# Patient Record
Sex: FEMALE | Race: WHITE | Employment: UNEMPLOYED | URBAN - METROPOLITAN AREA
[De-identification: names, ages, dates, MRNs, and addresses within clinical notes are randomized per-mention and may not be internally consistent; named-entity substitution may affect disease eponyms.]

---

## 2017-05-30 ENCOUNTER — HOSPITAL ENCOUNTER (OUTPATIENT)
Dept: LAB | Age: 42
Discharge: HOME | End: 2017-05-30

## 2017-05-30 PROCEDURE — 88312 SPECIAL STAINS GROUP 1: CPT | Performed by: INTERNAL MEDICINE

## 2017-05-30 PROCEDURE — 88305 TISSUE EXAM BY PATHOLOGIST: CPT | Performed by: INTERNAL MEDICINE

## 2018-03-02 PROBLEM — E55.9 VITAMIN D DEFICIENCY: Status: ACTIVE | Noted: 2018-03-02

## 2018-04-03 ENCOUNTER — HOSPITAL ENCOUNTER (OUTPATIENT)
Dept: MAMMOGRAPHY | Age: 43
Discharge: HOME | End: 2018-04-03
Payer: MEDICARE

## 2018-04-03 DIAGNOSIS — Z12.39 SCREENING FOR BREAST CANCER: ICD-10-CM

## 2018-04-03 PROCEDURE — 77067 SCR MAMMO BI INCL CAD: CPT

## 2018-04-17 PROBLEM — Z00.00 ANNUAL PHYSICAL EXAM: Status: ACTIVE | Noted: 2018-04-17

## 2018-04-17 PROBLEM — R51.9 HA (HEADACHE): Status: ACTIVE | Noted: 2018-04-17

## 2018-05-24 PROBLEM — D64.9 ANEMIA: Status: ACTIVE | Noted: 2018-05-16

## 2018-10-10 ENCOUNTER — HOSPITAL ENCOUNTER (OUTPATIENT)
Dept: PHYSICAL THERAPY | Age: 43
Discharge: HOME | End: 2018-10-10
Payer: MEDICARE

## 2018-10-10 PROCEDURE — 97162 PT EVAL MOD COMPLEX 30 MIN: CPT

## 2018-10-10 PROCEDURE — G8979 MOBILITY GOAL STATUS: HCPCS

## 2018-10-10 PROCEDURE — G8978 MOBILITY CURRENT STATUS: HCPCS

## 2018-10-10 NOTE — THERAPY EVALUATION
Tammy Zimmerman  : 1975  Primary: [place]  [place] [facility] at [address] [facility] [address] [facility], [place] [address] [address] [facility]  [phone]   [phone]       OUTPATIENT PHYSICAL THERAPY:Initial Assessment 10/10/2018    ICD-10: Treatment Diagnosis: Pain in right ankle and joints of right foot (M25.571)      Stiffness of right ankle, not elsewhere classified (M25.671)    Difficulty in walking, not elsewhere classified (R26.2)   Precautions/Allergies:   Abilify [aripiprazole]; Geodon [ziprasidone hcl]; Suzzanna Mirna; Ambien [zolpidem]; and Topamax [topiramate]   Fall Risk Score: 3 (? 5 = High Risk)  MD Orders: Evaluate and treat  MEDICAL/REFERRING DIAGNOSIS:  foot, right   DATE OF ONSET: 2018  REFERRING PHYSICIAN: Barry Sandoval MD  RETURN PHYSICIAN APPOINTMENT: 2018 (pt unsure of date)   This section established at most recent assessment  ASSESSMENT:  Tammy Zimmerman is a 41 y.o. female who presents to physical therapy for R ankle/foot pain that started in 2018 and has recently gotten worse. This session, she demonstrated decreased B LE strength/endurance, pain with R ankle mobility, multiple postural/gait deficits, decreased standing/walking tolerance, and decreased functional mobility as evident by a score of 36/84 on the Foot and Ankle Ability Measure and a score of 27/80 on the Lower Extremity Functional Scale (with lower scores indicating increased disability). Pt may benefit from skilled PT to address the above listed deficits to improve ability to perform pain-free ADLs/IADLs and to improve overall quality of life prior to discharge. PROBLEM LIST (Impacting functional limitations):  1. Decreased Strength  2. Decreased ADL/Functional Activities  3. Decreased Transfer Abilities  4. Decreased Ambulation Ability/Technique  5. Decreased Balance  6. Increased Pain  7.  Decreased Activity Tolerance  8. Increased Fatigue  9. Decreased Flexibility/Joint Mobility  10. Edema/Girth INTERVENTIONS PLANNED:  1. Balance Exercise  2. Bed Mobility  3. Cold  4. Electrical Stimulation  5. Family Education  6. Gait Training  7. Heat  8. Home Exercise Program (HEP)  9. Manual Therapy  10. Neuromuscular Re-education/Strengthening  11. Range of Motion (ROM)  12. Therapeutic Activites  13. Therapeutic Exercise/Strengthening  14. Transcutaneous Electrical Nerve Stimulation (TENS)  15. Transfer Training  16. Ultrasound (US)   TREATMENT PLAN:  Effective Dates: 10/10/2018 TO 12/9/2018 (60 days). Frequency/Duration: 2 times a week for 60 Days  GOALS: (Goals have been discussed and agreed upon with patient.)  Short-Term Functional Goals: Time Frame: 30 days  1. Pt will be compliant with HEP in order to increase LE strength/endurance/mobility to improve functional mobility and overall quality of life. 2. Pt will improve score on the Lower Extremity Functional Scale to 38/80 in order to demonstrate improved functional mobility and quality of life. 3. Pt will improve score on the Foot and Ankle Ability Measure to 41/84 in order to demonstrate improved functional mobility and quality of life. 4. Pt will report standing for > 5 minutes with minimal to no increase in pain in order to be able to stand for prolonged periods as needed to perform chores. 5. Pt will be able to ascend/descend 6 steps with S with or without rail with safe gait pattern and minimal to no increase in pain in order to improve community mobility. Discharge Goals: Time Frame: 60 days  1. Pt will be independent with HEP in order to increase LE strength/endurance/mobility to improve functional mobility and overall quality of life. 2. Pt will improve score on the Lower Extremity Functional Scale to 50/80 in order to demonstrate improved functional mobility and quality of life.    3. Pt will improve score on the Foot and Ankle Ability Measure to 45/84 in order to demonstrate improved functional mobility and quality of life. 4. Pt will report standing for > 10 minutes with minimal to no increase in pain in order to be able to stand for prolonged periods as needed to perform chores. 5. Pt will be able to ascend/descend 12 steps Magdalena with or without rail with reciprocal gait pattern and minimal to no increase in pain in order to improve community mobility. Rehabilitation Potential For Stated Goals: Good  Regarding Sanjay Pelletier's therapy, I certify that the treatment plan above will be carried out by a therapist or under their direction. Thank you for this referral,  KRISTINE QuachT     Referring Physician Signature: Ofe Giron MD              Date                    HISTORY:   History of Present Injury/Illness (Reason for Referral): *History per pt or pt's family except where otherwise noted  Pt states pain in her R ankle started in July 2018 (states she was walking more than usual), and she states that she was being treated for it (with prednisone) and it had gotten better with taping, but it has recently gotten worse again (early September when she stopped doing taping and started everyday walking again). States the pain has gotten better temporarily with Prednisone, but states that when she goes off the Prednisone it starts hurting again. Pain at worst is 6-7/10 (aggravating activities include walking > 5 minutes, prolonged standing > 1-2 minutes - even in shower). Pain at best is 0/10 (eases pain with prednisone, rest, taping, mobic, ice). Pt states she was given boot to wear to help with the pain but she cannot wear this while driving (she is supposed to wear this 4 weeks starting last Friday). Past Medical History/Comorbidities:   Ms. Michelle Betancourt  has a past medical history of Acid reflux; ADHD (attention deficit hyperactivity disorder); Allergic rhinitis (1/20/2014); Anxiety disorder (1/20/2014); Arthritis;  Asthma; B12 deficiency (1/20/2014); Cerumen impaction; Chronic sinusitis; Depression (1/20/2014); Deviated nasal septum; Endocrine disease; Fibromyalgia; GERD (gastroesophageal reflux disease) (1/20/2014); Headache; Hypothyroidism; Low blood sugar; Meniere disease (1/20/2014); Migraine; Nasal obstruction; OCD (obsessive compulsive disorder); Psychiatric disorder; Special examinations; Special examinations; Tinnitus; and Unspecified hypothyroidism (1/20/2014). Ms. Heber Grijalva  has a past surgical history that includes hx orthopaedic; hx heent; hx heent (07/03/2013); and hx heent (06/2017). Social History/Living Environment:    lives alone in one story apartment with elevator to get up (15 flights of stairs with B railing - occasionally has to take the stairs down if she has an appointment and the elevator is busy)  Prior Level of Function/Work/Activity:  On disability for bipolar disorder and anxiety; likes to walk downtown with her friends, does housework (vacuuming, tidying up, light cooking); has to shop for her groceries  Dominant Side:         RIGHT  Other Clinical Tests:          X-rays of R foot (showed heel spur according to pt)  Previous Treatment Approaches:          Prednisone has helped  Personal Factors:          Sex:  female        Age:  41 y.o. Current Medications:    Current Outpatient Prescriptions:     diclofenac (VOLTAREN) 1 % gel, Apply 2 g to affected area four (4) times daily. Apply to right foot as needed 2-4 times a day, Disp: 100 g, Rfl: 2    tiZANidine (ZANAFLEX) 4 mg tablet, Take 1 Tab by mouth three (3) times daily as needed. , Disp: 90 Tab, Rfl: 1    levothyroxine (SYNTHROID) 125 mcg tablet, Take 1 Tab by mouth daily. , Disp: 90 Tab, Rfl: 0    Dexlansoprazole (DEXILANT) 60 mg CpDB, Take 1 Cap by mouth every morning., Disp: 90 Cap, Rfl: 0    candesartan (ATACAND) 4 mg tablet, Take 1 Tab by mouth daily. , Disp: 90 Tab, Rfl: 0    IRON, FERROUS SULFATE, PO, Take 45 mg by mouth daily. , Disp: , Rfl:    cyanocobalamin 1,000 mcg tablet, Take 1 Tab by mouth daily. , Disp: 60 Tab, Rfl: 0    meloxicam (MOBIC) 15 mg tablet, Take 1 Tab by mouth daily. , Disp: 92 Tab, Rfl: 1    butalbital-acetaminophen (BUPAP)  mg tab, Take 1 Tab by mouth daily as needed. , Disp: 30 Tab, Rfl: 5    montelukast (SINGULAIR) 10 mg tablet, Take 1 Tab by mouth daily. , Disp: 92 Tab, Rfl: 1    cetirizine (ZYRTEC) 10 mg tablet, Take 1 Tab by mouth daily. , Disp: 92 Tab, Rfl: 1    venlafaxine-SR (EFFEXOR XR) 150 mg capsule, Take  by mouth daily. , Disp: , Rfl:     escitalopram oxalate (LEXAPRO) 20 mg tablet, Take 20 mg by mouth daily. , Disp: , Rfl:     Magnesium Oxide 500 mg cap, Take 500 mg by mouth daily. , Disp: , Rfl:     traMADol (ULTRAM) 50 mg tablet, Take 1 Tab by mouth every eight (8) hours as needed for Pain. Max Daily Amount: 150 mg., Disp: 30 Tab, Rfl: 1    amphetamine-dextroamphetamine XR (ADDERALL XR) 20 mg XR capsule, TK 1 C PO QD, Disp: , Rfl: 0    AMITIZA 24 mcg capsule, TK 1 C PO BID WF AND WATER, Disp: , Rfl: 6    albuterol (PROVENTIL HFA, VENTOLIN HFA, PROAIR HFA) 90 mcg/actuation inhaler, Take 2 Puffs by inhalation as needed for Wheezing., Disp: 1 Inhaler, Rfl: 2    ALPRAZolam (XANAX) 1 mg tablet, 0.5 mg three (3) times daily as needed. , Disp: , Rfl: 4    SUMAtriptan (IMITREX) 50 mg tablet, Take 50 mg by mouth., Disp: , Rfl:     risperiDONE (RISPERDAL) 2 mg tablet, Take 3 mg by mouth nightly., Disp: , Rfl: 3    LAMICTAL 200 mg tablet, TK 1 T PO BID, Disp: , Rfl: 4    pregabalin (LYRICA) 75 mg capsule, 2 PO q hs, Disp: , Rfl:     melatonin tab tablet, Take 5 mg by mouth., Disp: , Rfl:     cholecalciferol (VITAMIN D3) 5,000 unit capsule, Take 2,000 Units by mouth., Disp: , Rfl:     mirabegron ER (MYRBETRIQ) 25 mg ER tablet, Take 1 Tab by mouth daily.  (Patient taking differently: Take 25 mg by mouth nightly.), Disp: 30 Tab, Rfl: 12   Date Last Reviewed:  10/10/2018   # of Personal Factors/Comorbidities that affect the Plan of Care: 3+: HIGH COMPLEXITY   EXAMINATION:   Initial assessment on 10/10/2018  Observation/Orthostatic Postural Assessment:    The following postural deficits were noted in sitting:  loss of cervical lordosis, increased thoracic kyphosis, forward head, rounded shoulders, posterior pelvic tilt   The following postural deficits were noted in standing: forward trunk flexion, pronated foot L > R, slight genu valgus noted B  Palpation:          Pt with tenderness noted at posterior R heel and at dorsum of R foot  ROM:    Initial measurement: (on 10/10/2018) Initial measurement: (on 10/10/2018) Reassessment measurement:  Reassessment measurement:    L LE: WFL throughout, but excessive hip IR passively R LE: WFL throughout, but excessive hip IR passively; ankle motion [facility] but significant tightness noted in dorsiflexors with pain in ankle       Strength:    Initial measurement: (on 10/10/2018) Initial measurement: (on 10/10/2018) Reassessment measurement:  Reassessment measurement:    L LE:  Hip flexion: 5/5   Hip extension: 4-/5  Hip abduction: 4-/5  Hip adduction: 5/5  Hip IR: 4+/5  Hip ER: 4/5   Knee flexion:  4/5  Knee extension: 5/5  Ankle DF: 4/5 R LE:  Hip flexion: 5/5  Hip extension: 4-/5  Hip abduction: 4/5  Hip adduction: 4/5  Hip IR: 4+/5  Hip ER: 4/5  Knee flexion: 4+/5  Knee extension: 5/5  Ankle DF: 4-/5       Special Tests:          No leg length discrepancy noted  Neurological Screen:        Myotomes:  WFL        Dermatomes:  No deficits noted  Functional Mobility:         Gait/Ambulation:  Pt ambulates with no AD with following gait deficits: decreased B step length/clearance, increased pronation at each foot in stance phase, decreased B heel strike, decreased B arm swing, slight forward trunk flexion, increased lateral sway        Transfers:  Pt able to stand/sit with minimal to no UE use        Bed Mobility:  Pt able to perform Magdalena with increased time  Balance:          Slight lateral sway with ambulation    Body Structures Involved:  1. Nerves  2. Bones  3. Joints  4. Muscles  5. Ligaments Body Functions Affected:  1. Sensory/Pain  2. Neuromusculoskeletal  3. Movement Related Activities and Participation Affected:  1. General Tasks and Demands  2. Mobility  3. Self Care  4. Domestic Life  5. Interpersonal Interactions and Relationships  6. Community, Social and [place]   # of elements that affect the Plan of Care: 4+: HIGH COMPLEXITY   CLINICAL PRESENTATION:   Presentation: Evolving clinical presentation with changing clinical characteristics: MODERATE COMPLEXITY   CLINICAL DECISION MAKING:   Outcome Measure: Tool Used: Lower Extremity Functional Scale (LEFS)  Score:  Initial: 27/80 Most Recent: X/80 (Date: -- )   Interpretation of Score: 20 questions each scored on a 5 point scale with 0 representing \"extreme difficulty or unable to perform\" and 4 representing \"no difficulty\". The lower the score, the greater the functional disability. 80/80 represents no disability. Minimal detectable change is 9 points. Score 80 79-63 62-48 47-32 31-16 15-1 0   Modifier CH CI CJ CK CL CM CN     ? Mobility - Walking and Moving Around:     - CURRENT STATUS: CL - 60%-79% impaired, limited or restricted    - GOAL STATUS: CJ - 20%-39% impaired, limited or restricted    - D/C STATUS:  ---------------To be determined---------------    Tool Used: PT/OT FOOT AND ANKLE ABILITY MEASURE  Score:  Initial: 36/84 Most Recent: X (Date: -- )   Interpretation of Score: For the \"Activities of Daily Living\", there are 21 questions each scored on a 5 point scale with 0 representing \"Unable to do\" and 4 representing \"No difficulty\". The lower the score, the greater the functional disability. 84/84 represents no disability. Minimal detectable change is 5.7 points.   With the addition of the 8 questions in the \"Sports Subscale,\" there are 29 questions, each scored on a 5 point scale with 0 representing \"Unable to do\" and 4 representing \"No difficulty\". The lower the score, the greater the functional disability. 116/116 represents no disability. Minimal detectable change is 12.3 points. Activities of Daily Living:  Score 84 83-68 67-51 50-34 33-18 17-1 0   Modifier CH CI CJ CK CL CM CN     Activities of Daily Living + Sports Subscale:  Score 116 115-94 93-71 70-47 46-24 23-1 0   Modifier CH CI CJ CK CL CM CN     Payor: [employer] MEDICARE / Plan: [address] [place] [address] HMO / Product Type: Managed Care Medicare /   Medical Necessity:   · Patient is expected to demonstrate progress in strength, range of motion, balance and functional technique to improve ability to perform pain-free standing/ambulation. · Patient demonstrates good rehab potential due to higher previous functional level. Reason for Services/Other Comments:  · Patient continues to require modification of therapeutic interventions to increase complexity of exercises. Use of outcome tool(s) and clinical judgement create a POC that gives a: Questionable prediction of patient's progress: MODERATE COMPLEXITY   TREATMENT:   (In addition to Assessment/Re-Assessment sessions the following treatments were rendered)  Subjective comments at beginning of session: See history above. Assessment only today, no treatment provided. Treatment/Session Assessment:  See assessment above. Showed pt how to do eccentric calf raises at home  · Pain/ Symptoms: Initial:   1-2/10 in posterior R ankle Post Session:  No pain reported at end of session ·   Compliance with Program/Exercises: Will assess as treatment progresses. · Recommendations/Intent for next treatment session: \"Next visit will focus on advancements to more challenging activities and reduction in assistance provided\".  Manual therapy/modalities as needed to reduce tightness and pain; work on eccentric plantarflexor strengthening and balance  Total Treatment Duration:  PT Patient Time In/Time Out  Time In: 9142  Time Out: 1 Cristiana Montoya, DPT    Future Appointments  Date Time Provider Jo An   10/15/2018 10:15 AM Peerless [place], DPT [facility]   10/17/2018 10:00 AM Peerless Manolo, DPT [facility]   10/18/2018 10:00 AM MTV PM LAB/RAD [facility]   10/22/2018 10:15 AM Peerless [place], DPT [facility]   10/24/2018 10:15 AM Peerless Manolo, DPT [facility]   10/25/2018 10:00 AM Angeal Higgins MD [facility]   10/29/2018 10:15 AM Peerless [place], DPT [facility]   10/31/2018 10:15 AM Peerless [place], DPT [facility]   11/5/2018 10:30 AM Peerless Manolo, DPT [facility]   11/7/2018 10:15 AM [facility] [place]

## 2018-10-10 NOTE — PROGRESS NOTES
Ambulatory/Rehab Services H2 Model Falls Risk Assessment    Risk Factor Pts. ·   Confusion/Disorientation/Impulsivity  []    4 ·   Symptomatic Depression  [x]   2 ·   Altered Elimination  []   1 ·   Dizziness/Vertigo  []   1 ·   Gender (Male)  []   1 ·   Any administered antiepileptics (anticonvulsants):  []   2 ·   Any administered benzodiazepines:  [x]   1 ·   Visual Impairment (specify):  []   1 ·   Portable Oxygen Use  []   1 ·   Orthostatic ? BP  []   1 ·   History of Recent Falls (within 3 mos.)  []   5     Ability to Rise from Chair (choose one) Pts. ·   Ability to rise in a single movement  [x]   0 ·   Pushes up, successful in one attempt  []   1 ·   Multiple attempts, but successful  []   3 ·   Unable to rise without assistance  []   4   Total: (5 or greater = High Risk) 3     Falls Prevention Plan:   []                Physical Limitations to Exercise (specify):   []                Mobility Assistance Device (type):   []                Exercise/Equipment Adaptation (specify):    ©2010 [facility] of [address] Patent #[id number].  Federal Law prohibits the replication, distribution or use without written permission from [facility] [employer]

## 2018-10-15 ENCOUNTER — HOSPITAL ENCOUNTER (OUTPATIENT)
Dept: PHYSICAL THERAPY | Age: 43
Discharge: HOME | End: 2018-10-15
Payer: MEDICARE

## 2018-10-15 PROCEDURE — 97110 THERAPEUTIC EXERCISES: CPT

## 2018-10-15 PROCEDURE — 97140 MANUAL THERAPY 1/> REGIONS: CPT

## 2018-10-15 NOTE — PROGRESS NOTES
Geena Reilly  : 1975  Primary: Lilia Nunez Medicare [place]  Secondary: [place] [facility], [address], [place] [address] [address] [facility]  [phone]   [phone]       OUTPATIENT PHYSICAL THERAPY:Daily Note 10/15/2018    ICD-10: Treatment Diagnosis: Pain in right ankle and joints of right foot (M25.571)      Stiffness of right ankle, not elsewhere classified (M25.671)    Difficulty in walking, not elsewhere classified (R26.2)   Precautions/Allergies:   Abilify [aripiprazole]; Geodon [ziprasidone hcl]; Wendelyn Tacho; Ambien [zolpidem]; and Topamax [topiramate]   Fall Risk Score: 3 (? 5 = High Risk)  MD Orders: Evaluate and treat  MEDICAL/REFERRING DIAGNOSIS:  foot, right   DATE OF ONSET: 2018  REFERRING PHYSICIAN: Viki Chappell MD  RETURN PHYSICIAN APPOINTMENT: 2018 (pt unsure of date)   This section established at most recent assessment  ASSESSMENT:  Geena Reilly is a 41 y.o. female who presents to physical therapy for R ankle/foot pain that started in 2018 and has recently gotten worse. This session, she demonstrated decreased B LE strength/endurance, pain with R ankle mobility, multiple postural/gait deficits, decreased standing/walking tolerance, and decreased functional mobility as evident by a score of 36/84 on the Foot and Ankle Ability Measure and a score of 27/80 on the Lower Extremity Functional Scale (with lower scores indicating increased disability). Pt may benefit from skilled PT to address the above listed deficits to improve ability to perform pain-free ADLs/IADLs and to improve overall quality of life prior to discharge. PROBLEM LIST (Impacting functional limitations):  1. Decreased Strength  2. Decreased ADL/Functional Activities  3. Decreased Transfer Abilities  4. Decreased Ambulation Ability/Technique  5. Decreased Balance  6. Increased Pain  7.  Decreased Activity Tolerance  8. Increased Fatigue  9. Decreased Flexibility/Joint Mobility  10. Edema/Girth INTERVENTIONS PLANNED:  1. Balance Exercise  2. Bed Mobility  3. Cold  4. Electrical Stimulation  5. Family Education  6. Gait Training  7. Heat  8. Home Exercise Program (HEP)  9. Manual Therapy  10. Neuromuscular Re-education/Strengthening  11. Range of Motion (ROM)  12. Therapeutic Activites  13. Therapeutic Exercise/Strengthening  14. Transcutaneous Electrical Nerve Stimulation (TENS)  15. Transfer Training  16. Ultrasound (US)   TREATMENT PLAN:  Effective Dates: 10/10/2018 TO 12/9/2018 (60 days). Frequency/Duration: 2 times a week for 60 Days  GOALS: (Goals have been discussed and agreed upon with patient.)  Short-Term Functional Goals: Time Frame: 30 days  1. Pt will be compliant with HEP in order to increase LE strength/endurance/mobility to improve functional mobility and overall quality of life. 2. Pt will improve score on the Lower Extremity Functional Scale to 38/80 in order to demonstrate improved functional mobility and quality of life. 3. Pt will improve score on the Foot and Ankle Ability Measure to 41/84 in order to demonstrate improved functional mobility and quality of life. 4. Pt will report standing for > 5 minutes with minimal to no increase in pain in order to be able to stand for prolonged periods as needed to perform chores. 5. Pt will be able to ascend/descend 6 steps with S with or without rail with safe gait pattern and minimal to no increase in pain in order to improve community mobility. Discharge Goals: Time Frame: 60 days  1. Pt will be independent with HEP in order to increase LE strength/endurance/mobility to improve functional mobility and overall quality of life. 2. Pt will improve score on the Lower Extremity Functional Scale to 50/80 in order to demonstrate improved functional mobility and quality of life.    3. Pt will improve score on the Foot and Ankle Ability Measure to 45/84 in order to demonstrate improved functional mobility and quality of life. 4. Pt will report standing for > 10 minutes with minimal to no increase in pain in order to be able to stand for prolonged periods as needed to perform chores. 5. Pt will be able to ascend/descend 12 steps Magdalena with or without rail with reciprocal gait pattern and minimal to no increase in pain in order to improve community mobility. Rehabilitation Potential For Stated Goals: Good  Regarding Massimo Pelletier's therapy, I certify that the treatment plan above will be carried out by a therapist or under their direction. Thank you for this referral,  Wily Ames DPT     Referring Physician Signature: Robin Elkins MD              Date                    HISTORY:   History of Present Injury/Illness (Reason for Referral): *History per pt or pt's family except where otherwise noted  Pt states pain in her R ankle started in July 2018 (states she was walking more than usual), and she states that she was being treated for it (with prednisone) and it had gotten better with taping, but it has recently gotten worse again (early September when she stopped doing taping and started everyday walking again). States the pain has gotten better temporarily with Prednisone, but states that when she goes off the Prednisone it starts hurting again. Pain at worst is 6-7/10 (aggravating activities include walking > 5 minutes, prolonged standing > 1-2 minutes - even in shower). Pain at best is 0/10 (eases pain with prednisone, rest, taping, mobic, ice). Pt states she was given boot to wear to help with the pain but she cannot wear this while driving (she is supposed to wear this 4 weeks starting last Friday). Past Medical History/Comorbidities:   Ms. Merlin Frisk  has a past medical history of Acid reflux; ADHD (attention deficit hyperactivity disorder); Allergic rhinitis (1/20/2014); Anxiety disorder (1/20/2014); Arthritis;  Asthma; B12 deficiency (1/20/2014); Cerumen impaction; Chronic sinusitis; Depression (1/20/2014); Deviated nasal septum; Endocrine disease; Fibromyalgia; GERD (gastroesophageal reflux disease) (1/20/2014); Headache; Hypothyroidism; Low blood sugar; Meniere disease (1/20/2014); Migraine; Nasal obstruction; OCD (obsessive compulsive disorder); Psychiatric disorder; Special examinations; Special examinations; Tinnitus; and Unspecified hypothyroidism (1/20/2014). Ms. Juventino Aguilar  has a past surgical history that includes hx orthopaedic; hx heent; hx heent (07/03/2013); and hx heent (06/2017). Social History/Living Environment:    lives alone in one story apartment with elevator to get up (15 flights of stairs with B railing - occasionally has to take the stairs down if she has an appointment and the elevator is busy)  Prior Level of Function/Work/Activity:  On disability for bipolar disorder and anxiety; likes to walk downtown with her friends, does housework (vacuuming, tidying up, light cooking); has to shop for her groceries  Dominant Side:         RIGHT  Other Clinical Tests:          X-rays of R foot (showed heel spur according to pt)  Previous Treatment Approaches:          Prednisone has helped  Personal Factors:          Sex:  female        Age:  41 y.o. Current Medications:    Current Outpatient Prescriptions:     diclofenac (VOLTAREN) 1 % gel, Apply 2 g to affected area four (4) times daily. Apply to right foot as needed 2-4 times a day, Disp: 100 g, Rfl: 2    tiZANidine (ZANAFLEX) 4 mg tablet, Take 1 Tab by mouth three (3) times daily as needed. , Disp: 90 Tab, Rfl: 1    levothyroxine (SYNTHROID) 125 mcg tablet, Take 1 Tab by mouth daily. , Disp: 90 Tab, Rfl: 0    Dexlansoprazole (DEXILANT) 60 mg CpDB, Take 1 Cap by mouth every morning., Disp: 90 Cap, Rfl: 0    candesartan (ATACAND) 4 mg tablet, Take 1 Tab by mouth daily. , Disp: 90 Tab, Rfl: 0    IRON, FERROUS SULFATE, PO, Take 45 mg by mouth daily. , Disp: , Rfl:    cyanocobalamin 1,000 mcg tablet, Take 1 Tab by mouth daily. , Disp: 60 Tab, Rfl: 0    meloxicam (MOBIC) 15 mg tablet, Take 1 Tab by mouth daily. , Disp: 92 Tab, Rfl: 1    butalbital-acetaminophen (BUPAP)  mg tab, Take 1 Tab by mouth daily as needed. , Disp: 30 Tab, Rfl: 5    montelukast (SINGULAIR) 10 mg tablet, Take 1 Tab by mouth daily. , Disp: 92 Tab, Rfl: 1    cetirizine (ZYRTEC) 10 mg tablet, Take 1 Tab by mouth daily. , Disp: 92 Tab, Rfl: 1    venlafaxine-SR (EFFEXOR XR) 150 mg capsule, Take  by mouth daily. , Disp: , Rfl:     escitalopram oxalate (LEXAPRO) 20 mg tablet, Take 20 mg by mouth daily. , Disp: , Rfl:     Magnesium Oxide 500 mg cap, Take 500 mg by mouth daily. , Disp: , Rfl:     traMADol (ULTRAM) 50 mg tablet, Take 1 Tab by mouth every eight (8) hours as needed for Pain. Max Daily Amount: 150 mg., Disp: 30 Tab, Rfl: 1    amphetamine-dextroamphetamine XR (ADDERALL XR) 20 mg XR capsule, TK 1 C PO QD, Disp: , Rfl: 0    AMITIZA 24 mcg capsule, TK 1 C PO BID WF AND WATER, Disp: , Rfl: 6    albuterol (PROVENTIL HFA, VENTOLIN HFA, PROAIR HFA) 90 mcg/actuation inhaler, Take 2 Puffs by inhalation as needed for Wheezing., Disp: 1 Inhaler, Rfl: 2    ALPRAZolam (XANAX) 1 mg tablet, 0.5 mg three (3) times daily as needed. , Disp: , Rfl: 4    SUMAtriptan (IMITREX) 50 mg tablet, Take 50 mg by mouth., Disp: , Rfl:     risperiDONE (RISPERDAL) 2 mg tablet, Take 3 mg by mouth nightly., Disp: , Rfl: 3    LAMICTAL 200 mg tablet, TK 1 T PO BID, Disp: , Rfl: 4    pregabalin (LYRICA) 75 mg capsule, 2 PO q hs, Disp: , Rfl:     melatonin tab tablet, Take 5 mg by mouth., Disp: , Rfl:     cholecalciferol (VITAMIN D3) 5,000 unit capsule, Take 2,000 Units by mouth., Disp: , Rfl:     mirabegron ER (MYRBETRIQ) 25 mg ER tablet, Take 1 Tab by mouth daily.  (Patient taking differently: Take 25 mg by mouth nightly.), Disp: 30 Tab, Rfl: 12   Date Last Reviewed:  10/15/2018   # of Personal Factors/Comorbidities that affect the Plan of Care: 3+: HIGH COMPLEXITY   EXAMINATION:   Initial assessment on 10/10/2018  Observation/Orthostatic Postural Assessment:    The following postural deficits were noted in sitting:  loss of cervical lordosis, increased thoracic kyphosis, forward head, rounded shoulders, posterior pelvic tilt   The following postural deficits were noted in standing: forward trunk flexion, pronated foot L > R, slight genu valgus noted B  Palpation:          Pt with tenderness noted at posterior R heel and at dorsum of R foot  ROM:    Initial measurement: (on 10/10/2018) Initial measurement: (on 10/10/2018) Reassessment measurement:  Reassessment measurement:    L LE: WFL throughout, but excessive hip IR passively R LE: WFL throughout, but excessive hip IR passively; ankle motion [facility] but significant tightness noted in dorsiflexors with pain in ankle       Strength:    Initial measurement: (on 10/10/2018) Initial measurement: (on 10/10/2018) Reassessment measurement:  Reassessment measurement:    L LE:  Hip flexion: 5/5   Hip extension: 4-/5  Hip abduction: 4-/5  Hip adduction: 5/5  Hip IR: 4+/5  Hip ER: 4/5   Knee flexion:  4/5  Knee extension: 5/5  Ankle DF: 4/5 R LE:  Hip flexion: 5/5  Hip extension: 4-/5  Hip abduction: 4/5  Hip adduction: 4/5  Hip IR: 4+/5  Hip ER: 4/5  Knee flexion: 4+/5  Knee extension: 5/5  Ankle DF: 4-/5       Special Tests:          No leg length discrepancy noted  Neurological Screen:        Myotomes:  WFL        Dermatomes:  No deficits noted  Functional Mobility:         Gait/Ambulation:  Pt ambulates with no AD with following gait deficits: decreased B step length/clearance, increased pronation at each foot in stance phase, decreased B heel strike, decreased B arm swing, slight forward trunk flexion, increased lateral sway        Transfers:  Pt able to stand/sit with minimal to no UE use        Bed Mobility:  Pt able to perform Magdalena with increased time  Balance:          Slight lateral sway with ambulation    Body Structures Involved:  1. Nerves  2. Bones  3. Joints  4. Muscles  5. Ligaments Body Functions Affected:  1. Sensory/Pain  2. Neuromusculoskeletal  3. Movement Related Activities and Participation Affected:  1. General Tasks and Demands  2. Mobility  3. Self Care  4. Domestic Life  5. Interpersonal Interactions and Relationships  6. Community, Social and [place]   # of elements that affect the Plan of Care: 4+: HIGH COMPLEXITY   CLINICAL PRESENTATION:   Presentation: Evolving clinical presentation with changing clinical characteristics: MODERATE COMPLEXITY   CLINICAL DECISION MAKING:   Outcome Measure: Tool Used: Lower Extremity Functional Scale (LEFS)  Score:  Initial: 27/80 Most Recent: X/80 (Date: -- )   Interpretation of Score: 20 questions each scored on a 5 point scale with 0 representing \"extreme difficulty or unable to perform\" and 4 representing \"no difficulty\". The lower the score, the greater the functional disability. 80/80 represents no disability. Minimal detectable change is 9 points. Score 80 79-63 62-48 47-32 31-16 15-1 0   Modifier CH CI CJ CK CL CM CN     ? Mobility - Walking and Moving Around:     - CURRENT STATUS: CL - 60%-79% impaired, limited or restricted    - GOAL STATUS: CJ - 20%-39% impaired, limited or restricted    - D/C STATUS:  ---------------To be determined---------------    Tool Used: PT/OT FOOT AND ANKLE ABILITY MEASURE  Score:  Initial: 36/84 Most Recent: X (Date: -- )   Interpretation of Score: For the \"Activities of Daily Living\", there are 21 questions each scored on a 5 point scale with 0 representing \"Unable to do\" and 4 representing \"No difficulty\". The lower the score, the greater the functional disability. 84/84 represents no disability. Minimal detectable change is 5.7 points.   With the addition of the 8 questions in the \"Sports Subscale,\" there are 29 questions, each scored on a 5 point scale with 0 representing \"Unable to do\" and 4 representing \"No difficulty\". The lower the score, the greater the functional disability. 116/116 represents no disability. Minimal detectable change is 12.3 points. Activities of Daily Living:  Score 84 83-68 67-51 50-34 33-18 17-1 0   Modifier CH CI CJ CK CL CM CN     Activities of Daily Living + Sports Subscale:  Score 116 115-94 93-71 70-47 46-24 23-1 0   Modifier CH CI CJ CK CL CM CN     Payor: [employer] MEDICARE / Plan: [address] [place] [address] HMO / Product Type: Managed Care Medicare /   Medical Necessity:   · Patient is expected to demonstrate progress in strength, range of motion, balance and functional technique to improve ability to perform pain-free standing/ambulation. · Patient demonstrates good rehab potential due to higher previous functional level. Reason for Services/Other Comments:  · Patient continues to require modification of therapeutic interventions to increase complexity of exercises. Use of outcome tool(s) and clinical judgement create a POC that gives a: Questionable prediction of patient's progress: MODERATE COMPLEXITY   TREATMENT:   (In addition to Assessment/Re-Assessment sessions the following treatments were rendered)  Subjective comments at beginning of session: Pt states she felt okay after last session, and she did try to do the exercises, although she backed off of the exercises if she felt too sore; she also did walking (she iced her ankle after walking); she brings in ankle boot this session  THERAPEUTIC EXERCISE: (12 minutes):  Exercises per grid below to improve mobility, strength, balance and dynamic movement of ankle - right to improve functional mobility. Required minimal visual, verbal and manual cues to promote proper body alignment, promote proper body posture, promote proper body mechanics and promote proper body breathing techniques. Progressed resistance, range, repetitions and complexity of movement as indicated.     Date:  10/15/2018 Date:   Date:   Activity/Exercise Parameters Parameters    Physiostep L1 resistance for 8 minutes to increase strength/endurance     Ankle plantarflexor stretch on incline board 30 second hold x 3 reps with knees extended then flexed                                   *given in HEP  [facility]      Manual therapy (42 minutes): With pt in prone position, extensive STM to R gastrocnemius and soleus as well as cross friction massage to R achilles tendon to reduce tightness and pain and improve ankle mobility    Modalities (10 minutes): With pt in supine position with B LEs supported on wedge, cold pack (pillowcase layer) applied to R ankle to reduce pain and inflammation at end of session. Treatment/Session Assessment:  Pt initially stated pain in her R posterior ankle at beginning of manual therapy session, but stated this reduced after therapist told her to relax her foot (pt had it plantarflexed). She reported reduced pain overall at end of session. Therapist instructed pt to continue working on eccentric plantarflexor strengthening as tolerated and to ice after every strengthening session. · Pain/ Symptoms: Initial:   3/10 in posterior R ankle Post Session:  2/10 ·   Compliance with Program/Exercises: Will assess as treatment progresses. · Recommendations/Intent for next treatment session: \"Next visit will focus on advancements to more challenging activities and reduction in assistance provided\".  Manual therapy/modalities as needed to reduce tightness and pain; work on eccentric plantarflexor strengthening and balance  Total Treatment Duration:  PT Patient Time In/Time Out  Time In: 1015  Time Out: [address] -10, DPT    Future Appointments  Date Time Provider Jo Nolan   10/17/2018 10:00 AM Ricky Cisneros DPT [facility]   10/18/2018 10:00 AM MTV PM LAB/RAD [facility]   10/22/2018 10:15 AM Ricky Cisneros DPT [facility]   10/24/2018 10:15 AM Ricky Cisneros DPT [facility] 10/25/2018 10:00 AM Dat Dixon MD [facility]   10/29/2018 10:15 AM [facility] Mar   10/31/2018 10:15 AM VALENTINO Mar   11/5/2018 10:30 AM VALENTINO Mar   11/7/2018 10:15 AM [facility]

## 2018-10-17 ENCOUNTER — HOSPITAL ENCOUNTER (OUTPATIENT)
Dept: PHYSICAL THERAPY | Age: 43
Discharge: HOME | End: 2018-10-17
Payer: MEDICARE

## 2018-10-17 PROCEDURE — 97140 MANUAL THERAPY 1/> REGIONS: CPT

## 2018-10-17 PROCEDURE — 97110 THERAPEUTIC EXERCISES: CPT

## 2018-10-17 NOTE — PROGRESS NOTES
Mi Alcazar  : 1975  [facility] [employer] [facility]  [facility]: [place] [facility] [address] [facility], [place] [address] [address] [facility]  [phone]   [phone]       OUTPATIENT PHYSICAL THERAPY:Daily Note 10/17/2018    ICD-10: Treatment Diagnosis: Pain in right ankle and joints of right foot (M25.571)      Stiffness of right ankle, not elsewhere classified (M25.671)    Difficulty in walking, not elsewhere classified (R26.2)   Precautions/Allergies:   Abilify [aripiprazole]; Geodon [ziprasidone hcl]; Bud Husbands; Ambien [zolpidem]; and Topamax [topiramate]   Fall Risk Score: 3 (? 5 = High Risk)  MD Orders: Evaluate and treat  MEDICAL/REFERRING DIAGNOSIS:  foot, right   DATE OF ONSET: 2018  REFERRING PHYSICIAN: Phoebe Bills MD  RETURN PHYSICIAN APPOINTMENT: 2018 (pt unsure of date)   This section established at most recent assessment  ASSESSMENT:  Mi Alcazar is a 41 y.o. female who presents to physical therapy for R ankle/foot pain that started in 2018 and has recently gotten worse. This session, she demonstrated decreased B LE strength/endurance, pain with R ankle mobility, multiple postural/gait deficits, decreased standing/walking tolerance, and decreased functional mobility as evident by a score of 36/84 on the Foot and Ankle Ability Measure and a score of 27/80 on the Lower Extremity Functional Scale (with lower scores indicating increased disability). Pt may benefit from skilled PT to address the above listed deficits to improve ability to perform pain-free ADLs/IADLs and to improve overall quality of life prior to discharge. PROBLEM LIST (Impacting functional limitations):  1. Decreased Strength  2. Decreased ADL/Functional Activities  3. Decreased Transfer Abilities  4. Decreased Ambulation Ability/Technique  5. Decreased Balance  6. Increased Pain  7.  Decreased Activity Tolerance  8. Increased Fatigue  9. Decreased Flexibility/Joint Mobility  10. Edema/Girth INTERVENTIONS PLANNED:  1. Balance Exercise  2. Bed Mobility  3. Cold  4. Electrical Stimulation  5. Family Education  6. Gait Training  7. Heat  8. Home Exercise Program (HEP)  9. Manual Therapy  10. Neuromuscular Re-education/Strengthening  11. Range of Motion (ROM)  12. Therapeutic Activites  13. Therapeutic Exercise/Strengthening  14. Transcutaneous Electrical Nerve Stimulation (TENS)  15. Transfer Training  16. Ultrasound (US)   TREATMENT PLAN:  Effective Dates: 10/10/2018 TO 12/9/2018 (60 days). Frequency/Duration: 2 times a week for 60 Days  GOALS: (Goals have been discussed and agreed upon with patient.)  Short-Term Functional Goals: Time Frame: 30 days  1. Pt will be compliant with HEP in order to increase LE strength/endurance/mobility to improve functional mobility and overall quality of life. 2. Pt will improve score on the Lower Extremity Functional Scale to 38/80 in order to demonstrate improved functional mobility and quality of life. 3. Pt will improve score on the Foot and Ankle Ability Measure to 41/84 in order to demonstrate improved functional mobility and quality of life. 4. Pt will report standing for > 5 minutes with minimal to no increase in pain in order to be able to stand for prolonged periods as needed to perform chores. 5. Pt will be able to ascend/descend 6 steps with S with or without rail with safe gait pattern and minimal to no increase in pain in order to improve community mobility. Discharge Goals: Time Frame: 60 days  1. Pt will be independent with HEP in order to increase LE strength/endurance/mobility to improve functional mobility and overall quality of life. 2. Pt will improve score on the Lower Extremity Functional Scale to 50/80 in order to demonstrate improved functional mobility and quality of life.    3. Pt will improve score on the Foot and Ankle Ability Measure to 45/84 in order to demonstrate improved functional mobility and quality of life. 4. Pt will report standing for > 10 minutes with minimal to no increase in pain in order to be able to stand for prolonged periods as needed to perform chores. 5. Pt will be able to ascend/descend 12 steps Magdalena with or without rail with reciprocal gait pattern and minimal to no increase in pain in order to improve community mobility. Rehabilitation Potential For Stated Goals: Good  Regarding Osiel Landa Liyah's therapy, I certify that the treatment plan above will be carried out by a therapist or under their direction. Thank you for this referral,  Yojana Lang DPT     Referring Physician Signature: Francisco Javier Hein MD              Date                    HISTORY:   History of Present Injury/Illness (Reason for Referral): *History per pt or pt's family except where otherwise noted  Pt states pain in her R ankle started in July 2018 (states she was walking more than usual), and she states that she was being treated for it (with prednisone) and it had gotten better with taping, but it has recently gotten worse again (early September when she stopped doing taping and started everyday walking again). States the pain has gotten better temporarily with Prednisone, but states that when she goes off the Prednisone it starts hurting again. Pain at worst is 6-7/10 (aggravating activities include walking > 5 minutes, prolonged standing > 1-2 minutes - even in shower). Pain at best is 0/10 (eases pain with prednisone, rest, taping, mobic, ice). Pt states she was given boot to wear to help with the pain but she cannot wear this while driving (she is supposed to wear this 4 weeks starting last Friday).    Past Medical History/Comorbidities:   Ms. Khoa Benjamin  has a past medical history of Acid reflux, ADHD (attention deficit hyperactivity disorder), Allergic rhinitis, Anxiety disorder, Arthritis, Asthma, B12 deficiency, Cerumen impaction, Chronic sinusitis, Depression, Deviated nasal septum, Endocrine disease, Fibromyalgia, GERD (gastroesophageal reflux disease), Headache, Hypothyroidism, Low blood sugar, Meniere disease, Migraine, Nasal obstruction, OCD (obsessive compulsive disorder), Psychiatric disorder, Special examinations, Special examinations, Tinnitus, and Unspecified hypothyroidism. Ms. John Adhikari  has a past surgical history that includes hx orthopaedic; hx heent; hx heent (07/03/2013); and hx heent (06/2017). Social History/Living Environment:    lives alone in one story apartment with elevator to get up (15 flights of stairs with B railing - occasionally has to take the stairs down if she has an appointment and the elevator is busy)  Prior Level of Function/Work/Activity:  On disability for bipolar disorder and anxiety; likes to walk downtown with her friends, does housework (vacuuming, tidying up, light cooking); has to shop for her groceries  Dominant Side:         RIGHT  Other Clinical Tests:          X-rays of R foot (showed heel spur according to pt)  Previous Treatment Approaches:          Prednisone has helped  Personal Factors:          Sex:  female        Age:  41 y.o. Current Medications:    Current Outpatient Medications:     diclofenac (VOLTAREN) 1 % gel, Apply 2 g to affected area four (4) times daily. Apply to right foot as needed 2-4 times a day, Disp: 100 g, Rfl: 2    tiZANidine (ZANAFLEX) 4 mg tablet, Take 1 Tab by mouth three (3) times daily as needed. , Disp: 90 Tab, Rfl: 1    levothyroxine (SYNTHROID) 125 mcg tablet, Take 1 Tab by mouth daily. , Disp: 90 Tab, Rfl: 0    Dexlansoprazole (DEXILANT) 60 mg CpDB, Take 1 Cap by mouth every morning., Disp: 90 Cap, Rfl: 0    candesartan (ATACAND) 4 mg tablet, Take 1 Tab by mouth daily. , Disp: 90 Tab, Rfl: 0    IRON, FERROUS SULFATE, PO, Take 45 mg by mouth daily. , Disp: , Rfl:     cyanocobalamin 1,000 mcg tablet, Take 1 Tab by mouth daily. , Disp: 60 Tab, Rfl: 0   meloxicam (MOBIC) 15 mg tablet, Take 1 Tab by mouth daily. , Disp: 92 Tab, Rfl: 1    butalbital-acetaminophen (BUPAP)  mg tab, Take 1 Tab by mouth daily as needed. , Disp: 30 Tab, Rfl: 5    montelukast (SINGULAIR) 10 mg tablet, Take 1 Tab by mouth daily. , Disp: 92 Tab, Rfl: 1    cetirizine (ZYRTEC) 10 mg tablet, Take 1 Tab by mouth daily. , Disp: 92 Tab, Rfl: 1    venlafaxine-SR (EFFEXOR XR) 150 mg capsule, Take  by mouth daily. , Disp: , Rfl:     escitalopram oxalate (LEXAPRO) 20 mg tablet, Take 20 mg by mouth daily. , Disp: , Rfl:     Magnesium Oxide 500 mg cap, Take 500 mg by mouth daily. , Disp: , Rfl:     traMADol (ULTRAM) 50 mg tablet, Take 1 Tab by mouth every eight (8) hours as needed for Pain. Max Daily Amount: 150 mg., Disp: 30 Tab, Rfl: 1    amphetamine-dextroamphetamine XR (ADDERALL XR) 20 mg XR capsule, TK 1 C PO QD, Disp: , Rfl: 0    AMITIZA 24 mcg capsule, TK 1 C PO BID WF AND WATER, Disp: , Rfl: 6    albuterol (PROVENTIL HFA, VENTOLIN HFA, PROAIR HFA) 90 mcg/actuation inhaler, Take 2 Puffs by inhalation as needed for Wheezing., Disp: 1 Inhaler, Rfl: 2    ALPRAZolam (XANAX) 1 mg tablet, 0.5 mg three (3) times daily as needed. , Disp: , Rfl: 4    SUMAtriptan (IMITREX) 50 mg tablet, Take 50 mg by mouth., Disp: , Rfl:     risperiDONE (RISPERDAL) 2 mg tablet, Take 3 mg by mouth nightly., Disp: , Rfl: 3    LAMICTAL 200 mg tablet, TK 1 T PO BID, Disp: , Rfl: 4    pregabalin (LYRICA) 75 mg capsule, 2 PO q hs, Disp: , Rfl:     melatonin tab tablet, Take 5 mg by mouth., Disp: , Rfl:     cholecalciferol (VITAMIN D3) 5,000 unit capsule, Take 2,000 Units by mouth., Disp: , Rfl:     mirabegron ER (MYRBETRIQ) 25 mg ER tablet, Take 1 Tab by mouth daily.  (Patient taking differently: Take 25 mg by mouth nightly.), Disp: 30 Tab, Rfl: 12   Date Last Reviewed:  10/17/2018   # of Personal Factors/Comorbidities that affect the Plan of Care: 3+: HIGH COMPLEXITY   EXAMINATION:   Initial assessment on 10/10/2018  Observation/Orthostatic Postural Assessment:    The following postural deficits were noted in sitting:  loss of cervical lordosis, increased thoracic kyphosis, forward head, rounded shoulders, posterior pelvic tilt   The following postural deficits were noted in standing: forward trunk flexion, pronated foot L > R, slight genu valgus noted B  Palpation:          Pt with tenderness noted at posterior R heel and at dorsum of R foot  ROM:    Initial measurement: (on 10/10/2018) Initial measurement: (on 10/10/2018) Reassessment measurement:  Reassessment measurement:    L LE: WFL throughout, but excessive hip IR passively R LE: WFL throughout, but excessive hip IR passively; ankle motion [facility] but significant tightness noted in dorsiflexors with pain in ankle       Strength:    Initial measurement: (on 10/10/2018) Initial measurement: (on 10/10/2018) Reassessment measurement:  Reassessment measurement:    L LE:  Hip flexion: 5/5   Hip extension: 4-/5  Hip abduction: 4-/5  Hip adduction: 5/5  Hip IR: 4+/5  Hip ER: 4/5   Knee flexion:  4/5  Knee extension: 5/5  Ankle DF: 4/5 R LE:  Hip flexion: 5/5  Hip extension: 4-/5  Hip abduction: 4/5  Hip adduction: 4/5  Hip IR: 4+/5  Hip ER: 4/5  Knee flexion: 4+/5  Knee extension: 5/5  Ankle DF: 4-/5       Special Tests:          No leg length discrepancy noted  Neurological Screen:        Myotomes:  WFL        Dermatomes:  No deficits noted  Functional Mobility:         Gait/Ambulation:  Pt ambulates with no AD with following gait deficits: decreased B step length/clearance, increased pronation at each foot in stance phase, decreased B heel strike, decreased B arm swing, slight forward trunk flexion, increased lateral sway        Transfers:  Pt able to stand/sit with minimal to no UE use        Bed Mobility:  Pt able to perform Magdalena with increased time  Balance:          Slight lateral sway with ambulation    Body Structures Involved:  1. Nerves  2. Bones  3. Joints  4. Muscles  5. Ligaments Body Functions Affected:  1. Sensory/Pain  2. Neuromusculoskeletal  3. Movement Related Activities and Participation Affected:  1. General Tasks and Demands  2. Mobility  3. Self Care  4. Domestic Life  5. Interpersonal Interactions and Relationships  6. Community, Social and [place]   # of elements that affect the Plan of Care: 4+: HIGH COMPLEXITY   CLINICAL PRESENTATION:   Presentation: Evolving clinical presentation with changing clinical characteristics: MODERATE COMPLEXITY   CLINICAL DECISION MAKING:   Outcome Measure: Tool Used: Lower Extremity Functional Scale (LEFS)  Score:  Initial: 27/80 Most Recent: X/80 (Date: -- )   Interpretation of Score: 20 questions each scored on a 5 point scale with 0 representing \"extreme difficulty or unable to perform\" and 4 representing \"no difficulty\". The lower the score, the greater the functional disability. 80/80 represents no disability. Minimal detectable change is 9 points. Score 80 79-63 62-48 47-32 31-16 15-1 0   Modifier CH CI CJ CK CL CM CN     ? Mobility - Walking and Moving Around:     - CURRENT STATUS: CL - 60%-79% impaired, limited or restricted    - GOAL STATUS: CJ - 20%-39% impaired, limited or restricted    - D/C STATUS:  ---------------To be determined---------------    Tool Used: PT/OT FOOT AND ANKLE ABILITY MEASURE  Score:  Initial: 36/84 Most Recent: X (Date: -- )   Interpretation of Score: For the \"Activities of Daily Living\", there are 21 questions each scored on a 5 point scale with 0 representing \"Unable to do\" and 4 representing \"No difficulty\". The lower the score, the greater the functional disability. 84/84 represents no disability. Minimal detectable change is 5.7 points.   With the addition of the 8 questions in the \"Sports Subscale,\" there are 29 questions, each scored on a 5 point scale with 0 representing \"Unable to do\" and 4 representing \"No difficulty\". The lower the score, the greater the functional disability. 116/116 represents no disability. Minimal detectable change is 12.3 points. Activities of Daily Living:  Score 84 83-68 67-51 50-34 33-18 17-1 0   Modifier CH CI CJ CK CL CM CN     Activities of Daily Living + Sports Subscale:  Score 116 115-94 93-71 70-47 46-24 23-1 0   Modifier CH CI CJ CK CL CM CN     Payor: [employer] / Plan: [address] [place] [address] HMO / Product Type: Managed Care Medicare /   Medical Necessity:   · Patient is expected to demonstrate progress in strength, range of motion, balance and functional technique to improve ability to perform pain-free standing/ambulation. · Patient demonstrates good rehab potential due to higher previous functional level. Reason for Services/Other Comments:  · Patient continues to require modification of therapeutic interventions to increase complexity of exercises. Use of outcome tool(s) and clinical judgement create a POC that gives a: Questionable prediction of patient's progress: MODERATE COMPLEXITY   TREATMENT:   (In addition to Assessment/Re-Assessment sessions the following treatments were rendered)  Subjective comments at beginning of session: Pt states she feels \"off\" today - she didn't take her medications  THERAPEUTIC EXERCISE: (23 minutes):  Exercises per grid below to improve mobility, strength, balance and dynamic movement of ankle - right to improve functional mobility. Required minimal visual, verbal and manual cues to promote proper body alignment, promote proper body posture, promote proper body mechanics and promote proper body breathing techniques. Progressed resistance, range, repetitions and complexity of movement as indicated.     Date:  10/15/2018 Date:  10/17/2018 Date:   Activity/Exercise Parameters Parameters    Physiostep L1 resistance for 8 minutes to increase strength/endurance L2 resistance for 10 minutes to increase strength/endurance    Ankle plantarflexor stretch on incline board 30 second hold x 3 reps with knees extended then flexed 30 second hold x 3 reps with knees extended then flexed    Heel raises on firm ground  B UE support; 20 reps/1 set with cues for slow lowering on B LEs progressing to lowering on just R LE (last 5 reps)    Supine hamstring stretch  30 second hold x 3 reps R LE                      *given in HEP  [employer]      Manual therapy (17 minutes): With pt in prone position, extensive STM to R gastrocnemius and soleus as well as cross friction massage to R achilles tendon to reduce tightness and pain and improve ankle mobility    Modalities (10 minutes): With pt in supine position with B LEs supported on wedge, cold pack (pillowcase layer) applied to R ankle to reduce pain and inflammation at end of session. Treatment/Session Assessment:  Pt with slightly improved gait technique this session. Therapist was able to increase pressure with massage this session with minimal reports of increased pain with pressure, but pt did report increased soreness at end of session. Will determine if this soreness lasted more than a couple of days next session to determine if pressure needs to be lighter with manual therapy. · Pain/ Symptoms: Initial:   3/10 in posterior R ankle - states it is better and she feels like her boot is stabilizing it Post Session:  5/10 ·   Compliance with Program/Exercises: Will assess as treatment progresses. · Recommendations/Intent for next treatment session: \"Next visit will focus on advancements to more challenging activities and reduction in assistance provided\".  Manual therapy/modalities as needed to reduce tightness and pain; work on eccentric plantarflexor strengthening and balance  Total Treatment Duration:  PT Patient Time In/Time Out  Time In: 1000  Time Out: 845 Siena Abernathy DPT    Future Appointments   Date Time Provider Jo Nolan   10/18/2018 10:00 AM MTV PM LAB/RAD [facility]   10/22/2018 10:15 AM KRISTINE ArguelloT [facility] D   10/24/2018 10:15 AM VALENTINO HugoDORPMARY LEWIS   10/25/2018 10:00 AM Tucker Cole MD [facility]   10/25/2018  4:00 PM CESARIO Yanez [facility] [id number] PGU   10/29/2018 10:15 AM VALENTINO HugoDORPMARY D   10/31/2018 10:15 AM KRISTINE HugoT [facility]   11/5/2018 10:30 AM KRISTINE HugoT [facility] D   11/7/2018 10:15 AM KRISTINE HugoT [facility]OR[facility]

## 2018-10-22 ENCOUNTER — HOSPITAL ENCOUNTER (OUTPATIENT)
Dept: PHYSICAL THERAPY | Age: 43
Discharge: HOME | End: 2018-10-22
Payer: MEDICARE

## 2018-10-22 PROCEDURE — 97140 MANUAL THERAPY 1/> REGIONS: CPT

## 2018-10-22 PROCEDURE — 97110 THERAPEUTIC EXERCISES: CPT

## 2018-10-22 NOTE — PROGRESS NOTES
Sonia Larosn  : 1975  Primary: Kacy Loredo Mayra Medicare H[place]  Secondary: [place] Of [facility] [address], [place] [address] [address] [facility]  [phone]   [phone]       OUTPATIENT PHYSICAL THERAPY:Daily Note 10/22/2018    ICD-10: Treatment Diagnosis: Pain in right ankle and joints of right foot (M25.571)      Stiffness of right ankle, not elsewhere classified (M25.671)    Difficulty in walking, not elsewhere classified (R26.2)   Precautions/Allergies:   Abilify [aripiprazole]; Geodon [ziprasidone hcl]; [place] Frees; Ambien [zolpidem]; and Topamax [topiramate]   Fall Risk Score: 3 (? 5 = High Risk)  MD Orders: Evaluate and treat  MEDICAL/REFERRING DIAGNOSIS:  foot, right   DATE OF ONSET: 2018  REFERRING PHYSICIAN: Phillip Griffin MD  RETURN PHYSICIAN APPOINTMENT: 2018 (pt unsure of date)   This section established at most recent assessment  ASSESSMENT:  Sonia Larson is a 41 y.o. female who presents to physical therapy for R ankle/foot pain that started in 2018 and has recently gotten worse. This session, she demonstrated decreased B LE strength/endurance, pain with R ankle mobility, multiple postural/gait deficits, decreased standing/walking tolerance, and decreased functional mobility as evident by a score of 36/84 on the Foot and Ankle Ability Measure and a score of 27/80 on the Lower Extremity Functional Scale (with lower scores indicating increased disability). Pt may benefit from skilled PT to address the above listed deficits to improve ability to perform pain-free ADLs/IADLs and to improve overall quality of life prior to discharge. PROBLEM LIST (Impacting functional limitations):  1. Decreased Strength  2. Decreased ADL/Functional Activities  3. Decreased Transfer Abilities  4. Decreased Ambulation Ability/Technique  5. Decreased Balance  6. Increased Pain  7.  Decreased Activity Tolerance  8. Increased Fatigue  9. Decreased Flexibility/Joint Mobility  10. Edema/Girth INTERVENTIONS PLANNED:  1. Balance Exercise  2. Bed Mobility  3. Cold  4. Electrical Stimulation  5. Family Education  6. Gait Training  7. Heat  8. Home Exercise Program (HEP)  9. Manual Therapy  10. Neuromuscular Re-education/Strengthening  11. Range of Motion (ROM)  12. Therapeutic Activites  13. Therapeutic Exercise/Strengthening  14. Transcutaneous Electrical Nerve Stimulation (TENS)  15. Transfer Training  16. Ultrasound (US)   TREATMENT PLAN:  Effective Dates: 10/10/2018 TO 12/9/2018 (60 days). Frequency/Duration: 2 times a week for 60 Days  GOALS: (Goals have been discussed and agreed upon with patient.)  Short-Term Functional Goals: Time Frame: 30 days  1. Pt will be compliant with HEP in order to increase LE strength/endurance/mobility to improve functional mobility and overall quality of life. 2. Pt will improve score on the Lower Extremity Functional Scale to 38/80 in order to demonstrate improved functional mobility and quality of life. 3. Pt will improve score on the Foot and Ankle Ability Measure to 41/84 in order to demonstrate improved functional mobility and quality of life. 4. Pt will report standing for > 5 minutes with minimal to no increase in pain in order to be able to stand for prolonged periods as needed to perform chores. 5. Pt will be able to ascend/descend 6 steps with S with or without rail with safe gait pattern and minimal to no increase in pain in order to improve community mobility. Discharge Goals: Time Frame: 60 days  1. Pt will be independent with HEP in order to increase LE strength/endurance/mobility to improve functional mobility and overall quality of life. 2. Pt will improve score on the Lower Extremity Functional Scale to 50/80 in order to demonstrate improved functional mobility and quality of life.    3. Pt will improve score on the Foot and Ankle Ability Measure to 45/84 in order to demonstrate improved functional mobility and quality of life. 4. Pt will report standing for > 10 minutes with minimal to no increase in pain in order to be able to stand for prolonged periods as needed to perform chores. 5. Pt will be able to ascend/descend 12 steps Magdalena with or without rail with reciprocal gait pattern and minimal to no increase in pain in order to improve community mobility. Rehabilitation Potential For Stated Goals: Good  [facility], I certify that the treatment plan above will be carried out by a therapist or under their direction. Thank you for this referral,  Annie Pathak DPT     Referring Physician Signature: Ofe Giron MD              Date                    HISTORY:   History of Present Injury/Illness (Reason for Referral): *History per pt or pt's family except where otherwise noted  Pt states pain in her R ankle started in July 2018 (states she was walking more than usual), and she states that she was being treated for it (with prednisone) and it had gotten better with taping, but it has recently gotten worse again (early September when she stopped doing taping and started everyday walking again). States the pain has gotten better temporarily with Prednisone, but states that when she goes off the Prednisone it starts hurting again. Pain at worst is 6-7/10 (aggravating activities include walking > 5 minutes, prolonged standing > 1-2 minutes - even in shower). Pain at best is 0/10 (eases pain with prednisone, rest, taping, mobic, ice). Pt states she was given boot to wear to help with the pain but she cannot wear this while driving (she is supposed to wear this 4 weeks starting last Friday).    Past Medical History/Comorbidities:   Ms. Michelle Betancourt  has a past medical history of Acid reflux, ADHD (attention deficit hyperactivity disorder), Allergic rhinitis, Anxiety disorder, Arthritis, Asthma, B12 deficiency, Cerumen impaction, Chronic sinusitis, Depression, Deviated nasal septum, Endocrine disease, Fibromyalgia, GERD (gastroesophageal reflux disease), Headache, Hypothyroidism, Low blood sugar, Meniere disease, Migraine, Nasal obstruction, OCD (obsessive compulsive disorder), Psychiatric disorder, Special examinations, Special examinations, Tinnitus, and Unspecified hypothyroidism. Ms. Sunni Ahmadi  has a past surgical history that includes hx orthopaedic; hx heent; hx heent (07/03/2013); and hx heent (06/2017). Social History/Living Environment:    lives alone in one story apartment with elevator to get up (15 flights of stairs with B railing - occasionally has to take the stairs down if she has an appointment and the elevator is busy)  Prior Level of Function/Work/Activity:  On disability for bipolar disorder and anxiety; likes to walk downtown with her friends, does housework (vacuuming, tidying up, light cooking); has to shop for her groceries  Dominant Side:         RIGHT  Other Clinical Tests:          X-rays of R foot (showed heel spur according to pt)  Previous Treatment Approaches:          Prednisone has helped  Personal Factors:          Sex:  female        Age:  41 y.o. Current Medications:    Current Outpatient Medications:     diclofenac (VOLTAREN) 1 % gel, Apply 2 g to affected area four (4) times daily. Apply to right foot as needed 2-4 times a day, Disp: 100 g, Rfl: 2    tiZANidine (ZANAFLEX) 4 mg tablet, Take 1 Tab by mouth three (3) times daily as needed. , Disp: 90 Tab, Rfl: 1    levothyroxine (SYNTHROID) 125 mcg tablet, Take 1 Tab by mouth daily. , Disp: 90 Tab, Rfl: 0    Dexlansoprazole (DEXILANT) 60 mg CpDB, Take 1 Cap by mouth every morning., Disp: 90 Cap, Rfl: 0    candesartan (ATACAND) 4 mg tablet, Take 1 Tab by mouth daily. , Disp: 90 Tab, Rfl: 0    IRON, FERROUS SULFATE, PO, Take 45 mg by mouth daily. , Disp: , Rfl:     cyanocobalamin 1,000 mcg tablet, Take 1 Tab by mouth daily. , Disp: 60 Tab, Rfl: 0   meloxicam (MOBIC) 15 mg tablet, Take 1 Tab by mouth daily. , Disp: 92 Tab, Rfl: 1    butalbital-acetaminophen (BUPAP)  mg tab, Take 1 Tab by mouth daily as needed. , Disp: 30 Tab, Rfl: 5    montelukast (SINGULAIR) 10 mg tablet, Take 1 Tab by mouth daily. , Disp: 92 Tab, Rfl: 1    cetirizine (ZYRTEC) 10 mg tablet, Take 1 Tab by mouth daily. , Disp: 92 Tab, Rfl: 1    venlafaxine-SR (EFFEXOR XR) 150 mg capsule, Take  by mouth daily. , Disp: , Rfl:     escitalopram oxalate (LEXAPRO) 20 mg tablet, Take 20 mg by mouth daily. , Disp: , Rfl:     Magnesium Oxide 500 mg cap, Take 500 mg by mouth daily. , Disp: , Rfl:     traMADol (ULTRAM) 50 mg tablet, Take 1 Tab by mouth every eight (8) hours as needed for Pain. Max Daily Amount: 150 mg., Disp: 30 Tab, Rfl: 1    amphetamine-dextroamphetamine XR (ADDERALL XR) 20 mg XR capsule, TK 1 C PO QD, Disp: , Rfl: 0    AMITIZA 24 mcg capsule, TK 1 C PO BID WF AND WATER, Disp: , Rfl: 6    albuterol (PROVENTIL HFA, VENTOLIN HFA, PROAIR HFA) 90 mcg/actuation inhaler, Take 2 Puffs by inhalation as needed for Wheezing., Disp: 1 Inhaler, Rfl: 2    ALPRAZolam (XANAX) 1 mg tablet, 0.5 mg three (3) times daily as needed. , Disp: , Rfl: 4    SUMAtriptan (IMITREX) 50 mg tablet, Take 50 mg by mouth., Disp: , Rfl:     risperiDONE (RISPERDAL) 2 mg tablet, Take 3 mg by mouth nightly., Disp: , Rfl: 3    LAMICTAL 200 mg tablet, TK 1 T PO BID, Disp: , Rfl: 4    pregabalin (LYRICA) 75 mg capsule, 2 PO q hs, Disp: , Rfl:     melatonin tab tablet, Take 5 mg by mouth., Disp: , Rfl:     cholecalciferol (VITAMIN D3) 5,000 unit capsule, Take 2,000 Units by mouth., Disp: , Rfl:     mirabegron ER (MYRBETRIQ) 25 mg ER tablet, Take 1 Tab by mouth daily.  (Patient taking differently: Take 25 mg by mouth nightly.), Disp: 30 Tab, Rfl: 12   Date Last Reviewed:  10/22/2018   # of Personal Factors/Comorbidities that affect the Plan of Care: 3+: HIGH COMPLEXITY   EXAMINATION:   Initial assessment on 10/10/2018  Observation/Orthostatic Postural Assessment:    The following postural deficits were noted in sitting:  loss of cervical lordosis, increased thoracic kyphosis, forward head, rounded shoulders, posterior pelvic tilt   The following postural deficits were noted in standing: forward trunk flexion, pronated foot L > R, slight genu valgus noted B  Palpation:          Pt with tenderness noted at posterior R heel and at dorsum of R foot  ROM:    Initial measurement: (on 10/10/2018) Initial measurement: (on 10/10/2018) Reassessment measurement:  Reassessment measurement:    L LE: WFL throughout, but excessive hip IR passively R LE: WFL throughout, but excessive hip IR passively; ankle motion [facility] but significant tightness noted in dorsiflexors with pain in ankle       Strength:    Initial measurement: (on 10/10/2018) Initial measurement: (on 10/10/2018) Reassessment measurement:  Reassessment measurement:    L LE:  Hip flexion: 5/5   Hip extension: 4-/5  Hip abduction: 4-/5  Hip adduction: 5/5  Hip IR: 4+/5  Hip ER: 4/5   Knee flexion:  4/5  Knee extension: 5/5  Ankle DF: 4/5 R LE:  Hip flexion: 5/5  Hip extension: 4-/5  Hip abduction: 4/5  Hip adduction: 4/5  Hip IR: 4+/5  Hip ER: 4/5  Knee flexion: 4+/5  Knee extension: 5/5  Ankle DF: 4-/5       Special Tests:          No leg length discrepancy noted  Neurological Screen:        Myotomes:  WFL        Dermatomes:  No deficits noted  Functional Mobility:         Gait/Ambulation:  Pt ambulates with no AD with following gait deficits: decreased B step length/clearance, increased pronation at each foot in stance phase, decreased B heel strike, decreased B arm swing, slight forward trunk flexion, increased lateral sway        Transfers:  Pt able to stand/sit with minimal to no UE use        Bed Mobility:  Pt able to perform Magdalena with increased time  Balance:          Slight lateral sway with ambulation    Body Structures Involved:  1. Nerves  2. Bones  3. Joints  4. Muscles  5. Ligaments Body Functions Affected:  1. Sensory/Pain  2. Neuromusculoskeletal  3. Movement Related Activities and Participation Affected:  1. General Tasks and Demands  2. Mobility  3. Self Care  4. Domestic Life  5. Interpersonal Interactions and Relationships  6. Community, Social and [place]   # of elements that affect the Plan of Care: 4+: HIGH COMPLEXITY   CLINICAL PRESENTATION:   Presentation: Evolving clinical presentation with changing clinical characteristics: MODERATE COMPLEXITY   CLINICAL DECISION MAKING:   Outcome Measure: Tool Used: Lower Extremity Functional Scale (LEFS)  Score:  Initial: 27/80 Most Recent: X/80 (Date: -- )   Interpretation of Score: 20 questions each scored on a 5 point scale with 0 representing \"extreme difficulty or unable to perform\" and 4 representing \"no difficulty\". The lower the score, the greater the functional disability. 80/80 represents no disability. Minimal detectable change is 9 points. Score 80 79-63 62-48 47-32 31-16 15-1 0   Modifier CH CI CJ CK CL CM CN     ? Mobility - Walking and Moving Around:     - CURRENT STATUS: CL - 60%-79% impaired, limited or restricted    - GOAL STATUS: CJ - 20%-39% impaired, limited or restricted    - D/C STATUS:  ---------------To be determined---------------    Tool Used: PT/OT FOOT AND ANKLE ABILITY MEASURE  Score:  Initial: 36/84 Most Recent: X (Date: -- )   Interpretation of Score: For the \"Activities of Daily Living\", there are 21 questions each scored on a 5 point scale with 0 representing \"Unable to do\" and 4 representing \"No difficulty\". The lower the score, the greater the functional disability. 84/84 represents no disability. Minimal detectable change is 5.7 points.   With the addition of the 8 questions in the \"Sports Subscale,\" there are 29 questions, each scored on a 5 point scale with 0 representing \"Unable to do\" and 4 representing \"No difficulty\". The lower the score, the greater the functional disability. 116/116 represents no disability. Minimal detectable change is 12.3 points. Activities of Daily Living:  Score 84 83-68 67-51 50-34 33-18 17-1 0   Modifier CH CI CJ CK CL CM CN     Activities of Daily Living + Sports Subscale:  Score 116 115-94 93-71 70-47 46-24 23-1 0   Modifier CH CI CJ CK CL CM CN     Payor: [employer] / Plan: [address] [place] [address] HMO / Product Type: Managed Care Medicare /   Medical Necessity:   · Patient is expected to demonstrate progress in strength, range of motion, balance and functional technique to improve ability to perform pain-free standing/ambulation. · Patient demonstrates good rehab potential due to higher previous functional level. Reason for Services/Other Comments:  · Patient continues to require modification of therapeutic interventions to increase complexity of exercises. Use of outcome tool(s) and clinical judgement create a POC that gives a: Questionable prediction of patient's progress: MODERATE COMPLEXITY   TREATMENT:   (In addition to Assessment/Re-Assessment sessions the following treatments were rendered)  Subjective comments at beginning of session: Pt states she did a lot of walking and moving around over the weekend without her brace, so she is hurting a little more now  THERAPEUTIC EXERCISE: (10 minutes):  Exercises per grid below to improve mobility, strength, balance and dynamic movement of ankle - right to improve functional mobility. Required minimal visual, verbal and manual cues to promote proper body alignment, promote proper body posture, promote proper body mechanics and promote proper body breathing techniques. Progressed resistance, range, repetitions and complexity of movement as indicated.     Date:  10/15/2018 Date:  10/17/2018 Date:  10/22/2018   Activity/Exercise Parameters Parameters    Physiostep L1 resistance for 8 minutes to increase strength/endurance L2 resistance for 10 minutes to increase strength/endurance L2 resistance for 10 minutes to increase strength/endurance   Ankle plantarflexor stretch on incline board 30 second hold x 3 reps with knees extended then flexed 30 second hold x 3 reps with knees extended then flexed    Heel raises on firm ground  B UE support; 20 reps/1 set with cues for slow lowering on B LEs progressing to lowering on just R LE (last 5 reps)    Supine hamstring stretch  30 second hold x 3 reps R LE                      *given in HEP  [place] [facility]      Manual therapy (43 minutes): With pt in prone position, extensive STM to R gastrocnemius and soleus as well as cross friction massage to R achilles tendon to reduce tightness and pain and improve ankle mobility. With pt in prone position, ice massage along R achilles tendon to reduce inflammation and pain at end of session. Modalities (). Treatment/Session Assessment:  Pt with less gait deficits noted at end of session today. She is reporting less tenderness with manual therapy at R achilles tendon, and reporting reduction of symptoms with ice massage. · Pain/ Symptoms: Initial:   5/10 in posterior R ankle Post Session:  No pain reported at end of session - pt stating numbness ·   Compliance with Program/Exercises: Will assess as treatment progresses. · Recommendations/Intent for next treatment session: \"Next visit will focus on advancements to more challenging activities and reduction in assistance provided\".  Manual therapy/modalities as needed to reduce tightness and pain; work on eccentric plantarflexor strengthening and balance  Total Treatment Duration:  PT Patient Time In/Time Out  Time In: 1015  Time Out: Sergio Dominguez DPT    Future Appointments   Date Time Provider Jo Nolan   10/24/2018 10:15 AM Jesus SIMMS DPT [facility]   10/25/2018  4:00 PM CESARIO Levine SSA [id number] PGU   10/29/2018 10:15 AM Ariana Mora DPT [facility] 10/31/2018 10:15 AM VALENTINO Tang   11/1/2018  8:20 AM Kallie Boast, MD S[facility]   11/5/2018 10:30 AM VALENTINO Dos Santos   11/7/2018 10:15 AM VALENTINO Tang

## 2018-10-24 ENCOUNTER — HOSPITAL ENCOUNTER (OUTPATIENT)
Dept: PHYSICAL THERAPY | Age: 43
Discharge: HOME | End: 2018-10-24
Payer: MEDICARE

## 2018-10-24 PROCEDURE — 97140 MANUAL THERAPY 1/> REGIONS: CPT

## 2018-10-24 PROCEDURE — 97110 THERAPEUTIC EXERCISES: CPT

## 2018-10-24 NOTE — PROGRESS NOTES
Luis Thomas  : 1975  Primary: [employer] o  Secondary: [place] [facility], [address], [place] [address] [address] [facility]  [phone]   [phone]       OUTPATIENT PHYSICAL THERAPY:Daily Note 10/24/2018    ICD-10: Treatment Diagnosis: Pain in right ankle and joints of right foot (M25.571)      Stiffness of right ankle, not elsewhere classified (M25.671)    Difficulty in walking, not elsewhere classified (R26.2)   Precautions/Allergies:   Abilify [aripiprazole]; Geodon [ziprasidone hcl]; Naeem Blacksmith; Ambien [zolpidem]; and Topamax [topiramate]   Fall Risk Score: 3 (? 5 = High Risk)  MD Orders: Evaluate and treat  MEDICAL/REFERRING DIAGNOSIS:  foot, right   DATE OF ONSET: 2018  REFERRING PHYSICIAN: Kel Mckinney MD  RETURN PHYSICIAN APPOINTMENT: 2018 (pt unsure of date)   This section established at most recent assessment  ASSESSMENT:  Luis Thomas is a 41 y.o. female who presents to physical therapy for R ankle/foot pain that started in 2018 and has recently gotten worse. This session, she demonstrated decreased B LE strength/endurance, pain with R ankle mobility, multiple postural/gait deficits, decreased standing/walking tolerance, and decreased functional mobility as evident by a score of 36/84 on the Foot and Ankle Ability Measure and a score of 27/80 on the Lower Extremity Functional Scale (with lower scores indicating increased disability). Pt may benefit from skilled PT to address the above listed deficits to improve ability to perform pain-free ADLs/IADLs and to improve overall quality of life prior to discharge. PROBLEM LIST (Impacting functional limitations):  1. Decreased Strength  2. Decreased ADL/Functional Activities  3. Decreased Transfer Abilities  4. Decreased Ambulation Ability/Technique  5. Decreased Balance  6. Increased Pain  7.  Decreased Activity Tolerance  8. Increased Fatigue  9. Decreased Flexibility/Joint Mobility  10. Edema/Girth INTERVENTIONS PLANNED:  1. Balance Exercise  2. Bed Mobility  3. Cold  4. Electrical Stimulation  5. Family Education  6. Gait Training  7. Heat  8. Home Exercise Program (HEP)  9. Manual Therapy  10. Neuromuscular Re-education/Strengthening  11. Range of Motion (ROM)  12. Therapeutic Activites  13. Therapeutic Exercise/Strengthening  14. Transcutaneous Electrical Nerve Stimulation (TENS)  15. Transfer Training  16. Ultrasound (US)   TREATMENT PLAN:  Effective Dates: 10/10/2018 TO 12/9/2018 (60 days). Frequency/Duration: 2 times a week for 60 Days  GOALS: (Goals have been discussed and agreed upon with patient.)  Short-Term Functional Goals: Time Frame: 30 days  1. Pt will be compliant with HEP in order to increase LE strength/endurance/mobility to improve functional mobility and overall quality of life. 2. Pt will improve score on the Lower Extremity Functional Scale to 38/80 in order to demonstrate improved functional mobility and quality of life. 3. Pt will improve score on the Foot and Ankle Ability Measure to 41/84 in order to demonstrate improved functional mobility and quality of life. 4. Pt will report standing for > 5 minutes with minimal to no increase in pain in order to be able to stand for prolonged periods as needed to perform chores. 5. Pt will be able to ascend/descend 6 steps with S with or without rail with safe gait pattern and minimal to no increase in pain in order to improve community mobility. Discharge Goals: Time Frame: 60 days  1. Pt will be independent with HEP in order to increase LE strength/endurance/mobility to improve functional mobility and overall quality of life. 2. Pt will improve score on the Lower Extremity Functional Scale to 50/80 in order to demonstrate improved functional mobility and quality of life.    3. Pt will improve score on the Foot and Ankle Ability Measure to 45/84 in order to demonstrate improved functional mobility and quality of life. 4. Pt will report standing for > 10 minutes with minimal to no increase in pain in order to be able to stand for prolonged periods as needed to perform chores. 5. Pt will be able to ascend/descend 12 steps Magdalena with or without rail with reciprocal gait pattern and minimal to no increase in pain in order to improve community mobility. Rehabilitation Potential For Stated Goals: Good  Regarding [facility]'s therapy, I certify that the treatment plan above will be carried out by a therapist or under their direction. Thank you for this referral,  Lynette Woodson DPT     Referring Physician Signature: Dustin Cheng MD              Date                    HISTORY:   History of Present Injury/Illness (Reason for Referral): *History per pt or pt's family except where otherwise noted  Pt states pain in her R ankle started in July 2018 (states she was walking more than usual), and she states that she was being treated for it (with prednisone) and it had gotten better with taping, but it has recently gotten worse again (early September when she stopped doing taping and started everyday walking again). States the pain has gotten better temporarily with Prednisone, but states that when she goes off the Prednisone it starts hurting again. Pain at worst is 6-7/10 (aggravating activities include walking > 5 minutes, prolonged standing > 1-2 minutes - even in shower). Pain at best is 0/10 (eases pain with prednisone, rest, taping, mobic, ice). Pt states she was given boot to wear to help with the pain but she cannot wear this while driving (she is supposed to wear this 4 weeks starting last Friday).    Past Medical History/Comorbidities:   Ms. Heber Grijalva  has a past medical history of Acid reflux, ADHD (attention deficit hyperactivity disorder), Allergic rhinitis, Anxiety disorder, Arthritis, Asthma, B12 deficiency, Cerumen impaction, Chronic sinusitis, Depression, Deviated nasal septum, Endocrine disease, Fibromyalgia, GERD (gastroesophageal reflux disease), Headache, Hypothyroidism, Low blood sugar, Meniere disease, Migraine, Nasal obstruction, OCD (obsessive compulsive disorder), Psychiatric disorder, Special examinations, Special examinations, Tinnitus, and Unspecified hypothyroidism. Ms. Michael Joyce  has a past surgical history that includes hx orthopaedic; hx heent; hx heent (07/03/2013); and hx heent (06/2017). Social History/Living Environment:    lives alone in one story apartment with elevator to get up (15 flights of stairs with B railing - occasionally has to take the stairs down if she has an appointment and the elevator is busy)  Prior Level of Function/Work/Activity:  On disability for bipolar disorder and anxiety; likes to walk downtown with her friends, does housework (vacuuming, tidying up, light cooking); has to shop for her groceries  Dominant Side:         RIGHT  Other Clinical Tests:          X-rays of R foot (showed heel spur according to pt)  Previous Treatment Approaches:          Prednisone has helped  Personal Factors:          Sex:  female        Age:  41 y.o. Current Medications:    Current Outpatient Medications:     mirabegron ER (MYRBETRIQ) 25 mg ER tablet, Take 1 Tab by mouth nightly., Disp: 30 Tab, Rfl: 0    diclofenac (VOLTAREN) 1 % gel, Apply 2 g to affected area four (4) times daily. Apply to right foot as needed 2-4 times a day, Disp: 100 g, Rfl: 2    tiZANidine (ZANAFLEX) 4 mg tablet, Take 1 Tab by mouth three (3) times daily as needed. , Disp: 90 Tab, Rfl: 1    levothyroxine (SYNTHROID) 125 mcg tablet, Take 1 Tab by mouth daily. , Disp: 90 Tab, Rfl: 0    Dexlansoprazole (DEXILANT) 60 mg CpDB, Take 1 Cap by mouth every morning., Disp: 90 Cap, Rfl: 0    candesartan (ATACAND) 4 mg tablet, Take 1 Tab by mouth daily. , Disp: 90 Tab, Rfl: 0    IRON, FERROUS SULFATE, PO, Take 45 mg by mouth daily. , Disp: , Rfl:   cyanocobalamin 1,000 mcg tablet, Take 1 Tab by mouth daily. , Disp: 60 Tab, Rfl: 0    meloxicam (MOBIC) 15 mg tablet, Take 1 Tab by mouth daily. , Disp: 92 Tab, Rfl: 1    butalbital-acetaminophen (BUPAP)  mg tab, Take 1 Tab by mouth daily as needed. , Disp: 30 Tab, Rfl: 5    montelukast (SINGULAIR) 10 mg tablet, Take 1 Tab by mouth daily. , Disp: 92 Tab, Rfl: 1    cetirizine (ZYRTEC) 10 mg tablet, Take 1 Tab by mouth daily. , Disp: 92 Tab, Rfl: 1    venlafaxine-SR (EFFEXOR XR) 150 mg capsule, Take  by mouth daily. , Disp: , Rfl:     escitalopram oxalate (LEXAPRO) 20 mg tablet, Take 20 mg by mouth daily. , Disp: , Rfl:     Magnesium Oxide 500 mg cap, Take 500 mg by mouth daily. , Disp: , Rfl:     traMADol (ULTRAM) 50 mg tablet, Take 1 Tab by mouth every eight (8) hours as needed for Pain. Max Daily Amount: 150 mg., Disp: 30 Tab, Rfl: 1    amphetamine-dextroamphetamine XR (ADDERALL XR) 20 mg XR capsule, TK 1 C PO QD, Disp: , Rfl: 0    AMITIZA 24 mcg capsule, TK 1 C PO BID WF AND WATER, Disp: , Rfl: 6    albuterol (PROVENTIL HFA, VENTOLIN HFA, PROAIR HFA) 90 mcg/actuation inhaler, Take 2 Puffs by inhalation as needed for Wheezing., Disp: 1 Inhaler, Rfl: 2    ALPRAZolam (XANAX) 1 mg tablet, 0.5 mg three (3) times daily as needed. , Disp: , Rfl: 4    SUMAtriptan (IMITREX) 50 mg tablet, Take 50 mg by mouth., Disp: , Rfl:     risperiDONE (RISPERDAL) 2 mg tablet, Take 3 mg by mouth nightly., Disp: , Rfl: 3    LAMICTAL 200 mg tablet, TK 1 T PO BID, Disp: , Rfl: 4    pregabalin (LYRICA) 75 mg capsule, 2 PO q hs, Disp: , Rfl:     melatonin tab tablet, Take 5 mg by mouth., Disp: , Rfl:     cholecalciferol (VITAMIN D3) 5,000 unit capsule, Take 2,000 Units by mouth., Disp: , Rfl:    Date Last Reviewed:  10/24/2018   # of Personal Factors/Comorbidities that affect the Plan of Care: 3+: HIGH COMPLEXITY   EXAMINATION:   Initial assessment on 10/10/2018  Observation/Orthostatic Postural Assessment:     The following postural deficits were noted in sitting:  loss of cervical lordosis, increased thoracic kyphosis, forward head, rounded shoulders, posterior pelvic tilt   The following postural deficits were noted in standing: forward trunk flexion, pronated foot L > R, slight genu valgus noted B  Palpation:          Pt with tenderness noted at posterior R heel and at dorsum of R foot  ROM:    Initial measurement: (on 10/10/2018) Initial measurement: (on 10/10/2018) Reassessment measurement:  Reassessment measurement:    L LE: WFL throughout, but excessive hip IR passively R LE: WFL throughout, but excessive hip IR passively; ankle motion [facility] but significant tightness noted in dorsiflexors with pain in ankle       Strength:    Initial measurement: (on 10/10/2018) Initial measurement: (on 10/10/2018) Reassessment measurement:  Reassessment measurement:    L LE:  Hip flexion: 5/5   Hip extension: 4-/5  Hip abduction: 4-/5  Hip adduction: 5/5  Hip IR: 4+/5  Hip ER: 4/5   Knee flexion:  4/5  Knee extension: 5/5  Ankle DF: 4/5 R LE:  Hip flexion: 5/5  Hip extension: 4-/5  Hip abduction: 4/5  Hip adduction: 4/5  Hip IR: 4+/5  Hip ER: 4/5  Knee flexion: 4+/5  Knee extension: 5/5  Ankle DF: 4-/5       Special Tests:          No leg length discrepancy noted  Neurological Screen:        Myotomes:  WFL        Dermatomes:  No deficits noted  Functional Mobility:         Gait/Ambulation:  Pt ambulates with no AD with following gait deficits: decreased B step length/clearance, increased pronation at each foot in stance phase, decreased B heel strike, decreased B arm swing, slight forward trunk flexion, increased lateral sway        Transfers:  Pt able to stand/sit with minimal to no UE use        Bed Mobility:  Pt able to perform Magdalena with increased time  Balance:          Slight lateral sway with ambulation    Body Structures Involved:  1. Nerves  2. Bones  3. Joints  4. Muscles  5.  Ligaments Body Functions Affected:  1. Sensory/Pain  2. Neuromusculoskeletal  3. Movement Related Activities and Participation Affected:  1. General Tasks and Demands  2. Mobility  3. Self Care  4. Domestic Life  5. Interpersonal Interactions and Relationships  6. Community, Social and [place]   # of elements that affect the Plan of Care: 4+: HIGH COMPLEXITY   CLINICAL PRESENTATION:   Presentation: Evolving clinical presentation with changing clinical characteristics: MODERATE COMPLEXITY   CLINICAL DECISION MAKING:   Outcome Measure: Tool Used: Lower Extremity Functional Scale (LEFS)  Score:  Initial: 27/80 Most Recent: X/80 (Date: -- )   Interpretation of Score: 20 questions each scored on a 5 point scale with 0 representing \"extreme difficulty or unable to perform\" and 4 representing \"no difficulty\". The lower the score, the greater the functional disability. 80/80 represents no disability. Minimal detectable change is 9 points. Score 80 79-63 62-48 47-32 31-16 15-1 0   Modifier CH CI CJ CK CL CM CN     ? Mobility - Walking and Moving Around:     - CURRENT STATUS: CL - 60%-79% impaired, limited or restricted    - GOAL STATUS: CJ - 20%-39% impaired, limited or restricted    - D/C STATUS:  ---------------To be determined---------------    Tool Used: PT/OT FOOT AND ANKLE ABILITY MEASURE  Score:  Initial: 36/84 Most Recent: X (Date: -- )   Interpretation of Score: For the \"Activities of Daily Living\", there are 21 questions each scored on a 5 point scale with 0 representing \"Unable to do\" and 4 representing \"No difficulty\". The lower the score, the greater the functional disability. 84/84 represents no disability. Minimal detectable change is 5.7 points. With the addition of the 8 questions in the \"Sports Subscale,\" there are 29 questions, each scored on a 5 point scale with 0 representing \"Unable to do\" and 4 representing \"No difficulty\". The lower the score, the greater the functional disability.  116/116 represents no disability. Minimal detectable change is 12.3 points. Activities of Daily Living:  Score 84 83-68 67-51 50-34 33-18 17-1 0   Modifier CH CI CJ CK CL CM CN     Activities of Daily Living + Sports Subscale:  Score 116 115-94 93-71 70-47 46-24 23-1 0   Modifier CH CI CJ CK CL CM CN     Payor: [employer] MEDICARE / Plan: [address] [place] [address] HMO / Product Type: Managed Care Medicare /   Medical Necessity:   · Patient is expected to demonstrate progress in strength, range of motion, balance and functional technique to improve ability to perform pain-free standing/ambulation. · Patient demonstrates good rehab potential due to higher previous functional level. Reason for Services/Other Comments:  · Patient continues to require modification of therapeutic interventions to increase complexity of exercises. Use of outcome tool(s) and clinical judgement create a POC that gives a: Questionable prediction of patient's progress: MODERATE COMPLEXITY   TREATMENT:   (In addition to Assessment/Re-Assessment sessions the following treatments were rendered)  Subjective comments at beginning of session: Pt states she initially did a lot better after last session, but then it started hurting that night after she went to [facility] (states she was wearing the boot)  THERAPEUTIC EXERCISE: (25 minutes):  Exercises per grid below to improve mobility, strength, balance and dynamic movement of ankle - right to improve functional mobility. Required minimal visual, verbal and manual cues to promote proper body alignment, promote proper body posture, promote proper body mechanics and promote proper body breathing techniques. Progressed resistance, range, repetitions and complexity of movement as indicated.     Date:  10/17/2018 Date:  10/22/2018 Date:  10/24/2018   Activity/Exercise Parameters     Physiostep L2 resistance for 10 minutes to increase strength/endurance L2 resistance for 10 minutes to increase strength/endurance L2 resistance for 10 minutes to increase strength/endurance   Ankle plantarflexor stretch on incline board 30 second hold x 3 reps with knees extended then flexed  30 second hold x 3 reps with knees extended then flexed   Heel raises on firm ground B UE support; 20 reps/1 set with cues for slow lowering on B LEs progressing to lowering on just R LE (last 5 reps)  B UE support; 20 reps/1 set with cues for slow lowering on B LEs progressing to lowering on just R LE (last 5 reps)   Supine hamstring stretch 30 second hold x 3 reps R LE  30 second hold x 3 reps each LE; pt stretching passively with belt                     *given in HEP  [facility]      Manual therapy (28 minutes): With pt in prone position, extensive STM to R gastrocnemius and soleus as well as cross friction massage to R achilles tendon to reduce tightness and pain and improve ankle mobility. With pt in prone position, ice massage along R achilles tendon to reduce inflammation and pain at end of session. Modalities (). Treatment/Session Assessment:  Pt with decreased swelling in R ankle this session, although she continues to have tenderness and stiffness in her R ankle. She reported decreased pain with STM and ice massage. · Pain/ Symptoms: Initial:   5/10 in posterior R ankle Post Session:  3/10 ·   Compliance with Program/Exercises: Will assess as treatment progresses. · Recommendations/Intent for next treatment session: \"Next visit will focus on advancements to more challenging activities and reduction in assistance provided\".  Manual therapy/modalities as needed to reduce tightness and pain; work on eccentric plantarflexor strengthening and balance  Total Treatment Duration:  PT Patient Time In/Time Out  Time In: 1015  Time Out: 72 Charlsee Zuleyma Delgado DPT    Future Appointments   Date Time Provider Jo Nolan   10/25/2018  4:00 PM CESARIO Diaz SSA [id number] PGU   10/29/2018 10:15 AM Paulette Baptiste DPT [facility] 10/31/2018 10:15 AM VALENTINO Mcmullen   11/1/2018  8:20 AM Matt Kussmaul, MD SSA [facility]   11/5/2018 10:30 AM Herbert SIMMS DPT [facility]   11/7/2018 10:15 AM VALENTINO Mcmullen [facility]

## 2018-10-29 ENCOUNTER — HOSPITAL ENCOUNTER (OUTPATIENT)
Dept: PHYSICAL THERAPY | Age: 43
Discharge: HOME | End: 2018-10-29
Payer: MEDICARE

## 2018-10-29 PROCEDURE — 97140 MANUAL THERAPY 1/> REGIONS: CPT

## 2018-10-29 PROCEDURE — 97110 THERAPEUTIC EXERCISES: CPT

## 2018-10-29 NOTE — PROGRESS NOTES
Eduardo Moore  : 1975  Primary: Kerri Nunez Medicare [place]  Secondary: [place] [facility] [address] [facility], [place] [address] [address] [facility]  [phone]   [phone]       OUTPATIENT PHYSICAL THERAPY:Daily Note 10/29/2018    ICD-10: Treatment Diagnosis: Pain in right ankle and joints of right foot (M25.571)      Stiffness of right ankle, not elsewhere classified (M25.671)    Difficulty in walking, not elsewhere classified (R26.2)   Precautions/Allergies:   Abilify [aripiprazole]; Geodon [ziprasidone hcl]; Melita Gallery; Ambien [zolpidem]; and Topamax [topiramate]   Fall Risk Score: 3 (? 5 = High Risk)  MD Orders: Evaluate and treat  MEDICAL/REFERRING DIAGNOSIS:  foot, right   DATE OF ONSET: 2018  REFERRING PHYSICIAN: Cornelius Quintanilla MD  RETURN PHYSICIAN APPOINTMENT: 2018 (pt unsure of date)   This section established at most recent assessment  ASSESSMENT:  Eduardo Moore is a 41 y.o. female who presents to physical therapy for R ankle/foot pain that started in 2018 and has recently gotten worse. This session, she demonstrated decreased B LE strength/endurance, pain with R ankle mobility, multiple postural/gait deficits, decreased standing/walking tolerance, and decreased functional mobility as evident by a score of 36/84 on the Foot and Ankle Ability Measure and a score of 27/80 on the Lower Extremity Functional Scale (with lower scores indicating increased disability). Pt may benefit from skilled PT to address the above listed deficits to improve ability to perform pain-free ADLs/IADLs and to improve overall quality of life prior to discharge. PROBLEM LIST (Impacting functional limitations):  1. Decreased Strength  2. Decreased ADL/Functional Activities  3. Decreased Transfer Abilities  4. Decreased Ambulation Ability/Technique  5. Decreased Balance  6. Increased Pain  7.  Decreased Activity Tolerance  8. Increased Fatigue  9. Decreased Flexibility/Joint Mobility  10. Edema/Girth INTERVENTIONS PLANNED:  1. Balance Exercise  2. Bed Mobility  3. Cold  4. Electrical Stimulation  5. Family Education  6. Gait Training  7. Heat  8. Home Exercise Program (HEP)  9. Manual Therapy  10. Neuromuscular Re-education/Strengthening  11. Range of Motion (ROM)  12. Therapeutic Activites  13. Therapeutic Exercise/Strengthening  14. Transcutaneous Electrical Nerve Stimulation (TENS)  15. Transfer Training  16. Ultrasound (US)   TREATMENT PLAN:  Effective Dates: 10/10/2018 TO 12/9/2018 (60 days). Frequency/Duration: 2 times a week for 60 Days  GOALS: (Goals have been discussed and agreed upon with patient.)  Short-Term Functional Goals: Time Frame: 30 days  1. Pt will be compliant with HEP in order to increase LE strength/endurance/mobility to improve functional mobility and overall quality of life. 2. Pt will improve score on the Lower Extremity Functional Scale to 38/80 in order to demonstrate improved functional mobility and quality of life. 3. Pt will improve score on the Foot and Ankle Ability Measure to 41/84 in order to demonstrate improved functional mobility and quality of life. 4. Pt will report standing for > 5 minutes with minimal to no increase in pain in order to be able to stand for prolonged periods as needed to perform chores. 5. Pt will be able to ascend/descend 6 steps with S with or without rail with safe gait pattern and minimal to no increase in pain in order to improve community mobility. Discharge Goals: Time Frame: 60 days  1. Pt will be independent with HEP in order to increase LE strength/endurance/mobility to improve functional mobility and overall quality of life. 2. Pt will improve score on the Lower Extremity Functional Scale to 50/80 in order to demonstrate improved functional mobility and quality of life.    3. Pt will improve score on the Foot and Ankle Ability Measure to 45/84 in order to demonstrate improved functional mobility and quality of life. 4. Pt will report standing for > 10 minutes with minimal to no increase in pain in order to be able to stand for prolonged periods as needed to perform chores. 5. Pt will be able to ascend/descend 12 steps Magdalena with or without rail with reciprocal gait pattern and minimal to no increase in pain in order to improve community mobility. Rehabilitation Potential For Stated Goals: Good  Regarding Osiel Landa Liyah's therapy, I certify that the treatment plan above will be carried out by a therapist or under their direction. Thank you for this referral,  Yojana Lang DPT     Referring Physician Signature: Francisco Javier Hein MD              Date                    HISTORY:   History of Present Injury/Illness (Reason for Referral): *History per pt or pt's family except where otherwise noted  Pt states pain in her R ankle started in July 2018 (states she was walking more than usual), and she states that she was being treated for it (with prednisone) and it had gotten better with taping, but it has recently gotten worse again (early September when she stopped doing taping and started everyday walking again). States the pain has gotten better temporarily with Prednisone, but states that when she goes off the Prednisone it starts hurting again. Pain at worst is 6-7/10 (aggravating activities include walking > 5 minutes, prolonged standing > 1-2 minutes - even in shower). Pain at best is 0/10 (eases pain with prednisone, rest, taping, mobic, ice). Pt states she was given boot to wear to help with the pain but she cannot wear this while driving (she is supposed to wear this 4 weeks starting last Friday).    Past Medical History/Comorbidities:   Ms. Khoa Benjamin  has a past medical history of Acid reflux, ADHD (attention deficit hyperactivity disorder), Allergic rhinitis, Anxiety disorder, Arthritis, Asthma, B12 deficiency, Cerumen impaction, Chronic sinusitis, Depression, Deviated nasal septum, Endocrine disease, Fibromyalgia, GERD (gastroesophageal reflux disease), Headache, Hypothyroidism, Low blood sugar, Meniere disease, Migraine, Nasal obstruction, OCD (obsessive compulsive disorder), Psychiatric disorder, Special examinations, Special examinations, Tinnitus, and Unspecified hypothyroidism. Ms. Rizwana Dubois  has a past surgical history that includes hx orthopaedic; hx heent; hx heent (07/03/2013); and hx heent (06/2017). Social History/Living Environment:    lives alone in one story apartment with elevator to get up (15 flights of stairs with B railing - occasionally has to take the stairs down if she has an appointment and the elevator is busy)  Prior Level of Function/Work/Activity:  On disability for bipolar disorder and anxiety; likes to walk downtown with her friends, does housework (vacuuming, tidying up, light cooking); has to shop for her groceries  Dominant Side:         RIGHT  Other Clinical Tests:          X-rays of R foot (showed heel spur according to pt)  Previous Treatment Approaches:          Prednisone has helped  Personal Factors:          Sex:  female        Age:  41 y.o. Current Medications:    Current Outpatient Medications:     mirabegron ER (MYRBETRIQ) 25 mg ER tablet, Take 1 Tab by mouth nightly., Disp: 30 Tab, Rfl: 0    diclofenac (VOLTAREN) 1 % gel, Apply 2 g to affected area four (4) times daily. Apply to right foot as needed 2-4 times a day, Disp: 100 g, Rfl: 2    tiZANidine (ZANAFLEX) 4 mg tablet, Take 1 Tab by mouth three (3) times daily as needed. , Disp: 90 Tab, Rfl: 1    levothyroxine (SYNTHROID) 125 mcg tablet, Take 1 Tab by mouth daily. , Disp: 90 Tab, Rfl: 0    Dexlansoprazole (DEXILANT) 60 mg CpDB, Take 1 Cap by mouth every morning., Disp: 90 Cap, Rfl: 0    candesartan (ATACAND) 4 mg tablet, Take 1 Tab by mouth daily. , Disp: 90 Tab, Rfl: 0    IRON, FERROUS SULFATE, PO, Take 45 mg by mouth daily. , Disp: , Rfl:   cyanocobalamin 1,000 mcg tablet, Take 1 Tab by mouth daily. , Disp: 60 Tab, Rfl: 0    meloxicam (MOBIC) 15 mg tablet, Take 1 Tab by mouth daily. , Disp: 92 Tab, Rfl: 1    butalbital-acetaminophen (BUPAP)  mg tab, Take 1 Tab by mouth daily as needed. , Disp: 30 Tab, Rfl: 5    montelukast (SINGULAIR) 10 mg tablet, Take 1 Tab by mouth daily. , Disp: 92 Tab, Rfl: 1    cetirizine (ZYRTEC) 10 mg tablet, Take 1 Tab by mouth daily. , Disp: 92 Tab, Rfl: 1    venlafaxine-SR (EFFEXOR XR) 150 mg capsule, Take  by mouth daily. , Disp: , Rfl:     escitalopram oxalate (LEXAPRO) 20 mg tablet, Take 20 mg by mouth daily. , Disp: , Rfl:     Magnesium Oxide 500 mg cap, Take 500 mg by mouth daily. , Disp: , Rfl:     traMADol (ULTRAM) 50 mg tablet, Take 1 Tab by mouth every eight (8) hours as needed for Pain. Max Daily Amount: 150 mg., Disp: 30 Tab, Rfl: 1    amphetamine-dextroamphetamine XR (ADDERALL XR) 20 mg XR capsule, TK 1 C PO QD, Disp: , Rfl: 0    AMITIZA 24 mcg capsule, TK 1 C PO BID WF AND WATER, Disp: , Rfl: 6    albuterol (PROVENTIL HFA, VENTOLIN HFA, PROAIR HFA) 90 mcg/actuation inhaler, Take 2 Puffs by inhalation as needed for Wheezing., Disp: 1 Inhaler, Rfl: 2    ALPRAZolam (XANAX) 1 mg tablet, 0.5 mg three (3) times daily as needed. , Disp: , Rfl: 4    SUMAtriptan (IMITREX) 50 mg tablet, Take 50 mg by mouth., Disp: , Rfl:     risperiDONE (RISPERDAL) 2 mg tablet, Take 3 mg by mouth nightly., Disp: , Rfl: 3    LAMICTAL 200 mg tablet, TK 1 T PO BID, Disp: , Rfl: 4    pregabalin (LYRICA) 75 mg capsule, 2 PO q hs, Disp: , Rfl:     melatonin tab tablet, Take 5 mg by mouth., Disp: , Rfl:     cholecalciferol (VITAMIN D3) 5,000 unit capsule, Take 2,000 Units by mouth., Disp: , Rfl:    Date Last Reviewed:  10/29/2018   # of Personal Factors/Comorbidities that affect the Plan of Care: 3+: HIGH COMPLEXITY   EXAMINATION:   Initial assessment on 10/10/2018  Observation/Orthostatic Postural Assessment:     The following postural deficits were noted in sitting:  loss of cervical lordosis, increased thoracic kyphosis, forward head, rounded shoulders, posterior pelvic tilt   The following postural deficits were noted in standing: forward trunk flexion, pronated foot L > R, slight genu valgus noted B  Palpation:          Pt with tenderness noted at posterior R heel and at dorsum of R foot  ROM:    Initial measurement: (on 10/10/2018) Initial measurement: (on 10/10/2018) Reassessment measurement:  Reassessment measurement:    L LE: WFL throughout, but excessive hip IR passively R LE: WFL throughout, but excessive hip IR passively; ankle motion [facility] but significant tightness noted in dorsiflexors with pain in ankle       Strength:    Initial measurement: (on 10/10/2018) Initial measurement: (on 10/10/2018) Reassessment measurement:  Reassessment measurement:    L LE:  Hip flexion: 5/5   Hip extension: 4-/5  Hip abduction: 4-/5  Hip adduction: 5/5  Hip IR: 4+/5  Hip ER: 4/5   Knee flexion:  4/5  Knee extension: 5/5  Ankle DF: 4/5 R LE:  Hip flexion: 5/5  Hip extension: 4-/5  Hip abduction: 4/5  Hip adduction: 4/5  Hip IR: 4+/5  Hip ER: 4/5  Knee flexion: 4+/5  Knee extension: 5/5  Ankle DF: 4-/5       Special Tests:          No leg length discrepancy noted  Neurological Screen:        Myotomes:  WFL        Dermatomes:  No deficits noted  Functional Mobility:         Gait/Ambulation:  Pt ambulates with no AD with following gait deficits: decreased B step length/clearance, increased pronation at each foot in stance phase, decreased B heel strike, decreased B arm swing, slight forward trunk flexion, increased lateral sway        Transfers:  Pt able to stand/sit with minimal to no UE use        Bed Mobility:  Pt able to perform Magdalena with increased time  Balance:          Slight lateral sway with ambulation    Body Structures Involved:  1. Nerves  2. Bones  3. Joints  4. Muscles  5.  Ligaments Body Functions Affected:  1. Sensory/Pain  2. Neuromusculoskeletal  3. Movement Related Activities and Participation Affected:  1. General Tasks and Demands  2. Mobility  3. Self Care  4. Domestic Life  5. Interpersonal Interactions and Relationships  6. Community, Social and Evangeline Novi   # of elements that affect the Plan of Care: 4+: HIGH COMPLEXITY   CLINICAL PRESENTATION:   Presentation: Evolving clinical presentation with changing clinical characteristics: MODERATE COMPLEXITY   CLINICAL DECISION MAKING:   Outcome Measure: Tool Used: Lower Extremity Functional Scale (LEFS)  Score:  Initial: 27/80 Most Recent: X/80 (Date: -- )   Interpretation of Score: 20 questions each scored on a 5 point scale with 0 representing \"extreme difficulty or unable to perform\" and 4 representing \"no difficulty\". The lower the score, the greater the functional disability. 80/80 represents no disability. Minimal detectable change is 9 points. Score 80 79-63 62-48 47-32 31-16 15-1 0   Modifier CH CI CJ CK CL CM CN     ? Mobility - Walking and Moving Around:     - CURRENT STATUS: CL - 60%-79% impaired, limited or restricted    - GOAL STATUS: CJ - 20%-39% impaired, limited or restricted    - D/C STATUS:  ---------------To be determined---------------    Tool Used: PT/OT FOOT AND ANKLE ABILITY MEASURE  Score:  Initial: 36/84 Most Recent: X (Date: -- )   Interpretation of Score: For the \"Activities of Daily Living\", there are 21 questions each scored on a 5 point scale with 0 representing \"Unable to do\" and 4 representing \"No difficulty\". The lower the score, the greater the functional disability. 84/84 represents no disability. Minimal detectable change is 5.7 points. With the addition of the 8 questions in the \"Sports Subscale,\" there are 29 questions, each scored on a 5 point scale with 0 representing \"Unable to do\" and 4 representing \"No difficulty\". The lower the score, the greater the functional disability.  116/116 represents no disability. Minimal detectable change is 12.3 points. Activities of Daily Living:  Score 84 83-68 67-51 50-34 33-18 17-1 0   Modifier CH CI CJ CK CL CM CN     Activities of Daily Living + Sports Subscale:  Score 116 115-94 93-71 70-47 46-24 23-1 0   Modifier CH CI CJ CK CL CM CN     Payor: [employer] MEDICARE / Plan: [address] [place] [address] HMO / Product Type: Managed Care Medicare /   Medical Necessity:   · Patient is expected to demonstrate progress in strength, range of motion, balance and functional technique to improve ability to perform pain-free standing/ambulation. · Patient demonstrates good rehab potential due to higher previous functional level. Reason for Services/Other Comments:  · Patient continues to require modification of therapeutic interventions to increase complexity of exercises. Use of outcome tool(s) and clinical judgement create a POC that gives a: Questionable prediction of patient's progress: MODERATE COMPLEXITY   TREATMENT:   (In addition to Assessment/Re-Assessment sessions the following treatments were rendered)  Subjective comments at beginning of session: Pt states she is not having as much pain despite being active over the weekend (states she wore her boot one day over the weekend); states she has been able to practice heel raises (but hasn't practiced lowering on just R LE)  THERAPEUTIC EXERCISE: (30 minutes):  Exercises per grid below to improve mobility, strength, balance and dynamic movement of ankle - right to improve functional mobility. Required minimal visual, verbal and manual cues to promote proper body alignment, promote proper body posture, promote proper body mechanics and promote proper body breathing techniques. Progressed resistance, range, repetitions and complexity of movement as indicated.     Date:  10/22/2018 Date:  10/24/2018 Date:  10/29/2018   Activity/Exercise      Physiostep L2 resistance for 10 minutes to increase strength/endurance L2 resistance for 10 minutes to increase strength/endurance L2 resistance for 12 minutes to increase strength/endurance   Ankle plantarflexor stretch on incline board  30 second hold x 3 reps with knees extended then flexed 30 second hold x 3 reps with knees extended then flexed   Heel raises on firm ground  B UE support; 20 reps/1 set with cues for slow lowering on B LEs progressing to lowering on just R LE (last 5 reps) B UE support with feet on incline board (1st notch from bottom); 20 reps/1 set with cues for slow lowering on B LEs progressing to lowering on just R LE (last 5 reps)   Supine hamstring stretch  30 second hold x 3 reps each LE; pt stretching passively with belt 30 second hold x 3 reps R LE; pt stretching passively with belt   Leg press machine   10 lb resistance; 15 reps/1 set with cues to avoid hip IR/adduction and to keep knees apart when pushing; cues for slow movement               *given in HEP  [employer]      Manual therapy (10 minutes): With pt in prone position, extensive STM to R gastrocnemius and soleus as well as cross friction massage to R achilles tendon to reduce tightness and pain and improve ankle mobility. With pt in prone position, ice massage along R achilles tendon to reduce inflammation and pain at end of session. Modalities (). Treatment/Session Assessment:  Pt able to perform slightly more challenging strengthening exercises this session, indicating increased LE strength/endurance with less report of pain. However, she did report slightly increased pain at end of session. · Pain/ Symptoms: Initial:   2/10 in posterior R ankle Post Session:  3/10 ·   Compliance with Program/Exercises: Will assess as treatment progresses. · Recommendations/Intent for next treatment session: \"Next visit will focus on advancements to more challenging activities and reduction in assistance provided\".  Manual therapy/modalities as needed to reduce tightness and pain; work on eccentric plantarflexor strengthening and balance  Total Treatment Duration:  PT Patient Time In/Time Out  Time In: 1015  Time Out: 1601 Sasha Mak DPT    Future Appointments   Date Time Provider Jo Nolan   10/31/2018 10:15 AM Addi Loja DPT [facility]   11/1/2018  8:20 AM Mayo Ponce MD [facility]   11/5/2018 10:30 AM VALENTINO Sullivan [facility]   11/7/2018 10:15 AM VALENTINO Sullivan   11/12/2018 10:30 AM VALENTINO Sullivan   11/14/2018 10:30 AM VALENTINO Loomis [facility]

## 2018-10-31 ENCOUNTER — HOSPITAL ENCOUNTER (OUTPATIENT)
Dept: PHYSICAL THERAPY | Age: 43
Discharge: HOME | End: 2018-10-31
Payer: MEDICARE

## 2018-10-31 PROCEDURE — 97110 THERAPEUTIC EXERCISES: CPT

## 2018-10-31 PROCEDURE — 97140 MANUAL THERAPY 1/> REGIONS: CPT

## 2018-10-31 NOTE — PROGRESS NOTES
Tyesha Santana  : 1975  Primary: Keysha Nunez Medicare Hmo  Secondary: [place] Of [facility], [address], [place] [address] [address] [facility]  [phone]   [phone]       OUTPATIENT PHYSICAL THERAPY:Daily Note 10/31/2018    ICD-10: Treatment Diagnosis: Pain in right ankle and joints of right foot (M25.571)      Stiffness of right ankle, not elsewhere classified (M25.671)    Difficulty in walking, not elsewhere classified (R26.2)   Precautions/Allergies:   Abilify [aripiprazole]; Geodon [ziprasidone hcl]; Sen Juan; Ambien [zolpidem]; and Topamax [topiramate]   Fall Risk Score: 3 (? 5 = High Risk)  MD Orders: Evaluate and treat  MEDICAL/REFERRING DIAGNOSIS:  foot, right   DATE OF ONSET: 2018  REFERRING PHYSICIAN: Zion Moss MD  RETURN PHYSICIAN APPOINTMENT: 2018 (pt unsure of date)   This section established at most recent assessment  ASSESSMENT:  Tyesha Santana is a 41 y.o. female who presents to physical therapy for R ankle/foot pain that started in 2018 and has recently gotten worse. This session, she demonstrated decreased B LE strength/endurance, pain with R ankle mobility, multiple postural/gait deficits, decreased standing/walking tolerance, and decreased functional mobility as evident by a score of 36/84 on the Foot and Ankle Ability Measure and a score of 27/80 on the Lower Extremity Functional Scale (with lower scores indicating increased disability). Pt may benefit from skilled PT to address the above listed deficits to improve ability to perform pain-free ADLs/IADLs and to improve overall quality of life prior to discharge. PROBLEM LIST (Impacting functional limitations):  1. Decreased Strength  2. Decreased ADL/Functional Activities  3. Decreased Transfer Abilities  4. Decreased Ambulation Ability/Technique  5. Decreased Balance  6. Increased Pain  7.  Decreased Activity Tolerance  8. Increased Fatigue  9. Decreased Flexibility/Joint Mobility  10. Edema/Girth INTERVENTIONS PLANNED:  1. Balance Exercise  2. Bed Mobility  3. Cold  4. Electrical Stimulation  5. Family Education  6. Gait Training  7. Heat  8. Home Exercise Program (HEP)  9. Manual Therapy  10. Neuromuscular Re-education/Strengthening  11. Range of Motion (ROM)  12. Therapeutic Activites  13. Therapeutic Exercise/Strengthening  14. Transcutaneous Electrical Nerve Stimulation (TENS)  15. Transfer Training  16. Ultrasound (US)   TREATMENT PLAN:  Effective Dates: 10/10/2018 TO 12/9/2018 (60 days). Frequency/Duration: 2 times a week for 60 Days  GOALS: (Goals have been discussed and agreed upon with patient.)  Short-Term Functional Goals: Time Frame: 30 days  1. Pt will be compliant with HEP in order to increase LE strength/endurance/mobility to improve functional mobility and overall quality of life. 2. Pt will improve score on the Lower Extremity Functional Scale to 38/80 in order to demonstrate improved functional mobility and quality of life. 3. Pt will improve score on the Foot and Ankle Ability Measure to 41/84 in order to demonstrate improved functional mobility and quality of life. 4. Pt will report standing for > 5 minutes with minimal to no increase in pain in order to be able to stand for prolonged periods as needed to perform chores. 5. Pt will be able to ascend/descend 6 steps with S with or without rail with safe gait pattern and minimal to no increase in pain in order to improve community mobility. Discharge Goals: Time Frame: 60 days  1. Pt will be independent with HEP in order to increase LE strength/endurance/mobility to improve functional mobility and overall quality of life. 2. Pt will improve score on the Lower Extremity Functional Scale to 50/80 in order to demonstrate improved functional mobility and quality of life.    3. Pt will improve score on the Foot and Ankle Ability Measure to 45/84 in order to demonstrate improved functional mobility and quality of life. 4. Pt will report standing for > 10 minutes with minimal to no increase in pain in order to be able to stand for prolonged periods as needed to perform chores. 5. Pt will be able to ascend/descend 12 steps Magdalena with or without rail with reciprocal gait pattern and minimal to no increase in pain in order to improve community mobility. Rehabilitation Potential For Stated Goals: Good  Regarding [facility]'s therapy, I certify that the treatment plan above will be carried out by a therapist or under their direction. Thank you for this referral,  Vreonica Monteiro DPT     Referring Physician Signature: Maikel Weiss MD              Date                    HISTORY:   History of Present Injury/Illness (Reason for Referral): *History per pt or pt's family except where otherwise noted  Pt states pain in her R ankle started in July 2018 (states she was walking more than usual), and she states that she was being treated for it (with prednisone) and it had gotten better with taping, but it has recently gotten worse again (early September when she stopped doing taping and started everyday walking again). States the pain has gotten better temporarily with Prednisone, but states that when she goes off the Prednisone it starts hurting again. Pain at worst is 6-7/10 (aggravating activities include walking > 5 minutes, prolonged standing > 1-2 minutes - even in shower). Pain at best is 0/10 (eases pain with prednisone, rest, taping, mobic, ice). Pt states she was given boot to wear to help with the pain but she cannot wear this while driving (she is supposed to wear this 4 weeks starting last Friday).    Past Medical History/Comorbidities:   Ms. Kaylie Jones  has a past medical history of Acid reflux, ADHD (attention deficit hyperactivity disorder), Allergic rhinitis, Anxiety disorder, Arthritis, Asthma, B12 deficiency, Cerumen impaction, Chronic sinusitis, Depression, Deviated nasal septum, Endocrine disease, Fibromyalgia, GERD (gastroesophageal reflux disease), Headache, Hypothyroidism, Low blood sugar, Meniere disease, Migraine, Nasal obstruction, OCD (obsessive compulsive disorder), Psychiatric disorder, Special examinations, Special examinations, Tinnitus, and Unspecified hypothyroidism. Ms. Kvng Marmolejo  has a past surgical history that includes hx orthopaedic; hx heent; hx heent (07/03/2013); and hx heent (06/2017). Social History/Living Environment:    lives alone in one story apartment with elevator to get up (15 flights of stairs with B railing - occasionally has to take the stairs down if she has an appointment and the elevator is busy)  Prior Level of Function/Work/Activity:  On disability for bipolar disorder and anxiety; likes to walk downtown with her friends, does housework (vacuuming, tidying up, light cooking); has to shop for her groceries  Dominant Side:         RIGHT  Other Clinical Tests:          X-rays of R foot (showed heel spur according to pt)  Previous Treatment Approaches:          Prednisone has helped  Personal Factors:          Sex:  female        Age:  41 y.o. Current Medications:    Current Outpatient Medications:     mirabegron ER (MYRBETRIQ) 25 mg ER tablet, Take 1 Tab by mouth nightly., Disp: 30 Tab, Rfl: 0    diclofenac (VOLTAREN) 1 % gel, Apply 2 g to affected area four (4) times daily. Apply to right foot as needed 2-4 times a day, Disp: 100 g, Rfl: 2    tiZANidine (ZANAFLEX) 4 mg tablet, Take 1 Tab by mouth three (3) times daily as needed. , Disp: 90 Tab, Rfl: 1    levothyroxine (SYNTHROID) 125 mcg tablet, Take 1 Tab by mouth daily. , Disp: 90 Tab, Rfl: 0    Dexlansoprazole (DEXILANT) 60 mg CpDB, Take 1 Cap by mouth every morning., Disp: 90 Cap, Rfl: 0    candesartan (ATACAND) 4 mg tablet, Take 1 Tab by mouth daily. , Disp: 90 Tab, Rfl: 0    IRON, FERROUS SULFATE, PO, Take 45 mg by mouth daily. , Disp: , Rfl:   cyanocobalamin 1,000 mcg tablet, Take 1 Tab by mouth daily. , Disp: 60 Tab, Rfl: 0    meloxicam (MOBIC) 15 mg tablet, Take 1 Tab by mouth daily. , Disp: 92 Tab, Rfl: 1    butalbital-acetaminophen (BUPAP)  mg tab, Take 1 Tab by mouth daily as needed. , Disp: 30 Tab, Rfl: 5    montelukast (SINGULAIR) 10 mg tablet, Take 1 Tab by mouth daily. , Disp: 92 Tab, Rfl: 1    cetirizine (ZYRTEC) 10 mg tablet, Take 1 Tab by mouth daily. , Disp: 92 Tab, Rfl: 1    venlafaxine-SR (EFFEXOR XR) 150 mg capsule, Take  by mouth daily. , Disp: , Rfl:     escitalopram oxalate (LEXAPRO) 20 mg tablet, Take 20 mg by mouth daily. , Disp: , Rfl:     Magnesium Oxide 500 mg cap, Take 500 mg by mouth daily. , Disp: , Rfl:     traMADol (ULTRAM) 50 mg tablet, Take 1 Tab by mouth every eight (8) hours as needed for Pain. Max Daily Amount: 150 mg., Disp: 30 Tab, Rfl: 1    amphetamine-dextroamphetamine XR (ADDERALL XR) 20 mg XR capsule, TK 1 C PO QD, Disp: , Rfl: 0    AMITIZA 24 mcg capsule, TK 1 C PO BID WF AND WATER, Disp: , Rfl: 6    albuterol (PROVENTIL HFA, VENTOLIN HFA, PROAIR HFA) 90 mcg/actuation inhaler, Take 2 Puffs by inhalation as needed for Wheezing., Disp: 1 Inhaler, Rfl: 2    ALPRAZolam (XANAX) 1 mg tablet, 0.5 mg three (3) times daily as needed. , Disp: , Rfl: 4    SUMAtriptan (IMITREX) 50 mg tablet, Take 50 mg by mouth., Disp: , Rfl:     risperiDONE (RISPERDAL) 2 mg tablet, Take 3 mg by mouth nightly., Disp: , Rfl: 3    LAMICTAL 200 mg tablet, TK 1 T PO BID, Disp: , Rfl: 4    pregabalin (LYRICA) 75 mg capsule, 2 PO q hs, Disp: , Rfl:     melatonin tab tablet, Take 5 mg by mouth., Disp: , Rfl:     cholecalciferol (VITAMIN D3) 5,000 unit capsule, Take 2,000 Units by mouth., Disp: , Rfl:    Date Last Reviewed:  10/31/2018   # of Personal Factors/Comorbidities that affect the Plan of Care: 3+: HIGH COMPLEXITY   EXAMINATION:   Initial assessment on 10/10/2018  Observation/Orthostatic Postural Assessment:     The following postural deficits were noted in sitting:  loss of cervical lordosis, increased thoracic kyphosis, forward head, rounded shoulders, posterior pelvic tilt   The following postural deficits were noted in standing: forward trunk flexion, pronated foot L > R, slight genu valgus noted B  Palpation:          Pt with tenderness noted at posterior R heel and at dorsum of R foot  ROM:    Initial measurement: (on 10/10/2018) Initial measurement: (on 10/10/2018) Reassessment measurement:  Reassessment measurement:    L LE: WFL throughout, but excessive hip IR passively R LE: WFL throughout, but excessive hip IR passively; ankle motion [facility] but significant tightness noted in dorsiflexors with pain in ankle       Strength:    Initial measurement: (on 10/10/2018) Initial measurement: (on 10/10/2018) Reassessment measurement:  Reassessment measurement:    L LE:  Hip flexion: 5/5   Hip extension: 4-/5  Hip abduction: 4-/5  Hip adduction: 5/5  Hip IR: 4+/5  Hip ER: 4/5   Knee flexion:  4/5  Knee extension: 5/5  Ankle DF: 4/5 R LE:  Hip flexion: 5/5  Hip extension: 4-/5  Hip abduction: 4/5  Hip adduction: 4/5  Hip IR: 4+/5  Hip ER: 4/5  Knee flexion: 4+/5  Knee extension: 5/5  Ankle DF: 4-/5       Special Tests:          No leg length discrepancy noted  Neurological Screen:        Myotomes:  WFL        Dermatomes:  No deficits noted  Functional Mobility:         Gait/Ambulation:  Pt ambulates with no AD with following gait deficits: decreased B step length/clearance, increased pronation at each foot in stance phase, decreased B heel strike, decreased B arm swing, slight forward trunk flexion, increased lateral sway        Transfers:  Pt able to stand/sit with minimal to no UE use        Bed Mobility:  Pt able to perform Magdalena with increased time  Balance:          Slight lateral sway with ambulation    Body Structures Involved:  1. Nerves  2. Bones  3. Joints  4. Muscles  5.  Ligaments Body Functions Affected:  1. Sensory/Pain  2. Neuromusculoskeletal  3. Movement Related Activities and Participation Affected:  1. General Tasks and Demands  2. Mobility  3. Self Care  4. Domestic Life  5. Interpersonal Interactions and Relationships  6. Community, Social and [place]   # of elements that affect the Plan of Care: 4+: HIGH COMPLEXITY   CLINICAL PRESENTATION:   Presentation: Evolving clinical presentation with changing clinical characteristics: MODERATE COMPLEXITY   CLINICAL DECISION MAKING:   Outcome Measure: Tool Used: Lower Extremity Functional Scale (LEFS)  Score:  Initial: 27/80 Most Recent: X/80 (Date: -- )   Interpretation of Score: 20 questions each scored on a 5 point scale with 0 representing \"extreme difficulty or unable to perform\" and 4 representing \"no difficulty\". The lower the score, the greater the functional disability. 80/80 represents no disability. Minimal detectable change is 9 points. Score 80 79-63 62-48 47-32 31-16 15-1 0   Modifier CH CI CJ CK CL CM CN     ? Mobility - Walking and Moving Around:     - CURRENT STATUS: CL - 60%-79% impaired, limited or restricted    - GOAL STATUS: CJ - 20%-39% impaired, limited or restricted    - D/C STATUS:  ---------------To be determined---------------    Tool Used: PT/OT FOOT AND ANKLE ABILITY MEASURE  Score:  Initial: 36/84 Most Recent: X (Date: -- )   Interpretation of Score: For the \"Activities of Daily Living\", there are 21 questions each scored on a 5 point scale with 0 representing \"Unable to do\" and 4 representing \"No difficulty\". The lower the score, the greater the functional disability. 84/84 represents no disability. Minimal detectable change is 5.7 points. With the addition of the 8 questions in the \"Sports Subscale,\" there are 29 questions, each scored on a 5 point scale with 0 representing \"Unable to do\" and 4 representing \"No difficulty\". The lower the score, the greater the functional disability.  116/116 represents no disability. Minimal detectable change is 12.3 points. Activities of Daily Living:  Score 84 83-68 67-51 50-34 33-18 17-1 0   Modifier CH CI CJ CK CL CM CN     Activities of Daily Living + Sports Subscale:  Score 116 115-94 93-71 70-47 46-24 23-1 0   Modifier CH CI CJ CK CL CM CN     Payor: [employer] MEDICARE / Plan: [address] [place] [address] HMO / Product Type: Managed Care Medicare /   Medical Necessity:   · Patient is expected to demonstrate progress in strength, range of motion, balance and functional technique to improve ability to perform pain-free standing/ambulation. · Patient demonstrates good rehab potential due to higher previous functional level. Reason for Services/Other Comments:  · Patient continues to require modification of therapeutic interventions to increase complexity of exercises. Use of outcome tool(s) and clinical judgement create a POC that gives a: Questionable prediction of patient's progress: MODERATE COMPLEXITY   TREATMENT:   (In addition to Assessment/Re-Assessment sessions the following treatments were rendered)  Subjective comments at beginning of session: Pt states her pain spiked up a little after her last therapy session, but she took it easy and the pain is better today  THERAPEUTIC EXERCISE: (27 minutes):  Exercises per grid below to improve mobility, strength, balance and dynamic movement of ankle - right to improve functional mobility. Required minimal visual, verbal and manual cues to promote proper body alignment, promote proper body posture, promote proper body mechanics and promote proper body breathing techniques. Progressed resistance, range, repetitions and complexity of movement as indicated.     Date:  10/24/2018 Date:  10/29/2018 Date:  10/31/2018   Activity/Exercise      Physiostep L2 resistance for 10 minutes to increase strength/endurance L2 resistance for 12 minutes to increase strength/endurance L2 resistance for 12 minutes to increase strength/endurance   Ankle plantarflexor stretch on incline board 30 second hold x 3 reps with knees extended then flexed 30 second hold x 3 reps with knees extended then flexed 30 second hold x 3 reps with knees extended then flexed   Heel raises on firm ground B UE support; 20 reps/1 set with cues for slow lowering on B LEs progressing to lowering on just R LE (last 5 reps) B UE support with feet on incline board (1st notch from bottom); 20 reps/1 set with cues for slow lowering on B LEs progressing to lowering on just R LE (last 5 reps) B UE support with feet on incline board (1st notch from bottom); 20 reps/1 set with cues for slow lowering on B LEs  Progressed to performing at second notch of incline board x 10 reps (B UE support, but cues to avoid relying too much on UEs)   Supine hamstring stretch 30 second hold x 3 reps each LE; pt stretching passively with belt 30 second hold x 3 reps R LE; pt stretching passively with belt    Leg press machine  10 lb resistance; 15 reps/1 set with cues to avoid hip IR/adduction and to keep knees apart when pushing; cues for slow movement 10 lb resistance; 15 reps/1 set with cues to avoid hip IR/adduction and to keep knees apart when pushing; cues for slow movement               *given in HEP  [employer] Portal      Manual therapy (13 minutes): With pt in prone position, extensive STM to R gastrocnemius and soleus as well as cross friction massage to R achilles tendon to reduce tightness and pain and improve ankle mobility. With pt in prone position, ice massage along R achilles tendon to reduce inflammation and pain at end of session. Modalities (). Treatment/Session Assessment:  Pt stating slight increase in pain at end of session, but demonstrating improved overall muscular tightness/pain. · Pain/ Symptoms: Initial:   2-3/10 in posterior R ankle Post Session:  3/10 ·   Compliance with Program/Exercises: Will assess as treatment progresses.   · Recommendations/Intent for next treatment session: \"Next visit will focus on advancements to more challenging activities and reduction in assistance provided\".  Manual therapy/modalities as needed to reduce tightness and pain; work on eccentric plantarflexor strengthening and balance  Total Treatment Duration:  PT Patient Time In/Time Out  Time In: 1023  Time Out: [address]   11-7, DPT    Future Appointments   Date Time Provider Jo Nolan   11/1/2018  8:20 AM Chayo Moss MD [facility]   11/5/2018 10:30 AM VALENTINO Anaya [facility]   11/7/2018 10:15 AM VALENTINO Anaya   11/12/2018 10:30 AM VALENTINO Anaya   11/14/2018 10:30 AM VALENTINO Wilson [facility]

## 2018-11-01 PROBLEM — F50.81 BINGE EATING DISORDER: Status: ACTIVE | Noted: 2018-11-01

## 2018-11-01 PROBLEM — R51.9 HA (HEADACHE): Status: RESOLVED | Noted: 2018-04-17 | Resolved: 2018-11-01

## 2018-11-05 ENCOUNTER — HOSPITAL ENCOUNTER (OUTPATIENT)
Dept: PHYSICAL THERAPY | Age: 43
Discharge: HOME | End: 2018-11-05
Payer: MEDICARE

## 2018-11-05 PROCEDURE — G8978 MOBILITY CURRENT STATUS: HCPCS

## 2018-11-05 PROCEDURE — G8979 MOBILITY GOAL STATUS: HCPCS

## 2018-11-05 PROCEDURE — 97140 MANUAL THERAPY 1/> REGIONS: CPT

## 2018-11-05 PROCEDURE — 97110 THERAPEUTIC EXERCISES: CPT

## 2018-11-07 ENCOUNTER — HOSPITAL ENCOUNTER (OUTPATIENT)
Dept: PHYSICAL THERAPY | Age: 43
Discharge: HOME | End: 2018-11-07
Payer: MEDICARE

## 2018-11-07 PROCEDURE — 97110 THERAPEUTIC EXERCISES: CPT

## 2018-11-07 PROCEDURE — 97140 MANUAL THERAPY 1/> REGIONS: CPT

## 2018-11-07 NOTE — PROGRESS NOTES
Dipak Reardon  : 1975  Primary: [place] Nunez Medicare [place]  Secondary: [place] [facility], [address] [facility], [place] [address] [address] [facility]  [phone]   [phone]       OUTPATIENT PHYSICAL THERAPY:Daily Note 2018    ICD-10: Treatment Diagnosis: Pain in right ankle and joints of right foot (M25.571)      Stiffness of right ankle, not elsewhere classified (M25.671)    Difficulty in walking, not elsewhere classified (R26.2)   Precautions/Allergies:   Abilify [aripiprazole]; Geodon [ziprasidone hcl]; Sushma Pelt; Ambien [zolpidem]; and Topamax [topiramate]   Fall Risk Score: 3 (? 5 = High Risk)  MD Orders: Evaluate and treat  MEDICAL/REFERRING DIAGNOSIS:  foot, right   DATE OF ONSET: 2018  REFERRING PHYSICIAN: Ranjana Miller MD  RETURN PHYSICIAN APPOINTMENT: 2018 (pt unsure of date)   This section established at most recent assessment  ASSESSMENT:  Dipak Reardon has now attended 8 physical therapy sessions for R ankle/foot pain that started in 2018 and has recently gotten worse. This session, she demonstrated improved B LE strength/endurance, less pain with R ankle mobility, less postural/gait deficits, improved standing/walking tolerance, and improved functional mobility as evident by a score of 57/84 on the Foot and Ankle Ability Measure (initial score of 36/84, with lower scores indicating increased disability) and a score of 45/80 on the Lower Extremity Functional Scale (initial score of 27/80, with lower scores indicating increased disability). Despite the above listed improvements, she continues to demonstrate decreased B LE strength/endurance, pain with R ankle mobility, multiple postural/gait deficits, decreased standing/walking tolerance, and decreased functional mobility.   Pt may continue to benefit from skilled PT to address the above listed deficits to improve ability to perform pain-free ADLs/IADLs and to improve overall quality of life prior to discharge. PROBLEM LIST (Impacting functional limitations):  1. Decreased Strength  2. Decreased ADL/Functional Activities  3. Decreased Transfer Abilities  4. Decreased Ambulation Ability/Technique  5. Decreased Balance  6. Increased Pain  7. Decreased Activity Tolerance  8. Increased Fatigue  9. Decreased Flexibility/Joint Mobility  10. Edema/Girth INTERVENTIONS PLANNED:  1. Balance Exercise  2. Bed Mobility  3. Cold  4. Electrical Stimulation  5. Family Education  6. Gait Training  7. Heat  8. Home Exercise Program (HEP)  9. Manual Therapy  10. Neuromuscular Re-education/Strengthening  11. Range of Motion (ROM)  12. Therapeutic Activites  13. Therapeutic Exercise/Strengthening  14. Transcutaneous Electrical Nerve Stimulation (TENS)  15. Transfer Training  16. Ultrasound (US)   TREATMENT PLAN:  Effective Dates: 10/10/2018 TO 12/9/2018 (60 days). Frequency/Duration: 2 times a week for 60 Days  GOALS: (Goals have been discussed and agreed upon with patient.)  Short-Term Functional Goals: Time Frame: 30 days  1. Pt will be compliant with HEP in order to increase LE strength/endurance/mobility to improve functional mobility and overall quality of life. (MET 11/5/2018)  2. Pt will improve score on the Lower Extremity Functional Scale to 38/80 in order to demonstrate improved functional mobility and quality of life. (MET 11/5/2018)  3. Pt will improve score on the Foot and Ankle Ability Measure to 41/84 in order to demonstrate improved functional mobility and quality of life. (MET 11/5/2018)  4. Pt will report standing for > 5 minutes with minimal to no increase in pain in order to be able to stand for prolonged periods as needed to perform chores. (MET 11/5/2018)  5. Pt will be able to ascend/descend 6 steps with S with or without rail with safe gait pattern and minimal to no increase in pain in order to improve community mobility.   (MET 11/5/2018)  Discharge Goals: Time Frame: 60 days  1. Pt will be independent with HEP in order to increase LE strength/endurance/mobility to improve functional mobility and overall quality of life. 2. Pt will improve score on the Lower Extremity Functional Scale to 50/80 in order to demonstrate improved functional mobility and quality of life. 3. Pt will improve score on the Foot and Ankle Ability Measure to 45/84 in order to demonstrate improved functional mobility and quality of life. 4. Pt will report standing for > 10 minutes with minimal to no increase in pain in order to be able to stand for prolonged periods as needed to perform chores. 5. Pt will be able to ascend/descend 12 steps Magdalena with or without rail with reciprocal gait pattern and minimal to no increase in pain in order to improve community mobility. Rehabilitation Potential For Stated Goals: Good  Regarding Nazia Pelletier's therapy, I certify that the treatment plan above will be carried out by a therapist or under their direction. Thank you for this referral,  Ross Boone, DPT               HISTORY:   History of Present Injury/Illness (Reason for Referral): *History per pt or pt's family except where otherwise noted  Pt states pain in her R ankle started in July 2018 (states she was walking more than usual), and she states that she was being treated for it (with prednisone) and it had gotten better with taping, but it has recently gotten worse again (early September when she stopped doing taping and started everyday walking again). States the pain has gotten better temporarily with Prednisone, but states that when she goes off the Prednisone it starts hurting again. Pain at worst is 6-7/10 (aggravating activities include walking > 5 minutes, prolonged standing > 1-2 minutes - even in shower). Pain at best is 0/10 (eases pain with prednisone, rest, taping, mobic, ice).   Pt states she was given boot to wear to help with the pain but she cannot wear this while driving (she is supposed to wear this 4 weeks starting last Friday). Past Medical History/Comorbidities:   Ms. Cortez  has a past medical history of Acid reflux, ADHD (attention deficit hyperactivity disorder), Allergic rhinitis, Anxiety disorder, Arthritis, Asthma, B12 deficiency, Cerumen impaction, Chronic sinusitis, Depression, Deviated nasal septum, Endocrine disease, Fibromyalgia, GERD (gastroesophageal reflux disease), Headache, Hypothyroidism, Low blood sugar, Meniere disease, Migraine, Nasal obstruction, OCD (obsessive compulsive disorder), Psychiatric disorder, Special examinations, Special examinations, Tinnitus, and Unspecified hypothyroidism. Ms. Cortez  has a past surgical history that includes hx orthopaedic; hx heent; hx heent (07/03/2013); and hx heent (06/2017). Social History/Living Environment:    lives alone in one story apartment with elevator to get up (15 flights of stairs with B railing - occasionally has to take the stairs down if she has an appointment and the elevator is busy)  Prior Level of Function/Work/Activity:  On disability for bipolar disorder and anxiety; likes to walk downtown with her friends, does housework (vacuuming, tidying up, light cooking); has to shop for her groceries  Dominant Side:         RIGHT  Other Clinical Tests:          X-rays of R foot (showed heel spur according to pt)  Previous Treatment Approaches:          Prednisone has helped  Personal Factors:          Sex:  female        Age:  41 y.o. Current Medications:    Current Outpatient Medications:     levothyroxine (SYNTHROID) 125 mcg tablet, Take 1 Tab by mouth daily. , Disp: 90 Tab, Rfl: 1    Dexlansoprazole (DEXILANT) 60 mg CpDB, Take 1 Cap by mouth every morning., Disp: 90 Cap, Rfl: 1    candesartan (ATACAND) 4 mg tablet, Take 1 Tab by mouth daily. , Disp: 90 Tab, Rfl: 1    albuterol (PROVENTIL HFA, VENTOLIN HFA, PROAIR HFA) 90 mcg/actuation inhaler, Take 2 Puffs by inhalation as needed for Wheezing., Disp: 3 Inhaler, Rfl: 1    montelukast (SINGULAIR) 10 mg tablet, Take 1 Tab by mouth daily. , Disp: 90 Tab, Rfl: 1    cetirizine (ZYRTEC) 10 mg tablet, Take 1 Tab by mouth daily. , Disp: 90 Tab, Rfl: 1    meloxicam (MOBIC) 15 mg tablet, Take 1 Tab by mouth daily. , Disp: 90 Tab, Rfl: 1    mirabegron ER (MYRBETRIQ) 25 mg ER tablet, Take 1 Tab by mouth nightly., Disp: 30 Tab, Rfl: 0    diclofenac (VOLTAREN) 1 % gel, Apply 2 g to affected area four (4) times daily. Apply to right foot as needed 2-4 times a day, Disp: 100 g, Rfl: 2    tiZANidine (ZANAFLEX) 4 mg tablet, Take 1 Tab by mouth three (3) times daily as needed. , Disp: 90 Tab, Rfl: 1    IRON, FERROUS SULFATE, PO, Take 45 mg by mouth daily. , Disp: , Rfl:     cyanocobalamin 1,000 mcg tablet, Take 1 Tab by mouth daily. , Disp: 60 Tab, Rfl: 0    butalbital-acetaminophen (BUPAP)  mg tab, Take 1 Tab by mouth daily as needed. , Disp: 30 Tab, Rfl: 5    venlafaxine-SR (EFFEXOR XR) 150 mg capsule, Take  by mouth daily. , Disp: , Rfl:     escitalopram oxalate (LEXAPRO) 20 mg tablet, Take 20 mg by mouth daily. , Disp: , Rfl:     Magnesium Oxide 500 mg cap, Take 500 mg by mouth daily. , Disp: , Rfl:     traMADol (ULTRAM) 50 mg tablet, Take 1 Tab by mouth every eight (8) hours as needed for Pain. Max Daily Amount: 150 mg., Disp: 30 Tab, Rfl: 1    amphetamine-dextroamphetamine XR (ADDERALL XR) 20 mg XR capsule, TK 1 C PO QD, Disp: , Rfl: 0    AMITIZA 24 mcg capsule, TK 1 C PO BID WF AND WATER, Disp: , Rfl: 6    ALPRAZolam (XANAX) 1 mg tablet, 0.5 mg three (3) times daily as needed. , Disp: , Rfl: 4    risperiDONE (RISPERDAL) 2 mg tablet, Take 3 mg by mouth nightly., Disp: , Rfl: 3    LAMICTAL 200 mg tablet, TK 1 T PO BID, Disp: , Rfl: 4    pregabalin (LYRICA) 75 mg capsule, tid, Disp: , Rfl:     melatonin tab tablet, Take 5 mg by mouth., Disp: , Rfl:     cholecalciferol (VITAMIN D3) 5,000 unit capsule, Take 2,000 Units by mouth., Disp: , Rfl:    Date Last Reviewed:  11/7/2018   # of Personal Factors/Comorbidities that affect the Plan of Care: 3+: HIGH COMPLEXITY   EXAMINATION:   Reassessment on 11/5/2018  Observation/Orthostatic Postural Assessment:    The following postural deficits were noted in sitting:  loss of cervical lordosis, increased thoracic kyphosis, forward head, rounded shoulders, posterior pelvic tilt - improved upright on 11/5/2018  The following postural deficits were noted in standing: forward trunk flexion, pronated foot L > R, slight genu valgus noted B - improved upright posture on 11/5/2018  Palpation:          Pt with tenderness noted at posterior R heel and at dorsum of R foot - less tenderness throughout R foot/ankle noted on 11/5/2018  ROM:    Initial measurement: (on 10/10/2018) Initial measurement: (on 10/10/2018) Reassessment measurement: (on 11/5/2018) Reassessment measurement:    L LE: WFL throughout, but excessive hip IR passively R LE: WFL throughout, but excessive hip IR passively; ankle motion WFL but significant tightness noted in dorsiflexors with pain in ankle R LE: ankle motion WFL - tightness noted in dorsiflexors with minimal to no pain in ankle      Strength:    Initial measurement: (on 10/10/2018) Initial measurement: (on 10/10/2018) Reassessment measurement:  Reassessment measurement:    L LE:  Hip flexion: 5/5   Hip extension: 4-/5  Hip abduction: 4-/5  Hip adduction: 5/5  Hip IR: 4+/5  Hip ER: 4/5   Knee flexion:  4/5  Knee extension: 5/5  Ankle DF: 4/5 R LE:  Hip flexion: 5/5  Hip extension: 4-/5  Hip abduction: 4/5  Hip adduction: 4/5  Hip IR: 4+/5  Hip ER: 4/5  Knee flexion: 4+/5  Knee extension: 5/5  Ankle DF: 4-/5       Special Tests:          No leg length discrepancy noted  Neurological Screen:        Myotomes:  WFL        Dermatomes:  No deficits noted  Functional Mobility:         Gait/Ambulation:  Pt ambulates with no AD with following gait deficits: decreased B step length/clearance, increased pronation at each foot in stance phase, decreased B heel strike, decreased B arm swing, slight forward trunk flexion, increased lateral sway - slightly improved upright posture and improved B step length/clearance and heel strike on 11/5/2018        Transfers:  Pt able to stand/sit with minimal to no UE use        Bed Mobility:  Pt able to perform Magdalena with increased time  Balance:          Slight lateral sway with ambulation    Body Structures Involved:   1. Nerves  2. Bones  3. Joints  4. Muscles  5. Ligaments Body Functions Affected:  1. Sensory/Pain  2. Neuromusculoskeletal  3. Movement Related Activities and Participation Affected:  1. General Tasks and Demands  2. Mobility  3. Self Care  4. Domestic Life  5. Interpersonal Interactions and Relationships  6. Community, Social and [place]   # of elements that affect the Plan of Care: 4+: HIGH COMPLEXITY   CLINICAL PRESENTATION:   Presentation: Evolving clinical presentation with changing clinical characteristics: MODERATE COMPLEXITY   CLINICAL DECISION MAKING:   Outcome Measure: Tool Used: Lower Extremity Functional Scale (LEFS)  Score:  Initial: 27/80 Most Recent: 45/80 (Date: 11/5/2018)   Interpretation of Score: 20 questions each scored on a 5 point scale with 0 representing \"extreme difficulty or unable to perform\" and 4 representing \"no difficulty\". The lower the score, the greater the functional disability. 80/80 represents no disability. Minimal detectable change is 9 points. Score 80 79-63 62-48 47-32 31-16 15-1 0   Modifier CH CI CJ CK CL CM CN     ?  Mobility - Walking and Moving Around:     - CURRENT STATUS: CK - 40%-59% impaired, limited or restricted    - GOAL STATUS: CJ - 20%-39% impaired, limited or restricted    - D/C STATUS:  ---------------To be determined---------------    Tool Used: PT/OT FOOT AND ANKLE ABILITY MEASURE  Score:  Initial: 36/84 Most Recent: 57/84 (Date: 11/5/2018) Interpretation of Score: For the \"Activities of Daily Living\", there are 21 questions each scored on a 5 point scale with 0 representing \"Unable to do\" and 4 representing \"No difficulty\". The lower the score, the greater the functional disability. 84/84 represents no disability. Minimal detectable change is 5.7 points. With the addition of the 8 questions in the \"Sports Subscale,\" there are 29 questions, each scored on a 5 point scale with 0 representing \"Unable to do\" and 4 representing \"No difficulty\". The lower the score, the greater the functional disability. 116/116 represents no disability. Minimal detectable change is 12.3 points. Activities of Daily Living:  Score 84 83-68 67-51 50-34 33-18 17-1 0   Modifier CH CI CJ CK CL CM CN     Activities of Daily Living + Sports Subscale:  Score 116 115-94 93-71 70-47 46-24 23-1 0   Modifier CH CI CJ CK CL CM CN     Payor: [employer] MEDICARE / Plan: [address] [place] [address] HMO / Product Type: Managed Care Medicare /   Medical Necessity:   · Patient is expected to demonstrate progress in strength, range of motion, balance and functional technique to improve ability to perform pain-free standing/ambulation. · Patient demonstrates good rehab potential due to higher previous functional level. Reason for Services/Other Comments:  · Patient continues to require modification of therapeutic interventions to increase complexity of exercises.    Use of outcome tool(s) and clinical judgement create a POC that gives a: Questionable prediction of patient's progress: MODERATE COMPLEXITY   TREATMENT:   (In addition to Assessment/Re-Assessment sessions the following treatments were rendered)  Subjective comments at beginning of session: Pt states her pain is better today; states she was able to stand longer without aggravating pain earlier this week  THERAPEUTIC EXERCISE: (30 minutes):  Exercises per grid below to improve mobility, strength, balance and dynamic movement of ankle - right to improve functional mobility. Required minimal visual, verbal and manual cues to promote proper body alignment, promote proper body posture, promote proper body mechanics and promote proper body breathing techniques. Progressed resistance, range, repetitions and complexity of movement as indicated. Date:  10/31/2018 Date:  11/5/2018 Date:  11/7/2018   Activity/Exercise      Physiostep L2 resistance for 12 minutes to increase strength/endurance L2 resistance for 13 minutes to increase strength/endurance L2 resistance for 13 minutes to increase strength/endurance   Ankle plantarflexor stretch on incline board 30 second hold x 3 reps with knees extended then flexed  30 second hold x 3 reps with knees extended then flexed   Heel raises on firm ground B UE support with feet on incline board (1st notch from bottom); 20 reps/1 set with cues for slow lowering on B LEs  Progressed to performing at second notch of incline board x 10 reps (B UE support, but cues to avoid relying too much on UEs)  B UE support with feet on incline board (1st notch from bottom); 20 reps/1 set with cues for slow lowering on B LEs  Progressed to performing at second notch of incline board x 10 reps (B UE support, but cues to avoid relying too much on UEs)   Supine hamstring stretch  30 second hold x 3 reps each LE; pt stretching passively with belt    Leg press machine 10 lb resistance; 15 reps/1 set with cues to avoid hip IR/adduction and to keep knees apart when pushing; cues for slow movement  15 lb resistance; 15 reps/1 set with cues to avoid hip IR/adduction and to keep knees apart when pushing; cues for slow movement   Ambulation over level ground  Throughout session per assessment above    Ascending/descending stairs  Going up/down 6 steps per assessment above with reciprocal pattern and single rail use; S    *given in HEP  [employer]      Manual therapy (9 minutes):  With pt in supine position, gentle PROM performed at R ankle in all planes per assessment above. With pt in prone position, extensive STM to R gastrocnemius and soleus as well as cross friction massage to R achilles tendon to reduce tightness and pain and improve ankle mobility. With pt in prone position, ice massage along R achilles tendon to reduce inflammation and pain at end of session. Modalities (). Treatment/Session Assessment: Pt reporting only slightly increased pain at end of session (stating due to ankle plantarflexor stretch), and she reported improved mobility overall. · Pain/ Symptoms: Initial:   2/10 in posterior R ankle Post Session:  3/10 ·   Compliance with Program/Exercises: Good compliance with attending scheduled sessions. · Recommendations/Intent for next treatment session: \"Next visit will focus on advancements to more challenging activities and reduction in assistance provided\".  Manual therapy/modalities as needed to reduce tightness and pain; work on eccentric plantarflexor strengthening and balance  Total Treatment Duration:  PT Patient Time In/Time Out  Time In: 1017  Time Out: [address], [place]    Future Appointments   Date Time Provider Jo Nolan   11/12/2018 10:30 AM Sylvie SIMMS DPT [facility] Connor Jayde   11/14/2018 10:30 AM Sylvie SIMMS DPT [facility]   5/1/2019  8:45 AM MTV PM LAB/RAD [facility]   5/8/2019  9:20 AM Yoel Phoenix MD [facility]

## 2018-11-12 ENCOUNTER — HOSPITAL ENCOUNTER (OUTPATIENT)
Dept: PHYSICAL THERAPY | Age: 43
Discharge: HOME | End: 2018-11-12
Payer: MEDICARE

## 2018-11-12 PROCEDURE — 97110 THERAPEUTIC EXERCISES: CPT

## 2018-11-12 PROCEDURE — 97140 MANUAL THERAPY 1/> REGIONS: CPT

## 2018-11-12 NOTE — PROGRESS NOTES
Mikie Goltz  : 1975  Primary: [place] [employer] [place]  [facility] [facility] [address] [facility] [facility], [place] [address] [address] [facility]  [phone]   [phone]       OUTPATIENT PHYSICAL THERAPY:Daily Note 2018    ICD-10: Treatment Diagnosis: Pain in right ankle and joints of right foot (M25.571)      Stiffness of right ankle, not elsewhere classified (M25.671)    Difficulty in walking, not elsewhere classified (R26.2)   Precautions/Allergies:   Abilify [aripiprazole]; Geodon [ziprasidone hcl]; Mary Hutiron; Ambien [zolpidem]; and Topamax [topiramate]   Fall Risk Score: 3 (? 5 = High Risk)  MD Orders: Evaluate and treat  MEDICAL/REFERRING DIAGNOSIS:  right foot   DATE OF ONSET: 2018  REFERRING PHYSICIAN: Ashley Sánchez MD  RETURN PHYSICIAN APPOINTMENT: 2018 (pt unsure of date)   This section established at most recent assessment  ASSESSMENT:  Mikie Goltz has now attended 8 physical therapy sessions for R ankle/foot pain that started in 2018 and has recently gotten worse. This session, she demonstrated improved B LE strength/endurance, less pain with R ankle mobility, less postural/gait deficits, improved standing/walking tolerance, and improved functional mobility as evident by a score of 57/84 on the Foot and Ankle Ability Measure (initial score of 36/84, with lower scores indicating increased disability) and a score of 45/80 on the Lower Extremity Functional Scale (initial score of 27/80, with lower scores indicating increased disability). Despite the above listed improvements, she continues to demonstrate decreased B LE strength/endurance, pain with R ankle mobility, multiple postural/gait deficits, decreased standing/walking tolerance, and decreased functional mobility.   Pt may continue to benefit from skilled PT to address the above listed deficits to improve ability to perform pain-free ADLs/IADLs and to improve overall quality of life prior to discharge. PROBLEM LIST (Impacting functional limitations):  1. Decreased Strength  2. Decreased ADL/Functional Activities  3. Decreased Transfer Abilities  4. Decreased Ambulation Ability/Technique  5. Decreased Balance  6. Increased Pain  7. Decreased Activity Tolerance  8. Increased Fatigue  9. Decreased Flexibility/Joint Mobility  10. Edema/Girth INTERVENTIONS PLANNED:  1. Balance Exercise  2. Bed Mobility  3. Cold  4. Electrical Stimulation  5. Family Education  6. Gait Training  7. Heat  8. Home Exercise Program (HEP)  9. Manual Therapy  10. Neuromuscular Re-education/Strengthening  11. Range of Motion (ROM)  12. Therapeutic Activites  13. Therapeutic Exercise/Strengthening  14. Transcutaneous Electrical Nerve Stimulation (TENS)  15. Transfer Training  16. Ultrasound (US)   TREATMENT PLAN:  Effective Dates: 10/10/2018 TO 12/9/2018 (60 days). Frequency/Duration: 2 times a week for 60 Days  GOALS: (Goals have been discussed and agreed upon with patient.)  Short-Term Functional Goals: Time Frame: 30 days  1. Pt will be compliant with HEP in order to increase LE strength/endurance/mobility to improve functional mobility and overall quality of life. (MET 11/5/2018)  2. Pt will improve score on the Lower Extremity Functional Scale to 38/80 in order to demonstrate improved functional mobility and quality of life. (MET 11/5/2018)  3. Pt will improve score on the Foot and Ankle Ability Measure to 41/84 in order to demonstrate improved functional mobility and quality of life. (MET 11/5/2018)  4. Pt will report standing for > 5 minutes with minimal to no increase in pain in order to be able to stand for prolonged periods as needed to perform chores. (MET 11/5/2018)  5. Pt will be able to ascend/descend 6 steps with S with or without rail with safe gait pattern and minimal to no increase in pain in order to improve community mobility.   (MET 11/5/2018)  Discharge Goals: Time Frame: 60 days  1. Pt will be independent with HEP in order to increase LE strength/endurance/mobility to improve functional mobility and overall quality of life. 2. Pt will improve score on the Lower Extremity Functional Scale to 50/80 in order to demonstrate improved functional mobility and quality of life. 3. Pt will improve score on the Foot and Ankle Ability Measure to 45/84 in order to demonstrate improved functional mobility and quality of life. 4. Pt will report standing for > 10 minutes with minimal to no increase in pain in order to be able to stand for prolonged periods as needed to perform chores. 5. Pt will be able to ascend/descend 12 steps Magdalena with or without rail with reciprocal gait pattern and minimal to no increase in pain in order to improve community mobility. Rehabilitation Potential For Stated Goals: Good  Regarding Brandyn Pelletier's therapy, I certify that the treatment plan above will be carried out by a therapist or under their direction. Thank you for this referral,  Nataly Escalante, PTA               HISTORY:   History of Present Injury/Illness (Reason for Referral): *History per pt or pt's family except where otherwise noted  Pt states pain in her R ankle started in July 2018 (states she was walking more than usual), and she states that she was being treated for it (with prednisone) and it had gotten better with taping, but it has recently gotten worse again (early September when she stopped doing taping and started everyday walking again). States the pain has gotten better temporarily with Prednisone, but states that when she goes off the Prednisone it starts hurting again. Pain at worst is 6-7/10 (aggravating activities include walking > 5 minutes, prolonged standing > 1-2 minutes - even in shower). Pain at best is 0/10 (eases pain with prednisone, rest, taping, mobic, ice).   Pt states she was given boot to wear to help with the pain but she cannot wear this while driving (she is supposed to wear this 4 weeks starting last Friday). Past Medical History/Comorbidities:   Ms. Myra Harvey  has a past medical history of Acid reflux, ADHD (attention deficit hyperactivity disorder), Allergic rhinitis, Anxiety disorder, Arthritis, Asthma, B12 deficiency, Cerumen impaction, Chronic sinusitis, Depression, Deviated nasal septum, Endocrine disease, Fibromyalgia, GERD (gastroesophageal reflux disease), Headache, Hypothyroidism, Low blood sugar, Meniere disease, Migraine, Nasal obstruction, OCD (obsessive compulsive disorder), Psychiatric disorder, Special examinations, Special examinations, Tinnitus, and Unspecified hypothyroidism. Ms. Myra Harvey  has a past surgical history that includes hx orthopaedic; hx heent; hx heent (07/03/2013); and hx heent (06/2017). Social History/Living Environment:    lives alone in one story apartment with elevator to get up (15 flights of stairs with B railing - occasionally has to take the stairs down if she has an appointment and the elevator is busy)  Prior Level of Function/Work/Activity:  On disability for bipolar disorder and anxiety; likes to walk downtown with her friends, does housework (vacuuming, tidying up, light cooking); has to shop for her groceries  Dominant Side:         RIGHT  Other Clinical Tests:          X-rays of R foot (showed heel spur according to pt)  Previous Treatment Approaches:          Prednisone has helped  Personal Factors:          Sex:  female        Age:  41 y.o. Current Medications:    Current Outpatient Medications:     levothyroxine (SYNTHROID) 125 mcg tablet, Take 1 Tab by mouth daily. , Disp: 90 Tab, Rfl: 1    Dexlansoprazole (DEXILANT) 60 mg CpDB, Take 1 Cap by mouth every morning., Disp: 90 Cap, Rfl: 1    candesartan (ATACAND) 4 mg tablet, Take 1 Tab by mouth daily. , Disp: 90 Tab, Rfl: 1    albuterol (PROVENTIL HFA, VENTOLIN HFA, PROAIR HFA) 90 mcg/actuation inhaler, Take 2 Puffs by inhalation as needed for Wheezing., Disp: 3 Inhaler, Rfl: 1    montelukast (SINGULAIR) 10 mg tablet, Take 1 Tab by mouth daily. , Disp: 90 Tab, Rfl: 1    cetirizine (ZYRTEC) 10 mg tablet, Take 1 Tab by mouth daily. , Disp: 90 Tab, Rfl: 1    meloxicam (MOBIC) 15 mg tablet, Take 1 Tab by mouth daily. , Disp: 90 Tab, Rfl: 1    mirabegron ER (MYRBETRIQ) 25 mg ER tablet, Take 1 Tab by mouth nightly., Disp: 30 Tab, Rfl: 0    diclofenac (VOLTAREN) 1 % gel, Apply 2 g to affected area four (4) times daily. Apply to right foot as needed 2-4 times a day, Disp: 100 g, Rfl: 2    tiZANidine (ZANAFLEX) 4 mg tablet, Take 1 Tab by mouth three (3) times daily as needed. , Disp: 90 Tab, Rfl: 1    IRON, FERROUS SULFATE, PO, Take 45 mg by mouth daily. , Disp: , Rfl:     cyanocobalamin 1,000 mcg tablet, Take 1 Tab by mouth daily. , Disp: 60 Tab, Rfl: 0    butalbital-acetaminophen (BUPAP)  mg tab, Take 1 Tab by mouth daily as needed. , Disp: 30 Tab, Rfl: 5    venlafaxine-SR (EFFEXOR XR) 150 mg capsule, Take  by mouth daily. , Disp: , Rfl:     escitalopram oxalate (LEXAPRO) 20 mg tablet, Take 20 mg by mouth daily. , Disp: , Rfl:     Magnesium Oxide 500 mg cap, Take 500 mg by mouth daily. , Disp: , Rfl:     traMADol (ULTRAM) 50 mg tablet, Take 1 Tab by mouth every eight (8) hours as needed for Pain. Max Daily Amount: 150 mg., Disp: 30 Tab, Rfl: 1    amphetamine-dextroamphetamine XR (ADDERALL XR) 20 mg XR capsule, TK 1 C PO QD, Disp: , Rfl: 0    AMITIZA 24 mcg capsule, TK 1 C PO BID WF AND WATER, Disp: , Rfl: 6    ALPRAZolam (XANAX) 1 mg tablet, 0.5 mg three (3) times daily as needed. , Disp: , Rfl: 4    risperiDONE (RISPERDAL) 2 mg tablet, Take 3 mg by mouth nightly., Disp: , Rfl: 3    LAMICTAL 200 mg tablet, TK 1 T PO BID, Disp: , Rfl: 4    pregabalin (LYRICA) 75 mg capsule, tid, Disp: , Rfl:     melatonin tab tablet, Take 5 mg by mouth., Disp: , Rfl:     cholecalciferol (VITAMIN D3) 5,000 unit capsule, Take 2,000 Units by mouth., Disp: , Rfl:    Date Last Reviewed:  11/12/2018   # of Personal Factors/Comorbidities that affect the Plan of Care: 3+: HIGH COMPLEXITY   EXAMINATION:   Reassessment on 11/5/2018  Observation/Orthostatic Postural Assessment:    The following postural deficits were noted in sitting:  loss of cervical lordosis, increased thoracic kyphosis, forward head, rounded shoulders, posterior pelvic tilt - improved upright on 11/5/2018  The following postural deficits were noted in standing: forward trunk flexion, pronated foot L > R, slight genu valgus noted B - improved upright posture on 11/5/2018  Palpation:          Pt with tenderness noted at posterior R heel and at dorsum of R foot - less tenderness throughout R foot/ankle noted on 11/5/2018  ROM:    Initial measurement: (on 10/10/2018) Initial measurement: (on 10/10/2018) Reassessment measurement: (on 11/5/2018) Reassessment measurement:    L LE: WFL throughout, but excessive hip IR passively R LE: WFL throughout, but excessive hip IR passively; ankle motion WFL but significant tightness noted in dorsiflexors with pain in ankle R LE: ankle motion WFL - tightness noted in dorsiflexors with minimal to no pain in ankle      Strength:    Initial measurement: (on 10/10/2018) Initial measurement: (on 10/10/2018) Reassessment measurement:  Reassessment measurement:    L LE:  Hip flexion: 5/5   Hip extension: 4-/5  Hip abduction: 4-/5  Hip adduction: 5/5  Hip IR: 4+/5  Hip ER: 4/5   Knee flexion:  4/5  Knee extension: 5/5  Ankle DF: 4/5 R LE:  Hip flexion: 5/5  Hip extension: 4-/5  Hip abduction: 4/5  Hip adduction: 4/5  Hip IR: 4+/5  Hip ER: 4/5  Knee flexion: 4+/5  Knee extension: 5/5  Ankle DF: 4-/5       Special Tests:          No leg length discrepancy noted  Neurological Screen:        Myotomes:  WFL        Dermatomes:  No deficits noted  Functional Mobility:         Gait/Ambulation:  Pt ambulates with no AD with following gait deficits: decreased B step length/clearance, increased pronation at each foot in stance phase, decreased B heel strike, decreased B arm swing, slight forward trunk flexion, increased lateral sway - slightly improved upright posture and improved B step length/clearance and heel strike on 11/5/2018        Transfers:  Pt able to stand/sit with minimal to no UE use        Bed Mobility:  Pt able to perform Magdalena with increased time  Balance:          Slight lateral sway with ambulation    Body Structures Involved:   1. Nerves  2. Bones  3. Joints  4. Muscles  5. Ligaments Body Functions Affected:  1. Sensory/Pain  2. Neuromusculoskeletal  3. Movement Related Activities and Participation Affected:  1. General Tasks and Demands  2. Mobility  3. Self Care  4. Domestic Life  5. Interpersonal Interactions and Relationships  6. Community, Social and [place]   # of elements that affect the Plan of Care: 4+: HIGH COMPLEXITY   CLINICAL PRESENTATION:   Presentation: Evolving clinical presentation with changing clinical characteristics: MODERATE COMPLEXITY   CLINICAL DECISION MAKING:   Outcome Measure: Tool Used: Lower Extremity Functional Scale (LEFS)  Score:  Initial: 27/80 Most Recent: 45/80 (Date: 11/5/2018)   Interpretation of Score: 20 questions each scored on a 5 point scale with 0 representing \"extreme difficulty or unable to perform\" and 4 representing \"no difficulty\". The lower the score, the greater the functional disability. 80/80 represents no disability. Minimal detectable change is 9 points. Score 80 79-63 62-48 47-32 31-16 15-1 0   Modifier CH CI CJ CK CL CM CN     ?  Mobility - Walking and Moving Around:     - CURRENT STATUS: CK - 40%-59% impaired, limited or restricted    - GOAL STATUS: CJ - 20%-39% impaired, limited or restricted    - D/C STATUS:  ---------------To be determined---------------    Tool Used: PT/OT FOOT AND ANKLE ABILITY MEASURE  Score:  Initial: 36/84 Most Recent: 57/84 (Date: 11/5/2018) Interpretation of Score: For the \"Activities of Daily Living\", there are 21 questions each scored on a 5 point scale with 0 representing \"Unable to do\" and 4 representing \"No difficulty\". The lower the score, the greater the functional disability. 84/84 represents no disability. Minimal detectable change is 5.7 points. With the addition of the 8 questions in the \"Sports Subscale,\" there are 29 questions, each scored on a 5 point scale with 0 representing \"Unable to do\" and 4 representing \"No difficulty\". The lower the score, the greater the functional disability. 116/116 represents no disability. Minimal detectable change is 12.3 points. Activities of Daily Living:  Score 84 83-68 67-51 50-34 33-18 17-1 0   Modifier CH CI CJ CK CL CM CN     Activities of Daily Living + Sports Subscale:  Score 116 115-94 93-71 70-47 46-24 23-1 0   Modifier CH CI CJ CK CL CM CN     Payor: [employer] MEDICARE / Plan: [address] [place] [address] HMO / Product Type: Managed Care Medicare /   Medical Necessity:   · Patient is expected to demonstrate progress in strength, range of motion, balance and functional technique to improve ability to perform pain-free standing/ambulation. · Patient demonstrates good rehab potential due to higher previous functional level. Reason for Services/Other Comments:  · Patient continues to require modification of therapeutic interventions to increase complexity of exercises. Use of outcome tool(s) and clinical judgement create a POC that gives a: Questionable prediction of patient's progress: MODERATE COMPLEXITY   TREATMENT:   (In addition to Assessment/Re-Assessment sessions the following treatments were rendered)  Subjective comments at beginning of session: Pt states she is having very little pain today. Pain level 1/10 today. Walked a good deal shopping with friend over weekend and pain went up to 5/10 but with rest and ice pain decreased down to 1-2/10.    THERAPEUTIC EXERCISE: (30 minutes): Exercises per grid below to improve mobility, strength, balance and dynamic movement of ankle - right to improve functional mobility. Required minimal visual, verbal and manual cues to promote proper body alignment, promote proper body posture, promote proper body mechanics and promote proper body breathing techniques. Progressed resistance, range, repetitions and complexity of movement as indicated. Date:  11/5/2018 Date:  11/7/2018 Date  11/12/18   Activity/Exercise      Physiostep L2 resistance for 13 minutes to increase strength/endurance L2 resistance for 13 minutes to increase strength/endurance L2 resistance for    Ankle plantarflexor stretch on incline board  30 second hold x 3 reps with knees extended then flexed 3 x 30 second hold   Heel raises on firm ground  B UE support with feet on incline board (1st notch from bottom); 20 reps/1 set with cues for slow lowering on B LEs  Progressed to performing at second notch of incline board x 10 reps (B UE support, but cues to avoid relying too much on UEs) B UE support with feet on incline board (1st notch from bottom); 20 reps/1 set with cues for slow lowering on B LEs   Supine hamstring stretch 30 second hold x 3 reps each LE; pt stretching passively with belt     Leg press machine  15 lb resistance; 15 reps/1 set with cues to avoid hip IR/adduction and to keep knees apart when pushing; cues for slow movement 15 lb resistance; 10 reps/2 set with cues to avoid hip IR/adduction and to keep knees apart when pushing; cues for slow movement   Ambulation over level ground Throughout session per assessment above     Ascending/descending stairs Going up/down 6 steps per assessment above with reciprocal pattern and single rail use; S     *given in HEP  [employer]      Manual therapy (15 minutes): With pt in supine position, gentle PROM performed at R ankle in all planes per assessment above.  With pt in prone position, extensive STM with theraroller and hands to R gastrocnemius and soleus as well as cross friction massage to R achilles tendon with hands  to reduce tightness and pain and improve ankle mobility. With pt in prone position, ice massage along R achilles tendon to reduce inflammation and pain at end of session. Modalities (). Treatment/Session Assessment: Pt reporting minimal pain today and no increase in pain with treatment. Patient pleased with progress. Continue per plan of care. · Pain/ Symptoms: Initial:   1/10 in posterior R ankle Post Session:  1/10  ·   Compliance with Program/Exercises: Good compliance with attending scheduled sessions. · Recommendations/Intent for next treatment session: \"Next visit will focus on advancements to more challenging activities and reduction in assistance provided\".  Manual therapy/modalities as needed to reduce tightness and pain; work on eccentric plantarflexor strengthening and balance  Total Treatment Duration:  PT Patient Time In/Time Out  Time In: 1030  Time Out: [address] [facility]    Future Appointments   Date Time Provider Jo Nolan   11/14/2018 10:30 AM Dipti Cormier DPT [facility]   11/19/2018  3:15 PM VALENTINO [place] [facility]   11/21/2018 10:15 AM Maryana SIMMS DPT [facility]   5/1/2019  8:45 AM MTTAMERA PM LAB/RAD RAMANDEEP [facility]   5/8/2019  9:20 AM Dania Strickland MD [facility]

## 2018-11-14 ENCOUNTER — HOSPITAL ENCOUNTER (OUTPATIENT)
Dept: PHYSICAL THERAPY | Age: 43
Discharge: HOME | End: 2018-11-14
Payer: MEDICARE

## 2018-11-14 PROCEDURE — 97110 THERAPEUTIC EXERCISES: CPT

## 2018-11-14 PROCEDURE — 97140 MANUAL THERAPY 1/> REGIONS: CPT

## 2018-11-14 NOTE — PROGRESS NOTES
Zhane Dykes  : 1975  Primary: Latisha Nunez Medicare [place]  Secondary: [place] [facility], [facility], [address]  [phone]   [phone]       OUTPATIENT PHYSICAL THERAPY:Daily Note 2018    ICD-10: Treatment Diagnosis: Pain in right ankle and joints of right foot (M25.571)      Stiffness of right ankle, not elsewhere classified (M25.671)    Difficulty in walking, not elsewhere classified (R26.2)   Precautions/Allergies:   Abilify [aripiprazole]; Geodon [ziprasidone hcl]; Jose Heymann; Ambien [zolpidem]; and Topamax [topiramate]   Fall Risk Score: 3 (? 5 = High Risk)  MD Orders: Evaluate and treat  MEDICAL/REFERRING DIAGNOSIS:  right foot   DATE OF ONSET: 2018  REFERRING PHYSICIAN: Glendy Silva MD  RETURN PHYSICIAN APPOINTMENT: 2018 (pt unsure of date)   This section established at most recent assessment  ASSESSMENT:  Zhane Dykes has now attended 8 physical therapy sessions for R ankle/foot pain that started in 2018 and has recently gotten worse. This session, she demonstrated improved B LE strength/endurance, less pain with R ankle mobility, less postural/gait deficits, improved standing/walking tolerance, and improved functional mobility as evident by a score of 57/84 on the Foot and Ankle Ability Measure (initial score of 36/84, with lower scores indicating increased disability) and a score of 45/80 on the Lower Extremity Functional Scale (initial score of 27/80, with lower scores indicating increased disability). Despite the above listed improvements, she continues to demonstrate decreased B LE strength/endurance, pain with R ankle mobility, multiple postural/gait deficits, decreased standing/walking tolerance, and decreased functional mobility.   Pt may continue to benefit from skilled PT to address the above listed deficits to improve ability to perform pain-free ADLs/IADLs and to improve overall quality of life prior to discharge. PROBLEM LIST (Impacting functional limitations):  1. Decreased Strength  2. Decreased ADL/Functional Activities  3. Decreased Transfer Abilities  4. Decreased Ambulation Ability/Technique  5. Decreased Balance  6. Increased Pain  7. Decreased Activity Tolerance  8. Increased Fatigue  9. Decreased Flexibility/Joint Mobility  10. Edema/Girth INTERVENTIONS PLANNED:  1. Balance Exercise  2. Bed Mobility  3. Cold  4. Electrical Stimulation  5. Family Education  6. Gait Training  7. Heat  8. Home Exercise Program (HEP)  9. Manual Therapy  10. Neuromuscular Re-education/Strengthening  11. Range of Motion (ROM)  12. Therapeutic Activites  13. Therapeutic Exercise/Strengthening  14. Transcutaneous Electrical Nerve Stimulation (TENS)  15. Transfer Training  16. Ultrasound (US)   TREATMENT PLAN:  Effective Dates: 10/10/2018 TO 12/9/2018 (60 days). Frequency/Duration: 2 times a week for 60 Days  GOALS: (Goals have been discussed and agreed upon with patient.)  Short-Term Functional Goals: Time Frame: 30 days  1. Pt will be compliant with HEP in order to increase LE strength/endurance/mobility to improve functional mobility and overall quality of life. (MET 11/5/2018)  2. Pt will improve score on the Lower Extremity Functional Scale to 38/80 in order to demonstrate improved functional mobility and quality of life. (MET 11/5/2018)  3. Pt will improve score on the Foot and Ankle Ability Measure to 41/84 in order to demonstrate improved functional mobility and quality of life. (MET 11/5/2018)  4. Pt will report standing for > 5 minutes with minimal to no increase in pain in order to be able to stand for prolonged periods as needed to perform chores. (MET 11/5/2018)  5. Pt will be able to ascend/descend 6 steps with S with or without rail with safe gait pattern and minimal to no increase in pain in order to improve community mobility.   (MET 11/5/2018)  Discharge Goals: Time Frame: 60 days  1. Pt will be independent with HEP in order to increase LE strength/endurance/mobility to improve functional mobility and overall quality of life. 2. Pt will improve score on the Lower Extremity Functional Scale to 50/80 in order to demonstrate improved functional mobility and quality of life. 3. Pt will improve score on the Foot and Ankle Ability Measure to 45/84 in order to demonstrate improved functional mobility and quality of life. 4. Pt will report standing for > 10 minutes with minimal to no increase in pain in order to be able to stand for prolonged periods as needed to perform chores. 5. Pt will be able to ascend/descend 12 steps Magdalena with or without rail with reciprocal gait pattern and minimal to no increase in pain in order to improve community mobility. Rehabilitation Potential For Stated Goals: Good  Regarding Alberto Atkinson Liyah's therapy, I certify that the treatment plan above will be carried out by a therapist or under their direction. Thank you for this referral,  KRISTINE ClineT               HISTORY:   History of Present Injury/Illness (Reason for Referral): *History per pt or pt's family except where otherwise noted  Pt states pain in her R ankle started in July 2018 (states she was walking more than usual), and she states that she was being treated for it (with prednisone) and it had gotten better with taping, but it has recently gotten worse again (early September when she stopped doing taping and started everyday walking again). States the pain has gotten better temporarily with Prednisone, but states that when she goes off the Prednisone it starts hurting again. Pain at worst is 6-7/10 (aggravating activities include walking > 5 minutes, prolonged standing > 1-2 minutes - even in shower). Pain at best is 0/10 (eases pain with prednisone, rest, taping, mobic, ice).   Pt states she was given boot to wear to help with the pain but she cannot wear this while driving (she is supposed to wear this 4 weeks starting last Friday). Past Medical History/Comorbidities:   Ms. Robert Bardshaw  has a past medical history of Acid reflux, ADHD (attention deficit hyperactivity disorder), Allergic rhinitis, Anxiety disorder, Arthritis, Asthma, B12 deficiency, Cerumen impaction, Chronic sinusitis, Depression, Deviated nasal septum, Endocrine disease, Fibromyalgia, GERD (gastroesophageal reflux disease), Headache, Hypothyroidism, Low blood sugar, Meniere disease, Migraine, Nasal obstruction, OCD (obsessive compulsive disorder), Psychiatric disorder, Special examinations, Special examinations, Tinnitus, and Unspecified hypothyroidism. Ms. Robert Bradshaw  has a past surgical history that includes hx orthopaedic; hx heent; hx heent (07/03/2013); and hx heent (06/2017). Social History/Living Environment:    lives alone in one story apartment with elevator to get up (15 flights of stairs with B railing - occasionally has to take the stairs down if she has an appointment and the elevator is busy)  Prior Level of Function/Work/Activity:  On disability for bipolar disorder and anxiety; likes to walk downtown with her friends, does housework (vacuuming, tidying up, light cooking); has to shop for her groceries  Dominant Side:         RIGHT  Other Clinical Tests:          X-rays of R foot (showed heel spur according to pt)  Previous Treatment Approaches:          Prednisone has helped  Personal Factors:          Sex:  female        Age:  41 y.o. Current Medications:    Current Outpatient Medications:     levothyroxine (SYNTHROID) 125 mcg tablet, Take 1 Tab by mouth daily. , Disp: 90 Tab, Rfl: 1    Dexlansoprazole (DEXILANT) 60 mg CpDB, Take 1 Cap by mouth every morning., Disp: 90 Cap, Rfl: 1    candesartan (ATACAND) 4 mg tablet, Take 1 Tab by mouth daily. , Disp: 90 Tab, Rfl: 1    albuterol (PROVENTIL HFA, VENTOLIN HFA, PROAIR HFA) 90 mcg/actuation inhaler, Take 2 Puffs by inhalation as needed for Wheezing., Disp: 3 Inhaler, Rfl: 1    montelukast (SINGULAIR) 10 mg tablet, Take 1 Tab by mouth daily. , Disp: 90 Tab, Rfl: 1    cetirizine (ZYRTEC) 10 mg tablet, Take 1 Tab by mouth daily. , Disp: 90 Tab, Rfl: 1    meloxicam (MOBIC) 15 mg tablet, Take 1 Tab by mouth daily. , Disp: 90 Tab, Rfl: 1    mirabegron ER (MYRBETRIQ) 25 mg ER tablet, Take 1 Tab by mouth nightly., Disp: 30 Tab, Rfl: 0    diclofenac (VOLTAREN) 1 % gel, Apply 2 g to affected area four (4) times daily. Apply to right foot as needed 2-4 times a day, Disp: 100 g, Rfl: 2    tiZANidine (ZANAFLEX) 4 mg tablet, Take 1 Tab by mouth three (3) times daily as needed. , Disp: 90 Tab, Rfl: 1    IRON, FERROUS SULFATE, PO, Take 45 mg by mouth daily. , Disp: , Rfl:     cyanocobalamin 1,000 mcg tablet, Take 1 Tab by mouth daily. , Disp: 60 Tab, Rfl: 0    butalbital-acetaminophen (BUPAP)  mg tab, Take 1 Tab by mouth daily as needed. , Disp: 30 Tab, Rfl: 5    venlafaxine-SR (EFFEXOR XR) 150 mg capsule, Take  by mouth daily. , Disp: , Rfl:     escitalopram oxalate (LEXAPRO) 20 mg tablet, Take 20 mg by mouth daily. , Disp: , Rfl:     Magnesium Oxide 500 mg cap, Take 500 mg by mouth daily. , Disp: , Rfl:     traMADol (ULTRAM) 50 mg tablet, Take 1 Tab by mouth every eight (8) hours as needed for Pain. Max Daily Amount: 150 mg., Disp: 30 Tab, Rfl: 1    amphetamine-dextroamphetamine XR (ADDERALL XR) 20 mg XR capsule, TK 1 C PO QD, Disp: , Rfl: 0    AMITIZA 24 mcg capsule, TK 1 C PO BID WF AND WATER, Disp: , Rfl: 6    ALPRAZolam (XANAX) 1 mg tablet, 0.5 mg three (3) times daily as needed. , Disp: , Rfl: 4    risperiDONE (RISPERDAL) 2 mg tablet, Take 3 mg by mouth nightly., Disp: , Rfl: 3    LAMICTAL 200 mg tablet, TK 1 T PO BID, Disp: , Rfl: 4    pregabalin (LYRICA) 75 mg capsule, tid, Disp: , Rfl:     melatonin tab tablet, Take 5 mg by mouth., Disp: , Rfl:     cholecalciferol (VITAMIN D3) 5,000 unit capsule, Take 2,000 Units by mouth., Disp: , Rfl:    Date Last Reviewed:  11/14/2018   # of Personal Factors/Comorbidities that affect the Plan of Care: 3+: HIGH COMPLEXITY   EXAMINATION:   Reassessment on 11/5/2018  Observation/Orthostatic Postural Assessment:    The following postural deficits were noted in sitting:  loss of cervical lordosis, increased thoracic kyphosis, forward head, rounded shoulders, posterior pelvic tilt - improved upright on 11/5/2018  The following postural deficits were noted in standing: forward trunk flexion, pronated foot L > R, slight genu valgus noted B - improved upright posture on 11/5/2018  Palpation:          Pt with tenderness noted at posterior R heel and at dorsum of R foot - less tenderness throughout R foot/ankle noted on 11/5/2018  ROM:    Initial measurement: (on 10/10/2018) Initial measurement: (on 10/10/2018) Reassessment measurement: (on 11/5/2018) Reassessment measurement:    L LE: WFL throughout, but excessive hip IR passively R LE: WFL throughout, but excessive hip IR passively; ankle motion WFL but significant tightness noted in dorsiflexors with pain in ankle R LE: ankle motion WFL - tightness noted in dorsiflexors with minimal to no pain in ankle      Strength:    Initial measurement: (on 10/10/2018) Initial measurement: (on 10/10/2018) Reassessment measurement:  Reassessment measurement:    L LE:  Hip flexion: 5/5   Hip extension: 4-/5  Hip abduction: 4-/5  Hip adduction: 5/5  Hip IR: 4+/5  Hip ER: 4/5   Knee flexion:  4/5  Knee extension: 5/5  Ankle DF: 4/5 R LE:  Hip flexion: 5/5  Hip extension: 4-/5  Hip abduction: 4/5  Hip adduction: 4/5  Hip IR: 4+/5  Hip ER: 4/5  Knee flexion: 4+/5  Knee extension: 5/5  Ankle DF: 4-/5       Special Tests:          No leg length discrepancy noted  Neurological Screen:        Myotomes:  WFL        Dermatomes:  No deficits noted  Functional Mobility:         Gait/Ambulation:  Pt ambulates with no AD with following gait deficits: decreased B step length/clearance, increased pronation at each foot in stance phase, decreased B heel strike, decreased B arm swing, slight forward trunk flexion, increased lateral sway - slightly improved upright posture and improved B step length/clearance and heel strike on 11/5/2018        Transfers:  Pt able to stand/sit with minimal to no UE use        Bed Mobility:  Pt able to perform Magdalena with increased time  Balance:          Slight lateral sway with ambulation    Body Structures Involved:   1. Nerves  2. Bones  3. Joints  4. Muscles  5. Ligaments Body Functions Affected:  1. Sensory/Pain  2. Neuromusculoskeletal  3. Movement Related Activities and Participation Affected:  1. General Tasks and Demands  2. Mobility  3. Self Care  4. Domestic Life  5. Interpersonal Interactions and Relationships  6. Community, Social and [place]   # of elements that affect the Plan of Care: 4+: HIGH COMPLEXITY   CLINICAL PRESENTATION:   Presentation: Evolving clinical presentation with changing clinical characteristics: MODERATE COMPLEXITY   CLINICAL DECISION MAKING:   Outcome Measure: Tool Used: Lower Extremity Functional Scale (LEFS)  Score:  Initial: 27/80 Most Recent: 45/80 (Date: 11/5/2018)   Interpretation of Score: 20 questions each scored on a 5 point scale with 0 representing \"extreme difficulty or unable to perform\" and 4 representing \"no difficulty\". The lower the score, the greater the functional disability. 80/80 represents no disability. Minimal detectable change is 9 points. Score 80 79-63 62-48 47-32 31-16 15-1 0   Modifier CH CI CJ CK CL CM CN     ?  Mobility - Walking and Moving Around:     - CURRENT STATUS: CK - 40%-59% impaired, limited or restricted    - GOAL STATUS: CJ - 20%-39% impaired, limited or restricted    - D/C STATUS:  ---------------To be determined---------------    Tool Used: PT/OT FOOT AND ANKLE ABILITY MEASURE  Score:  Initial: 36/84 Most Recent: 57/84 (Date: 11/5/2018) Interpretation of Score: For the \"Activities of Daily Living\", there are 21 questions each scored on a 5 point scale with 0 representing \"Unable to do\" and 4 representing \"No difficulty\". The lower the score, the greater the functional disability. 84/84 represents no disability. Minimal detectable change is 5.7 points. With the addition of the 8 questions in the \"Sports Subscale,\" there are 29 questions, each scored on a 5 point scale with 0 representing \"Unable to do\" and 4 representing \"No difficulty\". The lower the score, the greater the functional disability. 116/116 represents no disability. Minimal detectable change is 12.3 points. Activities of Daily Living:  Score 84 83-68 67-51 50-34 33-18 17-1 0   Modifier CH CI CJ CK CL CM CN     Activities of Daily Living + Sports Subscale:  Score 116 115-94 93-71 70-47 46-24 23-1 0   Modifier CH CI CJ CK CL CM CN     Payor: [employer] MEDICARE / Plan: [address] [place] [address] HMO / Product Type: Managed Care Medicare /   Medical Necessity:   · Patient is expected to demonstrate progress in strength, range of motion, balance and functional technique to improve ability to perform pain-free standing/ambulation. · Patient demonstrates good rehab potential due to higher previous functional level. Reason for Services/Other Comments:  · Patient continues to require modification of therapeutic interventions to increase complexity of exercises.    Use of outcome tool(s) and clinical judgement create a POC that gives a: Questionable prediction of patient's progress: MODERATE COMPLEXITY   TREATMENT:   (In addition to Assessment/Re-Assessment sessions the following treatments were rendered)  Subjective comments at beginning of session: Pt states she is still having a burning pain when walking (occurs after only about 1-2 minutes walking on inclined surfaces, but happens after about 15 minutes on level surfaces)  THERAPEUTIC EXERCISE: (25 minutes):  Exercises per grid below to improve mobility, strength, balance and dynamic movement of ankle - right to improve functional mobility. Required minimal visual, verbal and manual cues to promote proper body alignment, promote proper body posture, promote proper body mechanics and promote proper body breathing techniques. Progressed resistance, range, repetitions and complexity of movement as indicated.     Date:  11/7/2018 Date  11/12/18 Date:  11/14/2018   Activity/Exercise      Physiostep L2 resistance for 13 minutes to increase strength/endurance L2 resistance for  L2 resistance for 13 minutes to increase strength/endurance   Ankle plantarflexor stretch on incline board 30 second hold x 3 reps with knees extended then flexed 3 x 30 second hold 30 second hold x 3 reps with knees extended then flexed   Heel raises on firm ground B UE support with feet on incline board (1st notch from bottom); 20 reps/1 set with cues for slow lowering on B LEs  Progressed to performing at second notch of incline board x 10 reps (B UE support, but cues to avoid relying too much on UEs) B UE support with feet on incline board (1st notch from bottom); 20 reps/1 set with cues for slow lowering on B LEs B UE support with feet on incline board (1st notch from bottom); 20 reps/1 set with cues for slow lowering on B LEs - progressed to slow lowering on just R LE for last 10 reps  Progressed to performing at second notch of incline board x 10 reps (B UE support, but cues to avoid relying too much on UEs); cues for lowering on B LEs   Supine hamstring stretch      Leg press machine 15 lb resistance; 15 reps/1 set with cues to avoid hip IR/adduction and to keep knees apart when pushing; cues for slow movement 15 lb resistance; 10 reps/2 set with cues to avoid hip IR/adduction and to keep knees apart when pushing; cues for slow movement    Ambulation over level ground      Ascending/descending stairs      *given in HEP  [employer]      Manual therapy (15 minutes): With pt in prone position, extensive STM to R gastrocnemius and soleus as well as cross friction massage to R achilles tendon to reduce tightness and pain and improve ankle mobility. With pt in prone position, ice massage along R achilles tendon to reduce inflammation and pain at end of session. Modalities (). Treatment/Session Assessment: Pt able to perform exercises with increased ease this session and minimal to no reports of increased pain, although she did require occasional verbal cues to perform heel raises correctly on one foot. · Pain/ Symptoms: Initial:   2/10 in posterior R ankle Post Session:  2/10  ·   Compliance with Program/Exercises: Good compliance with attending scheduled sessions. · Recommendations/Intent for next treatment session: \"Next visit will focus on advancements to more challenging activities and reduction in assistance provided\".  Manual therapy/modalities as needed to reduce tightness and pain; work on eccentric plantarflexor strengthening and balance  Total Treatment Duration:  PT Patient Time In/Time Out  Time In: 1030  Time Out: Da 109, DPT    Future Appointments   Date Time Provider Jo Nolan   11/19/2018  3:15 PM Prasad Campoverde DPT [facility]   11/21/2018 10:15 AM Rina SIMMS DPT [facility] D   12/4/2018 10:30 AM Santos Ferrer MD [facility]   5/1/2019  8:45 AM MTV PM LAB/RAD RAMANDEEP MT [facility]   5/8/2019  9:20 AM hSani Esteban MD [facility]

## 2018-11-19 ENCOUNTER — HOSPITAL ENCOUNTER (OUTPATIENT)
Dept: PHYSICAL THERAPY | Age: 43
Discharge: HOME | End: 2018-11-19
Payer: MEDICARE

## 2018-11-19 PROCEDURE — 97110 THERAPEUTIC EXERCISES: CPT

## 2018-11-19 PROCEDURE — 97140 MANUAL THERAPY 1/> REGIONS: CPT

## 2018-11-19 NOTE — PROGRESS NOTES
Mi Alcazar  : 1975  Primary: [facility] [employer] Medicare Hmo  Secondary: [place] Of [facility] at [address] [facility] [address] [facility], [place] [address] [address] [facility]  [phone]   [phone]       OUTPATIENT PHYSICAL THERAPY:Daily Note 2018    ICD-10: Treatment Diagnosis: Pain in right ankle and joints of right foot (M25.571)      Stiffness of right ankle, not elsewhere classified (M25.671)    Difficulty in walking, not elsewhere classified (R26.2)   Precautions/Allergies:   Abilify [aripiprazole]; Geodon [ziprasidone hcl]; Bud Husbands; Ambien [zolpidem]; and Topamax [topiramate]   Fall Risk Score: 3 (? 5 = High Risk)  MD Orders: Evaluate and treat  MEDICAL/REFERRING DIAGNOSIS:  foot, right   DATE OF ONSET: 2018  REFERRING PHYSICIAN: Phoebe Bills MD  RETURN PHYSICIAN APPOINTMENT: 2018 (pt unsure of date)   This section established at most recent assessment  ASSESSMENT:  Mi Alcazar has now attended 8 physical therapy sessions for R ankle/foot pain that started in 2018 and has recently gotten worse. This session, she demonstrated improved B LE strength/endurance, less pain with R ankle mobility, less postural/gait deficits, improved standing/walking tolerance, and improved functional mobility as evident by a score of 57/84 on the Foot and Ankle Ability Measure (initial score of 36/84, with lower scores indicating increased disability) and a score of 45/80 on the Lower Extremity Functional Scale (initial score of 27/80, with lower scores indicating increased disability). Despite the above listed improvements, she continues to demonstrate decreased B LE strength/endurance, pain with R ankle mobility, multiple postural/gait deficits, decreased standing/walking tolerance, and decreased functional mobility.   Pt may continue to benefit from skilled PT to address the above listed deficits to improve ability to perform pain-free ADLs/IADLs and to improve overall quality of life prior to discharge. PROBLEM LIST (Impacting functional limitations):  1. Decreased Strength  2. Decreased ADL/Functional Activities  3. Decreased Transfer Abilities  4. Decreased Ambulation Ability/Technique  5. Decreased Balance  6. Increased Pain  7. Decreased Activity Tolerance  8. Increased Fatigue  9. Decreased Flexibility/Joint Mobility  10. Edema/Girth INTERVENTIONS PLANNED:  1. Balance Exercise  2. Bed Mobility  3. Cold  4. Electrical Stimulation  5. Family Education  6. Gait Training  7. Heat  8. Home Exercise Program (HEP)  9. Manual Therapy  10. Neuromuscular Re-education/Strengthening  11. Range of Motion (ROM)  12. Therapeutic Activites  13. Therapeutic Exercise/Strengthening  14. Transcutaneous Electrical Nerve Stimulation (TENS)  15. Transfer Training  16. Ultrasound (US)   TREATMENT PLAN:  Effective Dates: 10/10/2018 TO 12/9/2018 (60 days). Frequency/Duration: 2 times a week for 60 Days  GOALS: (Goals have been discussed and agreed upon with patient.)  Short-Term Functional Goals: Time Frame: 30 days  1. Pt will be compliant with HEP in order to increase LE strength/endurance/mobility to improve functional mobility and overall quality of life. (MET 11/5/2018)  2. Pt will improve score on the Lower Extremity Functional Scale to 38/80 in order to demonstrate improved functional mobility and quality of life. (MET 11/5/2018)  3. Pt will improve score on the Foot and Ankle Ability Measure to 41/84 in order to demonstrate improved functional mobility and quality of life. (MET 11/5/2018)  4. Pt will report standing for > 5 minutes with minimal to no increase in pain in order to be able to stand for prolonged periods as needed to perform chores. (MET 11/5/2018)  5. Pt will be able to ascend/descend 6 steps with S with or without rail with safe gait pattern and minimal to no increase in pain in order to improve community mobility.   (MET 11/5/2018)  Discharge Goals: Time Frame: 60 days  1. Pt will be independent with HEP in order to increase LE strength/endurance/mobility to improve functional mobility and overall quality of life. 2. Pt will improve score on the Lower Extremity Functional Scale to 50/80 in order to demonstrate improved functional mobility and quality of life. 3. Pt will improve score on the Foot and Ankle Ability Measure to 45/84 in order to demonstrate improved functional mobility and quality of life. 4. Pt will report standing for > 10 minutes with minimal to no increase in pain in order to be able to stand for prolonged periods as needed to perform chores. 5. Pt will be able to ascend/descend 12 steps Magdalena with or without rail with reciprocal gait pattern and minimal to no increase in pain in order to improve community mobility. Rehabilitation Potential For Stated Goals: Good  Regarding Barbie Pelletier's therapy, I certify that the treatment plan above will be carried out by a therapist or under their direction. Thank you for this referral,  Ricky Cisneros, KRISTINET               HISTORY:   History of Present Injury/Illness (Reason for Referral): *History per pt or pt's family except where otherwise noted  Pt states pain in her R ankle started in July 2018 (states she was walking more than usual), and she states that she was being treated for it (with prednisone) and it had gotten better with taping, but it has recently gotten worse again (early September when she stopped doing taping and started everyday walking again). States the pain has gotten better temporarily with Prednisone, but states that when she goes off the Prednisone it starts hurting again. Pain at worst is 6-7/10 (aggravating activities include walking > 5 minutes, prolonged standing > 1-2 minutes - even in shower). Pain at best is 0/10 (eases pain with prednisone, rest, taping, mobic, ice).   Pt states she was given boot to wear to help with the pain but she cannot wear this while driving (she is supposed to wear this 4 weeks starting last Friday). Past Medical History/Comorbidities:   Ms. NICKERSON [facility]  has a past medical history of Acid reflux, ADHD (attention deficit hyperactivity disorder), Allergic rhinitis, Anxiety disorder, Arthritis, Asthma, B12 deficiency, Cerumen impaction, Chronic sinusitis, Depression, Deviated nasal septum, Endocrine disease, Fibromyalgia, GERD (gastroesophageal reflux disease), Headache, Hypothyroidism, Low blood sugar, Meniere disease, Migraine, Nasal obstruction, OCD (obsessive compulsive disorder), Psychiatric disorder, Special examinations, Special examinations, Tinnitus, and Unspecified hypothyroidism. Ms. NICKERSON [facility]  has a past surgical history that includes hx orthopaedic; hx heent; hx heent (07/03/2013); and hx heent (06/2017). Social History/Living Environment:    lives alone in one story apartment with elevator to get up (15 flights of stairs with B railing - occasionally has to take the stairs down if she has an appointment and the elevator is busy)  Prior Level of Function/Work/Activity:  On disability for bipolar disorder and anxiety; likes to walk downtown with her friends, does housework (vacuuming, tidying up, light cooking); has to shop for her groceries  Dominant Side:         RIGHT  Other Clinical Tests:          X-rays of R foot (showed heel spur according to pt)  Previous Treatment Approaches:          Prednisone has helped  Personal Factors:          Sex:  female        Age:  41 y.o. Current Medications:    Current Outpatient Medications:     levothyroxine (SYNTHROID) 125 mcg tablet, Take 1 Tab by mouth daily. , Disp: 90 Tab, Rfl: 1    Dexlansoprazole (DEXILANT) 60 mg CpDB, Take 1 Cap by mouth every morning., Disp: 90 Cap, Rfl: 1    candesartan (ATACAND) 4 mg tablet, Take 1 Tab by mouth daily. , Disp: 90 Tab, Rfl: 1    albuterol (PROVENTIL HFA, VENTOLIN HFA, PROAIR HFA) 90 mcg/actuation inhaler, Take 2 Puffs by inhalation as needed for Wheezing., Disp: 3 Inhaler, Rfl: 1    montelukast (SINGULAIR) 10 mg tablet, Take 1 Tab by mouth daily. , Disp: 90 Tab, Rfl: 1    cetirizine (ZYRTEC) 10 mg tablet, Take 1 Tab by mouth daily. , Disp: 90 Tab, Rfl: 1    meloxicam (MOBIC) 15 mg tablet, Take 1 Tab by mouth daily. , Disp: 90 Tab, Rfl: 1    mirabegron ER (MYRBETRIQ) 25 mg ER tablet, Take 1 Tab by mouth nightly., Disp: 30 Tab, Rfl: 0    diclofenac (VOLTAREN) 1 % gel, Apply 2 g to affected area four (4) times daily. Apply to right foot as needed 2-4 times a day, Disp: 100 g, Rfl: 2    tiZANidine (ZANAFLEX) 4 mg tablet, Take 1 Tab by mouth three (3) times daily as needed. , Disp: 90 Tab, Rfl: 1    IRON, FERROUS SULFATE, PO, Take 45 mg by mouth daily. , Disp: , Rfl:     cyanocobalamin 1,000 mcg tablet, Take 1 Tab by mouth daily. , Disp: 60 Tab, Rfl: 0    butalbital-acetaminophen (BUPAP)  mg tab, Take 1 Tab by mouth daily as needed. , Disp: 30 Tab, Rfl: 5    venlafaxine-SR (EFFEXOR XR) 150 mg capsule, Take  by mouth daily. , Disp: , Rfl:     escitalopram oxalate (LEXAPRO) 20 mg tablet, Take 20 mg by mouth daily. , Disp: , Rfl:     Magnesium Oxide 500 mg cap, Take 500 mg by mouth daily. , Disp: , Rfl:     traMADol (ULTRAM) 50 mg tablet, Take 1 Tab by mouth every eight (8) hours as needed for Pain. Max Daily Amount: 150 mg., Disp: 30 Tab, Rfl: 1    amphetamine-dextroamphetamine XR (ADDERALL XR) 20 mg XR capsule, TK 1 C PO QD, Disp: , Rfl: 0    AMITIZA 24 mcg capsule, TK 1 C PO BID WF AND WATER, Disp: , Rfl: 6    ALPRAZolam (XANAX) 1 mg tablet, 0.5 mg three (3) times daily as needed. , Disp: , Rfl: 4    risperiDONE (RISPERDAL) 2 mg tablet, Take 3 mg by mouth nightly., Disp: , Rfl: 3    LAMICTAL 200 mg tablet, TK 1 T PO BID, Disp: , Rfl: 4    pregabalin (LYRICA) 75 mg capsule, tid, Disp: , Rfl:     melatonin tab tablet, Take 5 mg by mouth., Disp: , Rfl:     cholecalciferol (VITAMIN D3) 5,000 unit capsule, Take 2,000 Units by mouth., Disp: , Rfl:    Date Last Reviewed:  11/19/2018   # of Personal Factors/Comorbidities that affect the Plan of Care: 3+: HIGH COMPLEXITY   EXAMINATION:   Reassessment on 11/5/2018  Observation/Orthostatic Postural Assessment:    The following postural deficits were noted in sitting:  loss of cervical lordosis, increased thoracic kyphosis, forward head, rounded shoulders, posterior pelvic tilt - improved upright on 11/5/2018  The following postural deficits were noted in standing: forward trunk flexion, pronated foot L > R, slight genu valgus noted B - improved upright posture on 11/5/2018  Palpation:          Pt with tenderness noted at posterior R heel and at dorsum of R foot - less tenderness throughout R foot/ankle noted on 11/5/2018  ROM:    Initial measurement: (on 10/10/2018) Initial measurement: (on 10/10/2018) Reassessment measurement: (on 11/5/2018) Reassessment measurement:    L LE: WFL throughout, but excessive hip IR passively R LE: WFL throughout, but excessive hip IR passively; ankle motion WFL but significant tightness noted in dorsiflexors with pain in ankle R LE: ankle motion WFL - tightness noted in dorsiflexors with minimal to no pain in ankle      Strength:    Initial measurement: (on 10/10/2018) Initial measurement: (on 10/10/2018) Reassessment measurement:  Reassessment measurement:    L LE:  Hip flexion: 5/5   Hip extension: 4-/5  Hip abduction: 4-/5  Hip adduction: 5/5  Hip IR: 4+/5  Hip ER: 4/5   Knee flexion:  4/5  Knee extension: 5/5  Ankle DF: 4/5 R LE:  Hip flexion: 5/5  Hip extension: 4-/5  Hip abduction: 4/5  Hip adduction: 4/5  Hip IR: 4+/5  Hip ER: 4/5  Knee flexion: 4+/5  Knee extension: 5/5  Ankle DF: 4-/5       Special Tests:          No leg length discrepancy noted  Neurological Screen:        Myotomes:  WFL        Dermatomes:  No deficits noted  Functional Mobility:         Gait/Ambulation:  Pt ambulates with no AD with following gait deficits: decreased B step length/clearance, increased pronation at each foot in stance phase, decreased B heel strike, decreased B arm swing, slight forward trunk flexion, increased lateral sway - slightly improved upright posture and improved B step length/clearance and heel strike on 11/5/2018        Transfers:  Pt able to stand/sit with minimal to no UE use        Bed Mobility:  Pt able to perform Magdalena with increased time  Balance:          Slight lateral sway with ambulation    Body Structures Involved:   1. Nerves  2. Bones  3. Joints  4. Muscles  5. Ligaments Body Functions Affected:  1. Sensory/Pain  2. Neuromusculoskeletal  3. Movement Related Activities and Participation Affected:  1. General Tasks and Demands  2. Mobility  3. Self Care  4. Domestic Life  5. Interpersonal Interactions and Relationships  6. Community, Social and [place]   # of elements that affect the Plan of Care: 4+: HIGH COMPLEXITY   CLINICAL PRESENTATION:   Presentation: Evolving clinical presentation with changing clinical characteristics: MODERATE COMPLEXITY   CLINICAL DECISION MAKING:   Outcome Measure: Tool Used: Lower Extremity Functional Scale (LEFS)  Score:  Initial: 27/80 Most Recent: 45/80 (Date: 11/5/2018)   Interpretation of Score: 20 questions each scored on a 5 point scale with 0 representing \"extreme difficulty or unable to perform\" and 4 representing \"no difficulty\". The lower the score, the greater the functional disability. 80/80 represents no disability. Minimal detectable change is 9 points. Score 80 79-63 62-48 47-32 31-16 15-1 0   Modifier CH CI CJ CK CL CM CN     ?  Mobility - Walking and Moving Around:     - CURRENT STATUS: CK - 40%-59% impaired, limited or restricted    - GOAL STATUS: CJ - 20%-39% impaired, limited or restricted    - D/C STATUS:  ---------------To be determined---------------    Tool Used: PT/OT FOOT AND ANKLE ABILITY MEASURE  Score:  Initial: 36/84 Most Recent: 57/84 (Date: 11/5/2018) Interpretation of Score: For the \"Activities of Daily Living\", there are 21 questions each scored on a 5 point scale with 0 representing \"Unable to do\" and 4 representing \"No difficulty\". The lower the score, the greater the functional disability. 84/84 represents no disability. Minimal detectable change is 5.7 points. With the addition of the 8 questions in the \"Sports Subscale,\" there are 29 questions, each scored on a 5 point scale with 0 representing \"Unable to do\" and 4 representing \"No difficulty\". The lower the score, the greater the functional disability. 116/116 represents no disability. Minimal detectable change is 12.3 points. Activities of Daily Living:  Score 84 83-68 67-51 50-34 33-18 17-1 0   Modifier CH CI CJ CK CL CM CN     Activities of Daily Living + Sports Subscale:  Score 116 115-94 93-71 70-47 46-24 23-1 0   Modifier CH CI CJ CK CL CM CN     Payor: [employer] MEDICARE / Plan: [address] [place] [address] HMO / Product Type: Managed Care Medicare /   Medical Necessity:   · Patient is expected to demonstrate progress in strength, range of motion, balance and functional technique to improve ability to perform pain-free standing/ambulation. · Patient demonstrates good rehab potential due to higher previous functional level. Reason for Services/Other Comments:  · Patient continues to require modification of therapeutic interventions to increase complexity of exercises.    Use of outcome tool(s) and clinical judgement create a POC that gives a: Questionable prediction of patient's progress: MODERATE COMPLEXITY   TREATMENT:   (In addition to Assessment/Re-Assessment sessions the following treatments were rendered)  Subjective comments at beginning of session: Pt states she has a little more pain today, but states that this might be due to her walking more than usual and not icing her ankle after  THERAPEUTIC EXERCISE: (28 minutes):  Exercises per grid below to improve mobility, strength, balance and dynamic movement of ankle - right to improve functional mobility. Required minimal visual, verbal and manual cues to promote proper body alignment, promote proper body posture, promote proper body mechanics and promote proper body breathing techniques. Progressed resistance, range, repetitions and complexity of movement as indicated. Date  11/12/18 Date:  11/14/2018 Date:  11/19/2018   Activity/Exercise      Physiostep L2 resistance for  L2 resistance for 13 minutes to increase strength/endurance L2 resistance for 10 minutes to increase strength/endurance   Ankle plantarflexor stretch on incline board 3 x 30 second hold 30 second hold x 3 reps with knees extended then flexed 30 second hold x 3 reps with knees extended then flexed   Heel raises on firm ground B UE support with feet on incline board (1st notch from bottom); 20 reps/1 set with cues for slow lowering on B LEs B UE support with feet on incline board (1st notch from bottom); 20 reps/1 set with cues for slow lowering on B LEs - progressed to slow lowering on just R LE for last 10 reps  Progressed to performing at second notch of incline board x 10 reps (B UE support, but cues to avoid relying too much on UEs); cues for lowering on B LEs B UE support with feet on incline board (1st notch from bottom); 20 reps/1 set with cues for slow lowering on B LEs   Supine hamstring stretch      Leg press machine 15 lb resistance; 10 reps/2 set with cues to avoid hip IR/adduction and to keep knees apart when pushing; cues for slow movement     Ambulation over level ground   Multiple times throughout session with cues for increased heel strike and pushing off on back foot for improved step clearance (pt demonstrating slightly excessive knee flexion during loading response at ipsilateral LE at each side, so was trying to reduce this with cues)   Ascending/descending stairs      *given in HEP  Quickly[employer] Portal      Manual therapy (12 minutes):  With pt in prone position, extensive STM to R gastrocnemius and soleus as well as cross friction massage to R achilles tendon to reduce tightness and pain and improve ankle mobility. With pt in prone position, ice massage along R achilles tendon to reduce inflammation and pain at end of session. Modalities (). Treatment/Session Assessment: Pt initially with increased tightness and pain in R ankle that reduced with manual therapy, but she continues to have increased knee flexion during loading response at ipsilateral LE at each side, which appears to be exacerbating her pain in her R ankle due to excessive forces at R foot/ankle. · Pain/ Symptoms: Initial:   4/10 in posterior R ankle when walking (1/10 when just sitting in waiting room) Post Session:  Pt stating feeling \"better\" at end of session (no pain number given)  ·   Compliance with Program/Exercises: Good compliance with attending scheduled sessions. · Recommendations/Intent for next treatment session: \"Next visit will focus on advancements to more challenging activities and reduction in assistance provided\".  Manual therapy/modalities as needed to reduce tightness and pain; work on eccentric plantarflexor strengthening and balance  Total Treatment Duration:  PT Patient Time In/Time Out  Time In: 1515  Time Out: 69 Candice Mauro DPT    Future Appointments   Date Time Provider Jo Nolan   11/21/2018 10:15 AM VALENTINO Pineda [facility]   11/27/2018  1:45 PM VALENTINO Pineda   11/29/2018 10:15 AM VALENTINO Pineda   12/3/2018 10:30 AM Gloria Capone [facility]   12/4/2018 10:30 AM Valencia Watters MD [facility]   12/7/2018 10:30 AM Gloria Capone [facility]   12/10/2018 10:15 AM Waleska Melara, VALENTINO LEWIS   12/13/2018 10:15 AM VALENTINO Pineda   5/1/2019  8:45 AM MTV PM LAB/RAD [facility] MT MT   5/8/2019  9:20 AM Eneida Blackwood MD [facility]

## 2018-11-21 ENCOUNTER — HOSPITAL ENCOUNTER (OUTPATIENT)
Dept: PHYSICAL THERAPY | Age: 43
Discharge: HOME | End: 2018-11-21
Payer: MEDICARE

## 2018-11-21 PROCEDURE — 97140 MANUAL THERAPY 1/> REGIONS: CPT

## 2018-11-21 PROCEDURE — 97110 THERAPEUTIC EXERCISES: CPT

## 2018-11-21 NOTE — PROGRESS NOTES
Diego De Jesus  : 1975  Primary: [place]une[place] Medicare [place]  [place] [facility] [facility], [place] [address] [address] [facility]  [phone]   [phone]       OUTPATIENT PHYSICAL THERAPY:Daily Note 2018    ICD-10: Treatment Diagnosis: Pain in right ankle and joints of right foot (M25.571)      Stiffness of right ankle, not elsewhere classified (M25.671)    Difficulty in walking, not elsewhere classified (R26.2)   Precautions/Allergies:   Abilify [aripiprazole]; Geodon [ziprasidone hcl]; Crandall Bhupendra; Ambien [zolpidem]; and Topamax [topiramate]   Fall Risk Score: 3 (? 5 = High Risk)  MD Orders: Evaluate and treat  MEDICAL/REFERRING DIAGNOSIS:  foot, right   DATE OF ONSET: 2018  REFERRING PHYSICIAN: Dustin Cheng MD  RETURN PHYSICIAN APPOINTMENT: 2018 (pt unsure of date)   This section established at most recent assessment  ASSESSMENT:  Diego De Jesus has now attended 8 physical therapy sessions for R ankle/foot pain that started in 2018 and has recently gotten worse. This session, she demonstrated improved B LE strength/endurance, less pain with R ankle mobility, less postural/gait deficits, improved standing/walking tolerance, and improved functional mobility as evident by a score of 57/84 on the Foot and Ankle Ability Measure (initial score of 36/84, with lower scores indicating increased disability) and a score of 45/80 on the Lower Extremity Functional Scale (initial score of 27/80, with lower scores indicating increased disability). Despite the above listed improvements, she continues to demonstrate decreased B LE strength/endurance, pain with R ankle mobility, multiple postural/gait deficits, decreased standing/walking tolerance, and decreased functional mobility.   Pt may continue to benefit from skilled PT to address the above listed deficits to improve ability to perform pain-free ADLs/IADLs and to improve overall quality of life prior to discharge. PROBLEM LIST (Impacting functional limitations):  1. Decreased Strength  2. Decreased ADL/Functional Activities  3. Decreased Transfer Abilities  4. Decreased Ambulation Ability/Technique  5. Decreased Balance  6. Increased Pain  7. Decreased Activity Tolerance  8. Increased Fatigue  9. Decreased Flexibility/Joint Mobility  10. Edema/Girth INTERVENTIONS PLANNED:  1. Balance Exercise  2. Bed Mobility  3. Cold  4. Electrical Stimulation  5. Family Education  6. Gait Training  7. Heat  8. Home Exercise Program (HEP)  9. Manual Therapy  10. Neuromuscular Re-education/Strengthening  11. Range of Motion (ROM)  12. Therapeutic Activites  13. Therapeutic Exercise/Strengthening  14. Transcutaneous Electrical Nerve Stimulation (TENS)  15. Transfer Training  16. Ultrasound (US)   TREATMENT PLAN:  Effective Dates: 10/10/2018 TO 12/9/2018 (60 days). Frequency/Duration: 2 times a week for 60 Days  GOALS: (Goals have been discussed and agreed upon with patient.)  Short-Term Functional Goals: Time Frame: 30 days  1. Pt will be compliant with HEP in order to increase LE strength/endurance/mobility to improve functional mobility and overall quality of life. (MET 11/5/2018)  2. Pt will improve score on the Lower Extremity Functional Scale to 38/80 in order to demonstrate improved functional mobility and quality of life. (MET 11/5/2018)  3. Pt will improve score on the Foot and Ankle Ability Measure to 41/84 in order to demonstrate improved functional mobility and quality of life. (MET 11/5/2018)  4. Pt will report standing for > 5 minutes with minimal to no increase in pain in order to be able to stand for prolonged periods as needed to perform chores. (MET 11/5/2018)  5. Pt will be able to ascend/descend 6 steps with S with or without rail with safe gait pattern and minimal to no increase in pain in order to improve community mobility.   (MET 11/5/2018)  Discharge Goals: Time Frame: 60 days  1. Pt will be independent with HEP in order to increase LE strength/endurance/mobility to improve functional mobility and overall quality of life. 2. Pt will improve score on the Lower Extremity Functional Scale to 50/80 in order to demonstrate improved functional mobility and quality of life. 3. Pt will improve score on the Foot and Ankle Ability Measure to 45/84 in order to demonstrate improved functional mobility and quality of life. 4. Pt will report standing for > 10 minutes with minimal to no increase in pain in order to be able to stand for prolonged periods as needed to perform chores. 5. Pt will be able to ascend/descend 12 steps Magdalena with or without rail with reciprocal gait pattern and minimal to no increase in pain in order to improve community mobility. Rehabilitation Potential For Stated Goals: Good  Regarding Christen Pelletier's therapy, I certify that the treatment plan above will be carried out by a therapist or under their direction. Thank you for this referral,  All Vargas, DPT               HISTORY:   History of Present Injury/Illness (Reason for Referral): *History per pt or pt's family except where otherwise noted  Pt states pain in her R ankle started in July 2018 (states she was walking more than usual), and she states that she was being treated for it (with prednisone) and it had gotten better with taping, but it has recently gotten worse again (early September when she stopped doing taping and started everyday walking again). States the pain has gotten better temporarily with Prednisone, but states that when she goes off the Prednisone it starts hurting again. Pain at worst is 6-7/10 (aggravating activities include walking > 5 minutes, prolonged standing > 1-2 minutes - even in shower). Pain at best is 0/10 (eases pain with prednisone, rest, taping, mobic, ice).   Pt states she was given boot to wear to help with the pain but she cannot wear this while driving (she is supposed to wear this 4 weeks starting last Friday). Past Medical History/Comorbidities:   Ms. Kaylie Jones  has a past medical history of Acid reflux, ADHD (attention deficit hyperactivity disorder), Allergic rhinitis, Anxiety disorder, Arthritis, Asthma, B12 deficiency, Cerumen impaction, Chronic sinusitis, Depression, Deviated nasal septum, Endocrine disease, Fibromyalgia, GERD (gastroesophageal reflux disease), Headache, Hypothyroidism, Low blood sugar, Meniere disease, Migraine, Nasal obstruction, OCD (obsessive compulsive disorder), Psychiatric disorder, Special examinations, Special examinations, Tinnitus, and Unspecified hypothyroidism. Ms. Kaylie Jones  has a past surgical history that includes hx orthopaedic; hx heent; hx heent (07/03/2013); and hx heent (06/2017). Social History/Living Environment:    lives alone in one story apartment with elevator to get up (15 flights of stairs with B railing - occasionally has to take the stairs down if she has an appointment and the elevator is busy)  Prior Level of Function/Work/Activity:  On disability for bipolar disorder and anxiety; likes to walk downtown with her friends, does housework (vacuuming, tidying up, light cooking); has to shop for her groceries  Dominant Side:         RIGHT  Other Clinical Tests:          X-rays of R foot (showed heel spur according to pt)  Previous Treatment Approaches:          Prednisone has helped  Personal Factors:          Sex:  female        Age:  41 y.o. Current Medications:    Current Outpatient Medications:     levothyroxine (SYNTHROID) 125 mcg tablet, Take 1 Tab by mouth daily. , Disp: 90 Tab, Rfl: 1    Dexlansoprazole (DEXILANT) 60 mg CpDB, Take 1 Cap by mouth every morning., Disp: 90 Cap, Rfl: 1    candesartan (ATACAND) 4 mg tablet, Take 1 Tab by mouth daily. , Disp: 90 Tab, Rfl: 1    albuterol (PROVENTIL HFA, VENTOLIN HFA, PROAIR HFA) 90 mcg/actuation inhaler, Take 2 Puffs by inhalation as needed for Wheezing., Disp: 3 Inhaler, Rfl: 1    montelukast (SINGULAIR) 10 mg tablet, Take 1 Tab by mouth daily. , Disp: 90 Tab, Rfl: 1    cetirizine (ZYRTEC) 10 mg tablet, Take 1 Tab by mouth daily. , Disp: 90 Tab, Rfl: 1    meloxicam (MOBIC) 15 mg tablet, Take 1 Tab by mouth daily. , Disp: 90 Tab, Rfl: 1    mirabegron ER (MYRBETRIQ) 25 mg ER tablet, Take 1 Tab by mouth nightly., Disp: 30 Tab, Rfl: 0    diclofenac (VOLTAREN) 1 % gel, Apply 2 g to affected area four (4) times daily. Apply to right foot as needed 2-4 times a day, Disp: 100 g, Rfl: 2    tiZANidine (ZANAFLEX) 4 mg tablet, Take 1 Tab by mouth three (3) times daily as needed. , Disp: 90 Tab, Rfl: 1    IRON, FERROUS SULFATE, PO, Take 45 mg by mouth daily. , Disp: , Rfl:     cyanocobalamin 1,000 mcg tablet, Take 1 Tab by mouth daily. , Disp: 60 Tab, Rfl: 0    butalbital-acetaminophen (BUPAP)  mg tab, Take 1 Tab by mouth daily as needed. , Disp: 30 Tab, Rfl: 5    venlafaxine-SR (EFFEXOR XR) 150 mg capsule, Take  by mouth daily. , Disp: , Rfl:     escitalopram oxalate (LEXAPRO) 20 mg tablet, Take 20 mg by mouth daily. , Disp: , Rfl:     Magnesium Oxide 500 mg cap, Take 500 mg by mouth daily. , Disp: , Rfl:     traMADol (ULTRAM) 50 mg tablet, Take 1 Tab by mouth every eight (8) hours as needed for Pain. Max Daily Amount: 150 mg., Disp: 30 Tab, Rfl: 1    amphetamine-dextroamphetamine XR (ADDERALL XR) 20 mg XR capsule, TK 1 C PO QD, Disp: , Rfl: 0    AMITIZA 24 mcg capsule, TK 1 C PO BID WF AND WATER, Disp: , Rfl: 6    ALPRAZolam (XANAX) 1 mg tablet, 0.5 mg three (3) times daily as needed. , Disp: , Rfl: 4    risperiDONE (RISPERDAL) 2 mg tablet, Take 3 mg by mouth nightly., Disp: , Rfl: 3    LAMICTAL 200 mg tablet, TK 1 T PO BID, Disp: , Rfl: 4    pregabalin (LYRICA) 75 mg capsule, tid, Disp: , Rfl:     melatonin tab tablet, Take 5 mg by mouth., Disp: , Rfl:     cholecalciferol (VITAMIN D3) 5,000 unit capsule, Take 2,000 Units by mouth., Disp: , Rfl:    Date Last Reviewed:  11/21/2018   # of Personal Factors/Comorbidities that affect the Plan of Care: 3+: HIGH COMPLEXITY   EXAMINATION:   Reassessment on 11/5/2018  Observation/Orthostatic Postural Assessment:    The following postural deficits were noted in sitting:  loss of cervical lordosis, increased thoracic kyphosis, forward head, rounded shoulders, posterior pelvic tilt - improved upright on 11/5/2018  The following postural deficits were noted in standing: forward trunk flexion, pronated foot L > R, slight genu valgus noted B - improved upright posture on 11/5/2018  Palpation:          Pt with tenderness noted at posterior R heel and at dorsum of R foot - less tenderness throughout R foot/ankle noted on 11/5/2018  ROM:    Initial measurement: (on 10/10/2018) Initial measurement: (on 10/10/2018) Reassessment measurement: (on 11/5/2018) Reassessment measurement:    L LE: WFL throughout, but excessive hip IR passively R LE: WFL throughout, but excessive hip IR passively; ankle motion WFL but significant tightness noted in dorsiflexors with pain in ankle R LE: ankle motion WFL - tightness noted in dorsiflexors with minimal to no pain in ankle      Strength:    Initial measurement: (on 10/10/2018) Initial measurement: (on 10/10/2018) Reassessment measurement:  Reassessment measurement:    L LE:  Hip flexion: 5/5   Hip extension: 4-/5  Hip abduction: 4-/5  Hip adduction: 5/5  Hip IR: 4+/5  Hip ER: 4/5   Knee flexion:  4/5  Knee extension: 5/5  Ankle DF: 4/5 R LE:  Hip flexion: 5/5  Hip extension: 4-/5  Hip abduction: 4/5  Hip adduction: 4/5  Hip IR: 4+/5  Hip ER: 4/5  Knee flexion: 4+/5  Knee extension: 5/5  Ankle DF: 4-/5       Special Tests:          No leg length discrepancy noted  Neurological Screen:        Myotomes:  WFL        Dermatomes:  No deficits noted  Functional Mobility:         Gait/Ambulation:  Pt ambulates with no AD with following gait deficits: decreased B step length/clearance, increased pronation at each foot in stance phase, decreased B heel strike, decreased B arm swing, slight forward trunk flexion, increased lateral sway - slightly improved upright posture and improved B step length/clearance and heel strike on 11/5/2018        Transfers:  Pt able to stand/sit with minimal to no UE use        Bed Mobility:  Pt able to perform Magdalena with increased time  Balance:          Slight lateral sway with ambulation    Body Structures Involved:   1. Nerves  2. Bones  3. Joints  4. Muscles  5. Ligaments Body Functions Affected:  1. Sensory/Pain  2. Neuromusculoskeletal  3. Movement Related Activities and Participation Affected:  1. General Tasks and Demands  2. Mobility  3. Self Care  4. Domestic Life  5. Interpersonal Interactions and Relationships  6. Community, Social and [place]   # of elements that affect the Plan of Care: 4+: HIGH COMPLEXITY   CLINICAL PRESENTATION:   Presentation: Evolving clinical presentation with changing clinical characteristics: MODERATE COMPLEXITY   CLINICAL DECISION MAKING:   Outcome Measure: Tool Used: Lower Extremity Functional Scale (LEFS)  Score:  Initial: 27/80 Most Recent: 45/80 (Date: 11/5/2018)   Interpretation of Score: 20 questions each scored on a 5 point scale with 0 representing \"extreme difficulty or unable to perform\" and 4 representing \"no difficulty\". The lower the score, the greater the functional disability. 80/80 represents no disability. Minimal detectable change is 9 points. Score 80 79-63 62-48 47-32 31-16 15-1 0   Modifier CH CI CJ CK CL CM CN     ?  Mobility - Walking and Moving Around:     - CURRENT STATUS: CK - 40%-59% impaired, limited or restricted    - GOAL STATUS: CJ - 20%-39% impaired, limited or restricted    - D/C STATUS:  ---------------To be determined---------------    Tool Used: PT/OT FOOT AND ANKLE ABILITY MEASURE  Score:  Initial: 36/84 Most Recent: 57/84 (Date: 11/5/2018) Interpretation of Score: For the \"Activities of Daily Living\", there are 21 questions each scored on a 5 point scale with 0 representing \"Unable to do\" and 4 representing \"No difficulty\". The lower the score, the greater the functional disability. 84/84 represents no disability. Minimal detectable change is 5.7 points. With the addition of the 8 questions in the \"Sports Subscale,\" there are 29 questions, each scored on a 5 point scale with 0 representing \"Unable to do\" and 4 representing \"No difficulty\". The lower the score, the greater the functional disability. 116/116 represents no disability. Minimal detectable change is 12.3 points. Activities of Daily Living:  Score 84 83-68 67-51 50-34 33-18 17-1 0   Modifier CH CI CJ CK CL CM CN     Activities of Daily Living + Sports Subscale:  Score 116 115-94 93-71 70-47 46-24 23-1 0   Modifier CH CI CJ CK CL CM CN     Payor: [employer] MEDICARE / Plan: [address] [place] [address] HMO / Product Type: Managed Care Medicare /   Medical Necessity:   · Patient is expected to demonstrate progress in strength, range of motion, balance and functional technique to improve ability to perform pain-free standing/ambulation. · Patient demonstrates good rehab potential due to higher previous functional level. Reason for Services/Other Comments:  · Patient continues to require modification of therapeutic interventions to increase complexity of exercises.    Use of outcome tool(s) and clinical judgement create a POC that gives a: Questionable prediction of patient's progress: MODERATE COMPLEXITY   TREATMENT:   (In addition to Assessment/Re-Assessment sessions the following treatments were rendered)  Subjective comments at beginning of session: Pt states she walked on the floor barefoot today and her pain flared up a little, but she put her shoes on and it felt better  THERAPEUTIC EXERCISE: (30 minutes):  Exercises per grid below to improve mobility, strength, balance and dynamic movement of ankle - right to improve functional mobility. Required minimal visual, verbal and manual cues to promote proper body alignment, promote proper body posture, promote proper body mechanics and promote proper body breathing techniques. Progressed resistance, range, repetitions and complexity of movement as indicated.     Date:  11/14/2018 Date:  11/19/2018 Date:  11/21/2018   Activity/Exercise      Physiostep L2 resistance for 13 minutes to increase strength/endurance L2 resistance for 10 minutes to increase strength/endurance L2 resistance for 13 minutes to increase strength/endurance   Ankle plantarflexor stretch on incline board 30 second hold x 3 reps with knees extended then flexed 30 second hold x 3 reps with knees extended then flexed 30 second hold x 3 reps with knees extended then flexed   Heel raises on firm ground B UE support with feet on incline board (1st notch from bottom); 20 reps/1 set with cues for slow lowering on B LEs - progressed to slow lowering on just R LE for last 10 reps  Progressed to performing at second notch of incline board x 10 reps (B UE support, but cues to avoid relying too much on UEs); cues for lowering on B LEs B UE support with feet on incline board (1st notch from bottom); 20 reps/1 set with cues for slow lowering on B LEs B UE support with feet on incline board (1st notch from bottom); slow lowering on just R LE for 20 reps/1 set  Progressed to performing at second notch of incline board x 5 reps (B UE support, but cues to avoid relying too much on UEs); cues for lowering on R LE only   Supine hamstring stretch      Leg press machine   25 lb resistance; 20 reps/1 set with slow controlled movement   Ambulation over level ground  Multiple times throughout session with cues for increased heel strike and pushing off on back foot for improved step clearance (pt demonstrating slightly excessive knee flexion during loading response at ipsilateral LE at each side, so was trying to reduce this with cues)    Ascending/descending stairs      *given in HEP  [facility]      Manual therapy (12 minutes): With pt in prone position, extensive STM to R gastrocnemius and soleus as well as cross friction massage to R achilles tendon to reduce tightness and pain and improve ankle mobility. With pt in prone position, ice massage along R achilles tendon to reduce inflammation and pain at end of session. Modalities (). Treatment/Session Assessment: Pt able to perform exercises with increased difficulty this session with no reports of increased pain. She reported decreased pain at end of session. · Pain/ Symptoms: Initial:   1.5/10 in posterior R ankle  Post Session:  1/10  ·   Compliance with Program/Exercises: Good compliance with attending scheduled sessions. · Recommendations/Intent for next treatment session: \"Next visit will focus on advancements to more challenging activities and reduction in assistance provided\".  Manual therapy/modalities as needed to reduce tightness and pain; work on eccentric plantarflexor strengthening and balance  Total Treatment Duration:  PT Patient Time In/Time Out  Time In: 1015  Time Out: [address], DPT    Future Appointments   Date Time Provider Jo Nolan   11/27/2018  1:45 PM Waleska Melara DPT [facility]   11/29/2018 10:15 AM VALENTINO Pineda   12/3/2018 10:30 [facility]   12/4/2018 10:30 AM Valencia Watters MD [facility]   12/7/2018 10:30 AM Gloria Capone [facility]   12/10/2018 10:15 AM VALENTINO Hinton   12/13/2018 10:15 AM VALENTINO Pineda   5/1/2019  8:45 AM MTV PM LAB/RAD [facility] MT MT   5/8/2019  9:20 AM Eneida Blackwood MD [facility]

## 2018-11-27 ENCOUNTER — HOSPITAL ENCOUNTER (OUTPATIENT)
Dept: PHYSICAL THERAPY | Age: 43
Discharge: HOME | End: 2018-11-27
Payer: MEDICARE

## 2018-11-27 PROCEDURE — G8979 MOBILITY GOAL STATUS: HCPCS

## 2018-11-27 PROCEDURE — G8978 MOBILITY CURRENT STATUS: HCPCS

## 2018-11-27 PROCEDURE — 97110 THERAPEUTIC EXERCISES: CPT

## 2018-11-27 NOTE — THERAPY RECERTIFICATION
Frandy Mendosa  : 1975  Primary: Alin Joshi [employer] Medicare [place]  Secondary[place] Of [facility], [address] [facility], [place] [address] [address] [facility]  [phone]   [phone]       OUTPATIENT PHYSICAL THERAPY:Recertification     ICD-10: Treatment Diagnosis: Pain in right ankle and joints of right foot (M25.571)      Stiffness of right ankle, not elsewhere classified (M25.671)    Difficulty in walking, not elsewhere classified (R26.2)   Precautions/Allergies:   Abilify [aripiprazole]; Geodon [ziprasidone hcl]; Lajune Pedrito; Ambien [zolpidem]; and Topamax [topiramate]   Fall Risk Score: 3 (? 5 = High Risk)  MD Orders: Evaluate and treat  MEDICAL/REFERRING DIAGNOSIS:  foot, right   DATE OF ONSET: 2018  REFERRING PHYSICIAN: Trent Juan MD  RETURN PHYSICIAN APPOINTMENT: 2018 (pt unsure of date)   This section established at most recent assessment  ASSESSMENT:  Frandy Mendosa has now attended 14 physical therapy sessions for R ankle/foot pain that started in 2018 and has recently gotten worse. This session, she demonstrated improved B LE strength/endurance, less pain with R ankle mobility, less postural/gait deficits, and improved standing/walking tolerance. Despite the above listed improvements, she continues to demonstrate decreased B LE strength/endurance, pain with R ankle mobility, multiple postural/gait deficits, decreased standing/walking tolerance, and decreased functional mobility. Pt may continue to benefit from skilled PT to address the above listed deficits to improve ability to perform pain-free ADLs/IADLs and to improve overall quality of life prior to discharge. PROBLEM LIST (Impacting functional limitations):  1. Decreased Strength  2. Decreased ADL/Functional Activities  3. Decreased Transfer Abilities  4. Decreased Ambulation Ability/Technique  5. Decreased Balance  6.  Increased Pain  7. Decreased Activity Tolerance  8. Increased Fatigue  9. Decreased Flexibility/Joint Mobility  10. Edema/Girth INTERVENTIONS PLANNED:  1. Balance Exercise  2. Bed Mobility  3. Cold  4. Electrical Stimulation  5. Family Education  6. Gait Training  7. Heat  8. Home Exercise Program (HEP)  9. Manual Therapy  10. Neuromuscular Re-education/Strengthening  11. Range of Motion (ROM)  12. Therapeutic Activites  13. Therapeutic Exercise/Strengthening  14. Transcutaneous Electrical Nerve Stimulation (TENS)  15. Transfer Training  16. Ultrasound (US)   TREATMENT PLAN:  Effective Dates: 11/27/2018 TO 1/4/2019 (60 days). Frequency/Duration: 2 times a week for 60 Days  GOALS: (Goals have been discussed and agreed upon with patient.)  Short-Term Functional Goals: Time Frame: 30 days  1. Pt will be compliant with HEP in order to increase LE strength/endurance/mobility to improve functional mobility and overall quality of life. (MET 11/5/2018)  2. Pt will improve score on the Lower Extremity Functional Scale to 38/80 in order to demonstrate improved functional mobility and quality of life. (MET 11/5/2018)  3. Pt will improve score on the Foot and Ankle Ability Measure to 41/84 in order to demonstrate improved functional mobility and quality of life. (MET 11/5/2018)  4. Pt will report standing for > 5 minutes with minimal to no increase in pain in order to be able to stand for prolonged periods as needed to perform chores. (MET 11/5/2018)  5. Pt will be able to ascend/descend 6 steps with S with or without rail with safe gait pattern and minimal to no increase in pain in order to improve community mobility. (MET 11/5/2018)  Discharge Goals: Time Frame: 60 days  1. Pt will be independent with HEP in order to increase LE strength/endurance/mobility to improve functional mobility and overall quality of life. (PROGRESSING , 11/27/2018)   2.  Pt will improve score on the Lower Extremity Functional Scale to 50/80 in order to demonstrate improved functional mobility and quality of life. (CONTINUE, 11/27/2018)   3. Pt will improve score on the Foot and Ankle Ability Measure to 45/84 in order to demonstrate improved functional mobility and quality of life. (CONTINUE, 11/27/2018)   4. Pt will report standing for > 10 minutes with minimal to no increase in pain in order to be able to stand for prolonged periods as needed to perform chores. (MET , 11/27/2018)   5. Pt will be able to ascend/descend 12 steps Magdalena with or without rail with reciprocal gait pattern and minimal to no increase in pain in order to improve community mobility. (PROGRESSING , 11/27/2018)   Rehabilitation Potential For Stated Goals: Good  Regarding Latrice Kemp Liyah's therapy, I certify that the treatment plan above will be carried out by a therapist or under their direction. Thank you for this referral,  Aileen Allan DPT     Referring Physician Signature: Phoebe Bills MD          Date                                  HISTORY:   History of Present Injury/Illness (Reason for Referral): *History per pt or pt's family except where otherwise noted  Pt states pain in her R ankle started in July 2018 (states she was walking more than usual), and she states that she was being treated for it (with prednisone) and it had gotten better with taping, but it has recently gotten worse again (early September when she stopped doing taping and started everyday walking again). States the pain has gotten better temporarily with Prednisone, but states that when she goes off the Prednisone it starts hurting again. Pain at worst is 6-7/10 (aggravating activities include walking > 5 minutes, prolonged standing > 1-2 minutes - even in shower). Pain at best is 0/10 (eases pain with prednisone, rest, taping, mobic, ice). Pt states she was given boot to wear to help with the pain but she cannot wear this while driving (she is supposed to wear this 4 weeks starting last Friday). Past Medical History/Comorbidities:   Ms. [facility]  has a past medical history of Acid reflux, ADHD (attention deficit hyperactivity disorder), Allergic rhinitis, Anxiety disorder, Arthritis, Asthma, B12 deficiency, Cerumen impaction, Chronic sinusitis, Depression, Deviated nasal septum, Endocrine disease, Fibromyalgia, GERD (gastroesophageal reflux disease), Headache, Hypothyroidism, Low blood sugar, Meniere disease, Migraine, Nasal obstruction, OCD (obsessive compulsive disorder), Psychiatric disorder, Special examinations, Special examinations, Tinnitus, and Unspecified hypothyroidism. Ms. [facility]  has a past surgical history that includes hx orthopaedic; hx heent; hx heent (07/03/2013); and hx heent (06/2017). Social History/Living Environment:    lives alone in one story apartment with elevator to get up (15 flights of stairs with B railing - occasionally has to take the stairs down if she has an appointment and the elevator is busy)  Prior Level of Function/Work/Activity:  On disability for bipolar disorder and anxiety; likes to walk downtown with her friends, does housework (vacuuming, tidying up, light cooking); has to shop for her groceries  Dominant Side:         RIGHT  Other Clinical Tests:          X-rays of R foot (showed heel spur according to pt)  Previous Treatment Approaches:          Prednisone has helped  Personal Factors:          Sex:  female        Age:  41 y.o. Current Medications:    Current Outpatient Medications:     levothyroxine (SYNTHROID) 125 mcg tablet, Take 1 Tab by mouth daily. , Disp: 90 Tab, Rfl: 1    Dexlansoprazole (DEXILANT) 60 mg CpDB, Take 1 Cap by mouth every morning., Disp: 90 Cap, Rfl: 1    candesartan (ATACAND) 4 mg tablet, Take 1 Tab by mouth daily. , Disp: 90 Tab, Rfl: 1    albuterol (PROVENTIL HFA, VENTOLIN HFA, PROAIR HFA) 90 mcg/actuation inhaler, Take 2 Puffs by inhalation as needed for Wheezing., Disp: 3 Inhaler, Rfl: 1    montelukast (SINGULAIR) 10 mg tablet, Take 1 Tab by mouth daily. , Disp: 90 Tab, Rfl: 1    cetirizine (ZYRTEC) 10 mg tablet, Take 1 Tab by mouth daily. , Disp: 90 Tab, Rfl: 1    meloxicam (MOBIC) 15 mg tablet, Take 1 Tab by mouth daily. , Disp: 90 Tab, Rfl: 1    mirabegron ER (MYRBETRIQ) 25 mg ER tablet, Take 1 Tab by mouth nightly., Disp: 30 Tab, Rfl: 0    diclofenac (VOLTAREN) 1 % gel, Apply 2 g to affected area four (4) times daily. Apply to right foot as needed 2-4 times a day, Disp: 100 g, Rfl: 2    tiZANidine (ZANAFLEX) 4 mg tablet, Take 1 Tab by mouth three (3) times daily as needed. , Disp: 90 Tab, Rfl: 1    IRON, FERROUS SULFATE, PO, Take 45 mg by mouth daily. , Disp: , Rfl:     cyanocobalamin 1,000 mcg tablet, Take 1 Tab by mouth daily. , Disp: 60 Tab, Rfl: 0    butalbital-acetaminophen (BUPAP)  mg tab, Take 1 Tab by mouth daily as needed. , Disp: 30 Tab, Rfl: 5    venlafaxine-SR (EFFEXOR XR) 150 mg capsule, Take  by mouth daily. , Disp: , Rfl:     escitalopram oxalate (LEXAPRO) 20 mg tablet, Take 20 mg by mouth daily. , Disp: , Rfl:     Magnesium Oxide 500 mg cap, Take 500 mg by mouth daily. , Disp: , Rfl:     traMADol (ULTRAM) 50 mg tablet, Take 1 Tab by mouth every eight (8) hours as needed for Pain. Max Daily Amount: 150 mg., Disp: 30 Tab, Rfl: 1    amphetamine-dextroamphetamine XR (ADDERALL XR) 20 mg XR capsule, TK 1 C PO QD, Disp: , Rfl: 0    AMITIZA 24 mcg capsule, TK 1 C PO BID WF AND WATER, Disp: , Rfl: 6    ALPRAZolam (XANAX) 1 mg tablet, 0.5 mg three (3) times daily as needed. , Disp: , Rfl: 4    risperiDONE (RISPERDAL) 2 mg tablet, Take 3 mg by mouth nightly., Disp: , Rfl: 3    LAMICTAL 200 mg tablet, TK 1 T PO BID, Disp: , Rfl: 4    pregabalin (LYRICA) 75 mg capsule, tid, Disp: , Rfl:     melatonin tab tablet, Take 5 mg by mouth., Disp: , Rfl:     cholecalciferol (VITAMIN D3) 5,000 unit capsule, Take 2,000 Units by mouth., Disp: , Rfl:    Date Last Reviewed:  11/27/2018   # of Personal Factors/Comorbidities that affect the Plan of Care: 3+: HIGH COMPLEXITY   EXAMINATION:   Reassessment on 11/27/2018  Observation/Orthostatic Postural Assessment:    The following postural deficits were noted in sitting:  loss of cervical lordosis, increased thoracic kyphosis, forward head, rounded shoulders, posterior pelvic tilt - improved upright on 11/27/2018  The following postural deficits were noted in standing: forward trunk flexion, pronated foot L > R, slight genu valgus noted B - improved upright posture on 11/27/2018  Palpation:          Pt with tenderness noted at posterior R heel and at dorsum of R foot - less tenderness throughout R foot/ankle noted on 11/5/2018  ROM:    Initial measurement: (on 10/10/2018) Initial measurement: (on 10/10/2018) Reassessment measurement: (on 11/5/2018) Reassessment measurement:    L LE: WFL throughout, but excessive hip IR passively R LE: WFL throughout, but excessive hip IR passively; ankle motion WFL but significant tightness noted in dorsiflexors with pain in ankle R LE: ankle motion WFL - tightness noted in dorsiflexors with minimal to no pain in ankle      Strength:    Initial measurement: (on 10/10/2018) Initial measurement: (on 10/10/2018) Reassessment measurement:  Reassessment measurement:    L LE:  Hip flexion: 5/5   Hip extension: 4-/5  Hip abduction: 4-/5  Hip adduction: 5/5  Hip IR: 4+/5  Hip ER: 4/5   Knee flexion:  4/5  Knee extension: 5/5  Ankle DF: 4/5 R LE:  Hip flexion: 5/5  Hip extension: 4-/5  Hip abduction: 4/5  Hip adduction: 4/5  Hip IR: 4+/5  Hip ER: 4/5  Knee flexion: 4+/5  Knee extension: 5/5  Ankle DF: 4-/5       Special Tests:          No leg length discrepancy noted  Neurological Screen:        Myotomes:  WFL        Dermatomes:  No deficits noted  Functional Mobility:         Gait/Ambulation:  Pt ambulates with no AD with following gait deficits: decreased B step length/clearance, increased pronation at each foot in stance phase, decreased B heel strike, decreased B arm swing, slight forward trunk flexion, increased lateral sway - slightly improved upright posture and improved B step length/clearance and heel strike on 11/5/2018 - improved upright posture but continuing to notice decreased heel strike with excessive knee flexion during stance phase at each LE on 11/27/2018        Transfers:  Pt able to stand/sit with minimal to no UE use        Bed Mobility:  Pt able to perform Magdalena with increased time  Balance:          Slight lateral sway with ambulation    Body Structures Involved:   1. Nerves  2. Bones  3. Joints  4. Muscles  5. Ligaments Body Functions Affected:  1. Sensory/Pain  2. Neuromusculoskeletal  3. Movement Related Activities and Participation Affected:  1. General Tasks and Demands  2. Mobility  3. Self Care  4. Domestic Life  5. Interpersonal Interactions and Relationships  6. Community, Social and [place]   # of elements that affect the Plan of Care: 4+: HIGH COMPLEXITY   CLINICAL PRESENTATION:   Presentation: Evolving clinical presentation with changing clinical characteristics: MODERATE COMPLEXITY   CLINICAL DECISION MAKING:   Outcome Measure: Tool Used: Lower Extremity Functional Scale (LEFS)  Score:  Initial: 27/80 Most Recent: 39/80 (Date: 11/27/2018)   Interpretation of Score: 20 questions each scored on a 5 point scale with 0 representing \"extreme difficulty or unable to perform\" and 4 representing \"no difficulty\". The lower the score, the greater the functional disability. 80/80 represents no disability. Minimal detectable change is 9 points. Score 80 79-63 62-48 47-32 31-16 15-1 0   Modifier CH CI CJ CK CL CM CN     ?  Mobility - Walking and Moving Around:     - CURRENT STATUS: CK - 40%-59% impaired, limited or restricted    - GOAL STATUS: CJ - 20%-39% impaired, limited or restricted    - D/C STATUS:  ---------------To be determined---------------    Tool Used: PT/OT FOOT AND ANKLE ABILITY MEASURE  Score:  Initial: 36/84 Most Recent: 49/84 (Date: 11/27/2018)   Interpretation of Score: For the \"Activities of Daily Living\", there are 21 questions each scored on a 5 point scale with 0 representing \"Unable to do\" and 4 representing \"No difficulty\". The lower the score, the greater the functional disability. 84/84 represents no disability. Minimal detectable change is 5.7 points. With the addition of the 8 questions in the \"Sports Subscale,\" there are 29 questions, each scored on a 5 point scale with 0 representing \"Unable to do\" and 4 representing \"No difficulty\". The lower the score, the greater the functional disability. 116/116 represents no disability. Minimal detectable change is 12.3 points. Activities of Daily Living:  Score 84 83-68 67-51 50-34 33-18 17-1 0   Modifier CH CI CJ CK CL CM CN     Activities of Daily Living + Sports Subscale:  Score 116 115-94 93-71 70-47 46-24 23-1 0   Modifier CH CI CJ CK CL CM CN     Payor: [employer] MEDICARE / Plan: [address] [place] [address] HMO / Product Type: Managed Care Medicare /   Medical Necessity:   · Patient is expected to demonstrate progress in strength, range of motion, balance and functional technique to improve ability to perform pain-free standing/ambulation. · Patient demonstrates good rehab potential due to higher previous functional level. Reason for Services/Other Comments:  · Patient continues to require modification of therapeutic interventions to increase complexity of exercises.    Use of outcome tool(s) and clinical judgement create a POC that gives a: Questionable prediction of patient's progress: MODERATE COMPLEXITY   TREATMENT:   (In addition to Assessment/Re-Assessment sessions the following treatments were rendered)  Subjective comments at beginning of session: Pt states she has about 3/10 pain today (states she was walking more yesterday and this seemed to aggravate it, and she didn't ice afterward)  THERAPEUTIC EXERCISE: (40 minutes):  Exercises per grid below (and assessment above on 11/27/2018) to improve mobility, strength, balance and dynamic movement of ankle - right to improve functional mobility. Required minimal visual, verbal and manual cues to promote proper body alignment, promote proper body posture, promote proper body mechanics and promote proper body breathing techniques. Progressed resistance, range, repetitions and complexity of movement as indicated.     Date:  11/19/2018 Date:  11/21/2018 Date:  11/27/2018   Activity/Exercise      Physiostep L2 resistance for 10 minutes to increase strength/endurance L2 resistance for 13 minutes to increase strength/endurance L2 resistance for 12 minutes to increase strength/endurance   Ankle plantarflexor stretch on incline board 30 second hold x 3 reps with knees extended then flexed 30 second hold x 3 reps with knees extended then flexed 30 second hold x 3 reps with knees extended then flexed   Heel raises on firm ground B UE support with feet on incline board (1st notch from bottom); 20 reps/1 set with cues for slow lowering on B LEs B UE support with feet on incline board (1st notch from bottom); slow lowering on just R LE for 20 reps/1 set  Progressed to performing at second notch of incline board x 5 reps (B UE support, but cues to avoid relying too much on UEs); cues for lowering on R LE only B UE support with feet on incline board (1st notch from bottom); slow lowering on B LEs for 10 reps/1 set - pt discontinued due to significantly increased pain in posterior R ankle   Supine hamstring stretch      Leg press machine  25 lb resistance; 20 reps/1 set with slow controlled movement 30 lb resistance; 20 reps/1 set with slow controlled movement   Ambulation over level ground Multiple times throughout session with cues for increased heel strike and pushing off on back foot for improved step clearance (pt demonstrating slightly excessive knee flexion during loading response at ipsilateral LE at each side, so was trying to reduce this with cues)  Multiple bouts throughout session with cues for increased walking speed for assessment above   Ascending/descending stairs   12 steps with no rail use per assessment above; S   Step ups   Onto 6 inch step; 5-10 reps/1 set each LE leading with cues for increased knee extension at leading LE; minimal progressing to no UE use; cues to avoid stepping too big   *given in HEP  "[employer] Portal      Manual therapy ()  Modalities (). Treatment/Session Assessment: See assessment above. Next session try heel lift at B shoes to offload achilles tendon. · Pain/ Symptoms: Initial:   3/10 in posterior R ankle  Post Session:  3/10  ·   Compliance with Program/Exercises: Good compliance with attending scheduled sessions. · Recommendations/Intent for next treatment session: \"Next visit will focus on advancements to more challenging activities and reduction in assistance provided\".  Manual therapy/modalities as needed to reduce tightness and pain; work on eccentric plantarflexor strengthening and balance  Total Treatment Duration:  PT Patient Time In/Time Out  Time In: 1345  Time Out: [address] [employer], T    Future Appointments   Date Time Provider Jo Nolan   11/29/2018 10:15 AM VALENTINO ChewRPMARY [facility]   12/3/2018 10:30 AM Ro Bach [facility]   12/4/2018 10:30 AM Santos Ferrer MD [facility]   12/7/2018 10:30 AM Ro Bach [facility]   12/10/2018 10:15 AM VALENTINO ValentinRPMARY D   12/13/2018 10:15 AM VALENTINO ChewRPMARY D   5/1/2019  8:45 AM [facility] PM LAB/RAD RAMANDEEP [facility]   5/8/2019  9:20 AM MD RAMANDEEP Burton MT [facility]

## 2018-11-29 ENCOUNTER — HOSPITAL ENCOUNTER (OUTPATIENT)
Dept: PHYSICAL THERAPY | Age: 43
Discharge: HOME | End: 2018-11-29
Payer: MEDICARE

## 2018-11-29 PROCEDURE — 97110 THERAPEUTIC EXERCISES: CPT

## 2018-11-29 PROCEDURE — 97140 MANUAL THERAPY 1/> REGIONS: CPT

## 2018-11-29 NOTE — PROGRESS NOTES
Berto Stein  : 1975  Primary: [place] Nunez Medicare [place]  Secondary: [place] [facility], [address] [facility], [place] [address] [address]  [phone]   [phone]       OUTPATIENT PHYSICAL THERAPY:Daily Note 2018    ICD-10: Treatment Diagnosis: Pain in right ankle and joints of right foot (M25.571)      Stiffness of right ankle, not elsewhere classified (M25.671)    Difficulty in walking, not elsewhere classified (R26.2)   Precautions/Allergies:   Abilify [aripiprazole]; Geodon [ziprasidone hcl]; Jennifer Mattock; Ambien [zolpidem]; and Topamax [topiramate]   Fall Risk Score: 3 (? 5 = High Risk)  MD Orders: Evaluate and treat  MEDICAL/REFERRING DIAGNOSIS:  foot, right   DATE OF ONSET: 2018  REFERRING PHYSICIAN: Marleni Jerome MD  RETURN PHYSICIAN APPOINTMENT: 2018 (pt unsure of date)   This section established at most recent assessment  ASSESSMENT:  Berto Stein has now attended 14 physical therapy sessions for R ankle/foot pain that started in 2018 and has recently gotten worse. This session, she demonstrated improved B LE strength/endurance, less pain with R ankle mobility, less postural/gait deficits, and improved standing/walking tolerance. Despite the above listed improvements, she continues to demonstrate decreased B LE strength/endurance, pain with R ankle mobility, multiple postural/gait deficits, decreased standing/walking tolerance, and decreased functional mobility. Pt may continue to benefit from skilled PT to address the above listed deficits to improve ability to perform pain-free ADLs/IADLs and to improve overall quality of life prior to discharge. PROBLEM LIST (Impacting functional limitations):  1. Decreased Strength  2. Decreased ADL/Functional Activities  3. Decreased Transfer Abilities  4. Decreased Ambulation Ability/Technique  5. Decreased Balance  6.  Increased Pain  7. Decreased Activity Tolerance  8. Increased Fatigue  9. Decreased Flexibility/Joint Mobility  10. Edema/Girth INTERVENTIONS PLANNED:  1. Balance Exercise  2. Bed Mobility  3. Cold  4. Electrical Stimulation  5. Family Education  6. Gait Training  7. Heat  8. Home Exercise Program (HEP)  9. Manual Therapy  10. Neuromuscular Re-education/Strengthening  11. Range of Motion (ROM)  12. Therapeutic Activites  13. Therapeutic Exercise/Strengthening  14. Transcutaneous Electrical Nerve Stimulation (TENS)  15. Transfer Training  16. Ultrasound (US)   TREATMENT PLAN:  Effective Dates: 11/27/2018 TO 1/4/2019 (60 days). Frequency/Duration: 2 times a week for 60 Days  GOALS: (Goals have been discussed and agreed upon with patient.)  Short-Term Functional Goals: Time Frame: 30 days  1. Pt will be compliant with HEP in order to increase LE strength/endurance/mobility to improve functional mobility and overall quality of life. (MET 11/5/2018)  2. Pt will improve score on the Lower Extremity Functional Scale to 38/80 in order to demonstrate improved functional mobility and quality of life. (MET 11/5/2018)  3. Pt will improve score on the Foot and Ankle Ability Measure to 41/84 in order to demonstrate improved functional mobility and quality of life. (MET 11/5/2018)  4. Pt will report standing for > 5 minutes with minimal to no increase in pain in order to be able to stand for prolonged periods as needed to perform chores. (MET 11/5/2018)  5. Pt will be able to ascend/descend 6 steps with S with or without rail with safe gait pattern and minimal to no increase in pain in order to improve community mobility. (MET 11/5/2018)  Discharge Goals: Time Frame: 60 days  1. Pt will be independent with HEP in order to increase LE strength/endurance/mobility to improve functional mobility and overall quality of life. (PROGRESSING , 11/27/2018)   2.  Pt will improve score on the Lower Extremity Functional Scale to 50/80 in order to demonstrate improved functional mobility and quality of life. (CONTINUE, 11/27/2018)   3. Pt will improve score on the Foot and Ankle Ability Measure to 45/84 in order to demonstrate improved functional mobility and quality of life. (CONTINUE, 11/27/2018)   4. Pt will report standing for > 10 minutes with minimal to no increase in pain in order to be able to stand for prolonged periods as needed to perform chores. (MET , 11/27/2018)   5. Pt will be able to ascend/descend 12 steps [place]dal[place] with or without rail with reciprocal gait pattern and minimal to no increase in pain in order to improve community mobility. (PROGRESSING , 11/27/2018)   Rehabilitation Potential For Stated Goals: Good  Regarding Leonorachantaleangela Pelletier's therapy, I certify that the treatment plan above will be carried out by a therapist or under their direction. Thank you for this referral,  Martell Hernandez DPT     Referring Physician Signature: Lyn Lake MD          Date                                  HISTORY:   History of Present Injury/Illness (Reason for Referral): *History per pt or pt's family except where otherwise noted  Pt states pain in her R ankle started in July 2018 (states she was walking more than usual), and she states that she was being treated for it (with prednisone) and it had gotten better with taping, but it has recently gotten worse again (early September when she stopped doing taping and started everyday walking again). States the pain has gotten better temporarily with Prednisone, but states that when she goes off the Prednisone it starts hurting again. Pain at worst is 6-7/10 (aggravating activities include walking > 5 minutes, prolonged standing > 1-2 minutes - even in shower). Pain at best is 0/10 (eases pain with prednisone, rest, taping, mobic, ice). Pt states she was given boot to wear to help with the pain but she cannot wear this while driving (she is supposed to wear this 4 weeks starting last Friday). Past Medical History/Comorbidities:   Ms. Tyson Walsh  has a past medical history of Acid reflux, ADHD (attention deficit hyperactivity disorder), Allergic rhinitis, Anxiety disorder, Arthritis, Asthma, B12 deficiency, Cerumen impaction, Chronic sinusitis, Depression, Deviated nasal septum, Endocrine disease, Fibromyalgia, GERD (gastroesophageal reflux disease), Headache, Hypothyroidism, Low blood sugar, Meniere disease, Migraine, Nasal obstruction, OCD (obsessive compulsive disorder), Psychiatric disorder, Special examinations, Special examinations, Tinnitus, and Unspecified hypothyroidism. Ms. Tyson Walsh  has a past surgical history that includes hx orthopaedic; hx heent; hx heent (07/03/2013); and hx heent (06/2017). Social History/Living Environment:    lives alone in one story apartment with elevator to get up (15 flights of stairs with B railing - occasionally has to take the stairs down if she has an appointment and the elevator is busy)  Prior Level of Function/Work/Activity:  On disability for bipolar disorder and anxiety; likes to walk downtown with her friends, does housework (vacuuming, tidying up, light cooking); has to shop for her groceries  Dominant Side:         RIGHT  Other Clinical Tests:          X-rays of R foot (showed heel spur according to pt)  Previous Treatment Approaches:          Prednisone has helped  Personal Factors:          Sex:  female        Age:  41 y.o. Current Medications:    Current Outpatient Medications:     levothyroxine (SYNTHROID) 125 mcg tablet, Take 1 Tab by mouth daily. , Disp: 90 Tab, Rfl: 1    Dexlansoprazole (DEXILANT) 60 mg CpDB, Take 1 Cap by mouth every morning., Disp: 90 Cap, Rfl: 1    candesartan (ATACAND) 4 mg tablet, Take 1 Tab by mouth daily. , Disp: 90 Tab, Rfl: 1    albuterol (PROVENTIL HFA, VENTOLIN HFA, PROAIR HFA) 90 mcg/actuation inhaler, Take 2 Puffs by inhalation as needed for Wheezing., Disp: 3 Inhaler, Rfl: 1    montelukast (SINGULAIR) 10 mg tablet, Take 1 Tab by mouth daily. , Disp: 90 Tab, Rfl: 1    cetirizine (ZYRTEC) 10 mg tablet, Take 1 Tab by mouth daily. , Disp: 90 Tab, Rfl: 1    meloxicam (MOBIC) 15 mg tablet, Take 1 Tab by mouth daily. , Disp: 90 Tab, Rfl: 1    mirabegron ER (MYRBETRIQ) 25 mg ER tablet, Take 1 Tab by mouth nightly., Disp: 30 Tab, Rfl: 0    diclofenac (VOLTAREN) 1 % gel, Apply 2 g to affected area four (4) times daily. Apply to right foot as needed 2-4 times a day, Disp: 100 g, Rfl: 2    tiZANidine (ZANAFLEX) 4 mg tablet, Take 1 Tab by mouth three (3) times daily as needed. , Disp: 90 Tab, Rfl: 1    IRON, FERROUS SULFATE, PO, Take 45 mg by mouth daily. , Disp: , Rfl:     cyanocobalamin 1,000 mcg tablet, Take 1 Tab by mouth daily. , Disp: 60 Tab, Rfl: 0    butalbital-acetaminophen (BUPAP)  mg tab, Take 1 Tab by mouth daily as needed. , Disp: 30 Tab, Rfl: 5    venlafaxine-SR (EFFEXOR XR) 150 mg capsule, Take  by mouth daily. , Disp: , Rfl:     escitalopram oxalate (LEXAPRO) 20 mg tablet, Take 20 mg by mouth daily. , Disp: , Rfl:     Magnesium Oxide 500 mg cap, Take 500 mg by mouth daily. , Disp: , Rfl:     traMADol (ULTRAM) 50 mg tablet, Take 1 Tab by mouth every eight (8) hours as needed for Pain. Max Daily Amount: 150 mg., Disp: 30 Tab, Rfl: 1    amphetamine-dextroamphetamine XR (ADDERALL XR) 20 mg XR capsule, TK 1 C PO QD, Disp: , Rfl: 0    AMITIZA 24 mcg capsule, TK 1 C PO BID WF AND WATER, Disp: , Rfl: 6    ALPRAZolam (XANAX) 1 mg tablet, 0.5 mg three (3) times daily as needed. , Disp: , Rfl: 4    risperiDONE (RISPERDAL) 2 mg tablet, Take 3 mg by mouth nightly., Disp: , Rfl: 3    LAMICTAL 200 mg tablet, TK 1 T PO BID, Disp: , Rfl: 4    pregabalin (LYRICA) 75 mg capsule, tid, Disp: , Rfl:     melatonin tab tablet, Take 5 mg by mouth., Disp: , Rfl:     cholecalciferol (VITAMIN D3) 5,000 unit capsule, Take 2,000 Units by mouth., Disp: , Rfl:    Date Last Reviewed:  11/29/2018   # of Personal Factors/Comorbidities that affect the Plan of Care: 3+: HIGH COMPLEXITY   EXAMINATION:   Reassessment on 11/27/2018  Observation/Orthostatic Postural Assessment:    The following postural deficits were noted in sitting:  loss of cervical lordosis, increased thoracic kyphosis, forward head, rounded shoulders, posterior pelvic tilt - improved upright on 11/27/2018  The following postural deficits were noted in standing: forward trunk flexion, pronated foot L > R, slight genu valgus noted B - improved upright posture on 11/27/2018  Palpation:          Pt with tenderness noted at posterior R heel and at dorsum of R foot - less tenderness throughout R foot/ankle noted on 11/5/2018  ROM:    Initial measurement: (on 10/10/2018) Initial measurement: (on 10/10/2018) Reassessment measurement: (on 11/5/2018) Reassessment measurement:    L LE: WFL throughout, but excessive hip IR passively R LE: WFL throughout, but excessive hip IR passively; ankle motion WFL but significant tightness noted in dorsiflexors with pain in ankle R LE: ankle motion WFL - tightness noted in dorsiflexors with minimal to no pain in ankle      Strength:    Initial measurement: (on 10/10/2018) Initial measurement: (on 10/10/2018) Reassessment measurement:  Reassessment measurement:    L LE:  Hip flexion: 5/5   Hip extension: 4-/5  Hip abduction: 4-/5  Hip adduction: 5/5  Hip IR: 4+/5  Hip ER: 4/5   Knee flexion:  4/5  Knee extension: 5/5  Ankle DF: 4/5 R LE:  Hip flexion: 5/5  Hip extension: 4-/5  Hip abduction: 4/5  Hip adduction: 4/5  Hip IR: 4+/5  Hip ER: 4/5  Knee flexion: 4+/5  Knee extension: 5/5  Ankle DF: 4-/5       Special Tests:          No leg length discrepancy noted  Neurological Screen:        Myotomes:  WFL        Dermatomes:  No deficits noted  Functional Mobility:         Gait/Ambulation:  Pt ambulates with no AD with following gait deficits: decreased B step length/clearance, increased pronation at each foot in stance phase, decreased B heel strike, decreased B arm swing, slight forward trunk flexion, increased lateral sway - slightly improved upright posture and improved B step length/clearance and heel strike on 11/5/2018 - improved upright posture but continuing to notice decreased heel strike with excessive knee flexion during stance phase at each LE on 11/27/2018        Transfers:  Pt able to stand/sit with minimal to no UE use        Bed Mobility:  Pt able to perform Magdalena with increased time  Balance:          Slight lateral sway with ambulation    Body Structures Involved:   1. Nerves  2. Bones  3. Joints  4. Muscles  5. Ligaments Body Functions Affected:  1. Sensory/Pain  2. Neuromusculoskeletal  3. Movement Related Activities and Participation Affected:  1. General Tasks and Demands  2. Mobility  3. Self Care  4. Domestic Life  5. Interpersonal Interactions and Relationships  6. Community, Social and [place]   # of elements that affect the Plan of Care: 4+: HIGH COMPLEXITY   CLINICAL PRESENTATION:   Presentation: Evolving clinical presentation with changing clinical characteristics: MODERATE COMPLEXITY   CLINICAL DECISION MAKING:   Outcome Measure: Tool Used: Lower Extremity Functional Scale (LEFS)  Score:  Initial: 27/80 Most Recent: 39/80 (Date: 11/27/2018)   Interpretation of Score: 20 questions each scored on a 5 point scale with 0 representing \"extreme difficulty or unable to perform\" and 4 representing \"no difficulty\". The lower the score, the greater the functional disability. 80/80 represents no disability. Minimal detectable change is 9 points. Score 80 79-63 62-48 47-32 31-16 15-1 0   Modifier CH CI CJ CK CL CM CN     ?  Mobility - Walking and Moving Around:     - CURRENT STATUS: CK - 40%-59% impaired, limited or restricted    - GOAL STATUS: CJ - 20%-39% impaired, limited or restricted    - D/C STATUS:  ---------------To be determined---------------    Tool Used: PT/OT FOOT AND ANKLE ABILITY MEASURE  Score:  Initial: 36/84 Most Recent: 49/84 (Date: 11/27/2018)   Interpretation of Score: For the \"Activities of Daily Living\", there are 21 questions each scored on a 5 point scale with 0 representing \"Unable to do\" and 4 representing \"No difficulty\". The lower the score, the greater the functional disability. 84/84 represents no disability. Minimal detectable change is 5.7 points. With the addition of the 8 questions in the \"Sports Subscale,\" there are 29 questions, each scored on a 5 point scale with 0 representing \"Unable to do\" and 4 representing \"No difficulty\". The lower the score, the greater the functional disability. 116/116 represents no disability. Minimal detectable change is 12.3 points. Activities of Daily Living:  Score 84 83-68 67-51 50-34 33-18 17-1 0   Modifier CH CI CJ CK CL CM CN     Activities of Daily Living + Sports Subscale:  Score 116 115-94 93-71 70-47 46-24 23-1 0   Modifier CH CI CJ CK CL CM CN     Payor: [employer] MEDICARE / Plan: [address] [place] [address] HMO / Product Type: Managed Care Medicare /   Medical Necessity:   · Patient is expected to demonstrate progress in strength, range of motion, balance and functional technique to improve ability to perform pain-free standing/ambulation. · Patient demonstrates good rehab potential due to higher previous functional level. Reason for Services/Other Comments:  · Patient continues to require modification of therapeutic interventions to increase complexity of exercises.    Use of outcome tool(s) and clinical judgement create a POC that gives a: Questionable prediction of patient's progress: MODERATE COMPLEXITY   TREATMENT:   (In addition to Assessment/Re-Assessment sessions the following treatments were rendered)  Subjective comments at beginning of session: Pt states she was feeling better after last session, but then ambulated about 20 minutes (iced afterward) and it hurt more again  THERAPEUTIC EXERCISE: (38 minutes):  Exercises per grid below to improve mobility, strength, balance and dynamic movement of ankle - right to improve functional mobility. Required minimal visual, verbal and manual cues to promote proper body alignment, promote proper body posture, promote proper body mechanics and promote proper body breathing techniques. Progressed resistance, range, repetitions and complexity of movement as indicated.     Date:  11/21/2018 Date:  11/27/2018 Date:  11/29/2018   Activity/Exercise      Physiostep L2 resistance for 13 minutes to increase strength/endurance L2 resistance for 12 minutes to increase strength/endurance L2 resistance for 12 minutes to increase strength/endurance   Ankle plantarflexor stretch on incline board 30 second hold x 3 reps with knees extended then flexed 30 second hold x 3 reps with knees extended then flexed 30 second hold x 3 reps with knees extended then flexed   Heel raises on firm ground B UE support with feet on incline board (1st notch from bottom); slow lowering on just R LE for 20 reps/1 set  Progressed to performing at second notch of incline board x 5 reps (B UE support, but cues to avoid relying too much on UEs); cues for lowering on R LE only B UE support with feet on incline board (1st notch from bottom); slow lowering on B LEs for 10 reps/1 set - pt discontinued due to significantly increased pain in posterior R ankle B UE support with feet on incline board (1st notch from bottom); slow lowering on B LEs for 10 reps/1 set    After kinesiotape, performed 15 reps/1 set on level ground with B UE support with no reports of increased pain   Supine hamstring stretch      Leg press machine 25 lb resistance; 20 reps/1 set with slow controlled movement 30 lb resistance; 20 reps/1 set with slow controlled movement 30 lb resistance; 20 reps/1 set with slow controlled movement (performed after kinesiotaping)   Ambulation over level ground  Multiple bouts throughout session with cues for increased walking speed for assessment above Several bouts throughout session; performed after kinesiotaping to assess effect on pt's pain   Ascending/descending stairs  12 steps with no rail use per assessment above; S    Step ups  Onto 6 inch step; 5-10 reps/1 set each LE leading with cues for increased knee extension at leading LE; minimal progressing to no UE use; cues to avoid stepping too big    *given in HEP  [employer] Portal      Manual therapy (15 minutes): With pt in prone position, STM to R gastrocnemius and soleus as well as cross friction massage to R achilles tendon to reduce tightness and pain and improve ankle mobility. With pt in prone position, kinesiotape applied to R plantarflexors at 50% stretch to reduce strain on achilles tendon. Modalities (10 minutes). With pt in supported supine position (B LEs elevated) at end of session, cold pack applied to R achilles tendon to reduce inflammation and pain. Treatment/Session Assessment: Pt reporting slightly improved pain at end of session with kinesiotaping. Instructed pt to wear heel lifts (6 mm given to pt at end of session for B shoes) after tape comes off and to note how she feels. · Pain/ Symptoms: Initial:   3/10 in posterior R ankle  Post Session:  2/10  ·   Compliance with Program/Exercises: Good compliance with attending scheduled sessions. · Recommendations/Intent for next treatment session: \"Next visit will focus on advancements to more challenging activities and reduction in assistance provided\".  Manual therapy/modalities as needed to reduce tightness and pain; work on eccentric plantarflexor strengthening and balance  Total Treatment Duration:  PT Patient Time In/Time Out  Time In: 1015  Time Out: Mary Garrett, DPT    Future Appointments   Date Time Provider Jo Nolan   12/3/2018 10:30 AM Parvin Iraheta [facility]   12/4/2018 10:30 AM Michael Alvarenga MD [facility]   12/7/2018 10:30 AM Zelda Holman [facility] [place]   12[facility]10[facility]2018 10:15 [facility]   12/13/2018 10:15 AM Addi Loja DPT [facility]   5/1/2019  8:45 AM MTV PM LAB/RAD [facility]   5/8/2019  9:20 AM Mayo Ponce MD [facility]

## 2018-12-03 ENCOUNTER — HOSPITAL ENCOUNTER (OUTPATIENT)
Dept: PHYSICAL THERAPY | Age: 43
Discharge: HOME | End: 2018-12-03
Payer: MEDICARE

## 2018-12-03 PROCEDURE — 97110 THERAPEUTIC EXERCISES: CPT

## 2018-12-03 PROCEDURE — 97140 MANUAL THERAPY 1/> REGIONS: CPT

## 2018-12-03 NOTE — PROGRESS NOTES
Tyesha Santana  : 1975  Primary: Keysha Nunez Medicare [place]  Secondary: [place] [facility] [address] [facility], [place] [address] [address] [facility]  [phone]   [phone]       OUTPATIENT PHYSICAL THERAPY:Daily Note 12/3/2018    ICD-10: Treatment Diagnosis: Pain in right ankle and joints of right foot (M25.571)      Stiffness of right ankle, not elsewhere classified (M25.671)    Difficulty in walking, not elsewhere classified (R26.2)   Precautions/Allergies:   Abilify [aripiprazole]; Geodon [ziprasidone hcl]; Sen Juan; Ambien [zolpidem]; and Topamax [topiramate]   Fall Risk Score: 3 (? 5 = High Risk)  MD Orders: Evaluate and treat  MEDICAL/REFERRING DIAGNOSIS:  foot, right   DATE OF ONSET: 2018  REFERRING PHYSICIAN: Zion Moss MD  RETURN PHYSICIAN APPOINTMENT: 2018 (pt unsure of date)   This section established at most recent assessment  ASSESSMENT:  Tyesha Santana has now attended 14 physical therapy sessions for R ankle/foot pain that started in 2018 and has recently gotten worse. This session, she demonstrated improved B LE strength/endurance, less pain with R ankle mobility, less postural/gait deficits, and improved standing/walking tolerance. Despite the above listed improvements, she continues to demonstrate decreased B LE strength/endurance, pain with R ankle mobility, multiple postural/gait deficits, decreased standing/walking tolerance, and decreased functional mobility. Pt may continue to benefit from skilled PT to address the above listed deficits to improve ability to perform pain-free ADLs/IADLs and to improve overall quality of life prior to discharge. PROBLEM LIST (Impacting functional limitations):  1. Decreased Strength  2. Decreased ADL/Functional Activities  3. Decreased Transfer Abilities  4. Decreased Ambulation Ability/Technique  5. Decreased Balance  6.  Increased Pain  7. Decreased Activity Tolerance  8. Increased Fatigue  9. Decreased Flexibility/Joint Mobility  10. Edema/Girth INTERVENTIONS PLANNED:  1. Balance Exercise  2. Bed Mobility  3. Cold  4. Electrical Stimulation  5. Family Education  6. Gait Training  7. Heat  8. Home Exercise Program (HEP)  9. Manual Therapy  10. Neuromuscular Re-education/Strengthening  11. Range of Motion (ROM)  12. Therapeutic Activites  13. Therapeutic Exercise/Strengthening  14. Transcutaneous Electrical Nerve Stimulation (TENS)  15. Transfer Training  16. Ultrasound (US)   TREATMENT PLAN:  Effective Dates: 11/27/2018 TO 1/4/2019 (60 days). Frequency/Duration: 2 times a week for 60 Days  GOALS: (Goals have been discussed and agreed upon with patient.)  Short-Term Functional Goals: Time Frame: 30 days  1. Pt will be compliant with HEP in order to increase LE strength/endurance/mobility to improve functional mobility and overall quality of life. (MET 11/5/2018)  2. Pt will improve score on the Lower Extremity Functional Scale to 38/80 in order to demonstrate improved functional mobility and quality of life. (MET 11/5/2018)  3. Pt will improve score on the Foot and Ankle Ability Measure to 41/84 in order to demonstrate improved functional mobility and quality of life. (MET 11/5/2018)  4. Pt will report standing for > 5 minutes with minimal to no increase in pain in order to be able to stand for prolonged periods as needed to perform chores. (MET 11/5/2018)  5. Pt will be able to ascend/descend 6 steps with S with or without rail with safe gait pattern and minimal to no increase in pain in order to improve community mobility. (MET 11/5/2018)  Discharge Goals: Time Frame: 60 days  1. Pt will be independent with HEP in order to increase LE strength/endurance/mobility to improve functional mobility and overall quality of life. (PROGRESSING , 11/27/2018)   2.  Pt will improve score on the Lower Extremity Functional Scale to 50/80 in order to demonstrate improved functional mobility and quality of life. (CONTINUE, 11/27/2018)   3. Pt will improve score on the Foot and Ankle Ability Measure to 45/84 in order to demonstrate improved functional mobility and quality of life. (CONTINUE, 11/27/2018)   4. Pt will report standing for > 10 minutes with minimal to no increase in pain in order to be able to stand for prolonged periods as needed to perform chores. (MET , 11/27/2018)   5. Pt will be able to ascend/descend 12 steps Magdalena with or without rail with reciprocal gait pattern and minimal to no increase in pain in order to improve community mobility. (PROGRESSING , 11/27/2018)   Rehabilitation Potential For Stated Goals: Good  Regarding Alyssa Pelletier's therapy, I certify that the treatment plan above will be carried out by a therapist or under their direction. Thank you for this referral,  Ella Pizarro PTA     Referring Physician Signature: Bry Willoughby MD          Date                                  HISTORY:   History of Present Injury/Illness (Reason for Referral): *History per pt or pt's family except where otherwise noted  Pt states pain in her R ankle started in July 2018 (states she was walking more than usual), and she states that she was being treated for it (with prednisone) and it had gotten better with taping, but it has recently gotten worse again (early September when she stopped doing taping and started everyday walking again). States the pain has gotten better temporarily with Prednisone, but states that when she goes off the Prednisone it starts hurting again. Pain at worst is 6-7/10 (aggravating activities include walking > 5 minutes, prolonged standing > 1-2 minutes - even in shower). Pain at best is 0/10 (eases pain with prednisone, rest, taping, mobic, ice). Pt states she was given boot to wear to help with the pain but she cannot wear this while driving (she is supposed to wear this 4 weeks starting last Friday). Past Medical History/Comorbidities:   Ms. Devonte [place]  has a past medical history of Acid reflux, ADHD (attention deficit hyperactivity disorder), Allergic rhinitis, Anxiety disorder, Arthritis, Asthma, B12 deficiency, Cerumen impaction, Chronic sinusitis, Depression, Deviated nasal septum, Endocrine disease, Fibromyalgia, GERD (gastroesophageal reflux disease), Headache, Hypothyroidism, Low blood sugar, Meniere disease, Migraine, Nasal obstruction, OCD (obsessive compulsive disorder), Psychiatric disorder, Special examinations, Special examinations, Tinnitus, and Unspecified hypothyroidism. Ms. Devonte [place]  has a past surgical history that includes hx orthopaedic; hx heent; hx heent (07/03/2013); and hx heent (06/2017). Social History/Living Environment:    lives alone in one story apartment with elevator to get up (15 flights of stairs with B railing - occasionally has to take the stairs down if she has an appointment and the elevator is busy)  Prior Level of Function/Work/Activity:  On disability for bipolar disorder and anxiety; likes to walk downtown with her friends, does housework (vacuuming, tidying up, light cooking); has to shop for her groceries  Dominant Side:         RIGHT  Other Clinical Tests:          X-rays of R foot (showed heel spur according to pt)  Previous Treatment Approaches:          Prednisone has helped  Personal Factors:          Sex:  female        Age:  41 y.o. Current Medications:    Current Outpatient Medications:     levothyroxine (SYNTHROID) 125 mcg tablet, Take 1 Tab by mouth daily. , Disp: 90 Tab, Rfl: 1    Dexlansoprazole (DEXILANT) 60 mg CpDB, Take 1 Cap by mouth every morning., Disp: 90 Cap, Rfl: 1    candesartan (ATACAND) 4 mg tablet, Take 1 Tab by mouth daily. , Disp: 90 Tab, Rfl: 1    albuterol (PROVENTIL HFA, VENTOLIN HFA, PROAIR HFA) 90 mcg/actuation inhaler, Take 2 Puffs by inhalation as needed for Wheezing., Disp: 3 Inhaler, Rfl: 1    montelukast (SINGULAIR) 10 mg tablet, Take 1 Tab by mouth daily. , Disp: 90 Tab, Rfl: 1    cetirizine (ZYRTEC) 10 mg tablet, Take 1 Tab by mouth daily. , Disp: 90 Tab, Rfl: 1    meloxicam (MOBIC) 15 mg tablet, Take 1 Tab by mouth daily. , Disp: 90 Tab, Rfl: 1    mirabegron ER (MYRBETRIQ) 25 mg ER tablet, Take 1 Tab by mouth nightly., Disp: 30 Tab, Rfl: 0    diclofenac (VOLTAREN) 1 % gel, Apply 2 g to affected area four (4) times daily. Apply to right foot as needed 2-4 times a day, Disp: 100 g, Rfl: 2    tiZANidine (ZANAFLEX) 4 mg tablet, Take 1 Tab by mouth three (3) times daily as needed. , Disp: 90 Tab, Rfl: 1    IRON, FERROUS SULFATE, PO, Take 45 mg by mouth daily. , Disp: , Rfl:     cyanocobalamin 1,000 mcg tablet, Take 1 Tab by mouth daily. , Disp: 60 Tab, Rfl: 0    butalbital-acetaminophen (BUPAP)  mg tab, Take 1 Tab by mouth daily as needed. , Disp: 30 Tab, Rfl: 5    venlafaxine-SR (EFFEXOR XR) 150 mg capsule, Take  by mouth daily. , Disp: , Rfl:     escitalopram oxalate (LEXAPRO) 20 mg tablet, Take 20 mg by mouth daily. , Disp: , Rfl:     Magnesium Oxide 500 mg cap, Take 500 mg by mouth daily. , Disp: , Rfl:     traMADol (ULTRAM) 50 mg tablet, Take 1 Tab by mouth every eight (8) hours as needed for Pain. Max Daily Amount: 150 mg., Disp: 30 Tab, Rfl: 1    amphetamine-dextroamphetamine XR (ADDERALL XR) 20 mg XR capsule, TK 1 C PO QD, Disp: , Rfl: 0    AMITIZA 24 mcg capsule, TK 1 C PO BID WF AND WATER, Disp: , Rfl: 6    ALPRAZolam (XANAX) 1 mg tablet, 0.5 mg three (3) times daily as needed. , Disp: , Rfl: 4    risperiDONE (RISPERDAL) 2 mg tablet, Take 3 mg by mouth nightly., Disp: , Rfl: 3    LAMICTAL 200 mg tablet, TK 1 T PO BID, Disp: , Rfl: 4    pregabalin (LYRICA) 75 mg capsule, tid, Disp: , Rfl:     melatonin tab tablet, Take 5 mg by mouth., Disp: , Rfl:     cholecalciferol (VITAMIN D3) 5,000 unit capsule, Take 2,000 Units by mouth., Disp: , Rfl:    Date Last Reviewed:  12/3/2018   # of Personal Factors/Comorbidities that affect the Plan of Care: 3+: HIGH COMPLEXITY   EXAMINATION:   Reassessment on 11/27/2018  Observation/Orthostatic Postural Assessment:    The following postural deficits were noted in sitting:  loss of cervical lordosis, increased thoracic kyphosis, forward head, rounded shoulders, posterior pelvic tilt - improved upright on 11/27/2018  The following postural deficits were noted in standing: forward trunk flexion, pronated foot L > R, slight genu valgus noted B - improved upright posture on 11/27/2018  Palpation:          Pt with tenderness noted at posterior R heel and at dorsum of R foot - less tenderness throughout R foot/ankle noted on 11/5/2018  ROM:    Initial measurement: (on 10/10/2018) Initial measurement: (on 10/10/2018) Reassessment measurement: (on 11/5/2018) Reassessment measurement:    L LE: WFL throughout, but excessive hip IR passively R LE: WFL throughout, but excessive hip IR passively; ankle motion WFL but significant tightness noted in dorsiflexors with pain in ankle R LE: ankle motion WFL - tightness noted in dorsiflexors with minimal to no pain in ankle      Strength:    Initial measurement: (on 10/10/2018) Initial measurement: (on 10/10/2018) Reassessment measurement:  Reassessment measurement:    L LE:  Hip flexion: 5/5   Hip extension: 4-/5  Hip abduction: 4-/5  Hip adduction: 5/5  Hip IR: 4+/5  Hip ER: 4/5   Knee flexion:  4/5  Knee extension: 5/5  Ankle DF: 4/5 R LE:  Hip flexion: 5/5  Hip extension: 4-/5  Hip abduction: 4/5  Hip adduction: 4/5  Hip IR: 4+/5  Hip ER: 4/5  Knee flexion: 4+/5  Knee extension: 5/5  Ankle DF: 4-/5       Special Tests:          No leg length discrepancy noted  Neurological Screen:        Myotomes:  WFL        Dermatomes:  No deficits noted  Functional Mobility:         Gait/Ambulation:  Pt ambulates with no AD with following gait deficits: decreased B step length/clearance, increased pronation at each foot in stance phase, decreased B heel strike, decreased B arm swing, slight forward trunk flexion, increased lateral sway - slightly improved upright posture and improved B step length/clearance and heel strike on 11/5/2018 - improved upright posture but continuing to notice decreased heel strike with excessive knee flexion during stance phase at each LE on 11/27/2018        Transfers:  Pt able to stand/sit with minimal to no UE use        Bed Mobility:  Pt able to perform Magdalena with increased time  Balance:          Slight lateral sway with ambulation    Body Structures Involved:   1. Nerves  2. Bones  3. Joints  4. Muscles  5. Ligaments Body Functions Affected:  1. Sensory/Pain  2. Neuromusculoskeletal  3. Movement Related Activities and Participation Affected:  1. General Tasks and Demands  2. Mobility  3. Self Care  4. Domestic Life  5. Interpersonal Interactions and Relationships  6. Community, Social and [place]   # of elements that affect the Plan of Care: 4+: HIGH COMPLEXITY   CLINICAL PRESENTATION:   Presentation: Evolving clinical presentation with changing clinical characteristics: MODERATE COMPLEXITY   CLINICAL DECISION MAKING:   Outcome Measure: Tool Used: Lower Extremity Functional Scale (LEFS)  Score:  Initial: 27/80 Most Recent: 39/80 (Date: 11/27/2018)   Interpretation of Score: 20 questions each scored on a 5 point scale with 0 representing \"extreme difficulty or unable to perform\" and 4 representing \"no difficulty\". The lower the score, the greater the functional disability. 80/80 represents no disability. Minimal detectable change is 9 points. Score 80 79-63 62-48 47-32 31-16 15-1 0   Modifier CH CI CJ CK CL CM CN     ?  Mobility - Walking and Moving Around:     - CURRENT STATUS: CK - 40%-59% impaired, limited or restricted    - GOAL STATUS: CJ - 20%-39% impaired, limited or restricted    - D/C STATUS:  ---------------To be determined---------------    Tool Used: PT/OT FOOT AND ANKLE ABILITY MEASURE  Score:  Initial: 36/84 Most Recent: 49/84 (Date: 11/27/2018)   Interpretation of Score: For the \"Activities of Daily Living\", there are 21 questions each scored on a 5 point scale with 0 representing \"Unable to do\" and 4 representing \"No difficulty\". The lower the score, the greater the functional disability. 84/84 represents no disability. Minimal detectable change is 5.7 points. With the addition of the 8 questions in the \"Sports Subscale,\" there are 29 questions, each scored on a 5 point scale with 0 representing \"Unable to do\" and 4 representing \"No difficulty\". The lower the score, the greater the functional disability. 116/116 represents no disability. Minimal detectable change is 12.3 points. Activities of Daily Living:  Score 84 83-68 67-51 50-34 33-18 17-1 0   Modifier CH CI CJ CK CL CM CN     Activities of Daily Living + Sports Subscale:  Score 116 115-94 93-71 70-47 46-24 23-1 0   Modifier CH CI CJ CK CL CM CN     Payor: [employer] MEDICARE / Plan: [address] [place] [address] HMO / Product Type: Managed Care Medicare /   Medical Necessity:   · Patient is expected to demonstrate progress in strength, range of motion, balance and functional technique to improve ability to perform pain-free standing/ambulation. · Patient demonstrates good rehab potential due to higher previous functional level. Reason for Services/Other Comments:  · Patient continues to require modification of therapeutic interventions to increase complexity of exercises. Use of outcome tool(s) and clinical judgement create a POC that gives a: Questionable prediction of patient's progress: MODERATE COMPLEXITY   TREATMENT:   (In addition to Assessment/Re-Assessment sessions the following treatments were rendered)  Subjective comments at beginning of session: Pt reports pain level is 3/10 in ankle and foot. Patient reported that the taping helped some last visit. Patient states she ordered some tape.    THERAPEUTIC EXERCISE: (30 minutes):  Exercises per grid below to improve mobility, strength, balance and dynamic movement of ankle - right to improve functional mobility. Required minimal visual, verbal and manual cues to promote proper body alignment, promote proper body posture, promote proper body mechanics and promote proper body breathing techniques. Progressed resistance, range, repetitions and complexity of movement as indicated.    Da Date:  11/27/2018 Date:  11/29/2018 Date  12/2/18   Activity/Exercise      Physiostep L2 resistance for 12 minutes to increase strength/endurance L2 resistance for 12 minutes to increase strength/endurance L3 resistance for 12 minutes to increase strength/endurance   Ankle plantarflexor stretch on incline board 30 second hold x 3 reps with knees extended then flexed 30 second hold x 3 reps with knees extended then flexed 30 second hold x 3 reps with knees extended then flexed   Heel raises on firm ground B UE support with feet on incline board (1st notch from bottom); slow lowering on B LEs for 10 reps/1 set - pt discontinued due to significantly increased pain in posterior R ankle B UE support with feet on incline board (1st notch from bottom); slow lowering on B LEs for 10 reps/1 set    After kinesiotape, performed 15 reps/1 set on level ground with B UE support with no reports of increased pain B UE support with feet on incline board (1st notch from bottom); slow lowering on B LEs for 10 reps/1 set   Supine hamstring stretch      Leg press machine 30 lb resistance; 20 reps/1 set with slow controlled movement 30 lb resistance; 20 reps/1 set with slow controlled movement (performed after kinesiotaping) 30 lb resistance; 20 reps/1 set with slow controlled movement (performed after kinesiotaping)   Ambulation over level ground Multiple bouts throughout session with cues for increased walking speed for assessment above Several bouts throughout session; performed after kinesiotaping to assess effect on pt's pain Ascending/descending stairs 12 steps with no rail use per assessment above; S     Step ups Onto 6 inch step; 5-10 reps/1 set each LE leading with cues for increased knee extension at leading LE; minimal progressing to no UE use; cues to avoid stepping too big     *given in HEP12  [facility]      Manual therapy (15 minutes): With pt in prone position, STM with hands and Guasha tool to R gastrocnemius and soleus and  R achilles tendon and to plantar fascia to reduce tightness and pain and improve  mobility. This was followed by passive heel cord stretch and passive plantar fascia stretch  Modalities (10 minutes). With pt in supported supine position (B LEs elevated) at end of session, cold pack applied to R achilles tendon to reduce inflammation and pain. Treatment/Session Assessment: Pt tolerated exercises and manual therapy well without reports of increased pain. Continue per plan of care. · Pain/ Symptoms: Initial:   3/10 in posterior R ankle  Post Session:  2/10  ·   Compliance with Program/Exercises: Good compliance with attending scheduled sessions. · Recommendations/Intent for next treatment session: \"Next visit will focus on advancements to more challenging activities and reduction in assistance provided\".  Manual therapy/modalities as needed to reduce tightness and pain; work on eccentric plantarflexor strengthening and balance  Total Treatment Duration:  PT Patient Time In/Time Out  Time In: 1030  Time Out: [address] [facility]    Future Appointments   Date Time Provider Jo Nolan   12[facility]7/2018 10:30 [facility]   12/10/2018 10:15 AM Radha Echevarria DPT [facility]   12/13/2018 10:15 AM Isabella SIMMS DPT [facility]   5/1/2019  8:45 AM [facility] PM LAB/RAD [facility]   5/8/2019  9:20 AM MD RAMANDEEP Jimenes [facility]

## 2018-12-07 ENCOUNTER — HOSPITAL ENCOUNTER (OUTPATIENT)
Dept: PHYSICAL THERAPY | Age: 43
Discharge: HOME | End: 2018-12-07
Payer: MEDICARE

## 2018-12-07 PROCEDURE — 97140 MANUAL THERAPY 1/> REGIONS: CPT

## 2018-12-07 PROCEDURE — 97110 THERAPEUTIC EXERCISES: CPT

## 2018-12-07 NOTE — PROGRESS NOTES
Victory Spatz  : 1975  Primary: [place] Nunez Medicare [place]  Secondary: [place] [facility], [facility], [address]  [phone]   [phone]       OUTPATIENT PHYSICAL THERAPY:Daily Note 2018    ICD-10: Treatment Diagnosis: Pain in right ankle and joints of right foot (M25.571)      Stiffness of right ankle, not elsewhere classified (M25.671)    Difficulty in walking, not elsewhere classified (R26.2)   Precautions/Allergies:   Abilify [aripiprazole]; Geodon [ziprasidone hcl]; Simone Ear; Ambien [zolpidem]; and Topamax [topiramate]   Fall Risk Score: 3 (? 5 = High Risk)  MD Orders: Evaluate and treat  MEDICAL/REFERRING DIAGNOSIS:  foot, right   DATE OF ONSET: 2018  REFERRING PHYSICIAN: Maya Mercedes MD  RETURN PHYSICIAN APPOINTMENT: 2018 (pt unsure of date)   This section established at most recent assessment  ASSESSMENT:  Victory Spatz has now attended 14 physical therapy sessions for R ankle/foot pain that started in 2018 and has recently gotten worse. This session, she demonstrated improved B LE strength/endurance, less pain with R ankle mobility, less postural/gait deficits, and improved standing/walking tolerance. Despite the above listed improvements, she continues to demonstrate decreased B LE strength/endurance, pain with R ankle mobility, multiple postural/gait deficits, decreased standing/walking tolerance, and decreased functional mobility. Pt may continue to benefit from skilled PT to address the above listed deficits to improve ability to perform pain-free ADLs/IADLs and to improve overall quality of life prior to discharge. PROBLEM LIST (Impacting functional limitations):  1. Decreased Strength  2. Decreased ADL/Functional Activities  3. Decreased Transfer Abilities  4. Decreased Ambulation Ability/Technique  5. Decreased Balance  6.  Increased Pain  7. Decreased Activity Tolerance  8. Increased Fatigue  9. Decreased Flexibility/Joint Mobility  10. Edema/Girth INTERVENTIONS PLANNED:  1. Balance Exercise  2. Bed Mobility  3. Cold  4. Electrical Stimulation  5. Family Education  6. Gait Training  7. Heat  8. Home Exercise Program (HEP)  9. Manual Therapy  10. Neuromuscular Re-education/Strengthening  11. Range of Motion (ROM)  12. Therapeutic Activites  13. Therapeutic Exercise/Strengthening  14. Transcutaneous Electrical Nerve Stimulation (TENS)  15. Transfer Training  16. Ultrasound (US)   TREATMENT PLAN:  Effective Dates: 11/27/2018 TO 1/4/2019 (60 days). Frequency/Duration: 2 times a week for 60 Days  GOALS: (Goals have been discussed and agreed upon with patient.)  Short-Term Functional Goals: Time Frame: 30 days  1. Pt will be compliant with HEP in order to increase LE strength/endurance/mobility to improve functional mobility and overall quality of life. (MET 11/5/2018)  2. Pt will improve score on the Lower Extremity Functional Scale to 38/80 in order to demonstrate improved functional mobility and quality of life. (MET 11/5/2018)  3. Pt will improve score on the Foot and Ankle Ability Measure to 41/84 in order to demonstrate improved functional mobility and quality of life. (MET 11/5/2018)  4. Pt will report standing for > 5 minutes with minimal to no increase in pain in order to be able to stand for prolonged periods as needed to perform chores. (MET 11/5/2018)  5. Pt will be able to ascend/descend 6 steps with S with or without rail with safe gait pattern and minimal to no increase in pain in order to improve community mobility. (MET 11/5/2018)  Discharge Goals: Time Frame: 60 days  1. Pt will be independent with HEP in order to increase LE strength/endurance/mobility to improve functional mobility and overall quality of life. (PROGRESSING , 11/27/2018)   2.  Pt will improve score on the Lower Extremity Functional Scale to 50/80 in order to demonstrate improved functional mobility and quality of life. (CONTINUE, 11/27/2018)   3. Pt will improve score on the Foot and Ankle Ability Measure to 45/84 in order to demonstrate improved functional mobility and quality of life. (CONTINUE, 11/27/2018)   4. Pt will report standing for > 10 minutes with minimal to no increase in pain in order to be able to stand for prolonged periods as needed to perform chores. (MET , 11/27/2018)   5. Pt will be able to ascend/descend 12 steps Magdalena with or without rail with reciprocal gait pattern and minimal to no increase in pain in order to improve community mobility. (PROGRESSING , 11/27/2018)   Rehabilitation Potential For Stated Goals: Good  Regarding Dany Estrada Liyah's therapy, I certify that the treatment plan above will be carried out by a therapist or under their direction. Thank you for this referral,  Mely Gomez PTA     Referring Physician Signature: Barry Sandoval MD          Date                                  HISTORY:   History of Present Injury/Illness (Reason for Referral): *History per pt or pt's family except where otherwise noted  Pt states pain in her R ankle started in July 2018 (states she was walking more than usual), and she states that she was being treated for it (with prednisone) and it had gotten better with taping, but it has recently gotten worse again (early September when she stopped doing taping and started everyday walking again). States the pain has gotten better temporarily with Prednisone, but states that when she goes off the Prednisone it starts hurting again. Pain at worst is 6-7/10 (aggravating activities include walking > 5 minutes, prolonged standing > 1-2 minutes - even in shower). Pain at best is 0/10 (eases pain with prednisone, rest, taping, mobic, ice). Pt states she was given boot to wear to help with the pain but she cannot wear this while driving (she is supposed to wear this 4 weeks starting last Friday). Past Medical History/Comorbidities:   Ms. Parker Mendez  has a past medical history of Acid reflux, ADHD (attention deficit hyperactivity disorder), Allergic rhinitis, Anxiety disorder, Arthritis, Asthma, B12 deficiency, Cerumen impaction, Chronic sinusitis, Depression, Deviated nasal septum, Endocrine disease, Fibromyalgia, GERD (gastroesophageal reflux disease), Headache, Hypothyroidism, Low blood sugar, Meniere disease, Migraine, Nasal obstruction, OCD (obsessive compulsive disorder), Psychiatric disorder, Special examinations, Special examinations, Tinnitus, and Unspecified hypothyroidism. Ms. Parker Mendez  has a past surgical history that includes hx orthopaedic; hx heent; hx heent (07/03/2013); and hx heent (06/2017). Social History/Living Environment:    lives alone in one story apartment with elevator to get up (15 flights of stairs with B railing - occasionally has to take the stairs down if she has an appointment and the elevator is busy)  Prior Level of Function/Work/Activity:  On disability for bipolar disorder and anxiety; likes to walk downtown with her friends, does housework (vacuuming, tidying up, light cooking); has to shop for her groceries  Dominant Side:         RIGHT  Other Clinical Tests:          X-rays of R foot (showed heel spur according to pt)  Previous Treatment Approaches:          Prednisone has helped  Personal Factors:          Sex:  female        Age:  41 y.o. Current Medications:    Current Outpatient Medications:     levothyroxine (SYNTHROID) 125 mcg tablet, Take 1 Tab by mouth daily. , Disp: 90 Tab, Rfl: 1    Dexlansoprazole (DEXILANT) 60 mg CpDB, Take 1 Cap by mouth every morning., Disp: 90 Cap, Rfl: 1    candesartan (ATACAND) 4 mg tablet, Take 1 Tab by mouth daily. , Disp: 90 Tab, Rfl: 1    albuterol (PROVENTIL HFA, VENTOLIN HFA, PROAIR HFA) 90 mcg/actuation inhaler, Take 2 Puffs by inhalation as needed for Wheezing., Disp: 3 Inhaler, Rfl: 1    montelukast (SINGULAIR) 10 mg tablet, Take 1 Tab by mouth daily. , Disp: 90 Tab, Rfl: 1    cetirizine (ZYRTEC) 10 mg tablet, Take 1 Tab by mouth daily. , Disp: 90 Tab, Rfl: 1    meloxicam (MOBIC) 15 mg tablet, Take 1 Tab by mouth daily. , Disp: 90 Tab, Rfl: 1    mirabegron ER (MYRBETRIQ) 25 mg ER tablet, Take 1 Tab by mouth nightly., Disp: 30 Tab, Rfl: 0    diclofenac (VOLTAREN) 1 % gel, Apply 2 g to affected area four (4) times daily. Apply to right foot as needed 2-4 times a day, Disp: 100 g, Rfl: 2    tiZANidine (ZANAFLEX) 4 mg tablet, Take 1 Tab by mouth three (3) times daily as needed. , Disp: 90 Tab, Rfl: 1    IRON, FERROUS SULFATE, PO, Take 45 mg by mouth daily. , Disp: , Rfl:     cyanocobalamin 1,000 mcg tablet, Take 1 Tab by mouth daily. , Disp: 60 Tab, Rfl: 0    butalbital-acetaminophen (BUPAP)  mg tab, Take 1 Tab by mouth daily as needed. , Disp: 30 Tab, Rfl: 5    venlafaxine-SR (EFFEXOR XR) 150 mg capsule, Take  by mouth daily. , Disp: , Rfl:     escitalopram oxalate (LEXAPRO) 20 mg tablet, Take 20 mg by mouth daily. , Disp: , Rfl:     Magnesium Oxide 500 mg cap, Take 500 mg by mouth daily. , Disp: , Rfl:     traMADol (ULTRAM) 50 mg tablet, Take 1 Tab by mouth every eight (8) hours as needed for Pain. Max Daily Amount: 150 mg., Disp: 30 Tab, Rfl: 1    amphetamine-dextroamphetamine XR (ADDERALL XR) 20 mg XR capsule, TK 1 C PO QD, Disp: , Rfl: 0    AMITIZA 24 mcg capsule, TK 1 C PO BID WF AND WATER, Disp: , Rfl: 6    ALPRAZolam (XANAX) 1 mg tablet, 0.5 mg three (3) times daily as needed. , Disp: , Rfl: 4    risperiDONE (RISPERDAL) 2 mg tablet, Take 3 mg by mouth nightly., Disp: , Rfl: 3    LAMICTAL 200 mg tablet, TK 1 T PO BID, Disp: , Rfl: 4    pregabalin (LYRICA) 75 mg capsule, tid, Disp: , Rfl:     melatonin tab tablet, Take 5 mg by mouth., Disp: , Rfl:     cholecalciferol (VITAMIN D3) 5,000 unit capsule, Take 2,000 Units by mouth., Disp: , Rfl:    Date Last Reviewed:  12/7/2018   # of Personal Factors/Comorbidities that affect the Plan of Care: 3+: HIGH COMPLEXITY   EXAMINATION:   Reassessment on 11/27/2018  Observation/Orthostatic Postural Assessment:    The following postural deficits were noted in sitting:  loss of cervical lordosis, increased thoracic kyphosis, forward head, rounded shoulders, posterior pelvic tilt - improved upright on 11/27/2018  The following postural deficits were noted in standing: forward trunk flexion, pronated foot L > R, slight genu valgus noted B - improved upright posture on 11/27/2018  Palpation:          Pt with tenderness noted at posterior R heel and at dorsum of R foot - less tenderness throughout R foot/ankle noted on 11/5/2018  ROM:    Initial measurement: (on 10/10/2018) Initial measurement: (on 10/10/2018) Reassessment measurement: (on 11/5/2018) Reassessment measurement:    L LE: WFL throughout, but excessive hip IR passively R LE: WFL throughout, but excessive hip IR passively; ankle motion WFL but significant tightness noted in dorsiflexors with pain in ankle R LE: ankle motion WFL - tightness noted in dorsiflexors with minimal to no pain in ankle      Strength:    Initial measurement: (on 10/10/2018) Initial measurement: (on 10/10/2018) Reassessment measurement:  Reassessment measurement:    L LE:  Hip flexion: 5/5   Hip extension: 4-/5  Hip abduction: 4-/5  Hip adduction: 5/5  Hip IR: 4+/5  Hip ER: 4/5   Knee flexion:  4/5  Knee extension: 5/5  Ankle DF: 4/5 R LE:  Hip flexion: 5/5  Hip extension: 4-/5  Hip abduction: 4/5  Hip adduction: 4/5  Hip IR: 4+/5  Hip ER: 4/5  Knee flexion: 4+/5  Knee extension: 5/5  Ankle DF: 4-/5       Special Tests:          No leg length discrepancy noted  Neurological Screen:        Myotomes:  WFL        Dermatomes:  No deficits noted  Functional Mobility:         Gait/Ambulation:  Pt ambulates with no AD with following gait deficits: decreased B step length/clearance, increased pronation at each foot in stance phase, decreased B heel strike, decreased B arm swing, slight forward trunk flexion, increased lateral sway - slightly improved upright posture and improved B step length/clearance and heel strike on 11/5/2018 - improved upright posture but continuing to notice decreased heel strike with excessive knee flexion during stance phase at each LE on 11/27/2018        Transfers:  Pt able to stand/sit with minimal to no UE use        Bed Mobility:  Pt able to perform Magdalena with increased time  Balance:          Slight lateral sway with ambulation    Body Structures Involved:   1. Nerves  2. Bones  3. Joints  4. Muscles  5. Ligaments Body Functions Affected:  1. Sensory/Pain  2. Neuromusculoskeletal  3. Movement Related Activities and Participation Affected:  1. General Tasks and Demands  2. Mobility  3. Self Care  4. Domestic Life  5. Interpersonal Interactions and Relationships  6. Community, Social and [place]   # of elements that affect the Plan of Care: 4+: HIGH COMPLEXITY   CLINICAL PRESENTATION:   Presentation: Evolving clinical presentation with changing clinical characteristics: MODERATE COMPLEXITY   CLINICAL DECISION MAKING:   Outcome Measure: Tool Used: Lower Extremity Functional Scale (LEFS)  Score:  Initial: 27/80 Most Recent: 39/80 (Date: 11/27/2018)   Interpretation of Score: 20 questions each scored on a 5 point scale with 0 representing \"extreme difficulty or unable to perform\" and 4 representing \"no difficulty\". The lower the score, the greater the functional disability. 80/80 represents no disability. Minimal detectable change is 9 points. Score 80 79-63 62-48 47-32 31-16 15-1 0   Modifier CH CI CJ CK CL CM CN     ?  Mobility - Walking and Moving Around:     - CURRENT STATUS: CK - 40%-59% impaired, limited or restricted    - GOAL STATUS: CJ - 20%-39% impaired, limited or restricted    - D/C STATUS:  ---------------To be determined---------------    Tool Used: PT/OT FOOT AND ANKLE ABILITY MEASURE  Score:  Initial: 36/84 Most Recent: 49/84 (Date: 11/27/2018)   Interpretation of Score: For the \"Activities of Daily Living\", there are 21 questions each scored on a 5 point scale with 0 representing \"Unable to do\" and 4 representing \"No difficulty\". The lower the score, the greater the functional disability. 84/84 represents no disability. Minimal detectable change is 5.7 points. With the addition of the 8 questions in the \"Sports Subscale,\" there are 29 questions, each scored on a 5 point scale with 0 representing \"Unable to do\" and 4 representing \"No difficulty\". The lower the score, the greater the functional disability. 116/116 represents no disability. Minimal detectable change is 12.3 points. Activities of Daily Living:  Score 84 83-68 67-51 50-34 33-18 17-1 0   Modifier CH CI CJ CK CL CM CN     Activities of Daily Living + Sports Subscale:  Score 116 115-94 93-71 70-47 46-24 23-1 0   Modifier CH CI CJ CK CL CM CN     Payor: [employer] MEDICARE / Plan: [address] [place] [address] HMO / Product Type: Managed Care Medicare /   Medical Necessity:   · Patient is expected to demonstrate progress in strength, range of motion, balance and functional technique to improve ability to perform pain-free standing/ambulation. · Patient demonstrates good rehab potential due to higher previous functional level. Reason for Services/Other Comments:  · Patient continues to require modification of therapeutic interventions to increase complexity of exercises. Use of outcome tool(s) and clinical judgement create a POC that gives a: Questionable prediction of patient's progress: MODERATE COMPLEXITY   TREATMENT:   (In addition to Assessment/Re-Assessment sessions the following treatments were rendered)  Subjective comments at beginning of session: Pt reports pain level is 2/10 in ankle and foot. Patient states she did some walking yesterday and pain went to 3/10 but then it resolved quickly and back down to 1/10.     THERAPEUTIC EXERCISE: (30 minutes):  Exercises per grid below to improve mobility, strength, balance and dynamic movement of ankle - right to improve functional mobility. Required minimal visual, verbal and manual cues to promote proper body alignment, promote proper body posture, promote proper body mechanics and promote proper body breathing techniques. Progressed resistance, range, repetitions and complexity of movement as indicated. Da Date:  11/29/2018 Date  12/2/18 Date  12/7/18   Activity/Exercise      Physiostep L2 resistance for 12 minutes to increase strength/endurance L3 resistance for 12 minutes to increase strength/endurance L3 resistance for 12 minutes to increase strength/endurance   Ankle plantarflexor stretch on incline board 30 second hold x 3 reps with knees extended then flexed 30 second hold x 3 reps with knees extended then flexed 30 second hold x 3 reps with knees extended then flexed   Heel raises on firm ground B UE support with feet on incline board (1st notch from bottom); slow lowering on B LEs for 10 reps/1 set    After kinesiotape, performed 15 reps/1 set on level ground with B UE support with no reports of increased pain B UE support with feet on incline board (1st notch from bottom); slow lowering on B LEs for 10 reps/1 set B UE support with feet on incline board (1st notch from bottom); slow lowering on B LEs for 10 reps/1 set   Supine hamstring stretch      Leg press machine 30 lb resistance; 20 reps/1 set with slow controlled movement (performed after kinesiotaping) 30 lb resistance; 20 reps/1 set with slow controlled movement (performed after kinesiotaping) 30 lb resistance; 20 reps/1 set with slow controlled movement (performed after kinesiotaping)   Ambulation over level ground Several bouts throughout session; performed after kinesiotaping to assess effect on pt's pain     Ascending/descending stairs      Step ups      *given in HEP12  [employer]      Manual therapy (15 minutes):  With pt in prone position, STM with hands and Guasha tool to R gastrocnemius and soleus and  R achilles tendon and to plantar fascia to reduce tightness and pain and improve  mobility. This was followed by passive heel cord stretch and passive plantar fascia stretch  Modalities (10 minutes). With pt in supported supine position (B LEs elevated) at end of session, cold pack applied to R achilles tendon to reduce inflammation and pain. Treatment/Session Assessment: Pt tolerated exercises and manual therapy well without reports of increased pain. Patient reports improvement with treatments and reports functional activities easier with decreased pain. Continue per plan of care. · Pain/ Symptoms: Initial:   3/10 in posterior R ankle  Post Session:  2/10  ·   Compliance with Program/Exercises: Good compliance with attending scheduled sessions. · Recommendations/Intent for next treatment session: \"Next visit will focus on advancements to more challenging activities and reduction in assistance provided\".  Manual therapy/modalities as needed to reduce tightness and pain; work on eccentric plantarflexor strengthening and balance  Total Treatment Duration:  PT Patient Time In/Time Out  Time In: 1030  Time Out: [facility] [place], [place]    Future Appointments   Date Time Provider Jo Nolan   12/10/2018 10:15 AM Drew Briseno DPT [facility]   12/13/2018 10:15 AM Nydia SIMMS DPT [facility]   5/1/2019  8:45 AM MTV PM LAB/RAD RAMANDEEP WORKMAN [place]   5/8/2019  9:20 AM MD RAMANDEEP Quiñones [facility]

## 2018-12-10 ENCOUNTER — HOSPITAL ENCOUNTER (OUTPATIENT)
Dept: PHYSICAL THERAPY | Age: 43
Discharge: HOME | End: 2018-12-10
Payer: MEDICARE

## 2018-12-10 NOTE — PROGRESS NOTES
Therapy Center at [address], [facility]  [phone]   [phone]     OUTPATIENT DAILY NOTE    NAME: Diane Cyr    DATE: 12/10/2018    Office initiated cancellation of pt's visit due to clinic closed secondary to weather.     KRISTINE RodriguezT

## 2018-12-13 ENCOUNTER — HOSPITAL ENCOUNTER (OUTPATIENT)
Dept: PHYSICAL THERAPY | Age: 43
Discharge: HOME | End: 2018-12-13
Payer: MEDICARE

## 2018-12-13 PROCEDURE — 97110 THERAPEUTIC EXERCISES: CPT

## 2018-12-13 PROCEDURE — 97140 MANUAL THERAPY 1/> REGIONS: CPT

## 2018-12-13 NOTE — PROGRESS NOTES
Frandy Mendosa  : 1975  Primary: Al[employer] Jos[employer] [place]  Secondary: [place] Of [facility], [address] [facility], [place] [address] [address] [facility]  [phone]   [phone]       OUTPATIENT PHYSICAL THERAPY:Daily Note 2018    ICD-10: Treatment Diagnosis: Pain in right ankle and joints of right foot (M25.571)      Stiffness of right ankle, not elsewhere classified (M25.671)    Difficulty in walking, not elsewhere classified (R26.2)   Precautions/Allergies:   Abilify [aripiprazole]; Geodon [ziprasidone hcl]; Lajune Pedrito; Ambien [zolpidem]; and Topamax [topiramate]   Fall Risk Score: 3 (? 5 = High Risk)  MD Orders: Evaluate and treat  MEDICAL/REFERRING DIAGNOSIS:  foot, right   DATE OF ONSET: 2018  REFERRING PHYSICIAN: Trent Juan MD  RETURN PHYSICIAN APPOINTMENT: 2018 (pt unsure of date)   This section established at most recent assessment  ASSESSMENT:  Frandy Mendosa has now attended 14 physical therapy sessions for R ankle/foot pain that started in 2018 and has recently gotten worse. This session, she demonstrated improved B LE strength/endurance, less pain with R ankle mobility, less postural/gait deficits, and improved standing/walking tolerance. Despite the above listed improvements, she continues to demonstrate decreased B LE strength/endurance, pain with R ankle mobility, multiple postural/gait deficits, decreased standing/walking tolerance, and decreased functional mobility. Pt may continue to benefit from skilled PT to address the above listed deficits to improve ability to perform pain-free ADLs/IADLs and to improve overall quality of life prior to discharge. PROBLEM LIST (Impacting functional limitations):  1. Decreased Strength  2. Decreased ADL/Functional Activities  3. Decreased Transfer Abilities  4. Decreased Ambulation Ability/Technique  5. Decreased Balance  6.  Increased Pain  7. Decreased Activity Tolerance  8. Increased Fatigue  9. Decreased Flexibility/Joint Mobility  10. Edema/Girth INTERVENTIONS PLANNED:  1. Balance Exercise  2. Bed Mobility  3. Cold  4. Electrical Stimulation  5. Family Education  6. Gait Training  7. Heat  8. Home Exercise Program (HEP)  9. Manual Therapy  10. Neuromuscular Re-education/Strengthening  11. Range of Motion (ROM)  12. Therapeutic Activites  13. Therapeutic Exercise/Strengthening  14. Transcutaneous Electrical Nerve Stimulation (TENS)  15. Transfer Training  16. Ultrasound (US)   TREATMENT PLAN:  Effective Dates: 11/27/2018 TO 1/4/2019 (60 days). Frequency/Duration: 2 times a week for 60 Days  GOALS: (Goals have been discussed and agreed upon with patient.)  Short-Term Functional Goals: Time Frame: 30 days  1. Pt will be compliant with HEP in order to increase LE strength/endurance/mobility to improve functional mobility and overall quality of life. (MET 11/5/2018)  2. Pt will improve score on the Lower Extremity Functional Scale to 38/80 in order to demonstrate improved functional mobility and quality of life. (MET 11/5/2018)  3. Pt will improve score on the Foot and Ankle Ability Measure to 41/84 in order to demonstrate improved functional mobility and quality of life. (MET 11/5/2018)  4. Pt will report standing for > 5 minutes with minimal to no increase in pain in order to be able to stand for prolonged periods as needed to perform chores. (MET 11/5/2018)  5. Pt will be able to ascend/descend 6 steps with S with or without rail with safe gait pattern and minimal to no increase in pain in order to improve community mobility. (MET 11/5/2018)  Discharge Goals: Time Frame: 60 days  1. Pt will be independent with HEP in order to increase LE strength/endurance/mobility to improve functional mobility and overall quality of life. (PROGRESSING , 11/27/2018)   2.  Pt will improve score on the Lower Extremity Functional Scale to 50/80 in order to demonstrate improved functional mobility and quality of life. (CONTINUE, 11/27/2018)   3. Pt will improve score on the Foot and Ankle Ability Measure to 45/84 in order to demonstrate improved functional mobility and quality of life. (CONTINUE, 11/27/2018)   4. Pt will report standing for > 10 minutes with minimal to no increase in pain in order to be able to stand for prolonged periods as needed to perform chores. (MET , 11/27/2018)   5. Pt will be able to ascend/descend 12 steps Magdalena with or without rail with reciprocal gait pattern and minimal to no increase in pain in order to improve community mobility. (PROGRESSING , 11/27/2018)   Rehabilitation Potential For Stated Goals: Good  Regarding Myla Pelletier's therapy, I certify that the treatment plan above will be carried out by a therapist or under their direction. Thank you for this referral,  Tiburcio Plummer DPT     Referring Physician Signature: Didier Mendez MD          Date                                  HISTORY:   History of Present Injury/Illness (Reason for Referral): *History per pt or pt's family except where otherwise noted  Pt states pain in her R ankle started in July 2018 (states she was walking more than usual), and she states that she was being treated for it (with prednisone) and it had gotten better with taping, but it has recently gotten worse again (early September when she stopped doing taping and started everyday walking again). States the pain has gotten better temporarily with Prednisone, but states that when she goes off the Prednisone it starts hurting again. Pain at worst is 6-7/10 (aggravating activities include walking > 5 minutes, prolonged standing > 1-2 minutes - even in shower). Pain at best is 0/10 (eases pain with prednisone, rest, taping, mobic, ice). Pt states she was given boot to wear to help with the pain but she cannot wear this while driving (she is supposed to wear this 4 weeks starting last Friday). Past Medical History/Comorbidities:   Ms. Raji Locke  has a past medical history of Acid reflux, ADHD (attention deficit hyperactivity disorder), Allergic rhinitis, Anxiety disorder, Arthritis, Asthma, B12 deficiency, Cerumen impaction, Chronic sinusitis, Depression, Deviated nasal septum, Endocrine disease, Fibromyalgia, GERD (gastroesophageal reflux disease), Headache, Hypothyroidism, Low blood sugar, Meniere disease, Migraine, Nasal obstruction, OCD (obsessive compulsive disorder), Psychiatric disorder, Special examinations, Special examinations, Tinnitus, and Unspecified hypothyroidism. Ms. Raji Locke  has a past surgical history that includes hx orthopaedic; hx heent; hx heent (07/03/2013); and hx heent (06/2017). Social History/Living Environment:    lives alone in one story apartment with elevator to get up (15 flights of stairs with B railing - occasionally has to take the stairs down if she has an appointment and the elevator is busy)  Prior Level of Function/Work/Activity:  On disability for bipolar disorder and anxiety; likes to walk downtown with her friends, does housework (vacuuming, tidying up, light cooking); has to shop for her groceries  Dominant Side:         RIGHT  Other Clinical Tests:          X-rays of R foot (showed heel spur according to pt)  Previous Treatment Approaches:          Prednisone has helped  Personal Factors:          Sex:  female        Age:  41 y.o. Current Medications:    Current Outpatient Medications:     levothyroxine (SYNTHROID) 125 mcg tablet, Take 1 Tab by mouth daily. , Disp: 90 Tab, Rfl: 1    Dexlansoprazole (DEXILANT) 60 mg CpDB, Take 1 Cap by mouth every morning., Disp: 90 Cap, Rfl: 1    candesartan (ATACAND) 4 mg tablet, Take 1 Tab by mouth daily. , Disp: 90 Tab, Rfl: 1    albuterol (PROVENTIL HFA, VENTOLIN HFA, PROAIR HFA) 90 mcg/actuation inhaler, Take 2 Puffs by inhalation as needed for Wheezing., Disp: 3 Inhaler, Rfl: 1    montelukast (SINGULAIR) 10 mg tablet, Take 1 Tab by mouth daily. , Disp: 90 Tab, Rfl: 1    cetirizine (ZYRTEC) 10 mg tablet, Take 1 Tab by mouth daily. , Disp: 90 Tab, Rfl: 1    meloxicam (MOBIC) 15 mg tablet, Take 1 Tab by mouth daily. , Disp: 90 Tab, Rfl: 1    mirabegron ER (MYRBETRIQ) 25 mg ER tablet, Take 1 Tab by mouth nightly., Disp: 30 Tab, Rfl: 0    diclofenac (VOLTAREN) 1 % gel, Apply 2 g to affected area four (4) times daily. Apply to right foot as needed 2-4 times a day, Disp: 100 g, Rfl: 2    tiZANidine (ZANAFLEX) 4 mg tablet, Take 1 Tab by mouth three (3) times daily as needed. , Disp: 90 Tab, Rfl: 1    IRON, FERROUS SULFATE, PO, Take 45 mg by mouth daily. , Disp: , Rfl:     cyanocobalamin 1,000 mcg tablet, Take 1 Tab by mouth daily. , Disp: 60 Tab, Rfl: 0    butalbital-acetaminophen (BUPAP)  mg tab, Take 1 Tab by mouth daily as needed. , Disp: 30 Tab, Rfl: 5    venlafaxine-SR (EFFEXOR XR) 150 mg capsule, Take  by mouth daily. , Disp: , Rfl:     escitalopram oxalate (LEXAPRO) 20 mg tablet, Take 20 mg by mouth daily. , Disp: , Rfl:     Magnesium Oxide 500 mg cap, Take 500 mg by mouth daily. , Disp: , Rfl:     traMADol (ULTRAM) 50 mg tablet, Take 1 Tab by mouth every eight (8) hours as needed for Pain. Max Daily Amount: 150 mg., Disp: 30 Tab, Rfl: 1    amphetamine-dextroamphetamine XR (ADDERALL XR) 20 mg XR capsule, TK 1 C PO QD, Disp: , Rfl: 0    AMITIZA 24 mcg capsule, TK 1 C PO BID WF AND WATER, Disp: , Rfl: 6    ALPRAZolam (XANAX) 1 mg tablet, 0.5 mg three (3) times daily as needed. , Disp: , Rfl: 4    risperiDONE (RISPERDAL) 2 mg tablet, Take 3 mg by mouth nightly., Disp: , Rfl: 3    LAMICTAL 200 mg tablet, TK 1 T PO BID, Disp: , Rfl: 4    pregabalin (LYRICA) 75 mg capsule, tid, Disp: , Rfl:     melatonin tab tablet, Take 5 mg by mouth., Disp: , Rfl:     cholecalciferol (VITAMIN D3) 5,000 unit capsule, Take 2,000 Units by mouth., Disp: , Rfl:    Date Last Reviewed:  12/13/2018   # of Personal Factors/Comorbidities that affect the Plan of Care: 3+: HIGH COMPLEXITY   EXAMINATION:   Reassessment on 11/27/2018  Observation/Orthostatic Postural Assessment:    The following postural deficits were noted in sitting:  loss of cervical lordosis, increased thoracic kyphosis, forward head, rounded shoulders, posterior pelvic tilt - improved upright on 11/27/2018  The following postural deficits were noted in standing: forward trunk flexion, pronated foot L > R, slight genu valgus noted B - improved upright posture on 11/27/2018  Palpation:          Pt with tenderness noted at posterior R heel and at dorsum of R foot - less tenderness throughout R foot/ankle noted on 11/5/2018  ROM:    Initial measurement: (on 10/10/2018) Initial measurement: (on 10/10/2018) Reassessment measurement: (on 11/5/2018) Reassessment measurement:    L LE: WFL throughout, but excessive hip IR passively R LE: WFL throughout, but excessive hip IR passively; ankle motion WFL but significant tightness noted in dorsiflexors with pain in ankle R LE: ankle motion WFL - tightness noted in dorsiflexors with minimal to no pain in ankle      Strength:    Initial measurement: (on 10/10/2018) Initial measurement: (on 10/10/2018) Reassessment measurement:  Reassessment measurement:    L LE:  Hip flexion: 5/5   Hip extension: 4-/5  Hip abduction: 4-/5  Hip adduction: 5/5  Hip IR: 4+/5  Hip ER: 4/5   Knee flexion:  4/5  Knee extension: 5/5  Ankle DF: 4/5 R LE:  Hip flexion: 5/5  Hip extension: 4-/5  Hip abduction: 4/5  Hip adduction: 4/5  Hip IR: 4+/5  Hip ER: 4/5  Knee flexion: 4+/5  Knee extension: 5/5  Ankle DF: 4-/5       Special Tests:          No leg length discrepancy noted  Neurological Screen:        Myotomes:  WFL        Dermatomes:  No deficits noted  Functional Mobility:         Gait/Ambulation:  Pt ambulates with no AD with following gait deficits: decreased B step length/clearance, increased pronation at each foot in stance phase, decreased B heel strike, decreased B arm swing, slight forward trunk flexion, increased lateral sway - slightly improved upright posture and improved B step length/clearance and heel strike on 11/5/2018 - improved upright posture but continuing to notice decreased heel strike with excessive knee flexion during stance phase at each LE on 11/27/2018        Transfers:  Pt able to stand/sit with minimal to no UE use        Bed Mobility:  Pt able to perform Magdalena with increased time  Balance:          Slight lateral sway with ambulation    Body Structures Involved:   1. Nerves  2. Bones  3. Joints  4. Muscles  5. Ligaments Body Functions Affected:  1. Sensory/Pain  2. Neuromusculoskeletal  3. Movement Related Activities and Participation Affected:  1. General Tasks and Demands  2. Mobility  3. Self Care  4. Domestic Life  5. Interpersonal Interactions and Relationships  6. Community, Social and [place]   # of elements that affect the Plan of Care: 4+: HIGH COMPLEXITY   CLINICAL PRESENTATION:   Presentation: Evolving clinical presentation with changing clinical characteristics: MODERATE COMPLEXITY   CLINICAL DECISION MAKING:   Outcome Measure: Tool Used: Lower Extremity Functional Scale (LEFS)  Score:  Initial: 27/80 Most Recent: 39/80 (Date: 11/27/2018)   Interpretation of Score: 20 questions each scored on a 5 point scale with 0 representing \"extreme difficulty or unable to perform\" and 4 representing \"no difficulty\". The lower the score, the greater the functional disability. 80/80 represents no disability. Minimal detectable change is 9 points. Score 80 79-63 62-48 47-32 31-16 15-1 0   Modifier CH CI CJ CK CL CM CN     ?  Mobility - Walking and Moving Around:     - CURRENT STATUS: CK - 40%-59% impaired, limited or restricted    - GOAL STATUS: CJ - 20%-39% impaired, limited or restricted    - D/C STATUS:  ---------------To be determined---------------    Tool Used: PT/OT FOOT AND ANKLE ABILITY MEASURE  Score:  Initial: 36/84 Most Recent: 49/84 (Date: 11/27/2018)   Interpretation of Score: For the \"Activities of Daily Living\", there are 21 questions each scored on a 5 point scale with 0 representing \"Unable to do\" and 4 representing \"No difficulty\". The lower the score, the greater the functional disability. 84/84 represents no disability. Minimal detectable change is 5.7 points. With the addition of the 8 questions in the \"Sports Subscale,\" there are 29 questions, each scored on a 5 point scale with 0 representing \"Unable to do\" and 4 representing \"No difficulty\". The lower the score, the greater the functional disability. 116/116 represents no disability. Minimal detectable change is 12.3 points. Activities of Daily Living:  Score 84 83-68 67-51 50-34 33-18 17-1 0   Modifier CH CI CJ CK CL CM CN     Activities of Daily Living + Sports Subscale:  Score 116 115-94 93-71 70-47 46-24 23-1 0   Modifier CH CI CJ CK CL CM CN     Payor: [employer] MEDICARE / Plan: [address] [place] [address] HMO / Product Type: Managed Care Medicare /   Medical Necessity:   · Patient is expected to demonstrate progress in strength, range of motion, balance and functional technique to improve ability to perform pain-free standing/ambulation. · Patient demonstrates good rehab potential due to higher previous functional level. Reason for Services/Other Comments:  · Patient continues to require modification of therapeutic interventions to increase complexity of exercises.    Use of outcome tool(s) and clinical judgement create a POC that gives a: Questionable prediction of patient's progress: MODERATE COMPLEXITY   TREATMENT:   (In addition to Assessment/Re-Assessment sessions the following treatments were rendered)  Subjective comments at beginning of session: Pt states she has been trying to do her exercises at home, but they make her have more pain; states she is going back to see the doctor on 12/18/2018; she has been wearing the heel lifts - thinks these are improving her ability to stand/walk longer periods of time    THERAPEUTIC EXERCISE: (15 minutes):  Exercises per grid below to improve mobility, strength, balance and dynamic movement of ankle - right to improve functional mobility. Required minimal visual, verbal and manual cues to promote proper body alignment, promote proper body posture, promote proper body mechanics and promote proper body breathing techniques. Progressed resistance, range, repetitions and complexity of movement as indicated. Da Date  12/2/18 Date  12/7/18 Date:  12/13/2018   Activity/Exercise      Physiostep L3 resistance for 12 minutes to increase strength/endurance L3 resistance for 12 minutes to increase strength/endurance L3 resistance for 12 minutes to increase strength/endurance   Ankle plantarflexor stretch on incline board 30 second hold x 3 reps with knees extended then flexed 30 second hold x 3 reps with knees extended then flexed 30 second hold x 3 reps with knees extended then flexed   Heel raises on firm ground B UE support with feet on incline board (1st notch from bottom); slow lowering on B LEs for 10 reps/1 set B UE support with feet on incline board (1st notch from bottom); slow lowering on B LEs for 10 reps/1 set    Supine hamstring stretch      Leg press machine 30 lb resistance; 20 reps/1 set with slow controlled movement (performed after kinesiotaping) 30 lb resistance; 20 reps/1 set with slow controlled movement (performed after kinesiotaping)    Ambulation over level ground      Ascending/descending stairs      Step ups      *given [facility]      Manual therapy (25 minutes): With pt in prone position, STM to R gastrocnemius and soleus as well as cross friction massage to R achilles tendon to reduce tightness and pain and improve ankle mobility. With pt in prone position, kinesiotape applied to R plantarflexors at 50% stretch as well as additional kinesiotape to same area in Y position (to go around and support gastrocs) at minimal stretch to reduce strain on achilles tendon. Modalities (10 minutes). With pt in supported supine position (B LEs elevated) at end of session, cold pack applied to R achilles tendon to reduce inflammation and pain. Treatment/Session Assessment: Pt reported improvement in pain level with manual therapy this session. Will determine next session if tape helped to further reduce her pain. · Pain/ Symptoms: Initial:   1/10 in posterior R ankle  Post Session:  1-2/10  ·   Compliance with Program/Exercises: Good compliance with attending scheduled sessions. · Recommendations/Intent for next treatment session: \"Next visit will focus on advancements to more challenging activities and reduction in assistance provided\".  Manual therapy/modalities as needed to reduce tightness and pain; work on eccentric plantarflexor strengthening and balance  Total Treatment Duration:  PT Patient Time In/Time Out  Time In: 1010  Time Out: 3 [facility], T    Future Appointments   Date Time Provider Jo Nolan   5/1/2019  8:45 AM [facility] PM LAB/RAD RAMANDEEP WORKMAN [place]   5/8/2019  9:20 AM [facility]

## 2018-12-19 ENCOUNTER — HOSPITAL ENCOUNTER (OUTPATIENT)
Dept: PHYSICAL THERAPY | Age: 43
Discharge: HOME | End: 2018-12-19
Payer: MEDICARE

## 2018-12-19 PROCEDURE — 97140 MANUAL THERAPY 1/> REGIONS: CPT

## 2018-12-19 PROCEDURE — 97110 THERAPEUTIC EXERCISES: CPT

## 2018-12-19 NOTE — PROGRESS NOTES
Estrella Carballo  : 1975  Primary: [employer] [place]  Secondary: [place] [facility] [facility], [place] [address] [address] [facility]  [phone]   [phone]       OUTPATIENT PHYSICAL THERAPY:Daily Note 2018    ICD-10: Treatment Diagnosis: Pain in right ankle and joints of right foot (M25.571)      Stiffness of right ankle, not elsewhere classified (M25.671)    Difficulty in walking, not elsewhere classified (R26.2)   Precautions/Allergies:   Abilify [aripiprazole]; Geodon [ziprasidone hcl]; Yennifer Du; Ambien [zolpidem]; and Topamax [topiramate]   Fall Risk Score: 3 (? 5 = High Risk)  MD Orders: Evaluate and treat  MEDICAL/REFERRING DIAGNOSIS:  foot, right   DATE OF ONSET: 2018  REFERRING PHYSICIAN: Riccardo Linton MD  RETURN PHYSICIAN APPOINTMENT: 2018 (pt unsure of date)   This section established at most recent assessment  ASSESSMENT:  Estrella Carballo has now attended 14 physical therapy sessions for R ankle/foot pain that started in 2018 and has recently gotten worse. This session, she demonstrated improved B LE strength/endurance, less pain with R ankle mobility, less postural/gait deficits, and improved standing/walking tolerance. Despite the above listed improvements, she continues to demonstrate decreased B LE strength/endurance, pain with R ankle mobility, multiple postural/gait deficits, decreased standing/walking tolerance, and decreased functional mobility. Pt may continue to benefit from skilled PT to address the above listed deficits to improve ability to perform pain-free ADLs/IADLs and to improve overall quality of life prior to discharge. PROBLEM LIST (Impacting functional limitations):  1. Decreased Strength  2. Decreased ADL/Functional Activities  3. Decreased Transfer Abilities  4. Decreased Ambulation Ability/Technique  5. Decreased Balance  6.  Increased Pain  7. Decreased Activity Tolerance  8. Increased Fatigue  9. Decreased Flexibility/Joint Mobility  10. Edema/Girth INTERVENTIONS PLANNED:  1. Balance Exercise  2. Bed Mobility  3. Cold  4. Electrical Stimulation  5. Family Education  6. Gait Training  7. Heat  8. Home Exercise Program (HEP)  9. Manual Therapy  10. Neuromuscular Re-education/Strengthening  11. Range of Motion (ROM)  12. Therapeutic Activites  13. Therapeutic Exercise/Strengthening  14. Transcutaneous Electrical Nerve Stimulation (TENS)  15. Transfer Training  16. Ultrasound (US)   TREATMENT PLAN:  Effective Dates: 11/27/2018 TO 1/4/2019 (60 days). Frequency/Duration: 2 times a week for 60 Days  GOALS: (Goals have been discussed and agreed upon with patient.)  Short-Term Functional Goals: Time Frame: 30 days  1. Pt will be compliant with HEP in order to increase LE strength/endurance/mobility to improve functional mobility and overall quality of life. (MET 11/5/2018)  2. Pt will improve score on the Lower Extremity Functional Scale to 38/80 in order to demonstrate improved functional mobility and quality of life. (MET 11/5/2018)  3. Pt will improve score on the Foot and Ankle Ability Measure to 41/84 in order to demonstrate improved functional mobility and quality of life. (MET 11/5/2018)  4. Pt will report standing for > 5 minutes with minimal to no increase in pain in order to be able to stand for prolonged periods as needed to perform chores. (MET 11/5/2018)  5. Pt will be able to ascend/descend 6 steps with S with or without rail with safe gait pattern and minimal to no increase in pain in order to improve community mobility. (MET 11/5/2018)  Discharge Goals: Time Frame: 60 days  1. Pt will be independent with HEP in order to increase LE strength/endurance/mobility to improve functional mobility and overall quality of life. (PROGRESSING , 11/27/2018)   2.  Pt will improve score on the Lower Extremity Functional Scale to 50/80 in order to demonstrate improved functional mobility and quality of life. (CONTINUE, 11/27/2018)   3. Pt will improve score on the Foot and Ankle Ability Measure to 45/84 in order to demonstrate improved functional mobility and quality of life. (CONTINUE, 11/27/2018)   4. Pt will report standing for > 10 minutes with minimal to no increase in pain in order to be able to stand for prolonged periods as needed to perform chores. (MET , 11/27/2018)   5. Pt will be able to ascend/descend 12 steps Magdalena with or without rail with reciprocal gait pattern and minimal to no increase in pain in order to improve community mobility. (PROGRESSING , 11/27/2018)   Rehabilitation Potential For Stated Goals: Good  Regarding Nazia Inna Pelletier's therapy, I certify that the treatment plan above will be carried out by a therapist or under their direction. Thank you for this referral,  Ross Boone DPT     Referring Physician Signature: Kel Mckinney MD          Date                                  HISTORY:   History of Present Injury/Illness (Reason for Referral): *History per pt or pt's family except where otherwise noted  Pt states pain in her R ankle started in July 2018 (states she was walking more than usual), and she states that she was being treated for it (with prednisone) and it had gotten better with taping, but it has recently gotten worse again (early September when she stopped doing taping and started everyday walking again). States the pain has gotten better temporarily with Prednisone, but states that when she goes off the Prednisone it starts hurting again. Pain at worst is 6-7/10 (aggravating activities include walking > 5 minutes, prolonged standing > 1-2 minutes - even in shower). Pain at best is 0/10 (eases pain with prednisone, rest, taping, mobic, ice). Pt states she was given boot to wear to help with the pain but she cannot wear this while driving (she is supposed to wear this 4 weeks starting last Friday). Past Medical History/Comorbidities:   Ms. Farhat Thakur  has a past medical history of Acid reflux, ADHD (attention deficit hyperactivity disorder), Allergic rhinitis, Anxiety disorder, Arthritis, Asthma, B12 deficiency, Cerumen impaction, Chronic sinusitis, Depression, Deviated nasal septum, Endocrine disease, Fibromyalgia, GERD (gastroesophageal reflux disease), Headache, Hypothyroidism, Low blood sugar, Meniere disease, Migraine, Nasal obstruction, OCD (obsessive compulsive disorder), Psychiatric disorder, Special examinations, Special examinations, Tinnitus, and Unspecified hypothyroidism. Ms. Farhat Thakur  has a past surgical history that includes hx orthopaedic; hx heent; hx heent (07/03/2013); and hx heent (06/2017). Social History/Living Environment:    lives alone in one story apartment with elevator to get up (15 flights of stairs with B railing - occasionally has to take the stairs down if she has an appointment and the elevator is busy)  Prior Level of Function/Work/Activity:  On disability for bipolar disorder and anxiety; likes to walk downtown with her friends, does housework (vacuuming, tidying up, light cooking); has to shop for her groceries  Dominant Side:         RIGHT  Other Clinical Tests:          X-rays of R foot (showed heel spur according to pt)  Previous Treatment Approaches:          Prednisone has helped  Personal Factors:          Sex:  female        Age:  41 y.o. Current Medications:    Current Outpatient Medications:     levothyroxine (SYNTHROID) 125 mcg tablet, Take 1 Tab by mouth daily. , Disp: 90 Tab, Rfl: 1    Dexlansoprazole (DEXILANT) 60 mg CpDB, Take 1 Cap by mouth every morning., Disp: 90 Cap, Rfl: 1    candesartan (ATACAND) 4 mg tablet, Take 1 Tab by mouth daily. , Disp: 90 Tab, Rfl: 1    albuterol (PROVENTIL HFA, VENTOLIN HFA, PROAIR HFA) 90 mcg/actuation inhaler, Take 2 Puffs by inhalation as needed for Wheezing., Disp: 3 Inhaler, Rfl: 1    montelukast (SINGULAIR) 10 mg tablet, Take 1 Tab by mouth daily. , Disp: 90 Tab, Rfl: 1    cetirizine (ZYRTEC) 10 mg tablet, Take 1 Tab by mouth daily. , Disp: 90 Tab, Rfl: 1    meloxicam (MOBIC) 15 mg tablet, Take 1 Tab by mouth daily. , Disp: 90 Tab, Rfl: 1    mirabegron ER (MYRBETRIQ) 25 mg ER tablet, Take 1 Tab by mouth nightly., Disp: 30 Tab, Rfl: 0    diclofenac (VOLTAREN) 1 % gel, Apply 2 g to affected area four (4) times daily. Apply to right foot as needed 2-4 times a day, Disp: 100 g, Rfl: 2    tiZANidine (ZANAFLEX) 4 mg tablet, Take 1 Tab by mouth three (3) times daily as needed. , Disp: 90 Tab, Rfl: 1    IRON, FERROUS SULFATE, PO, Take 45 mg by mouth daily. , Disp: , Rfl:     cyanocobalamin 1,000 mcg tablet, Take 1 Tab by mouth daily. , Disp: 60 Tab, Rfl: 0    butalbital-acetaminophen (BUPAP)  mg tab, Take 1 Tab by mouth daily as needed. , Disp: 30 Tab, Rfl: 5    venlafaxine-SR (EFFEXOR XR) 150 mg capsule, Take  by mouth daily. , Disp: , Rfl:     escitalopram oxalate (LEXAPRO) 20 mg tablet, Take 20 mg by mouth daily. , Disp: , Rfl:     Magnesium Oxide 500 mg cap, Take 500 mg by mouth daily. , Disp: , Rfl:     traMADol (ULTRAM) 50 mg tablet, Take 1 Tab by mouth every eight (8) hours as needed for Pain. Max Daily Amount: 150 mg., Disp: 30 Tab, Rfl: 1    amphetamine-dextroamphetamine XR (ADDERALL XR) 20 mg XR capsule, TK 1 C PO QD, Disp: , Rfl: 0    AMITIZA 24 mcg capsule, TK 1 C PO BID WF AND WATER, Disp: , Rfl: 6    ALPRAZolam (XANAX) 1 mg tablet, 0.5 mg three (3) times daily as needed. , Disp: , Rfl: 4    risperiDONE (RISPERDAL) 2 mg tablet, Take 3 mg by mouth nightly., Disp: , Rfl: 3    LAMICTAL 200 mg tablet, TK 1 T PO BID, Disp: , Rfl: 4    pregabalin (LYRICA) 75 mg capsule, tid, Disp: , Rfl:     melatonin tab tablet, Take 5 mg by mouth., Disp: , Rfl:     cholecalciferol (VITAMIN D3) 5,000 unit capsule, Take 2,000 Units by mouth., Disp: , Rfl:    Date Last Reviewed:  12/19/2018   # of Personal Factors/Comorbidities that affect the Plan of Care: 3+: HIGH COMPLEXITY   EXAMINATION:   Reassessment on 11/27/2018  Observation/Orthostatic Postural Assessment:    The following postural deficits were noted in sitting:  loss of cervical lordosis, increased thoracic kyphosis, forward head, rounded shoulders, posterior pelvic tilt - improved upright on 11/27/2018  The following postural deficits were noted in standing: forward trunk flexion, pronated foot L > R, slight genu valgus noted B - improved upright posture on 11/27/2018  Palpation:          Pt with tenderness noted at posterior R heel and at dorsum of R foot - less tenderness throughout R foot/ankle noted on 11/5/2018  ROM:    Initial measurement: (on 10/10/2018) Initial measurement: (on 10/10/2018) Reassessment measurement: (on 11/5/2018) Reassessment measurement:    L LE: WFL throughout, but excessive hip IR passively R LE: WFL throughout, but excessive hip IR passively; ankle motion WFL but significant tightness noted in dorsiflexors with pain in ankle R LE: ankle motion WFL - tightness noted in dorsiflexors with minimal to no pain in ankle      Strength:    Initial measurement: (on 10/10/2018) Initial measurement: (on 10/10/2018) Reassessment measurement:  Reassessment measurement:    L LE:  Hip flexion: 5/5   Hip extension: 4-/5  Hip abduction: 4-/5  Hip adduction: 5/5  Hip IR: 4+/5  Hip ER: 4/5   Knee flexion:  4/5  Knee extension: 5/5  Ankle DF: 4/5 R LE:  Hip flexion: 5/5  Hip extension: 4-/5  Hip abduction: 4/5  Hip adduction: 4/5  Hip IR: 4+/5  Hip ER: 4/5  Knee flexion: 4+/5  Knee extension: 5/5  Ankle DF: 4-/5       Special Tests:          No leg length discrepancy noted  Neurological Screen:        Myotomes:  WFL        Dermatomes:  No deficits noted  Functional Mobility:         Gait/Ambulation:  Pt ambulates with no AD with following gait deficits: decreased B step length/clearance, increased pronation at each foot in stance phase, decreased B heel strike, decreased B arm swing, slight forward trunk flexion, increased lateral sway - slightly improved upright posture and improved B step length/clearance and heel strike on 11/5/2018 - improved upright posture but continuing to notice decreased heel strike with excessive knee flexion during stance phase at each LE on 11/27/2018        Transfers:  Pt able to stand/sit with minimal to no UE use        Bed Mobility:  Pt able to perform Magdalena with increased time  Balance:          Slight lateral sway with ambulation    Body Structures Involved:   1. Nerves  2. Bones  3. Joints  4. Muscles  5. Ligaments Body Functions Affected:  1. Sensory/Pain  2. Neuromusculoskeletal  3. Movement Related Activities and Participation Affected:  1. General Tasks and Demands  2. Mobility  3. Self Care  4. Domestic Life  5. Interpersonal Interactions and Relationships  6. Community, Social and [place] Odin   # of elements that affect the Plan of Care: 4+: HIGH COMPLEXITY   CLINICAL PRESENTATION:   Presentation: Evolving clinical presentation with changing clinical characteristics: MODERATE COMPLEXITY   CLINICAL DECISION MAKING:   Outcome Measure: Tool Used: Lower Extremity Functional Scale (LEFS)  Score:  Initial: 27/80 Most Recent: 39/80 (Date: 11/27/2018)   Interpretation of Score: 20 questions each scored on a 5 point scale with 0 representing \"extreme difficulty or unable to perform\" and 4 representing \"no difficulty\". The lower the score, the greater the functional disability. 80/80 represents no disability. Minimal detectable change is 9 points. Score 80 79-63 62-48 47-32 31-16 15-1 0   Modifier CH CI CJ CK CL CM CN     ?  Mobility - Walking and Moving Around:     - CURRENT STATUS: CK - 40%-59% impaired, limited or restricted    - GOAL STATUS: CJ - 20%-39% impaired, limited or restricted    - D/C STATUS:  ---------------To be determined---------------    Tool Used: PT/OT FOOT AND ANKLE ABILITY MEASURE  Score:  Initial: 36/84 Most Recent: 49/84 (Date: 11/27/2018)   Interpretation of Score: For the \"Activities of Daily Living\", there are 21 questions each scored on a 5 point scale with 0 representing \"Unable to do\" and 4 representing \"No difficulty\". The lower the score, the greater the functional disability. 84/84 represents no disability. Minimal detectable change is 5.7 points. With the addition of the 8 questions in the \"Sports Subscale,\" there are 29 questions, each scored on a 5 point scale with 0 representing \"Unable to do\" and 4 representing \"No difficulty\". The lower the score, the greater the functional disability. 116/116 represents no disability. Minimal detectable change is 12.3 points. Activities of Daily Living:  Score 84 83-68 67-51 50-34 33-18 17-1 0   Modifier CH CI CJ CK CL CM CN     Activities of Daily Living + Sports Subscale:  Score 116 115-94 93-71 70-47 46-24 23-1 0   Modifier CH CI CJ CK CL CM CN     Payor: [employer] MEDICARE / Plan: [address] [place] [address] HMO / Product Type: Managed Care Medicare /   Medical Necessity:   · Patient is expected to demonstrate progress in strength, range of motion, balance and functional technique to improve ability to perform pain-free standing/ambulation. · Patient demonstrates good rehab potential due to higher previous functional level. Reason for Services/Other Comments:  · Patient continues to require modification of therapeutic interventions to increase complexity of exercises.    Use of outcome tool(s) and clinical judgement create a POC that gives a: Questionable prediction of patient's progress: MODERATE COMPLEXITY   TREATMENT:   (In addition to Assessment/Re-Assessment sessions the following treatments were rendered)  Subjective comments at beginning of session: Pt states she has been feeling better overall; states she initially hurt more after last session but it felt better after about a day; states she has another MD appointment in late January   THERAPEUTIC EXERCISE: (17 minutes):  Exercises per grid below to improve mobility, strength, balance and dynamic movement of ankle - right to improve functional mobility. Required minimal visual, verbal and manual cues to promote proper body alignment, promote proper body posture, promote proper body mechanics and promote proper body breathing techniques. Progressed resistance, range, repetitions and complexity of movement as indicated. Date  12/7/18 Date:  12/13/2018 Date:  12/19/2018   Activity/Exercise      Physiostep L3 resistance for 12 minutes to increase strength/endurance L3 resistance for 12 minutes to increase strength/endurance L3 resistance for 13 minutes to increase strength/endurance   Ankle plantarflexor stretch on incline board 30 second hold x 3 reps with knees extended then flexed 30 second hold x 3 reps with knees extended then flexed 30 second hold x 3 reps with knees extended then flexed   Heel raises on firm ground B UE support with feet on incline board (1st notch from bottom); slow lowering on B LEs for 10 reps/1 set  B UE support with feet on incline board (1st notch from bottom); slow lowering on B LEs for 10 reps/1 set   Supine hamstring stretch      Leg press machine 30 lb resistance; 20 reps/1 set with slow controlled movement (performed after kinesiotaping)     Ambulation over level ground      Ascending/descending stairs      Step ups      *given in HEP12  [facility] Portal      Manual therapy (23 minutes): With pt in prone position, STM to R gastrocnemius and soleus as well as cross friction massage to R achilles tendon to reduce tightness and pain and improve ankle mobility. With pt in prone position, kinesiotape applied to R plantarflexors at 25% stretch as well as additional kinesiotape to same area in Y position (to go around and support gastrocs) at minimal stretch to reduce strain on achilles tendon. Modalities ().     Treatment/Session Assessment: Pt reporting overall improved pain level this session, although she continues to have some pain in her R heel with heel lifts. Pt encouraged to wear heel lift that the MD gave her. Will plan on decreasing pt's frequency to one time week in the next few weeks. ..  · Pain/ Symptoms: Initial:   2/10 in posterior R ankle  Post Session:  No specific pain level reported at end of session  ·   Compliance with Program/Exercises: Good compliance with attending scheduled sessions. · Recommendations/Intent for next treatment session: \"Next visit will focus on advancements to more challenging activities and reduction in assistance provided\".  Manual therapy/modalities as needed to reduce tightness and pain; work on eccentric plantarflexor strengthening and balance  Total Treatment Duration:  PT Patient Time In/Time Out  Time In: 7741  Time Out: Tawastintie 3, DPT    Future Appointments   Date Time Provider Jo Nolan   [facility]2018 10[facility]   12/26/2018  2:30 PM Jamel Murray [facility]   12/28/2018 11:00 AM French Fulling B, DPT [facility]   12/31/2018 10:30 AM French Fulling B, DPT [facility]   1/3/2019 11:00 AM French Fulling B, DPT [facility]   5/1/2019  8:45 AM MTV PM LAB/RAD RAMANDEEP [facility]   5/8/2019  9:20 AM MD RAMANDEEP Hidalgo [facility]

## 2018-12-21 ENCOUNTER — HOSPITAL ENCOUNTER (OUTPATIENT)
Dept: PHYSICAL THERAPY | Age: 43
Discharge: HOME | End: 2018-12-21
Payer: MEDICARE

## 2018-12-21 PROCEDURE — 97140 MANUAL THERAPY 1/> REGIONS: CPT

## 2018-12-21 PROCEDURE — 97110 THERAPEUTIC EXERCISES: CPT

## 2018-12-21 NOTE — PROGRESS NOTES
Karo Thompson  : 1975  Primary: [place] Nunez Medicare [place]  [place] [facility] [facility], [place] [address] [address] [facility]  [phone]   [phone]       OUTPATIENT PHYSICAL THERAPY:Daily Note 2018    ICD-10: Treatment Diagnosis: Pain in right ankle and joints of right foot (M25.571)      Stiffness of right ankle, not elsewhere classified (M25.671)    Difficulty in walking, not elsewhere classified (R26.2)   Precautions/Allergies:   Abilify [aripiprazole]; Geodon [ziprasidone hcl]; Angeline Boeck; Ambien [zolpidem]; and Topamax [topiramate]   Fall Risk Score: 3 (? 5 = High Risk)  MD Orders: Evaluate and treat  MEDICAL/REFERRING DIAGNOSIS:  foot, right   DATE OF ONSET: 2018  REFERRING PHYSICIAN: Verle Halsted, MD  RETURN PHYSICIAN APPOINTMENT: 2018 (pt unsure of date)   This section established at most recent assessment  ASSESSMENT:  Karo Thompson has now attended 14 physical therapy sessions for R ankle/foot pain that started in 2018 and has recently gotten worse. This session, she demonstrated improved B LE strength/endurance, less pain with R ankle mobility, less postural/gait deficits, and improved standing/walking tolerance. Despite the above listed improvements, she continues to demonstrate decreased B LE strength/endurance, pain with R ankle mobility, multiple postural/gait deficits, decreased standing/walking tolerance, and decreased functional mobility. Pt may continue to benefit from skilled PT to address the above listed deficits to improve ability to perform pain-free ADLs/IADLs and to improve overall quality of life prior to discharge. PROBLEM LIST (Impacting functional limitations):  1. Decreased Strength  2. Decreased ADL/Functional Activities  3. Decreased Transfer Abilities  4. Decreased Ambulation Ability/Technique  5. Decreased Balance  6.  Increased Pain  7. Decreased Activity Tolerance  8. Increased Fatigue  9. Decreased Flexibility/Joint Mobility  10. Edema/Girth INTERVENTIONS PLANNED:  1. Balance Exercise  2. Bed Mobility  3. Cold  4. Electrical Stimulation  5. Family Education  6. Gait Training  7. Heat  8. Home Exercise Program (HEP)  9. Manual Therapy  10. Neuromuscular Re-education/Strengthening  11. Range of Motion (ROM)  12. Therapeutic Activites  13. Therapeutic Exercise/Strengthening  14. Transcutaneous Electrical Nerve Stimulation (TENS)  15. Transfer Training  16. Ultrasound (US)   TREATMENT PLAN:  Effective Dates: 11/27/2018 TO 1/4/2019 (60 days). Frequency/Duration: 2 times a week for 60 Days  GOALS: (Goals have been discussed and agreed upon with patient.)  Short-Term Functional Goals: Time Frame: 30 days  1. Pt will be compliant with HEP in order to increase LE strength/endurance/mobility to improve functional mobility and overall quality of life. (MET 11/5/2018)  2. Pt will improve score on the Lower Extremity Functional Scale to 38/80 in order to demonstrate improved functional mobility and quality of life. (MET 11/5/2018)  3. Pt will improve score on the Foot and Ankle Ability Measure to 41/84 in order to demonstrate improved functional mobility and quality of life. (MET 11/5/2018)  4. Pt will report standing for > 5 minutes with minimal to no increase in pain in order to be able to stand for prolonged periods as needed to perform chores. (MET 11/5/2018)  5. Pt will be able to ascend/descend 6 steps with S with or without rail with safe gait pattern and minimal to no increase in pain in order to improve community mobility. (MET 11/5/2018)  Discharge Goals: Time Frame: 60 days  1. Pt will be independent with HEP in order to increase LE strength/endurance/mobility to improve functional mobility and overall quality of life. (PROGRESSING , 11/27/2018)   2.  Pt will improve score on the Lower Extremity Functional Scale to 50/80 in order to demonstrate improved functional mobility and quality of life. (CONTINUE, 11/27/2018)   3. Pt will improve score on the Foot and Ankle Ability Measure to 45/84 in order to demonstrate improved functional mobility and quality of life. (CONTINUE, 11/27/2018)   4. Pt will report standing for > 10 minutes with minimal to no increase in pain in order to be able to stand for prolonged periods as needed to perform chores. (MET , 11/27/2018)   5. Pt will be able to ascend/descend 12 steps Magdalena with or without rail with reciprocal gait pattern and minimal to no increase in pain in order to improve community mobility. (PROGRESSING , 11/27/2018)   Rehabilitation Potential For Stated Goals: Good  Regarding Nicole Pelletier's therapy, I certify that the treatment plan above will be carried out by a therapist or under their direction. Thank you for this referral,  Will Mendez PTA     Referring Physician Signature: Kathy Javier MD          Date                                  HISTORY:   History of Present Injury/Illness (Reason for Referral): *History per pt or pt's family except where otherwise noted  Pt states pain in her R ankle started in July 2018 (states she was walking more than usual), and she states that she was being treated for it (with prednisone) and it had gotten better with taping, but it has recently gotten worse again (early September when she stopped doing taping and started everyday walking again). States the pain has gotten better temporarily with Prednisone, but states that when she goes off the Prednisone it starts hurting again. Pain at worst is 6-7/10 (aggravating activities include walking > 5 minutes, prolonged standing > 1-2 minutes - even in shower). Pain at best is 0/10 (eases pain with prednisone, rest, taping, mobic, ice). Pt states she was given boot to wear to help with the pain but she cannot wear this while driving (she is supposed to wear this 4 weeks starting last Friday). Past Medical History/Comorbidities:   Ms. Michael Joyce  has a past medical history of Acid reflux, ADHD (attention deficit hyperactivity disorder), Allergic rhinitis, Anxiety disorder, Arthritis, Asthma, B12 deficiency, Cerumen impaction, Chronic sinusitis, Depression, Deviated nasal septum, Endocrine disease, Fibromyalgia, GERD (gastroesophageal reflux disease), Headache, Hypothyroidism, Low blood sugar, Meniere disease, Migraine, Nasal obstruction, OCD (obsessive compulsive disorder), Psychiatric disorder, Special examinations, Special examinations, Tinnitus, and Unspecified hypothyroidism. Ms. Mihcael Joyce  has a past surgical history that includes hx orthopaedic; hx heent; hx heent (07/03/2013); and hx heent (06/2017). Social History/Living Environment:    lives alone in one story apartment with elevator to get up (15 flights of stairs with B railing - occasionally has to take the stairs down if she has an appointment and the elevator is busy)  Prior Level of Function/Work/Activity:  On disability for bipolar disorder and anxiety; likes to walk downtown with her friends, does housework (vacuuming, tidying up, light cooking); has to shop for her groceries  Dominant Side:         RIGHT  Other Clinical Tests:          X-rays of R foot (showed heel spur according to pt)  Previous Treatment Approaches:          Prednisone has helped  Personal Factors:          Sex:  female        Age:  41 y.o. Current Medications:    Current Outpatient Medications:     levothyroxine (SYNTHROID) 125 mcg tablet, Take 1 Tab by mouth daily. , Disp: 90 Tab, Rfl: 1    Dexlansoprazole (DEXILANT) 60 mg CpDB, Take 1 Cap by mouth every morning., Disp: 90 Cap, Rfl: 1    candesartan (ATACAND) 4 mg tablet, Take 1 Tab by mouth daily. , Disp: 90 Tab, Rfl: 1    albuterol (PROVENTIL HFA, VENTOLIN HFA, PROAIR HFA) 90 mcg/actuation inhaler, Take 2 Puffs by inhalation as needed for Wheezing., Disp: 3 Inhaler, Rfl: 1    montelukast (SINGULAIR) 10 mg tablet, Take 1 Tab by mouth daily. , Disp: 90 Tab, Rfl: 1    cetirizine (ZYRTEC) 10 mg tablet, Take 1 Tab by mouth daily. , Disp: 90 Tab, Rfl: 1    meloxicam (MOBIC) 15 mg tablet, Take 1 Tab by mouth daily. , Disp: 90 Tab, Rfl: 1    mirabegron ER (MYRBETRIQ) 25 mg ER tablet, Take 1 Tab by mouth nightly., Disp: 30 Tab, Rfl: 0    diclofenac (VOLTAREN) 1 % gel, Apply 2 g to affected area four (4) times daily. Apply to right foot as needed 2-4 times a day, Disp: 100 g, Rfl: 2    tiZANidine (ZANAFLEX) 4 mg tablet, Take 1 Tab by mouth three (3) times daily as needed. , Disp: 90 Tab, Rfl: 1    IRON, FERROUS SULFATE, PO, Take 45 mg by mouth daily. , Disp: , Rfl:     cyanocobalamin 1,000 mcg tablet, Take 1 Tab by mouth daily. , Disp: 60 Tab, Rfl: 0    butalbital-acetaminophen (BUPAP)  mg tab, Take 1 Tab by mouth daily as needed. , Disp: 30 Tab, Rfl: 5    venlafaxine-SR (EFFEXOR XR) 150 mg capsule, Take  by mouth daily. , Disp: , Rfl:     escitalopram oxalate (LEXAPRO) 20 mg tablet, Take 20 mg by mouth daily. , Disp: , Rfl:     Magnesium Oxide 500 mg cap, Take 500 mg by mouth daily. , Disp: , Rfl:     traMADol (ULTRAM) 50 mg tablet, Take 1 Tab by mouth every eight (8) hours as needed for Pain. Max Daily Amount: 150 mg., Disp: 30 Tab, Rfl: 1    amphetamine-dextroamphetamine XR (ADDERALL XR) 20 mg XR capsule, TK 1 C PO QD, Disp: , Rfl: 0    AMITIZA 24 mcg capsule, TK 1 C PO BID WF AND WATER, Disp: , Rfl: 6    ALPRAZolam (XANAX) 1 mg tablet, 0.5 mg three (3) times daily as needed. , Disp: , Rfl: 4    risperiDONE (RISPERDAL) 2 mg tablet, Take 3 mg by mouth nightly., Disp: , Rfl: 3    LAMICTAL 200 mg tablet, TK 1 T PO BID, Disp: , Rfl: 4    pregabalin (LYRICA) 75 mg capsule, tid, Disp: , Rfl:     melatonin tab tablet, Take 5 mg by mouth., Disp: , Rfl:     cholecalciferol (VITAMIN D3) 5,000 unit capsule, Take 2,000 Units by mouth., Disp: , Rfl:    Date Last Reviewed:  12/21/2018   # of Personal Factors/Comorbidities that affect the Plan of Care: 3+: HIGH COMPLEXITY   EXAMINATION:   Reassessment on 11/27/2018  Observation/Orthostatic Postural Assessment:    The following postural deficits were noted in sitting:  loss of cervical lordosis, increased thoracic kyphosis, forward head, rounded shoulders, posterior pelvic tilt - improved upright on 11/27/2018  The following postural deficits were noted in standing: forward trunk flexion, pronated foot L > R, slight genu valgus noted B - improved upright posture on 11/27/2018  Palpation:          Pt with tenderness noted at posterior R heel and at dorsum of R foot - less tenderness throughout R foot/ankle noted on 11/5/2018  ROM:    Initial measurement: (on 10/10/2018) Initial measurement: (on 10/10/2018) Reassessment measurement: (on 11/5/2018) Reassessment measurement:    L LE: WFL throughout, but excessive hip IR passively R LE: WFL throughout, but excessive hip IR passively; ankle motion WFL but significant tightness noted in dorsiflexors with pain in ankle R LE: ankle motion WFL - tightness noted in dorsiflexors with minimal to no pain in ankle      Strength:    Initial measurement: (on 10/10/2018) Initial measurement: (on 10/10/2018) Reassessment measurement:  Reassessment measurement:    L LE:  Hip flexion: 5/5   Hip extension: 4-/5  Hip abduction: 4-/5  Hip adduction: 5/5  Hip IR: 4+/5  Hip ER: 4/5   Knee flexion:  4/5  Knee extension: 5/5  Ankle DF: 4/5 R LE:  Hip flexion: 5/5  Hip extension: 4-/5  Hip abduction: 4/5  Hip adduction: 4/5  Hip IR: 4+/5  Hip ER: 4/5  Knee flexion: 4+/5  Knee extension: 5/5  Ankle DF: 4-/5       Special Tests:          No leg length discrepancy noted  Neurological Screen:        Myotomes:  WFL        Dermatomes:  No deficits noted  Functional Mobility:         Gait/Ambulation:  Pt ambulates with no AD with following gait deficits: decreased B step length/clearance, increased pronation at each foot in stance phase, decreased B heel strike, decreased B arm swing, slight forward trunk flexion, increased lateral sway - slightly improved upright posture and improved B step length/clearance and heel strike on 11/5/2018 - improved upright posture but continuing to notice decreased heel strike with excessive knee flexion during stance phase at each LE on 11/27/2018        Transfers:  Pt able to stand/sit with minimal to no UE use        Bed Mobility:  Pt able to perform Magdalena with increased time  Balance:          Slight lateral sway with ambulation    Body Structures Involved:   1. Nerves  2. Bones  3. Joints  4. Muscles  5. Ligaments Body Functions Affected:  1. Sensory/Pain  2. Neuromusculoskeletal  3. Movement Related Activities and Participation Affected:  1. General Tasks and Demands  2. Mobility  3. Self Care  4. Domestic Life  5. Interpersonal Interactions and Relationships  6. Community, Social and [place]   # of elements that affect the Plan of Care: 4+: HIGH COMPLEXITY   CLINICAL PRESENTATION:   Presentation: Evolving clinical presentation with changing clinical characteristics: MODERATE COMPLEXITY   CLINICAL DECISION MAKING:   Outcome Measure: Tool Used: Lower Extremity Functional Scale (LEFS)  Score:  Initial: 27/80 Most Recent: 39/80 (Date: 11/27/2018)   Interpretation of Score: 20 questions each scored on a 5 point scale with 0 representing \"extreme difficulty or unable to perform\" and 4 representing \"no difficulty\". The lower the score, the greater the functional disability. 80/80 represents no disability. Minimal detectable change is 9 points. Score 80 79-63 62-48 47-32 31-16 15-1 0   Modifier CH CI CJ CK CL CM CN     ?  Mobility - Walking and Moving Around:     - CURRENT STATUS: CK - 40%-59% impaired, limited or restricted    - GOAL STATUS: CJ - 20%-39% impaired, limited or restricted    - D/C STATUS:  ---------------To be determined---------------    Tool Used: PT/OT FOOT AND ANKLE ABILITY MEASURE  Score:  Initial: 36/84 Most Recent: 49/84 (Date: 11/27/2018)   Interpretation of Score: For the \"Activities of Daily Living\", there are 21 questions each scored on a 5 point scale with 0 representing \"Unable to do\" and 4 representing \"No difficulty\". The lower the score, the greater the functional disability. 84/84 represents no disability. Minimal detectable change is 5.7 points. With the addition of the 8 questions in the \"Sports Subscale,\" there are 29 questions, each scored on a 5 point scale with 0 representing \"Unable to do\" and 4 representing \"No difficulty\". The lower the score, the greater the functional disability. 116/116 represents no disability. Minimal detectable change is 12.3 points. Activities of Daily Living:  Score 84 83-68 67-51 50-34 33-18 17-1 0   Modifier CH CI CJ CK CL CM CN     Activities of Daily Living + Sports Subscale:  Score 116 115-94 93-71 70-47 46-24 23-1 0   Modifier CH CI CJ CK CL CM CN     Payor: [employer] MEDICARE / Plan: [address] [place] [address] HMO / Product Type: Managed Care Medicare /   Medical Necessity:   · Patient is expected to demonstrate progress in strength, range of motion, balance and functional technique to improve ability to perform pain-free standing/ambulation. · Patient demonstrates good rehab potential due to higher previous functional level. Reason for Services/Other Comments:  · Patient continues to require modification of therapeutic interventions to increase complexity of exercises. Use of outcome tool(s) and clinical judgement create a POC that gives a: Questionable prediction of patient's progress: MODERATE COMPLEXITY   TREATMENT:   (In addition to Assessment/Re-Assessment sessions the following treatments were rendered)  Subjective comments at beginning of session: Pt states she has been feeling pretty good today. Patient reports her pain to be 2/10.     THERAPEUTIC EXERCISE: (25 minutes):  Exercises per grid below to improve mobility, strength, balance and dynamic movement of ankle - right to improve functional mobility. Required minimal visual, verbal and manual cues to promote proper body alignment, promote proper body posture, promote proper body mechanics and promote proper body breathing techniques. Progressed resistance, range, repetitions and complexity of movement as indicated. Date  12/7/18 Date:  12/13/2018 Date:  12/19/2018 Date:  12-21-18   Activity/Exercise       Physiostep L3 resistance for 12 minutes to increase strength/endurance L3 resistance for 12 minutes to increase strength/endurance L3 resistance for 13 minutes to increase strength/endurance L3 resistance for 12 minutes to increase strength/endurance    Ankle plantarflexor stretch on incline board 30 second hold x 3 reps with knees extended then flexed 30 second hold x 3 reps with knees extended then flexed 30 second hold x 3 reps with knees extended then flexed 3 x 30 sec hold with knees extended    Heel raises on firm ground B UE support with feet on incline board (1st notch from bottom); slow lowering on B LEs for 10 reps/1 set  B UE support with feet on incline board (1st notch from bottom); slow lowering on B LEs for 10 reps/1 set B UE support with feet on incline board(1st notch from bottom)   Slow descend   1 X 10    Supine hamstring stretch       Leg press machine 30 lb resistance; 20 reps/1 set with slow controlled movement (performed after kinesiotaping)   30#  1 x 20  With slow controlled movements   Ambulation over level ground       Ascending/descending stairs       Step ups       *given in HEP12  [facility]      Manual therapy (15 minutes): With pt in prone position, STM to R gastrocnemius and soleus as well as cross friction massage to R achilles tendon to reduce tightness and pain and improve ankle mobility. Patient positioned in prone for manual work. No tape today per patient request.     Modalities (5 minutes).  Patient prone for ice massage to R gastroc and R heel to decrease pain and inflammation. Skin was clear ands intact     Treatment/Session Assessment: Pt reports she uses ice at home a good bit. She reports her apartment is still under renovations. Pt reports she is doing well with her HEP. · Pain/ Symptoms: Initial:   2/10 in posterior R ankle  Post Session:  No specific pain level reported at end of session  ·   Compliance with Program/Exercises: Good compliance with attending scheduled sessions. · Recommendations/Intent for next treatment session: \"Next visit will focus on advancements to more challenging activities and reduction in assistance provided\".  Manual therapy/modalities as needed to reduce tightness and pain; work on eccentric plantarflexor strengthening and balance  Total Treatment Duration: 45 minutes   PT Patient Time In/Time Out  Time In: 1045  Time Out: 189 García Pastor PTA    Future Appointments   Date Time Provider Jo Nolan   12/26/2018  2:30 PM Faustino Hubbard PTA [facility]   12/28/2018 11:00 AM VALENTINO Mead   12/31/2018 10:30 AM VALENTINO Aguilera   1/3/2019 11:00 AM VALENTINO MeadDORPMARY LEWIS   5/1/2019  8:45 AM JACINTA PM LAB/RAD RAMANDEEP WORKMAN   5/8/2019  9:20 AM MD RAMANDEEP Dominguez MT

## 2018-12-26 ENCOUNTER — HOSPITAL ENCOUNTER (OUTPATIENT)
Dept: PHYSICAL THERAPY | Age: 43
Discharge: HOME | End: 2018-12-26
Payer: MEDICARE

## 2018-12-26 PROCEDURE — 97110 THERAPEUTIC EXERCISES: CPT

## 2018-12-26 PROCEDURE — 97140 MANUAL THERAPY 1/> REGIONS: CPT

## 2018-12-26 NOTE — PROGRESS NOTES
Dipak Reardon  : 1975  Primary: [place] Nunez [place]  Secondary: [place] Of [facility], [address], [place] [address] [address] [facility]  [phone]   [phone]       OUTPATIENT PHYSICAL THERAPY:Daily Note 2018    ICD-10: Treatment Diagnosis: Pain in right ankle and joints of right foot (M25.571)      Stiffness of right ankle, not elsewhere classified (M25.671)    Difficulty in walking, not elsewhere classified (R26.2)   Precautions/Allergies:   Abilify [aripiprazole]; Geodon [ziprasidone hcl]; Sushma Pelt; Ambien [zolpidem]; and Topamax [topiramate]   Fall Risk Score: 3 (? 5 = High Risk)  MD Orders: Evaluate and treat  MEDICAL/REFERRING DIAGNOSIS:  foot, right   DATE OF ONSET: 2018  REFERRING PHYSICIAN: Ranjana Miller MD  RETURN PHYSICIAN APPOINTMENT: 2018 (pt unsure of date)   This section established at most recent assessment  ASSESSMENT:  Dipak Reardon has now attended 14 physical therapy sessions for R ankle/foot pain that started in 2018 and has recently gotten worse. This session, she demonstrated improved B LE strength/endurance, less pain with R ankle mobility, less postural/gait deficits, and improved standing/walking tolerance. Despite the above listed improvements, she continues to demonstrate decreased B LE strength/endurance, pain with R ankle mobility, multiple postural/gait deficits, decreased standing/walking tolerance, and decreased functional mobility. Pt may continue to benefit from skilled PT to address the above listed deficits to improve ability to perform pain-free ADLs/IADLs and to improve overall quality of life prior to discharge. PROBLEM LIST (Impacting functional limitations):  1. Decreased Strength  2. Decreased ADL/Functional Activities  3. Decreased Transfer Abilities  4. Decreased Ambulation Ability/Technique  5. Decreased Balance  6.  Increased Pain  7. Decreased Activity Tolerance  8. Increased Fatigue  9. Decreased Flexibility/Joint Mobility  10. Edema/Girth INTERVENTIONS PLANNED:  1. Balance Exercise  2. Bed Mobility  3. Cold  4. Electrical Stimulation  5. Family Education  6. Gait Training  7. Heat  8. Home Exercise Program (HEP)  9. Manual Therapy  10. Neuromuscular Re-education/Strengthening  11. Range of Motion (ROM)  12. Therapeutic Activites  13. Therapeutic Exercise/Strengthening  14. Transcutaneous Electrical Nerve Stimulation (TENS)  15. Transfer Training  16. Ultrasound (US)   TREATMENT PLAN:  Effective Dates: 11/27/2018 TO 1/4/2019 (60 days). Frequency/Duration: 2 times a week for 60 Days  GOALS: (Goals have been discussed and agreed upon with patient.)  Short-Term Functional Goals: Time Frame: 30 days  1. Pt will be compliant with HEP in order to increase LE strength/endurance/mobility to improve functional mobility and overall quality of life. (MET 11/5/2018)  2. Pt will improve score on the Lower Extremity Functional Scale to 38/80 in order to demonstrate improved functional mobility and quality of life. (MET 11/5/2018)  3. Pt will improve score on the Foot and Ankle Ability Measure to 41/84 in order to demonstrate improved functional mobility and quality of life. (MET 11/5/2018)  4. Pt will report standing for > 5 minutes with minimal to no increase in pain in order to be able to stand for prolonged periods as needed to perform chores. (MET 11/5/2018)  5. Pt will be able to ascend/descend 6 steps with S with or without rail with safe gait pattern and minimal to no increase in pain in order to improve community mobility. (MET 11/5/2018)  Discharge Goals: Time Frame: 60 days  1. Pt will be independent with HEP in order to increase LE strength/endurance/mobility to improve functional mobility and overall quality of life. (PROGRESSING , 11/27/2018)   2.  Pt will improve score on the Lower Extremity Functional Scale to 50/80 in order to demonstrate improved functional mobility and quality of life. (CONTINUE, 11/27/2018)   3. Pt will improve score on the Foot and Ankle Ability Measure to 45/84 in order to demonstrate improved functional mobility and quality of life. (CONTINUE, 11/27/2018)   4. Pt will report standing for > 10 minutes with minimal to no increase in pain in order to be able to stand for prolonged periods as needed to perform chores. (MET , 11/27/2018)   5. Pt will be able to ascend/descend 12 steps [place] with or without rail with reciprocal gait pattern and minimal to no increase in pain in order to improve community mobility. (PROGRESSING , 11/27/2018)   Rehabilitation Potential For Stated Goals: Good  Regarding [facility]'s therapy, I certify that the treatment plan above will be carried out by a therapist or under their direction. Thank you for this referral,  Duy Nunn PTA                               HISTORY:   History of Present Injury/Illness (Reason for Referral): *History per pt or pt's family except where otherwise noted  Pt states pain in her R ankle started in July 2018 (states she was walking more than usual), and she states that she was being treated for it (with prednisone) and it had gotten better with taping, but it has recently gotten worse again (early September when she stopped doing taping and started everyday walking again). States the pain has gotten better temporarily with Prednisone, but states that when she goes off the Prednisone it starts hurting again. Pain at worst is 6-7/10 (aggravating activities include walking > 5 minutes, prolonged standing > 1-2 minutes - even in shower). Pain at best is 0/10 (eases pain with prednisone, rest, taping, mobic, ice). Pt states she was given boot to wear to help with the pain but she cannot wear this while driving (she is supposed to wear this 4 weeks starting last Friday).    Past Medical History/Comorbidities:   Ms. Cheikh Shelton  has a past medical history of Acid reflux, ADHD (attention deficit hyperactivity disorder), Allergic rhinitis, Anxiety disorder, Arthritis, Asthma, B12 deficiency, Cerumen impaction, Chronic sinusitis, Depression, Deviated nasal septum, Endocrine disease, Fibromyalgia, GERD (gastroesophageal reflux disease), Headache, Hypothyroidism, Low blood sugar, Meniere disease, Migraine, Nasal obstruction, OCD (obsessive compulsive disorder), Psychiatric disorder, Special examinations, Special examinations, Tinnitus, and Unspecified hypothyroidism. Ms. Jodi Avila  has a past surgical history that includes hx orthopaedic; hx heent; hx heent (07/03/2013); and hx heent (06/2017). Social History/Living Environment:    lives alone in one story apartment with elevator to get up (15 flights of stairs with B railing - occasionally has to take the stairs down if she has an appointment and the elevator is busy)  Prior Level of Function/Work/Activity:  On disability for bipolar disorder and anxiety; likes to walk downtown with her friends, does housework (vacuuming, tidying up, light cooking); has to shop for her groceries  Dominant Side:         RIGHT  Other Clinical Tests:          X-rays of R foot (showed heel spur according to pt)  Previous Treatment Approaches:          Prednisone has helped  Personal Factors:          Sex:  female        Age:  41 y.o. Current Medications:    Current Outpatient Medications:     levothyroxine (SYNTHROID) 125 mcg tablet, Take 1 Tab by mouth daily. , Disp: 90 Tab, Rfl: 1    Dexlansoprazole (DEXILANT) 60 mg CpDB, Take 1 Cap by mouth every morning., Disp: 90 Cap, Rfl: 1    candesartan (ATACAND) 4 mg tablet, Take 1 Tab by mouth daily. , Disp: 90 Tab, Rfl: 1    albuterol (PROVENTIL HFA, VENTOLIN HFA, PROAIR HFA) 90 mcg/actuation inhaler, Take 2 Puffs by inhalation as needed for Wheezing., Disp: 3 Inhaler, Rfl: 1    montelukast (SINGULAIR) 10 mg tablet, Take 1 Tab by mouth daily. , Disp: 90 Tab, Rfl: 1    cetirizine (ZYRTEC) 10 mg tablet, Take 1 Tab by mouth daily. , Disp: 90 Tab, Rfl: 1    meloxicam (MOBIC) 15 mg tablet, Take 1 Tab by mouth daily. , Disp: 90 Tab, Rfl: 1    mirabegron ER (MYRBETRIQ) 25 mg ER tablet, Take 1 Tab by mouth nightly., Disp: 30 Tab, Rfl: 0    diclofenac (VOLTAREN) 1 % gel, Apply 2 g to affected area four (4) times daily. Apply to right foot as needed 2-4 times a day, Disp: 100 g, Rfl: 2    tiZANidine (ZANAFLEX) 4 mg tablet, Take 1 Tab by mouth three (3) times daily as needed. , Disp: 90 Tab, Rfl: 1    IRON, FERROUS SULFATE, PO, Take 45 mg by mouth daily. , Disp: , Rfl:     cyanocobalamin 1,000 mcg tablet, Take 1 Tab by mouth daily. , Disp: 60 Tab, Rfl: 0    butalbital-acetaminophen (BUPAP)  mg tab, Take 1 Tab by mouth daily as needed. , Disp: 30 Tab, Rfl: 5    venlafaxine-SR (EFFEXOR XR) 150 mg capsule, Take  by mouth daily. , Disp: , Rfl:     escitalopram oxalate (LEXAPRO) 20 mg tablet, Take 20 mg by mouth daily. , Disp: , Rfl:     Magnesium Oxide 500 mg cap, Take 500 mg by mouth daily. , Disp: , Rfl:     traMADol (ULTRAM) 50 mg tablet, Take 1 Tab by mouth every eight (8) hours as needed for Pain. Max Daily Amount: 150 mg., Disp: 30 Tab, Rfl: 1    amphetamine-dextroamphetamine XR (ADDERALL XR) 20 mg XR capsule, TK 1 C PO QD, Disp: , Rfl: 0    AMITIZA 24 mcg capsule, TK 1 C PO BID WF AND WATER, Disp: , Rfl: 6    ALPRAZolam (XANAX) 1 mg tablet, 0.5 mg three (3) times daily as needed. , Disp: , Rfl: 4    risperiDONE (RISPERDAL) 2 mg tablet, Take 3 mg by mouth nightly., Disp: , Rfl: 3    LAMICTAL 200 mg tablet, TK 1 T PO BID, Disp: , Rfl: 4    pregabalin (LYRICA) 75 mg capsule, tid, Disp: , Rfl:     melatonin tab tablet, Take 5 mg by mouth., Disp: , Rfl:     cholecalciferol (VITAMIN D3) 5,000 unit capsule, Take 2,000 Units by mouth., Disp: , Rfl:    Date Last Reviewed:  12/26/2018   # of Personal Factors/Comorbidities that affect the Plan of Care: 3+: HIGH COMPLEXITY   EXAMINATION: Reassessment on 11/27/2018  Observation/Orthostatic Postural Assessment:    The following postural deficits were noted in sitting:  loss of cervical lordosis, increased thoracic kyphosis, forward head, rounded shoulders, posterior pelvic tilt - improved upright on 11/27/2018  The following postural deficits were noted in standing: forward trunk flexion, pronated foot L > R, slight genu valgus noted B - improved upright posture on 11/27/2018  Palpation:          Pt with tenderness noted at posterior R heel and at dorsum of R foot - less tenderness throughout R foot/ankle noted on 11/5/2018  ROM:    Initial measurement: (on 10/10/2018) Initial measurement: (on 10/10/2018) Reassessment measurement: (on 11/5/2018) Reassessment measurement:    L LE: WFL throughout, but excessive hip IR passively R LE: WFL throughout, but excessive hip IR passively; ankle motion WFL but significant tightness noted in dorsiflexors with pain in ankle R LE: ankle motion WFL - tightness noted in dorsiflexors with minimal to no pain in ankle      Strength:    Initial measurement: (on 10/10/2018) Initial measurement: (on 10/10/2018) Reassessment measurement:  Reassessment measurement:    L LE:  Hip flexion: 5/5   Hip extension: 4-/5  Hip abduction: 4-/5  Hip adduction: 5/5  Hip IR: 4+/5  Hip ER: 4/5   Knee flexion:  4/5  Knee extension: 5/5  Ankle DF: 4/5 R LE:  Hip flexion: 5/5  Hip extension: 4-/5  Hip abduction: 4/5  Hip adduction: 4/5  Hip IR: 4+/5  Hip ER: 4/5  Knee flexion: 4+/5  Knee extension: 5/5  Ankle DF: 4-/5       Special Tests:          No leg length discrepancy noted  Neurological Screen:        Myotomes:  WFL        Dermatomes:  No deficits noted  Functional Mobility:         Gait/Ambulation:  Pt ambulates with no AD with following gait deficits: decreased B step length/clearance, increased pronation at each foot in stance phase, decreased B heel strike, decreased B arm swing, slight forward trunk flexion, increased lateral sway - slightly improved upright posture and improved B step length/clearance and heel strike on 11/5/2018 - improved upright posture but continuing to notice decreased heel strike with excessive knee flexion during stance phase at each LE on 11/27/2018        Transfers:  Pt able to stand/sit with minimal to no UE use        Bed Mobility:  Pt able to perform Magdalena with increased time  Balance:          Slight lateral sway with ambulation    Body Structures Involved:   1. Nerves  2. Bones  3. Joints  4. Muscles  5. Ligaments Body Functions Affected:  1. Sensory/Pain  2. Neuromusculoskeletal  3. Movement Related Activities and Participation Affected:  1. General Tasks and Demands  2. Mobility  3. Self Care  4. Domestic Life  5. Interpersonal Interactions and Relationships  6. Community, Social and Social Circle [place]   # of elements that affect the Plan of Care: 4+: HIGH COMPLEXITY   CLINICAL PRESENTATION:   Presentation: Evolving clinical presentation with changing clinical characteristics: MODERATE COMPLEXITY   CLINICAL DECISION MAKING:   Outcome Measure: Tool Used: Lower Extremity Functional Scale (LEFS)  Score:  Initial: 27/80 Most Recent: 39/80 (Date: 11/27/2018)   Interpretation of Score: 20 questions each scored on a 5 point scale with 0 representing \"extreme difficulty or unable to perform\" and 4 representing \"no difficulty\". The lower the score, the greater the functional disability. 80/80 represents no disability. Minimal detectable change is 9 points. Score 80 79-63 62-48 47-32 31-16 15-1 0   Modifier CH CI CJ CK CL CM CN     ? Mobility - Walking and Moving Around:     - CURRENT STATUS: CK - 40%-59% impaired, limited or restricted    - GOAL STATUS: CJ - 20%-39% impaired, limited or restricted    - D/C STATUS:  ---------------To be determined---------------    Tool Used: PT/OT FOOT AND ANKLE ABILITY MEASURE  Score:  Initial: 36/84 Most Recent: 49/84 (Date: 11/27/2018)   Interpretation of Score:  For the \"Activities of Daily Living\", there are 21 questions each scored on a 5 point scale with 0 representing \"Unable to do\" and 4 representing \"No difficulty\". The lower the score, the greater the functional disability. 84/84 represents no disability. Minimal detectable change is 5.7 points. With the addition of the 8 questions in the \"Sports Subscale,\" there are 29 questions, each scored on a 5 point scale with 0 representing \"Unable to do\" and 4 representing \"No difficulty\". The lower the score, the greater the functional disability. 116/116 represents no disability. Minimal detectable change is 12.3 points. Activities of Daily Living:  Score 84 83-68 67-51 50-34 33-18 17-1 0   Modifier CH CI CJ CK CL CM CN     Activities of Daily Living + Sports Subscale:  Score 116 115-94 93-71 70-47 46-24 23-1 0   Modifier CH CI CJ CK CL CM CN     Payor: [employer] MEDICARE / Plan: [address] [place] [address] HMO / Product Type: Managed Care Medicare /   Medical Necessity:   · Patient is expected to demonstrate progress in strength, range of motion, balance and functional technique to improve ability to perform pain-free standing/ambulation. · Patient demonstrates good rehab potential due to higher previous functional level. Reason for Services/Other Comments:  · Patient continues to require modification of therapeutic interventions to increase complexity of exercises. Use of outcome tool(s) and clinical judgement create a POC that gives a: Questionable prediction of patient's progress: MODERATE COMPLEXITY   TREATMENT:   (In addition to Assessment/Re-Assessment sessions the following treatments were rendered)  Subjective comments at beginning of session: Pt states her pain today to be 2/10. She reports she will be moving back to her place this weekend. THERAPEUTIC EXERCISE: (32 minutes):  Exercises per grid below to improve mobility, strength, balance and dynamic movement of ankle - right to improve functional mobility. Required minimal visual, verbal and manual cues to promote proper body alignment, promote proper body posture, promote proper body mechanics and promote proper body breathing techniques. Progressed resistance, range, repetitions and complexity of movement as indicated. Date:  12/13/2018 Date:  12/19/2018 Date:  12-21-18 Date:  12-26-18   Activity/Exercise       Physiostep L3 resistance for 12 minutes to increase strength/endurance L3 resistance for 13 minutes to increase strength/endurance L3 resistance for 12 minutes to increase strength/endurance  L3 resistance for 15 minutes to increase strength/ endurance    Ankle plantarflexor stretch on incline board 30 second hold x 3 reps with knees extended then flexed 30 second hold x 3 reps with knees extended then flexed 3 x 30 sec hold with knees extended  3 x 30 sec hold with knees extended    Heel raises on firm ground  B UE support with feet on incline board (1st notch from bottom); slow lowering on B LEs for 10 reps/1 set B UE support with feet on incline board(1st notch from bottom)   Slow descend   1 X 10  B UE support with feet on incline board (1st notch from bottom)  Slow descend   1 x 10    Supine hamstring stretch       Leg press machine   30#  1 x 20  With slow controlled movements 30#  1 x 20 and 1 x 10  with slow controlled movements   Ambulation over level ground       Ascending/descending stairs       Step ups       *given in HEP12  [facility]      Manual therapy (15 minutes): With pt in prone position, STM to R gastrocnemius and soleus as well as cross friction massage to R achilles tendon to reduce tightness and pain and improve ankle mobility. Patient positioned in prone for manual work. Patient wanted to try without tape again. Modalities (8 minutes). Patient prone for ice massage to R gastroc and R heel to decrease pain and inflammation.   Skin was clear ands intact     Treatment/Session Assessment: Pt reports the lateral aspect of her R ankle is more sore today than usual. Patient reports she is moving back into her apartment this evening, but has friends to assist her with the heavier bags. · Pain/ Symptoms: Initial:   2/10 in posterior R ankle  Post Session:  Lateral aspect of R ankle 3/10  The rest of foot 1-2/10. ·   Compliance with Program/Exercises: Good compliance with attending scheduled sessions. · Recommendations/Intent for next treatment session: \"Next visit will focus on advancements to more challenging activities and reduction in assistance provided\".  Manual therapy/modalities as needed to reduce tightness and pain; work on eccentric plantarflexor strengthening and balance  Total Treatment Duration: 55 minutes   PT Patient Time In/Time Out  Time In: 1430  Time Out: [address], PTA    Future Appointments   Date Time Provider Jo Nolan   12/28/2018 11:00 AM Tasha Wagner DPT [facility]   12/31/2018 10:30 AM VALENTINO Cotton   1/3/2019 11:00 AM VALENTINO Cotton   5/1/2019  8:45 AM MTT[facility]ERA PM LAB/RAD RAMANDEEP WORKMAN [facility]   5/8/2019  9:20 AM MD RAMANDEEP Goyal MT

## 2018-12-28 ENCOUNTER — HOSPITAL ENCOUNTER (OUTPATIENT)
Dept: PHYSICAL THERAPY | Age: 43
Discharge: HOME | End: 2018-12-28
Payer: MEDICARE

## 2018-12-28 PROCEDURE — 97110 THERAPEUTIC EXERCISES: CPT

## 2018-12-28 PROCEDURE — 97140 MANUAL THERAPY 1/> REGIONS: CPT

## 2018-12-28 NOTE — PROGRESS NOTES
Juliana Izaguirre  : 1975  Primary: Kanwal Nunez Medicare o  Secondary: [place] [facility] [address], [address], [place] [address] [facility]  [phone]   [phone]       OUTPATIENT PHYSICAL THERAPY:Daily Note 2018    ICD-10: Treatment Diagnosis: Pain in right ankle and joints of right foot (M25.571)      Stiffness of right ankle, not elsewhere classified (M25.671)    Difficulty in walking, not elsewhere classified (R26.2)   Precautions/Allergies:   Abilify [aripiprazole]; Geodon [ziprasidone hcl]; Bonnita Hof; Ambien [zolpidem]; and Topamax [topiramate]   Fall Risk Score: 3 (? 5 = High Risk)  MD Orders: Evaluate and treat  MEDICAL/REFERRING DIAGNOSIS:  foot, right   DATE OF ONSET: 2018  REFERRING PHYSICIAN: Tati Acosta MD  RETURN PHYSICIAN APPOINTMENT: 2018 (pt unsure of date)   This section established at most recent assessment  ASSESSMENT:  Juliana Izaguirre has now attended 14 physical therapy sessions for R ankle/foot pain that started in 2018 and has recently gotten worse. This session, she demonstrated improved B LE strength/endurance, less pain with R ankle mobility, less postural/gait deficits, and improved standing/walking tolerance. Despite the above listed improvements, she continues to demonstrate decreased B LE strength/endurance, pain with R ankle mobility, multiple postural/gait deficits, decreased standing/walking tolerance, and decreased functional mobility. Pt may continue to benefit from skilled PT to address the above listed deficits to improve ability to perform pain-free ADLs/IADLs and to improve overall quality of life prior to discharge. PROBLEM LIST (Impacting functional limitations):  1. Decreased Strength  2. Decreased ADL/Functional Activities  3. Decreased Transfer Abilities  4. Decreased Ambulation Ability/Technique  5. Decreased Balance  6.  Increased Pain  7. Decreased Activity Tolerance  8. Increased Fatigue  9. Decreased Flexibility/Joint Mobility  10. Edema/Girth INTERVENTIONS PLANNED:  1. Balance Exercise  2. Bed Mobility  3. Cold  4. Electrical Stimulation  5. Family Education  6. Gait Training  7. Heat  8. Home Exercise Program (HEP)  9. Manual Therapy  10. Neuromuscular Re-education/Strengthening  11. Range of Motion (ROM)  12. Therapeutic Activites  13. Therapeutic Exercise/Strengthening  14. Transcutaneous Electrical Nerve Stimulation (TENS)  15. Transfer Training  16. Ultrasound (US)   TREATMENT PLAN:  Effective Dates: 11/27/2018 TO 1/4/2019 (60 days). Frequency/Duration: 2 times a week for 60 Days  GOALS: (Goals have been discussed and agreed upon with patient.)  Short-Term Functional Goals: Time Frame: 30 days  1. Pt will be compliant with HEP in order to increase LE strength/endurance/mobility to improve functional mobility and overall quality of life. (MET 11/5/2018)  2. Pt will improve score on the Lower Extremity Functional Scale to 38/80 in order to demonstrate improved functional mobility and quality of life. (MET 11/5/2018)  3. Pt will improve score on the Foot and Ankle Ability Measure to 41/84 in order to demonstrate improved functional mobility and quality of life. (MET 11/5/2018)  4. Pt will report standing for > 5 minutes with minimal to no increase in pain in order to be able to stand for prolonged periods as needed to perform chores. (MET 11/5/2018)  5. Pt will be able to ascend/descend 6 steps with S with or without rail with safe gait pattern and minimal to no increase in pain in order to improve community mobility. (MET 11/5/2018)  Discharge Goals: Time Frame: 60 days  1. Pt will be independent with HEP in order to increase LE strength/endurance/mobility to improve functional mobility and overall quality of life. (PROGRESSING , 11/27/2018)   2.  Pt will improve score on the Lower Extremity Functional Scale to 50/80 in order to demonstrate improved functional mobility and quality of life. (CONTINUE, 11/27/2018)   3. Pt will improve score on the Foot and Ankle Ability Measure to 45/84 in order to demonstrate improved functional mobility and quality of life. (CONTINUE, 11/27/2018)   4. Pt will report standing for > 10 minutes with minimal to no increase in pain in order to be able to stand for prolonged periods as needed to perform chores. (MET , 11/27/2018)   5. Pt will be able to ascend/descend 12 steps Magdalena with or without rail with reciprocal gait pattern and minimal to no increase in pain in order to improve community mobility. (PROGRESSING , 11/27/2018)   Rehabilitation Potential For Stated Goals: Good  Regarding Philippe Pelletier's therapy, I certify that the treatment plan above will be carried out by a therapist or under their direction. Thank you for this referral,  Corina Mao DPT                               HISTORY:   History of Present Injury/Illness (Reason for Referral): *History per pt or pt's family except where otherwise noted  Pt states pain in her R ankle started in July 2018 (states she was walking more than usual), and she states that she was being treated for it (with prednisone) and it had gotten better with taping, but it has recently gotten worse again (early September when she stopped doing taping and started everyday walking again). States the pain has gotten better temporarily with Prednisone, but states that when she goes off the Prednisone it starts hurting again. Pain at worst is 6-7/10 (aggravating activities include walking > 5 minutes, prolonged standing > 1-2 minutes - even in shower). Pain at best is 0/10 (eases pain with prednisone, rest, taping, mobic, ice). Pt states she was given boot to wear to help with the pain but she cannot wear this while driving (she is supposed to wear this 4 weeks starting last Friday).    Past Medical History/Comorbidities:   Ms. Rosemary Perkins  has a past medical history of Acid reflux, ADHD (attention deficit hyperactivity disorder), Allergic rhinitis, Anxiety disorder, Arthritis, Asthma, B12 deficiency, Cerumen impaction, Chronic sinusitis, Depression, Deviated nasal septum, Endocrine disease, Fibromyalgia, GERD (gastroesophageal reflux disease), Headache, Hypothyroidism, Low blood sugar, Meniere disease, Migraine, Nasal obstruction, OCD (obsessive compulsive disorder), Psychiatric disorder, Special examinations, Special examinations, Tinnitus, and Unspecified hypothyroidism. Ms. Martha Soriano  has a past surgical history that includes hx orthopaedic; hx heent; hx heent (07/03/2013); and hx heent (06/2017). Social History/Living Environment:    lives alone in one story apartment with elevator to get up (15 flights of stairs with B railing - occasionally has to take the stairs down if she has an appointment and the elevator is busy)  Prior Level of Function/Work/Activity:  On disability for bipolar disorder and anxiety; likes to walk downtown with her friends, does housework (vacuuming, tidying up, light cooking); has to shop for her groceries  Dominant Side:         RIGHT  Other Clinical Tests:          X-rays of R foot (showed heel spur according to pt)  Previous Treatment Approaches:          Prednisone has helped  Personal Factors:          Sex:  female        Age:  41 y.o. Current Medications:    Current Outpatient Medications:     levothyroxine (SYNTHROID) 125 mcg tablet, Take 1 Tab by mouth daily. , Disp: 90 Tab, Rfl: 1    Dexlansoprazole (DEXILANT) 60 mg CpDB, Take 1 Cap by mouth every morning., Disp: 90 Cap, Rfl: 1    candesartan (ATACAND) 4 mg tablet, Take 1 Tab by mouth daily. , Disp: 90 Tab, Rfl: 1    albuterol (PROVENTIL HFA, VENTOLIN HFA, PROAIR HFA) 90 mcg/actuation inhaler, Take 2 Puffs by inhalation as needed for Wheezing., Disp: 3 Inhaler, Rfl: 1    montelukast (SINGULAIR) 10 mg tablet, Take 1 Tab by mouth daily. , Disp: 90 Tab, Rfl: 1    cetirizine (ZYRTEC) 10 mg tablet, Take 1 Tab by mouth daily. , Disp: 90 Tab, Rfl: 1    meloxicam (MOBIC) 15 mg tablet, Take 1 Tab by mouth daily. , Disp: 90 Tab, Rfl: 1    mirabegron ER (MYRBETRIQ) 25 mg ER tablet, Take 1 Tab by mouth nightly., Disp: 30 Tab, Rfl: 0    diclofenac (VOLTAREN) 1 % gel, Apply 2 g to affected area four (4) times daily. Apply to right foot as needed 2-4 times a day, Disp: 100 g, Rfl: 2    tiZANidine (ZANAFLEX) 4 mg tablet, Take 1 Tab by mouth three (3) times daily as needed. , Disp: 90 Tab, Rfl: 1    IRON, FERROUS SULFATE, PO, Take 45 mg by mouth daily. , Disp: , Rfl:     cyanocobalamin 1,000 mcg tablet, Take 1 Tab by mouth daily. , Disp: 60 Tab, Rfl: 0    butalbital-acetaminophen (BUPAP)  mg tab, Take 1 Tab by mouth daily as needed. , Disp: 30 Tab, Rfl: 5    venlafaxine-SR (EFFEXOR XR) 150 mg capsule, Take  by mouth daily. , Disp: , Rfl:     escitalopram oxalate (LEXAPRO) 20 mg tablet, Take 20 mg by mouth daily. , Disp: , Rfl:     Magnesium Oxide 500 mg cap, Take 500 mg by mouth daily. , Disp: , Rfl:     traMADol (ULTRAM) 50 mg tablet, Take 1 Tab by mouth every eight (8) hours as needed for Pain. Max Daily Amount: 150 mg., Disp: 30 Tab, Rfl: 1    amphetamine-dextroamphetamine XR (ADDERALL XR) 20 mg XR capsule, TK 1 C PO QD, Disp: , Rfl: 0    AMITIZA 24 mcg capsule, TK 1 C PO BID WF AND WATER, Disp: , Rfl: 6    ALPRAZolam (XANAX) 1 mg tablet, 0.5 mg three (3) times daily as needed. , Disp: , Rfl: 4    risperiDONE (RISPERDAL) 2 mg tablet, Take 3 mg by mouth nightly., Disp: , Rfl: 3    LAMICTAL 200 mg tablet, TK 1 T PO BID, Disp: , Rfl: 4    pregabalin (LYRICA) 75 mg capsule, tid, Disp: , Rfl:     melatonin tab tablet, Take 5 mg by mouth., Disp: , Rfl:     cholecalciferol (VITAMIN D3) 5,000 unit capsule, Take 2,000 Units by mouth., Disp: , Rfl:    Date Last Reviewed:  12/28/2018   # of Personal Factors/Comorbidities that affect the Plan of Care: 3+: HIGH COMPLEXITY   EXAMINATION:   Reassessment on 11/27/2018  Observation/Orthostatic Postural Assessment:    The following postural deficits were noted in sitting:  loss of cervical lordosis, increased thoracic kyphosis, forward head, rounded shoulders, posterior pelvic tilt - improved upright on 11/27/2018  The following postural deficits were noted in standing: forward trunk flexion, pronated foot L > R, slight genu valgus noted B - improved upright posture on 11/27/2018  Palpation:          Pt with tenderness noted at posterior R heel and at dorsum of R foot - less tenderness throughout R foot/ankle noted on 11/5/2018  ROM:    Initial measurement: (on 10/10/2018) Initial measurement: (on 10/10/2018) Reassessment measurement: (on 11/5/2018) Reassessment measurement:    L LE: WFL throughout, but excessive hip IR passively R LE: WFL throughout, but excessive hip IR passively; ankle motion WFL but significant tightness noted in dorsiflexors with pain in ankle R LE: ankle motion WFL - tightness noted in dorsiflexors with minimal to no pain in ankle      Strength:    Initial measurement: (on 10/10/2018) Initial measurement: (on 10/10/2018) Reassessment measurement:  Reassessment measurement:    L LE:  Hip flexion: 5/5   Hip extension: 4-/5  Hip abduction: 4-/5  Hip adduction: 5/5  Hip IR: 4+/5  Hip ER: 4/5   Knee flexion:  4/5  Knee extension: 5/5  Ankle DF: 4/5 R LE:  Hip flexion: 5/5  Hip extension: 4-/5  Hip abduction: 4/5  Hip adduction: 4/5  Hip IR: 4+/5  Hip ER: 4/5  Knee flexion: 4+/5  Knee extension: 5/5  Ankle DF: 4-/5       Special Tests:          No leg length discrepancy noted  Neurological Screen:        Myotomes:  WFL        Dermatomes:  No deficits noted  Functional Mobility:         Gait/Ambulation:  Pt ambulates with no AD with following gait deficits: decreased B step length/clearance, increased pronation at each foot in stance phase, decreased B heel strike, decreased B arm swing, slight forward trunk flexion, increased lateral sway - slightly improved upright posture and improved B step length/clearance and heel strike on 11/5/2018 - improved upright posture but continuing to notice decreased heel strike with excessive knee flexion during stance phase at each LE on 11/27/2018        Transfers:  Pt able to stand/sit with minimal to no UE use        Bed Mobility:  Pt able to perform Magdalena with increased time  Balance:          Slight lateral sway with ambulation    Body Structures Involved:   1. Nerves  2. Bones  3. Joints  4. Muscles  5. Ligaments Body Functions Affected:  1. Sensory/Pain  2. Neuromusculoskeletal  3. Movement Related Activities and Participation Affected:  1. General Tasks and Demands  2. Mobility  3. Self Care  4. Domestic Life  5. Interpersonal Interactions and Relationships  6. Community, Social and [place]   # of elements that affect the Plan of Care: 4+: HIGH COMPLEXITY   CLINICAL PRESENTATION:   Presentation: Evolving clinical presentation with changing clinical characteristics: MODERATE COMPLEXITY   CLINICAL DECISION MAKING:   Outcome Measure: Tool Used: Lower Extremity Functional Scale (LEFS)  Score:  Initial: 27/80 Most Recent: 39/80 (Date: 11/27/2018)   Interpretation of Score: 20 questions each scored on a 5 point scale with 0 representing \"extreme difficulty or unable to perform\" and 4 representing \"no difficulty\". The lower the score, the greater the functional disability. 80/80 represents no disability. Minimal detectable change is 9 points. Score 80 79-63 62-48 47-32 31-16 15-1 0   Modifier CH CI CJ CK CL CM CN     ? Mobility - Walking and Moving Around:     - CURRENT STATUS: CK - 40%-59% impaired, limited or restricted    - GOAL STATUS: CJ - 20%-39% impaired, limited or restricted    - D/C STATUS:  ---------------To be determined---------------    Tool Used: PT/OT FOOT AND ANKLE ABILITY MEASURE  Score:  Initial: 36/84 Most Recent: 49/84 (Date: 11/27/2018)   Interpretation of Score:  For the \"Activities of Daily Living\", there are 21 questions each scored on a 5 point scale with 0 representing \"Unable to do\" and 4 representing \"No difficulty\". The lower the score, the greater the functional disability. 84/84 represents no disability. Minimal detectable change is 5.7 points. With the addition of the 8 questions in the \"Sports Subscale,\" there are 29 questions, each scored on a 5 point scale with 0 representing \"Unable to do\" and 4 representing \"No difficulty\". The lower the score, the greater the functional disability. 116/116 represents no disability. Minimal detectable change is 12.3 points. Activities of Daily Living:  Score 84 83-68 67-51 50-34 33-18 17-1 0   Modifier CH CI CJ CK CL CM CN     Activities of Daily Living + Sports Subscale:  Score 116 115-94 93-71 70-47 46-24 23-1 0   Modifier CH CI CJ CK CL CM CN     Payor: [employer] MEDICARE / Plan: [address] [place] [address] HMO / Product Type: Managed Care Medicare /   Medical Necessity:   · Patient is expected to demonstrate progress in strength, range of motion, balance and functional technique to improve ability to perform pain-free standing/ambulation. · Patient demonstrates good rehab potential due to higher previous functional level. Reason for Services/Other Comments:  · Patient continues to require modification of therapeutic interventions to increase complexity of exercises. Use of outcome tool(s) and clinical judgement create a POC that gives a: Questionable prediction of patient's progress: MODERATE COMPLEXITY   TREATMENT:   (In addition to Assessment/Re-Assessment sessions the following treatments were rendered)  Subjective comments at beginning of session: Pt states her pain today to be 2-3/10. She has moved back to her place.  States she has not been bothered as much by her foot even without the tape  THERAPEUTIC EXERCISE: (32 minutes):  Exercises per grid below to improve mobility, strength, balance and dynamic movement of ankle - right to improve functional mobility. Required minimal visual, verbal and manual cues to promote proper body alignment, promote proper body posture, promote proper body mechanics and promote proper body breathing techniques. Progressed resistance, range, repetitions and complexity of movement as indicated. Date:  12/13/2018 Date:  12/19/2018 Date:  12-21-18 Date:  12-26-18 Date:  12/28/2018   Activity/Exercise        Physiostep L3 resistance for 12 minutes to increase strength/endurance L3 resistance for 13 minutes to increase strength/endurance L3 resistance for 12 minutes to increase strength/endurance  L3 resistance for 15 minutes to increase strength/ endurance  L3 resistance for 15 minutes to increase strength/ endurance   Ankle plantarflexor stretch on incline board 30 second hold x 3 reps with knees extended then flexed 30 second hold x 3 reps with knees extended then flexed 3 x 30 sec hold with knees extended  3 x 30 sec hold with knees extended  3 x 30 sec hold with knees extended then flexed   Heel raises on firm ground  B UE support with feet on incline board (1st notch from bottom); slow lowering on B LEs for 10 reps/1 set B UE support with feet on incline board(1st notch from bottom)   Slow descend   1 X 10  B UE support with feet on incline board (1st notch from bottom)  Slow descend   1 x 10  B UE support with feet on incline board (1st notch from bottom); slow lowering on B LEs for 15 reps/1 set   Supine hamstring stretch        Leg press machine   30#  1 x 20  With slow controlled movements 30#  1 x 20 and 1 x 10  with slow controlled movements 30 lb; 20 reps/1 set with cues for slow movements with increased control   Ambulation over level ground        Ascending/descending stairs        Step ups        *given in HEP12  [facility]      Manual therapy (8 minutes):  With pt in prone position, STM to R gastrocnemius and soleus as well as cross friction massage to R achilles tendon to reduce tightness and pain and improve ankle mobility. Followed with ice massage to R achilles tendon to reduce pain and inflammation. Modalities ( minutes). Treatment/Session Assessment: Pt reporting overall improvement in her pain level through the course of therapy. Will reassess overall progress next week and potentially reduce frequency to 1 time/week due to pt's slowing progress. · Pain/ Symptoms: Initial:   2/10 in posterior R ankle  Post Session: 2-3/10  ·   Compliance with Program/Exercises: Good compliance with attending scheduled sessions. · Recommendations/Intent for next treatment session: \"Next visit will focus on advancements to more challenging activities and reduction in assistance provided\".  Manual therapy/modalities as needed to reduce tightness and pain; work on eccentric plantarflexor strengthening and balance  Total Treatment Duration:   PT Patient Time In/Time Out  Time In: 1104  Time Out: 2801 Jr Tanisha Reddy DPT    Future Appointments   Date Time Provider Jo Nolan   12/31/2018 10:30 AM Juan Florence DPT [facility]   1/3/2019 11:00 AM Senthil SIMMS DPT [facility]   5/1/2019  8:45 AM [facility] PM LAB/RAD RAMANDEEP [facility]   5/8/2019  9:20 AM MD RAMANDEEP Adhikari [place]

## 2018-12-31 ENCOUNTER — HOSPITAL ENCOUNTER (OUTPATIENT)
Dept: PHYSICAL THERAPY | Age: 43
Discharge: HOME | End: 2018-12-31
Payer: MEDICARE

## 2018-12-31 PROCEDURE — G8978 MOBILITY CURRENT STATUS: HCPCS

## 2018-12-31 PROCEDURE — 97110 THERAPEUTIC EXERCISES: CPT

## 2018-12-31 PROCEDURE — G8979 MOBILITY GOAL STATUS: HCPCS

## 2018-12-31 PROCEDURE — 97140 MANUAL THERAPY 1/> REGIONS: CPT

## 2018-12-31 NOTE — THERAPY RECERTIFICATION
Chandler Jasmine  : 1975  Primary: [facility] Tomy Nunez Medicare Hmo  Secondary: [place] [facility], [place], [facility] [address] [facility]  [phone]   [phone]       OUTPATIENT PHYSICAL [address] [facility]     ICD-10: Treatment Diagnosis: Pain in right ankle and joints of right foot (M25.571)      Stiffness of right ankle, not elsewhere classified (M25.671)    Difficulty in walking, not elsewhere classified (R26.2)   Precautions/Allergies:   Abilify [aripiprazole]; Geodon [ziprasidone hcl]; Rendell Fritter; Ambien [zolpidem]; and Topamax [topiramate]   Fall Risk Score: 3 (? 5 = High Risk)  MD Orders: Evaluate and treat  MEDICAL/REFERRING DIAGNOSIS:  foot, right   DATE OF ONSET: 2018  REFERRING PHYSICIAN: Radha Aguilar MD  RETURN PHYSICIAN APPOINTMENT: 2018 (pt unsure of date)   This section established at most recent assessment  ASSESSMENT:  Chandler Jasmine has now attended 23 physical therapy sessions for R ankle/foot pain that started in 2018 and has recently gotten worse. This session, she demonstrated improved B LE strength/endurance, less pain with R ankle mobility, less postural/gait deficits, and improved standing/walking tolerance. Despite the above listed improvements, she continues to demonstrate decreased B LE strength/endurance, pain with R ankle mobility, multiple postural/gait deficits, decreased standing/walking tolerance, and decreased functional mobility. Pt may continue to benefit from skilled PT to address the above listed deficits to improve ability to perform pain-free ADLs/IADLs and to improve overall quality of life prior to discharge. However, will reduce frequency to one time/week and extend treatment dates due to pt's slowing progress. PROBLEM LIST (Impacting functional limitations):  1. Decreased Strength  2.  Decreased ADL/Functional Activities  3. Decreased Transfer Abilities  4. Decreased Ambulation Ability/Technique  5. Decreased Balance  6. Increased Pain  7. Decreased Activity Tolerance  8. Increased Fatigue  9. Decreased Flexibility/Joint Mobility  10. Edema/Girth INTERVENTIONS PLANNED:  1. Balance Exercise  2. Bed Mobility  3. Cold  4. Electrical Stimulation  5. Family Education  6. Gait Training  7. Heat  8. Home Exercise Program (HEP)  9. Manual Therapy  10. Neuromuscular Re-education/Strengthening  11. Range of Motion (ROM)  12. Therapeutic Activites  13. Therapeutic Exercise/Strengthening  14. Transcutaneous Electrical Nerve Stimulation (TENS)  15. Transfer Training  16. Ultrasound (US)   TREATMENT PLAN:  Effective Dates: 12/31/2018 TO 2/15/2019. Frequency/Duration: 2 times a week for 1 additional week, followed by 1 time a week for 5 additional weeks  GOALS: (Goals have been discussed and agreed upon with patient.)  Short-Term Functional Goals: Time Frame: 30 days  1. Pt will be compliant with HEP in order to increase LE strength/endurance/mobility to improve functional mobility and overall quality of life. (MET 11/5/2018)  2. Pt will improve score on the Lower Extremity Functional Scale to 38/80 in order to demonstrate improved functional mobility and quality of life. (MET 11/5/2018)  3. Pt will improve score on the Foot and Ankle Ability Measure to 41/84 in order to demonstrate improved functional mobility and quality of life. (MET 11/5/2018)  4. Pt will report standing for > 5 minutes with minimal to no increase in pain in order to be able to stand for prolonged periods as needed to perform chores. (MET 11/5/2018)  5. Pt will be able to ascend/descend 6 steps with S with or without rail with safe gait pattern and minimal to no increase in pain in order to improve community mobility. (MET 11/5/2018)  Discharge Goals: Time Frame: 60 days  1.  Pt will be independent with HEP in order to increase LE strength/endurance/mobility to improve functional mobility and overall quality of life. (PROGRESSING , 12/31/2018)   2. Pt will improve score on the Lower Extremity Functional Scale to 50/80 in order to demonstrate improved functional mobility and quality of life. (MET , 12/31/2018)   3. Pt will improve score on the Foot and Ankle Ability Measure to 45/84 in order to demonstrate improved functional mobility and quality of life. (MET , 12/31/2018)   4. Pt will report standing for > 10 minutes with minimal to no increase in pain in order to be able to stand for prolonged periods as needed to perform chores. (MET , 11/27/2018)   5. Pt will be able to ascend/descend 12 steps Magdalena with or without rail with reciprocal gait pattern and minimal to no increase in pain in order to improve community mobility. (PROGRESSING , 12/31/2018)   6. Pt will improve score on the Lower Extremity Functional Scale to 58/80 in order to demonstrate improved functional mobility and quality of life. (NEW GOAL , 12/31/2018)   Rehabilitation Potential For Stated Goals: Good  Regarding Chun Pelletier's therapy, I certify that the treatment plan above will be carried out by a therapist or under their direction. Thank you for this referral,  Supa Beltran DPT     Referring Physician Signature: Vasquez Franklin MD          Date                                 HISTORY:   History of Present Injury/Illness (Reason for Referral): *History per pt or pt's family except where otherwise noted  Pt states pain in her R ankle started in July 2018 (states she was walking more than usual), and she states that she was being treated for it (with prednisone) and it had gotten better with taping, but it has recently gotten worse again (early September when she stopped doing taping and started everyday walking again). States the pain has gotten better temporarily with Prednisone, but states that when she goes off the Prednisone it starts hurting again.  Pain at worst is 6-7/10 (aggravating activities include walking > 5 minutes, prolonged standing > 1-2 minutes - even in shower). Pain at best is 0/10 (eases pain with prednisone, rest, taping, mobic, ice). Pt states she was given boot to wear to help with the pain but she cannot wear this while driving (she is supposed to wear this 4 weeks starting last Friday). Past Medical History/Comorbidities:   Ms. Inez Aquino  has a past medical history of Acid reflux, ADHD (attention deficit hyperactivity disorder), Allergic rhinitis, Anxiety disorder, Arthritis, Asthma, B12 deficiency, Cerumen impaction, Chronic sinusitis, Depression, Deviated nasal septum, Endocrine disease, Fibromyalgia, GERD (gastroesophageal reflux disease), Headache, Hypothyroidism, Low blood sugar, Meniere disease, Migraine, Nasal obstruction, OCD (obsessive compulsive disorder), Psychiatric disorder, Special examinations, Special examinations, Tinnitus, and Unspecified hypothyroidism. Ms. Inez Aquino  has a past surgical history that includes hx orthopaedic; hx heent; hx heent (07/03/2013); and hx heent (06/2017). Social History/Living Environment:    lives alone in one story apartment with elevator to get up (15 flights of stairs with B railing - occasionally has to take the stairs down if she has an appointment and the elevator is busy)  Prior Level of Function/Work/Activity:  On disability for bipolar disorder and anxiety; likes to walk downtown with her friends, does housework (vacuuming, tidying up, light cooking); has to shop for her groceries  Dominant Side:         RIGHT  Other Clinical Tests:          X-rays of R foot (showed heel spur according to pt)  Previous Treatment Approaches:          Prednisone has helped  Personal Factors:          Sex:  female        Age:  41 y.o. Current Medications:    Current Outpatient Medications:     levothyroxine (SYNTHROID) 125 mcg tablet, Take 1 Tab by mouth daily. , Disp: 90 Tab, Rfl: 1    Dexlansoprazole (DEXILANT) 60 mg CpDB, Take 1 Cap by mouth every morning., Disp: 90 Cap, Rfl: 1    candesartan (ATACAND) 4 mg tablet, Take 1 Tab by mouth daily. , Disp: 90 Tab, Rfl: 1    albuterol (PROVENTIL HFA, VENTOLIN HFA, PROAIR HFA) 90 mcg/actuation inhaler, Take 2 Puffs by inhalation as needed for Wheezing., Disp: 3 Inhaler, Rfl: 1    montelukast (SINGULAIR) 10 mg tablet, Take 1 Tab by mouth daily. , Disp: 90 Tab, Rfl: 1    cetirizine (ZYRTEC) 10 mg tablet, Take 1 Tab by mouth daily. , Disp: 90 Tab, Rfl: 1    meloxicam (MOBIC) 15 mg tablet, Take 1 Tab by mouth daily. , Disp: 90 Tab, Rfl: 1    mirabegron ER (MYRBETRIQ) 25 mg ER tablet, Take 1 Tab by mouth nightly., Disp: 30 Tab, Rfl: 0    diclofenac (VOLTAREN) 1 % gel, Apply 2 g to affected area four (4) times daily. Apply to right foot as needed 2-4 times a day, Disp: 100 g, Rfl: 2    tiZANidine (ZANAFLEX) 4 mg tablet, Take 1 Tab by mouth three (3) times daily as needed. , Disp: 90 Tab, Rfl: 1    IRON, FERROUS SULFATE, PO, Take 45 mg by mouth daily. , Disp: , Rfl:     cyanocobalamin 1,000 mcg tablet, Take 1 Tab by mouth daily. , Disp: 60 Tab, Rfl: 0    butalbital-acetaminophen (BUPAP)  mg tab, Take 1 Tab by mouth daily as needed. , Disp: 30 Tab, Rfl: 5    venlafaxine-SR (EFFEXOR XR) 150 mg capsule, Take  by mouth daily. , Disp: , Rfl:     escitalopram oxalate (LEXAPRO) 20 mg tablet, Take 20 mg by mouth daily. , Disp: , Rfl:     Magnesium Oxide 500 mg cap, Take 500 mg by mouth daily. , Disp: , Rfl:     traMADol (ULTRAM) 50 mg tablet, Take 1 Tab by mouth every eight (8) hours as needed for Pain. Max Daily Amount: 150 mg., Disp: 30 Tab, Rfl: 1    amphetamine-dextroamphetamine XR (ADDERALL XR) 20 mg XR capsule, TK 1 C PO QD, Disp: , Rfl: 0    AMITIZA 24 mcg capsule, TK 1 C PO BID WF AND WATER, Disp: , Rfl: 6    ALPRAZolam (XANAX) 1 mg tablet, 0.5 mg three (3) times daily as needed. , Disp: , Rfl: 4    risperiDONE (RISPERDAL) 2 mg tablet, Take 3 mg by mouth nightly., Disp: , Rfl: 3   LAMICTAL 200 mg tablet, TK 1 T PO BID, Disp: , Rfl: 4    pregabalin (LYRICA) 75 mg capsule, tid, Disp: , Rfl:     melatonin tab tablet, Take 5 mg by mouth., Disp: , Rfl:     cholecalciferol (VITAMIN D3) 5,000 unit capsule, Take 2,000 Units by mouth., Disp: , Rfl:    Date Last Reviewed:  12/31/2018   # of Personal Factors/Comorbidities that affect the Plan of Care: 3+: HIGH COMPLEXITY   EXAMINATION:   Reassessment on 12/31//2018  Observation/Orthostatic Postural Assessment:    The following postural deficits were noted in sitting:  loss of cervical lordosis, increased thoracic kyphosis, forward head, rounded shoulders, posterior pelvic tilt - improved upright on 11/27/2018  The following postural deficits were noted in standing: forward trunk flexion, pronated foot L > R, slight genu valgus noted B - improved upright posture on 11/27/2018  Palpation:          Pt with tenderness noted at posterior R heel and at dorsum of R foot - less tenderness throughout R foot/ankle noted on 11/5/2018  ROM:    Initial measurement: (on 10/10/2018) Initial measurement: (on 10/10/2018) Reassessment measurement: (on 11/5/2018) Reassessment measurement:    L LE: WFL throughout, but excessive hip IR passively R LE: WFL throughout, but excessive hip IR passively; ankle motion WFL but significant tightness noted in dorsiflexors with pain in ankle R LE: ankle motion WFL - tightness noted in dorsiflexors with minimal to no pain in ankle      Strength:    Initial measurement: (on 10/10/2018) Initial measurement: (on 10/10/2018) Reassessment measurement: (on 12/31/2018) Reassessment measurement: (on 12/31/2018)   L LE:  Hip flexion: 5/5   Hip extension: 4-/5  Hip abduction: 4-/5  Hip adduction: 5/5  Hip IR: 4+/5  Hip ER: 4/5   Knee flexion:  4/5  Knee extension: 5/5  Ankle DF: 4/5 R LE:  Hip flexion: 5/5  Hip extension: 4-/5  Hip abduction: 4/5  Hip adduction: 4/5  Hip IR: 4+/5  Hip ER: 4/5  Knee flexion: 4+/5  Knee extension: 5/5  Ankle DF: 4-/5 L LE:  Hip flexion: 5/5   Hip extension: 4/5  Hip abduction: 4/5  Hip adduction: 4/5  Hip IR: 4+/5  Hip ER: 4/5   Knee flexion:  4+/5  Knee extension: 5/5  Ankle DF: 4/5 R LE:  Hip flexion: 5/5  Hip extension: 4/5  Hip abduction: 4/5  Hip adduction: 4/5  Hip IR: 4+/5  Hip ER: 4/5  Knee flexion: 4+/5  Knee extension: 5/5  Ankle DF: 4/5     Special Tests:          No leg length discrepancy noted  Neurological Screen:        Myotomes:  WFL        Dermatomes:  No deficits noted  Functional Mobility:         Gait/Ambulation:  Pt ambulates with no AD with following gait deficits: decreased B step length/clearance, increased pronation at each foot in stance phase, decreased B heel strike, decreased B arm swing, slight forward trunk flexion, increased lateral sway - slightly improved upright posture and improved B step length/clearance and heel strike on 11/5/2018 - improved upright posture but continuing to notice decreased heel strike with excessive knee flexion during stance phase at each LE on 12/31/2018        Transfers:  Pt able to stand/sit with minimal to no UE use        Bed Mobility:  Pt able to perform Magdalena with increased time  Balance:          Slight lateral sway with ambulation    Body Structures Involved:   1. Nerves  2. Bones  3. Joints  4. Muscles  5. Ligaments Body Functions Affected:  1. Sensory/Pain  2. Neuromusculoskeletal  3. Movement Related Activities and Participation Affected:  1. General Tasks and Demands  2. Mobility  3. Self Care  4. Domestic Life  5. Interpersonal Interactions and Relationships  6. Community, Social and [place]   # of elements that affect the Plan of Care: 4+: HIGH COMPLEXITY   CLINICAL PRESENTATION:   Presentation: Evolving clinical presentation with changing clinical characteristics: MODERATE COMPLEXITY   CLINICAL DECISION MAKING:   Outcome Measure:    Tool Used: Lower Extremity Functional Scale (LEFS)  Score:  Initial: 27/80 Most Recent: 50/80 (Date: 12/31/2018) Interpretation of Score: 20 questions each scored on a 5 point scale with 0 representing \"extreme difficulty or unable to perform\" and 4 representing \"no difficulty\". The lower the score, the greater the functional disability. 80/80 represents no disability. Minimal detectable change is 9 points. Score 80 79-63 62-48 47-32 31-16 15-1 0   Modifier CH CI CJ CK CL CM CN     ? Mobility - Walking and Moving Around:     - CURRENT STATUS: CJ - 20%-39% impaired, limited or restricted    - GOAL STATUS: CJ - 20%-39% impaired, limited or restricted    - D/C STATUS:  ---------------To be determined---------------    Tool Used: PT/OT FOOT AND ANKLE ABILITY MEASURE  Score:  Initial: 36/84 Most Recent:  56/84 (Date: 12/31/2018)   Interpretation of Score: For the \"Activities of Daily Living\", there are 21 questions each scored on a 5 point scale with 0 representing \"Unable to do\" and 4 representing \"No difficulty\". The lower the score, the greater the functional disability. 84/84 represents no disability. Minimal detectable change is 5.7 points. With the addition of the 8 questions in the \"Sports Subscale,\" there are 29 questions, each scored on a 5 point scale with 0 representing \"Unable to do\" and 4 representing \"No difficulty\". The lower the score, the greater the functional disability. 116/116 represents no disability. Minimal detectable change is 12.3 points. Activities of Daily Living:  Score 84 83-68 67-51 50-34 33-18 17-1 0   Modifier CH CI CJ CK CL CM CN     Activities of Daily Living + Sports Subscale:  Score 116 115-94 93-71 70-47 46-24 23-1 0   Modifier CH CI CJ CK CL CM CN     Payor: [employer] MEDICARE / Plan: [address] [place] [address] HMO / Product Type: Managed Care Medicare /   Medical Necessity:   · Patient is expected to demonstrate progress in strength, range of motion, balance and functional technique to improve ability to perform pain-free standing/ambulation.   · Patient demonstrates good rehab potential due to higher previous functional level. Reason for Services/Other Comments:  · Patient continues to require modification of therapeutic interventions to increase complexity of exercises. Use of outcome tool(s) and clinical judgement create a POC that gives a: Questionable prediction of patient's progress: MODERATE COMPLEXITY   TREATMENT:   (In addition to Assessment/Re-Assessment sessions the following treatments were rendered)  Subjective comments at beginning of session: Pt states she was walking at [address] [facility] for about 15-20 minutes, and her pain is 3-4/10 today  THERAPEUTIC EXERCISE: (45 minutes):  Exercises per grid below (and assessment above on 12/31/2018) to improve mobility, strength, balance and dynamic movement of ankle - right to improve functional mobility. Required minimal visual, verbal and manual cues to promote proper body alignment, promote proper body posture, promote proper body mechanics and promote proper body breathing techniques. Progressed resistance, range, repetitions and complexity of movement as indicated.     Date:  12/19/2018 Date:  12-21-18 Date:  12-26-18 Date:  12/28/2018 Date:  12/31/2018   Activity/Exercise        Physiostep L3 resistance for 13 minutes to increase strength/endurance L3 resistance for 12 minutes to increase strength/endurance  L3 resistance for 15 minutes to increase strength/ endurance  L3 resistance for 15 minutes to increase strength/ endurance L3 resistance for 15 minutes to increase strength/ endurance   Ankle plantarflexor stretch on incline board 30 second hold x 3 reps with knees extended then flexed 3 x 30 sec hold with knees extended  3 x 30 sec hold with knees extended  3 x 30 sec hold with knees extended then flexed 3 x 30 sec hold with knees extended then flexed   Heel raises on firm ground B UE support with feet on incline board (1st notch from bottom); slow lowering on B LEs for 10 reps/1 set B UE support with feet on incline board(1st notch from bottom)   Slow descend   1 X 10  B UE support with feet on incline board (1st notch from bottom)  Slow descend   1 x 10  B UE support with feet on incline board (1st notch from bottom); slow lowering on B LEs for 15 reps/1 set B UE support with feet on incline board (1st notch from bottom); slow lowering on R LE only for 15 reps/1 set   Supine hamstring stretch        Leg press machine  30#  1 x 20  With slow controlled movements 30#  1 x 20 and 1 x 10  with slow controlled movements 30 lb; 20 reps/1 set with cues for slow movements with increased control 35 lb; 15 reps/1 set with cues for slow movements with increased control   Ambulation over level ground        Ascending/descending stairs     12 steps with reciprocal gait and no rail use per assessment above with minimal to moderate increase in pain   Step ups        Varying resistance at LEs in different planes of motion     Per strength assessment above           *given in HEP  [facility]      Manual therapy (8 minutes): With pt in prone position, STM to R gastrocnemius and soleus as well as cross friction massage to R achilles tendon to reduce tightness and pain and improve ankle mobility. Followed with ice massage to R achilles tendon to reduce pain and inflammation. Modalities ( minutes). Treatment/Session Assessment: See assessment above  · Pain/ Symptoms: Initial:   3-4/10 in posterior R ankle  Post Session: no change in pain noted at end of session  ·   Compliance with Program/Exercises: Good compliance with attending scheduled sessions. · Recommendations/Intent for next treatment session: \"Next visit will focus on advancements to more challenging activities and reduction in assistance provided\".  Manual therapy/modalities as needed to reduce tightness and pain; work on eccentric plantarflexor strengthening and balance  Total Treatment Duration:   PT Patient Time In/Time Out  Time In: 1027  Time Out: 75 Tanya Pastor [place] DPT    Future Appointments   Date Time Provider Jo Echeverriai   1/3/2019 11:00 AM Ingris Cifuentes DPT [facility]   1/10/2019 10:15 AM VALENTINO Ann [facility]   1/17/2019 10:15 AM VALENTINO Ann D   1/24/2019 10:15 AM VALENTINO Ann [facility]   1/31/2019 11:00 AM VALENTINO [facility]   5/1/2019  8:45 AM JACINTA PM LAB/RAD RAMANDEEP [facility]   5/8/2019  9:20 AM MD RAMANDEEP [facility]

## 2019-01-03 ENCOUNTER — HOSPITAL ENCOUNTER (OUTPATIENT)
Dept: PHYSICAL THERAPY | Age: 44
Discharge: HOME | End: 2019-01-03
Payer: MEDICARE

## 2019-01-03 PROCEDURE — 97110 THERAPEUTIC EXERCISES: CPT

## 2019-01-03 PROCEDURE — 97140 MANUAL THERAPY 1/> REGIONS: CPT

## 2019-01-03 NOTE — PROGRESS NOTES
Mikhail Wade  : 1975  Primary: [place] [employer] [place]  Secondary: [place] [facility]  [address] [facility], [place] [address] [address] [facility]  [phone]   [phone]       OUTPATIENT PHYSICAL THERAPY:Daily Note 1/3/2019    ICD-10: Treatment Diagnosis: Pain in right ankle and joints of right foot (M25.571)      Stiffness of right ankle, not elsewhere classified (M25.671)    Difficulty in walking, not elsewhere classified (R26.2)   Precautions/Allergies:   Abilify [aripiprazole]; Geodon [ziprasidone hcl]; Tabitha Fraction; Ambien [zolpidem]; and Topamax [topiramate]   Fall Risk Score: 3 (? 5 = High Risk)  MD Orders: Evaluate and treat  MEDICAL/REFERRING DIAGNOSIS:  foot, right   DATE OF ONSET: 2018  REFERRING PHYSICIAN: Wanda Ashraf MD  RETURN PHYSICIAN APPOINTMENT: 2018 (pt unsure of date)   This section established at most recent assessment  ASSESSMENT:  Mikhail Wade has now attended 23 physical therapy sessions for R ankle/foot pain that started in 2018 and has recently gotten worse. This session, she demonstrated improved B LE strength/endurance, less pain with R ankle mobility, less postural/gait deficits, and improved standing/walking tolerance. Despite the above listed improvements, she continues to demonstrate decreased B LE strength/endurance, pain with R ankle mobility, multiple postural/gait deficits, decreased standing/walking tolerance, and decreased functional mobility. Pt may continue to benefit from skilled PT to address the above listed deficits to improve ability to perform pain-free ADLs/IADLs and to improve overall quality of life prior to discharge. However, will reduce frequency to one time/week and extend treatment dates due to pt's slowing progress. PROBLEM LIST (Impacting functional limitations):  1. Decreased Strength  2. Decreased ADL/Functional Activities  3.  Decreased Transfer Abilities  4. Decreased Ambulation Ability/Technique  5. Decreased Balance  6. Increased Pain  7. Decreased Activity Tolerance  8. Increased Fatigue  9. Decreased Flexibility/Joint Mobility  10. Edema/Girth INTERVENTIONS PLANNED:  1. Balance Exercise  2. Bed Mobility  3. Cold  4. Electrical Stimulation  5. Family Education  6. Gait Training  7. Heat  8. Home Exercise Program (HEP)  9. Manual Therapy  10. Neuromuscular Re-education/Strengthening  11. Range of Motion (ROM)  12. Therapeutic Activites  13. Therapeutic Exercise/Strengthening  14. Transcutaneous Electrical Nerve Stimulation (TENS)  15. Transfer Training  16. Ultrasound (US)   TREATMENT PLAN:  Effective Dates: 12/31/2018 TO 2/15/2019. Frequency/Duration: 2 times a week for 1 additional week, followed by 1 time a week for 5 additional weeks  GOALS: (Goals have been discussed and agreed upon with patient.)  Short-Term Functional Goals: Time Frame: 30 days  1. Pt will be compliant with HEP in order to increase LE strength/endurance/mobility to improve functional mobility and overall quality of life. (MET 11/5/2018)  2. Pt will improve score on the Lower Extremity Functional Scale to 38/80 in order to demonstrate improved functional mobility and quality of life. (MET 11/5/2018)  3. Pt will improve score on the Foot and Ankle Ability Measure to 41/84 in order to demonstrate improved functional mobility and quality of life. (MET 11/5/2018)  4. Pt will report standing for > 5 minutes with minimal to no increase in pain in order to be able to stand for prolonged periods as needed to perform chores. (MET 11/5/2018)  5. Pt will be able to ascend/descend 6 steps with S with or without rail with safe gait pattern and minimal to no increase in pain in order to improve community mobility. (MET 11/5/2018)  Discharge Goals: Time Frame: 60 days  1.  Pt will be independent with HEP in order to increase LE strength/endurance/mobility to improve functional mobility and overall quality of life. (PROGRESSING , 12/31/2018)   2. Pt will improve score on the Lower Extremity Functional Scale to 50/80 in order to demonstrate improved functional mobility and quality of life. (MET , 12/31/2018)   3. Pt will improve score on the Foot and Ankle Ability Measure to 45/84 in order to demonstrate improved functional mobility and quality of life. (MET , 12/31/2018)   4. Pt will report standing for > 10 minutes with minimal to no increase in pain in order to be able to stand for prolonged periods as needed to perform chores. (MET , 11/27/2018)   5. Pt will be able to ascend/descend 12 steps Magdalena with or without rail with reciprocal gait pattern and minimal to no increase in pain in order to improve community mobility. (PROGRESSING , 12/31/2018)   6. Pt will improve score on the Lower Extremity Functional Scale to 58/80 in order to demonstrate improved functional mobility and quality of life. (NEW GOAL , 12/31/2018)   Rehabilitation Potential For Stated Goals: Good  Regarding [facility]'s therapy, I certify that the treatment plan above will be carried out by a therapist or under their direction. Thank you for this referral,  Yahaira Luna DPT     Referring Physician Signature: Mimi Hillman MD          Date                                 HISTORY:   History of Present Injury/Illness (Reason for Referral): *History per pt or pt's family except where otherwise noted  Pt states pain in her R ankle started in July 2018 (states she was walking more than usual), and she states that she was being treated for it (with prednisone) and it had gotten better with taping, but it has recently gotten worse again (early September when she stopped doing taping and started everyday walking again). States the pain has gotten better temporarily with Prednisone, but states that when she goes off the Prednisone it starts hurting again.  Pain at worst is 6-7/10 (aggravating activities include walking > 5 minutes, prolonged standing > 1-2 minutes - even in shower). Pain at best is 0/10 (eases pain with prednisone, rest, taping, mobic, ice). Pt states she was given boot to wear to help with the pain but she cannot wear this while driving (she is supposed to wear this 4 weeks starting last Friday). Past Medical History/Comorbidities:   Ms. Falguni Hodgson  has a past medical history of Acid reflux, ADHD (attention deficit hyperactivity disorder), Allergic rhinitis, Anxiety disorder, Arthritis, Asthma, B12 deficiency, Cerumen impaction, Chronic sinusitis, Depression, Deviated nasal septum, Endocrine disease, Fibromyalgia, GERD (gastroesophageal reflux disease), Headache, Hypothyroidism, Low blood sugar, Meniere disease, Migraine, Nasal obstruction, OCD (obsessive compulsive disorder), Psychiatric disorder, Special examinations, Special examinations, Tinnitus, and Unspecified hypothyroidism. Ms. Falguni Hodgson  has a past surgical history that includes hx orthopaedic; hx heent; hx heent (07/03/2013); and hx heent (06/2017). Social History/Living Environment:    lives alone in one story apartment with elevator to get up (15 flights of stairs with B railing - occasionally has to take the stairs down if she has an appointment and the elevator is busy)  Prior Level of Function/Work/Activity:  On disability for bipolar disorder and anxiety; likes to walk downtown with her friends, does housework (vacuuming, tidying up, light cooking); has to shop for her groceries  Dominant Side:         RIGHT  Other Clinical Tests:          X-rays of R foot (showed heel spur according to pt)  Previous Treatment Approaches:          Prednisone has helped  Personal Factors:          Sex:  female        Age:  41 y.o. Current Medications:    Current Outpatient Medications:     levothyroxine (SYNTHROID) 125 mcg tablet, Take 1 Tab by mouth daily. , Disp: 90 Tab, Rfl: 1    Dexlansoprazole (DEXILANT) 60 mg CpDB, Take 1 Cap by mouth every morning., Disp: 90 Cap, Rfl: 1    candesartan (ATACAND) 4 mg tablet, Take 1 Tab by mouth daily. , Disp: 90 Tab, Rfl: 1    albuterol (PROVENTIL HFA, VENTOLIN HFA, PROAIR HFA) 90 mcg/actuation inhaler, Take 2 Puffs by inhalation as needed for Wheezing., Disp: 3 Inhaler, Rfl: 1    montelukast (SINGULAIR) 10 mg tablet, Take 1 Tab by mouth daily. , Disp: 90 Tab, Rfl: 1    cetirizine (ZYRTEC) 10 mg tablet, Take 1 Tab by mouth daily. , Disp: 90 Tab, Rfl: 1    meloxicam (MOBIC) 15 mg tablet, Take 1 Tab by mouth daily. , Disp: 90 Tab, Rfl: 1    mirabegron ER (MYRBETRIQ) 25 mg ER tablet, Take 1 Tab by mouth nightly., Disp: 30 Tab, Rfl: 0    diclofenac (VOLTAREN) 1 % gel, Apply 2 g to affected area four (4) times daily. Apply to right foot as needed 2-4 times a day, Disp: 100 g, Rfl: 2    tiZANidine (ZANAFLEX) 4 mg tablet, Take 1 Tab by mouth three (3) times daily as needed. , Disp: 90 Tab, Rfl: 1    IRON, FERROUS SULFATE, PO, Take 45 mg by mouth daily. , Disp: , Rfl:     cyanocobalamin 1,000 mcg tablet, Take 1 Tab by mouth daily. , Disp: 60 Tab, Rfl: 0    butalbital-acetaminophen (BUPAP)  mg tab, Take 1 Tab by mouth daily as needed. , Disp: 30 Tab, Rfl: 5    venlafaxine-SR (EFFEXOR XR) 150 mg capsule, Take  by mouth daily. , Disp: , Rfl:     escitalopram oxalate (LEXAPRO) 20 mg tablet, Take 20 mg by mouth daily. , Disp: , Rfl:     Magnesium Oxide 500 mg cap, Take 500 mg by mouth daily. , Disp: , Rfl:     traMADol (ULTRAM) 50 mg tablet, Take 1 Tab by mouth every eight (8) hours as needed for Pain. Max Daily Amount: 150 mg., Disp: 30 Tab, Rfl: 1    amphetamine-dextroamphetamine XR (ADDERALL XR) 20 mg XR capsule, TK 1 C PO QD, Disp: , Rfl: 0    AMITIZA 24 mcg capsule, TK 1 C PO BID WF AND WATER, Disp: , Rfl: 6    ALPRAZolam (XANAX) 1 mg tablet, 0.5 mg three (3) times daily as needed. , Disp: , Rfl: 4    risperiDONE (RISPERDAL) 2 mg tablet, Take 3 mg by mouth nightly., Disp: , Rfl: 3    LAMICTAL 200 mg tablet, TK 1 T PO BID, Disp: , Rfl: 4    pregabalin (LYRICA) 75 mg capsule, tid, Disp: , Rfl:     melatonin tab tablet, Take 5 mg by mouth., Disp: , Rfl:     cholecalciferol (VITAMIN D3) 5,000 unit capsule, Take 2,000 Units by mouth., Disp: , Rfl:    Date Last Reviewed:  1/3/2019   # of Personal Factors/Comorbidities that affect the Plan of Care: 3+: HIGH COMPLEXITY   EXAMINATION:   Reassessment on 12/31//2018  Observation/Orthostatic Postural Assessment:    The following postural deficits were noted in sitting:  loss of cervical lordosis, increased thoracic kyphosis, forward head, rounded shoulders, posterior pelvic tilt - improved upright on 11/27/2018  The following postural deficits were noted in standing: forward trunk flexion, pronated foot L > R, slight genu valgus noted B - improved upright posture on 11/27/2018  Palpation:          Pt with tenderness noted at posterior R heel and at dorsum of R foot - less tenderness throughout R foot/ankle noted on 11/5/2018  ROM:    Initial measurement: (on 10/10/2018) Initial measurement: (on 10/10/2018) Reassessment measurement: (on 11/5/2018) Reassessment measurement:    L LE: WFL throughout, but excessive hip IR passively R LE: WFL throughout, but excessive hip IR passively; ankle motion WFL but significant tightness noted in dorsiflexors with pain in ankle R LE: ankle motion WFL - tightness noted in dorsiflexors with minimal to no pain in ankle      Strength:    Initial measurement: (on 10/10/2018) Initial measurement: (on 10/10/2018) Reassessment measurement: (on 12/31/2018) Reassessment measurement: (on 12/31/2018)   L LE:  Hip flexion: 5/5   Hip extension: 4-/5  Hip abduction: 4-/5  Hip adduction: 5/5  Hip IR: 4+/5  Hip ER: 4/5   Knee flexion:  4/5  Knee extension: 5/5  Ankle DF: 4/5 R LE:  Hip flexion: 5/5  Hip extension: 4-/5  Hip abduction: 4/5  Hip adduction: 4/5  Hip IR: 4+/5  Hip ER: 4/5  Knee flexion: 4+/5  Knee extension: 5/5  Ankle DF: 4-/5 L LE:  Hip flexion: 5/5 Hip extension: 4/5  Hip abduction: 4/5  Hip adduction: 4/5  Hip IR: 4+/5  Hip ER: 4/5   Knee flexion:  4+/5  Knee extension: 5/5  Ankle DF: 4/5 R LE:  Hip flexion: 5/5  Hip extension: 4/5  Hip abduction: 4/5  Hip adduction: 4/5  Hip IR: 4+/5  Hip ER: 4/5  Knee flexion: 4+/5  Knee extension: 5/5  Ankle DF: 4/5     Special Tests:          No leg length discrepancy noted  Neurological Screen:        Myotomes:  WFL        Dermatomes:  No deficits noted  Functional Mobility:         Gait/Ambulation:  Pt ambulates with no AD with following gait deficits: decreased B step length/clearance, increased pronation at each foot in stance phase, decreased B heel strike, decreased B arm swing, slight forward trunk flexion, increased lateral sway - slightly improved upright posture and improved B step length/clearance and heel strike on 11/5/2018 - improved upright posture but continuing to notice decreased heel strike with excessive knee flexion during stance phase at each LE on 12/31/2018        Transfers:  Pt able to stand/sit with minimal to no UE use        Bed Mobility:  Pt able to perform Magdalena with increased time  Balance:          Slight lateral sway with ambulation    Body Structures Involved:   1. Nerves  2. Bones  3. Joints  4. Muscles  5. Ligaments Body Functions Affected:  1. Sensory/Pain  2. Neuromusculoskeletal  3. Movement Related Activities and Participation Affected:  1. General Tasks and Demands  2. Mobility  3. Self Care  4. Domestic Life  5. Interpersonal Interactions and Relationships  6. Community, Social and [place]   # of elements that affect the Plan of Care: 4+: HIGH COMPLEXITY   CLINICAL PRESENTATION:   Presentation: Evolving clinical presentation with changing clinical characteristics: MODERATE COMPLEXITY   CLINICAL DECISION MAKING:   Outcome Measure:    Tool Used: Lower Extremity Functional Scale (LEFS)  Score:  Initial: 27/80 Most Recent: 50/80 (Date: 12/31/2018)   Interpretation of Score: 20 questions each scored on a 5 point scale with 0 representing \"extreme difficulty or unable to perform\" and 4 representing \"no difficulty\". The lower the score, the greater the functional disability. 80/80 represents no disability. Minimal detectable change is 9 points. Score 80 79-63 62-48 47-32 31-16 15-1 0   Modifier CH CI CJ CK CL CM CN     ? Mobility - Walking and Moving Around:     - CURRENT STATUS: CJ - 20%-39% impaired, limited or restricted    - GOAL STATUS: CJ - 20%-39% impaired, limited or restricted    - D/C STATUS:  ---------------To be determined---------------    Tool Used: PT/OT FOOT AND ANKLE ABILITY MEASURE  Score:  Initial: 36/84 Most Recent:  56/84 (Date: 12/31/2018)   Interpretation of Score: For the \"Activities of Daily Living\", there are 21 questions each scored on a 5 point scale with 0 representing \"Unable to do\" and 4 representing \"No difficulty\". The lower the score, the greater the functional disability. 84/84 represents no disability. Minimal detectable change is 5.7 points. With the addition of the 8 questions in the \"Sports Subscale,\" there are 29 questions, each scored on a 5 point scale with 0 representing \"Unable to do\" and 4 representing \"No difficulty\". The lower the score, the greater the functional disability. 116/116 represents no disability. Minimal detectable change is 12.3 points. Activities of Daily Living:  Score 84 83-68 67-51 50-34 33-18 17-1 0   Modifier CH CI CJ CK CL CM CN     Activities of Daily Living + Sports Subscale:  Score 116 115-94 93-71 70-47 46-24 23-1 0   Modifier CH CI CJ CK CL CM CN     Payor: [employer] MEDICARE / Plan: [address] [place] [address] HMO / Product Type: Managed Care Medicare /   Medical Necessity:   · Patient is expected to demonstrate progress in strength, range of motion, balance and functional technique to improve ability to perform pain-free standing/ambulation.   · Patient demonstrates good rehab potential due to higher previous functional level. Reason for Services/Other Comments:  · Patient continues to require modification of therapeutic interventions to increase complexity of exercises. Use of outcome tool(s) and clinical judgement create a POC that gives a: Questionable prediction of patient's progress: MODERATE COMPLEXITY   TREATMENT:   (In addition to Assessment/Re-Assessment sessions the following treatments were rendered)  Subjective comments at beginning of session: Pt states her ankle pain is not bothering her much right now  THERAPEUTIC EXERCISE: (30 minutes):  Exercises per grid below to improve mobility, strength, balance and dynamic movement of ankle - right to improve functional mobility. Required minimal visual, verbal and manual cues to promote proper body alignment, promote proper body posture, promote proper body mechanics and promote proper body breathing techniques. Progressed resistance, range, repetitions and complexity of movement as indicated.     Date:  12/19/2018 Date:  12-21-18 Date:  12-26-18 Date:  12/28/2018 Date:  12/31/2018 Date:  1/3/2018   Activity/Exercise         Physiostep L3 resistance for 13 minutes to increase strength/endurance L3 resistance for 12 minutes to increase strength/endurance  L3 resistance for 15 minutes to increase strength/ endurance  L3 resistance for 15 minutes to increase strength/ endurance L3 resistance for 15 minutes to increase strength/ endurance L3 progressing to L4 resistance (after 5 minutes) for 15 minutes to increase strength/ endurance   Ankle plantarflexor stretch on incline board 30 second hold x 3 reps with knees extended then flexed 3 x 30 sec hold with knees extended  3 x 30 sec hold with knees extended  3 x 30 sec hold with knees extended then flexed 3 x 30 sec hold with knees extended then flexed 3 x 30 sec hold with knees extended then flexed   Heel raises on firm ground B UE support with feet on incline board (1st notch from bottom); slow lowering on B LEs for 10 reps/1 set B UE support with feet on incline board(1st notch from bottom)   Slow descend   1 X 10  B UE support with feet on incline board (1st notch from bottom)  Slow descend   1 x 10  B UE support with feet on incline board (1st notch from bottom); slow lowering on B LEs for 15 reps/1 set B UE support with feet on incline board (1st notch from bottom); slow lowering on R LE only for 15 reps/1 set B UE support with feet on incline board (1st notch from bottom); slow lowering on R LE only for 15 reps/1 set   Supine hamstring stretch         Leg press machine  30#  1 x 20  With slow controlled movements 30#  1 x 20 and 1 x 10  with slow controlled movements 30 lb; 20 reps/1 set with cues for slow movements with increased control 35 lb; 15 reps/1 set with cues for slow movements with increased control 35 lb; 20 reps/1 set with cues for slow movements with increased control   Ambulation over level ground         Ascending/descending stairs     12 steps with reciprocal gait and no rail use per assessment above with minimal to moderate increase in pain    Step ups         Varying resistance at LEs in different planes of motion     Per strength assessment above             *given in HEP  [facility] Portal      Manual therapy (8 minutes): With pt in prone position, STM to R gastrocnemius and soleus as well as cross friction massage to R achilles tendon to reduce tightness and pain and improve ankle mobility. Followed with ice massage to R achilles tendon to reduce pain and inflammation. Modalities ( minutes). Treatment/Session Assessment: Pt continues to have difficulty with heel raises, but was able to perform with minimal to no increase in pain today. Her pain level overall was only slightly increased at end of session.   · Pain/ Symptoms: Initial:   1/10 in posterior R ankle  Post Session: 3/10  ·   Compliance with Program/Exercises: Good compliance with attending scheduled sessions. · Recommendations/Intent for next treatment session: \"Next visit will focus on advancements to more challenging activities and reduction in assistance provided\".  Manual therapy/modalities as needed to reduce tightness and pain; work on eccentric plantarflexor strengthening and balance  Total Treatment Duration:   PT Patient Time In/Time Out  Time In: 1101  Time Out: [address], DPT    Future Appointments   Date Time Provider Jo Nloan   1/10/2019 10:15 AM Irena SIMMS DPT [facility]   1/17/2019 10:15 AM VALENTINO Fraser [facility]   1/24/2019 10:15 AM VALENTINO Alvares   1/31/2019 11:00 AM VALENTINO Alvares   5/1/2019  8:45 AM MTTAMERA PM LAB/RAD RAMANDEEP WORKMAN [facility]   5/8/2019  9:20 AM MD RAMANDEEP Schneider MTTAMERA [facility]

## 2019-01-10 ENCOUNTER — APPOINTMENT (OUTPATIENT)
Dept: PHYSICAL THERAPY | Age: 44
End: 2019-01-10
Payer: MEDICARE

## 2019-01-11 ENCOUNTER — HOSPITAL ENCOUNTER (OUTPATIENT)
Dept: PHYSICAL THERAPY | Age: 44
Discharge: HOME | End: 2019-01-11
Payer: MEDICARE

## 2019-01-11 PROCEDURE — 97110 THERAPEUTIC EXERCISES: CPT

## 2019-01-11 NOTE — PROGRESS NOTES
Jason Kinsey  : 1975  Primary: [place]une[place] Medicare [place]  Secondary: [place] [facility] [address] [facility], [place] [address] [address] [facility]  [phone]   [phone]       OUTPATIENT PHYSICAL THERAPY:Daily Note 2019    ICD-10: Treatment Diagnosis: Pain in right ankle and joints of right foot (M25.571)      Stiffness of right ankle, not elsewhere classified (M25.671)    Difficulty in walking, not elsewhere classified (R26.2)   Precautions/Allergies:   Abilify [aripiprazole]; Geodon [ziprasidone hcl]; Gradie Schimke; Ambien [zolpidem]; and Topamax [topiramate]   Fall Risk Score: 3 (? 5 = High Risk)  MD Orders: Evaluate and treat  MEDICAL/REFERRING DIAGNOSIS:  foot, right   DATE OF ONSET: 2018  REFERRING PHYSICIAN: Joe Harper MD  RETURN PHYSICIAN APPOINTMENT: 2018 (pt unsure of date)   This section established at most recent assessment  ASSESSMENT:  Jason Kinsey has now attended 23 physical therapy sessions for R ankle/foot pain that started in 2018 and has recently gotten worse. This session, she demonstrated improved B LE strength/endurance, less pain with R ankle mobility, less postural/gait deficits, and improved standing/walking tolerance. Despite the above listed improvements, she continues to demonstrate decreased B LE strength/endurance, pain with R ankle mobility, multiple postural/gait deficits, decreased standing/walking tolerance, and decreased functional mobility. Pt may continue to benefit from skilled PT to address the above listed deficits to improve ability to perform pain-free ADLs/IADLs and to improve overall quality of life prior to discharge. However, will reduce frequency to one time/week and extend treatment dates due to pt's slowing progress. PROBLEM LIST (Impacting functional limitations):  1. Decreased Strength  2.  Decreased ADL/Functional Activities  3. Decreased Transfer Abilities  4. Decreased Ambulation Ability/Technique  5. Decreased Balance  6. Increased Pain  7. Decreased Activity Tolerance  8. Increased Fatigue  9. Decreased Flexibility/Joint Mobility  10. Edema/Girth INTERVENTIONS PLANNED:  1. Balance Exercise  2. Bed Mobility  3. Cold  4. Electrical Stimulation  5. Family Education  6. Gait Training  7. Heat  8. Home Exercise Program (HEP)  9. Manual Therapy  10. Neuromuscular Re-education/Strengthening  11. Range of Motion (ROM)  12. Therapeutic Activites  13. Therapeutic Exercise/Strengthening  14. Transcutaneous Electrical Nerve Stimulation (TENS)  15. Transfer Training  16. Ultrasound (US)   TREATMENT PLAN:  Effective Dates: 12/31/2018 TO 2/15/2019. Frequency/Duration: 2 times a week for 1 additional week, followed by 1 time a week for 5 additional weeks  GOALS: (Goals have been discussed and agreed upon with patient.)  Short-Term Functional Goals: Time Frame: 30 days  1. Pt will be compliant with HEP in order to increase LE strength/endurance/mobility to improve functional mobility and overall quality of life. (MET 11/5/2018)  2. Pt will improve score on the Lower Extremity Functional Scale to 38/80 in order to demonstrate improved functional mobility and quality of life. (MET 11/5/2018)  3. Pt will improve score on the Foot and Ankle Ability Measure to 41/84 in order to demonstrate improved functional mobility and quality of life. (MET 11/5/2018)  4. Pt will report standing for > 5 minutes with minimal to no increase in pain in order to be able to stand for prolonged periods as needed to perform chores. (MET 11/5/2018)  5. Pt will be able to ascend/descend 6 steps with S with or without rail with safe gait pattern and minimal to no increase in pain in order to improve community mobility. (MET 11/5/2018)  Discharge Goals: Time Frame: 60 days  1.  Pt will be independent with HEP in order to increase LE strength/endurance/mobility to improve functional mobility and overall quality of life. (PROGRESSING , 12/31/2018)   2. Pt will improve score on the Lower Extremity Functional Scale to 50/80 in order to demonstrate improved functional mobility and quality of life. (MET , 12/31/2018)   3. Pt will improve score on the Foot and Ankle Ability Measure to 45/84 in order to demonstrate improved functional mobility and quality of life. (MET , 12/31/2018)   4. Pt will report standing for > 10 minutes with minimal to no increase in pain in order to be able to stand for prolonged periods as needed to perform chores. (MET , 11/27/2018)   5. Pt will be able to ascend/descend 12 steps Magdalena with or without rail with reciprocal gait pattern and minimal to no increase in pain in order to improve community mobility. (PROGRESSING , 12/31/2018)   6. Pt will improve score on the Lower Extremity Functional Scale to 58/80 in order to demonstrate improved functional mobility and quality of life. (NEW GOAL , 12/31/2018)   Rehabilitation Potential For Stated Goals: Good  Regarding Chun Pelletier's therapy, I certify that the treatment plan above will be carried out by a therapist or under their direction. Thank you for this referral,  Supa Beltran DPT     Referring Physician Signature: Vasquez Franklin MD          Date                                 HISTORY:   History of Present Injury/Illness (Reason for Referral): *History per pt or pt's family except where otherwise noted  Pt states pain in her R ankle started in July 2018 (states she was walking more than usual), and she states that she was being treated for it (with prednisone) and it had gotten better with taping, but it has recently gotten worse again (early September when she stopped doing taping and started everyday walking again). States the pain has gotten better temporarily with Prednisone, but states that when she goes off the Prednisone it starts hurting again.  Pain at worst is 6-7/10 (aggravating activities include walking > 5 minutes, prolonged standing > 1-2 minutes - even in shower). Pain at best is 0/10 (eases pain with prednisone, rest, taping, mobic, ice). Pt states she was given boot to wear to help with the pain but she cannot wear this while driving (she is supposed to wear this 4 weeks starting last Friday). Past Medical History/Comorbidities:   Ms. Vinny Coleman  has a past medical history of Acid reflux, ADHD (attention deficit hyperactivity disorder), Allergic rhinitis, Anxiety disorder, Arthritis, Asthma, B12 deficiency, Cerumen impaction, Chronic sinusitis, Depression, Deviated nasal septum, Endocrine disease, Fibromyalgia, GERD (gastroesophageal reflux disease), Headache, Hypothyroidism, Low blood sugar, Meniere disease, Migraine, Nasal obstruction, OCD (obsessive compulsive disorder), Psychiatric disorder, Special examinations, Special examinations, Tinnitus, and Unspecified hypothyroidism. Ms. Vinny Coleman  has a past surgical history that includes hx orthopaedic; hx heent; hx heent (07/03/2013); and hx heent (06/2017). Social History/Living Environment:    lives alone in one story apartment with elevator to get up (15 flights of stairs with B railing - occasionally has to take the stairs down if she has an appointment and the elevator is busy)  Prior Level of Function/Work/Activity:  On disability for bipolar disorder and anxiety; likes to walk downtown with her friends, does housework (vacuuming, tidying up, light cooking); has to shop for her groceries  Dominant Side:         RIGHT  Other Clinical Tests:          X-rays of R foot (showed heel spur according to pt)  Previous Treatment Approaches:          Prednisone has helped  Personal Factors:          Sex:  female        Age:  41 y.o. Current Medications:    Current Outpatient Medications:     levothyroxine (SYNTHROID) 125 mcg tablet, Take 1 Tab by mouth daily. , Disp: 90 Tab, Rfl: 1    Dexlansoprazole (DEXILANT) 60 mg CpDB, Take 1 Cap by mouth every morning., Disp: 90 Cap, Rfl: 1    candesartan (ATACAND) 4 mg tablet, Take 1 Tab by mouth daily. , Disp: 90 Tab, Rfl: 1    albuterol (PROVENTIL HFA, VENTOLIN HFA, PROAIR HFA) 90 mcg/actuation inhaler, Take 2 Puffs by inhalation as needed for Wheezing., Disp: 3 Inhaler, Rfl: 1    montelukast (SINGULAIR) 10 mg tablet, Take 1 Tab by mouth daily. , Disp: 90 Tab, Rfl: 1    cetirizine (ZYRTEC) 10 mg tablet, Take 1 Tab by mouth daily. , Disp: 90 Tab, Rfl: 1    meloxicam (MOBIC) 15 mg tablet, Take 1 Tab by mouth daily. , Disp: 90 Tab, Rfl: 1    mirabegron ER (MYRBETRIQ) 25 mg ER tablet, Take 1 Tab by mouth nightly., Disp: 30 Tab, Rfl: 0    diclofenac (VOLTAREN) 1 % gel, Apply 2 g to affected area four (4) times daily. Apply to right foot as needed 2-4 times a day, Disp: 100 g, Rfl: 2    tiZANidine (ZANAFLEX) 4 mg tablet, Take 1 Tab by mouth three (3) times daily as needed. , Disp: 90 Tab, Rfl: 1    IRON, FERROUS SULFATE, PO, Take 45 mg by mouth daily. , Disp: , Rfl:     cyanocobalamin 1,000 mcg tablet, Take 1 Tab by mouth daily. , Disp: 60 Tab, Rfl: 0    butalbital-acetaminophen (BUPAP)  mg tab, Take 1 Tab by mouth daily as needed. , Disp: 30 Tab, Rfl: 5    venlafaxine-SR (EFFEXOR XR) 150 mg capsule, Take  by mouth daily. , Disp: , Rfl:     escitalopram oxalate (LEXAPRO) 20 mg tablet, Take 20 mg by mouth daily. , Disp: , Rfl:     Magnesium Oxide 500 mg cap, Take 500 mg by mouth daily. , Disp: , Rfl:     traMADol (ULTRAM) 50 mg tablet, Take 1 Tab by mouth every eight (8) hours as needed for Pain. Max Daily Amount: 150 mg., Disp: 30 Tab, Rfl: 1    amphetamine-dextroamphetamine XR (ADDERALL XR) 20 mg XR capsule, TK 1 C PO QD, Disp: , Rfl: 0    AMITIZA 24 mcg capsule, TK 1 C PO BID WF AND WATER, Disp: , Rfl: 6    ALPRAZolam (XANAX) 1 mg tablet, 0.5 mg three (3) times daily as needed. , Disp: , Rfl: 4    risperiDONE (RISPERDAL) 2 mg tablet, Take 3 mg by mouth nightly., Disp: , Rfl: 3   LAMICTAL 200 mg tablet, TK 1 T PO BID, Disp: , Rfl: 4    pregabalin (LYRICA) 75 mg capsule, tid, Disp: , Rfl:     melatonin tab tablet, Take 5 mg by mouth., Disp: , Rfl:     cholecalciferol (VITAMIN D3) 5,000 unit capsule, Take 2,000 Units by mouth., Disp: , Rfl:    Date Last Reviewed:  1/11/2019   # of Personal Factors/Comorbidities that affect the Plan of Care: 3+: HIGH COMPLEXITY   EXAMINATION:   Reassessment on 12/31//2018  Observation/Orthostatic Postural Assessment:    The following postural deficits were noted in sitting:  loss of cervical lordosis, increased thoracic kyphosis, forward head, rounded shoulders, posterior pelvic tilt - improved upright on 11/27/2018  The following postural deficits were noted in standing: forward trunk flexion, pronated foot L > R, slight genu valgus noted B - improved upright posture on 11/27/2018  Palpation:          Pt with tenderness noted at posterior R heel and at dorsum of R foot - less tenderness throughout R foot/ankle noted on 11/5/2018  ROM:    Initial measurement: (on 10/10/2018) Initial measurement: (on 10/10/2018) Reassessment measurement: (on 11/5/2018) Reassessment measurement:    L LE: WFL throughout, but excessive hip IR passively R LE: WFL throughout, but excessive hip IR passively; ankle motion WFL but significant tightness noted in dorsiflexors with pain in ankle R LE: ankle motion WFL - tightness noted in dorsiflexors with minimal to no pain in ankle      Strength:    Initial measurement: (on 10/10/2018) Initial measurement: (on 10/10/2018) Reassessment measurement: (on 12/31/2018) Reassessment measurement: (on 12/31/2018)   L LE:  Hip flexion: 5/5   Hip extension: 4-/5  Hip abduction: 4-/5  Hip adduction: 5/5  Hip IR: 4+/5  Hip ER: 4/5   Knee flexion:  4/5  Knee extension: 5/5  Ankle DF: 4/5 R LE:  Hip flexion: 5/5  Hip extension: 4-/5  Hip abduction: 4/5  Hip adduction: 4/5  Hip IR: 4+/5  Hip ER: 4/5  Knee flexion: 4+/5  Knee extension: 5/5  Ankle DF: 4-/5 L LE:  Hip flexion: 5/5   Hip extension: 4/5  Hip abduction: 4/5  Hip adduction: 4/5  Hip IR: 4+/5  Hip ER: 4/5   Knee flexion:  4+/5  Knee extension: 5/5  Ankle DF: 4/5 R LE:  Hip flexion: 5/5  Hip extension: 4/5  Hip abduction: 4/5  Hip adduction: 4/5  Hip IR: 4+/5  Hip ER: 4/5  Knee flexion: 4+/5  Knee extension: 5/5  Ankle DF: 4/5     Special Tests:          No leg length discrepancy noted  Neurological Screen:        Myotomes:  WFL        Dermatomes:  No deficits noted  Functional Mobility:         Gait/Ambulation:  Pt ambulates with no AD with following gait deficits: decreased B step length/clearance, increased pronation at each foot in stance phase, decreased B heel strike, decreased B arm swing, slight forward trunk flexion, increased lateral sway - slightly improved upright posture and improved B step length/clearance and heel strike on 11/5/2018 - improved upright posture but continuing to notice decreased heel strike with excessive knee flexion during stance phase at each LE on 12/31/2018        Transfers:  Pt able to stand/sit with minimal to no UE use        Bed Mobility:  Pt able to perform Magdalena with increased time  Balance:          Slight lateral sway with ambulation    Body Structures Involved:   1. Nerves  2. Bones  3. Joints  4. Muscles  5. Ligaments Body Functions Affected:  1. Sensory/Pain  2. Neuromusculoskeletal  3. Movement Related Activities and Participation Affected:  1. General Tasks and Demands  2. Mobility  3. Self Care  4. Domestic Life  5. Interpersonal Interactions and Relationships  6. Community, Social and [place]   # of elements that affect the Plan of Care: 4+: HIGH COMPLEXITY   CLINICAL PRESENTATION:   Presentation: Evolving clinical presentation with changing clinical characteristics: MODERATE COMPLEXITY   CLINICAL DECISION MAKING:   Outcome Measure:    Tool Used: Lower Extremity Functional Scale (LEFS)  Score:  Initial: 27/80 Most Recent: 50/80 (Date: 12/31/2018) Interpretation of Score: 20 questions each scored on a 5 point scale with 0 representing \"extreme difficulty or unable to perform\" and 4 representing \"no difficulty\". The lower the score, the greater the functional disability. 80/80 represents no disability. Minimal detectable change is 9 points. Score 80 79-63 62-48 47-32 31-16 15-1 0   Modifier CH CI CJ CK CL CM CN     ? Mobility - Walking and Moving Around:     - CURRENT STATUS: CJ - 20%-39% impaired, limited or restricted    - GOAL STATUS: CJ - 20%-39% impaired, limited or restricted    - D/C STATUS:  ---------------To be determined---------------    Tool Used: PT/OT FOOT AND ANKLE ABILITY MEASURE  Score:  Initial: 36/84 Most Recent:  56/84 (Date: 12/31/2018)   Interpretation of Score: For the \"Activities of Daily Living\", there are 21 questions each scored on a 5 point scale with 0 representing \"Unable to do\" and 4 representing \"No difficulty\". The lower the score, the greater the functional disability. 84/84 represents no disability. Minimal detectable change is 5.7 points. With the addition of the 8 questions in the \"Sports Subscale,\" there are 29 questions, each scored on a 5 point scale with 0 representing \"Unable to do\" and 4 representing \"No difficulty\". The lower the score, the greater the functional disability. 116/116 represents no disability. Minimal detectable change is 12.3 points. Activities of Daily Living:  Score 84 83-68 67-51 50-34 33-18 17-1 0   Modifier CH CI CJ CK CL CM CN     Activities of Daily Living + Sports Subscale:  Score 116 115-94 93-71 70-47 46-24 23-1 0   Modifier CH CI CJ CK CL CM CN     Payor: [employer] MEDICARE / Plan: [address] [place] [address] HMO / Product Type: Managed Care Medicare /   Medical Necessity:   · Patient is expected to demonstrate progress in strength, range of motion, balance and functional technique to improve ability to perform pain-free standing/ambulation.   · Patient demonstrates good rehab potential due to higher previous functional level. Reason for Services/Other Comments:  · Patient continues to require modification of therapeutic interventions to increase complexity of exercises. Use of outcome tool(s) and clinical judgement create a POC that gives a: Questionable prediction of patient's progress: MODERATE COMPLEXITY   TREATMENT:   (In addition to Assessment/Re-Assessment sessions the following treatments were rendered)  Subjective comments at beginning of session: Pt states her ankle pain has gotten much better - now is not bothering her when she walks longer distance or when she has to stand to take a shower; states she is occasionally working on exercises at home  THERAPEUTIC EXERCISE: (30 minutes):  Exercises per grid below to improve mobility, strength, balance and dynamic movement of ankle - right to improve functional mobility. Required minimal visual, verbal and manual cues to promote proper body alignment, promote proper body posture, promote proper body mechanics and promote proper body breathing techniques. Progressed resistance, range, repetitions and complexity of movement as indicated.     Date:  12-21-18 Date:  12-26-18 Date:  12/28/2018 Date:  12/31/2018 Date:  1/3/2018 Date:  1/11/2019   Activity/Exercise         Physiostep L3 resistance for 12 minutes to increase strength/endurance  L3 resistance for 15 minutes to increase strength/ endurance  L3 resistance for 15 minutes to increase strength/ endurance L3 resistance for 15 minutes to increase strength/ endurance L3 progressing to L4 resistance (after 5 minutes) for 15 minutes to increase strength/ endurance L3 quickly progressing to L4 resistance (after 5 minutes) for 15 minutes to increase strength/ endurance   Ankle plantarflexor stretch on incline board 3 x 30 sec hold with knees extended  3 x 30 sec hold with knees extended  3 x 30 sec hold with knees extended then flexed 3 x 30 sec hold with knees extended then flexed 3 x 30 sec hold with knees extended then flexed 3 x 30 sec hold with knees extended then flexed; progressed to 3rd notch from bottom   Heel raises on firm ground B UE support with feet on incline board(1st notch from bottom)   Slow descend   1 X 10  B UE support with feet on incline board (1st notch from bottom)  Slow descend   1 x 10  B UE support with feet on incline board (1st notch from bottom); slow lowering on B LEs for 15 reps/1 set B UE support with feet on incline board (1st notch from bottom); slow lowering on R LE only for 15 reps/1 set B UE support with feet on incline board (1st notch from bottom); slow lowering on R LE only for 15 reps/1 set B UE support with feet on incline board (2nd notch from bottom); slow lowering on R LE only for 10 reps/1 set   Supine hamstring stretch         Leg press machine 30#  1 x 20  With slow controlled movements 30#  1 x 20 and 1 x 10  with slow controlled movements 30 lb; 20 reps/1 set with cues for slow movements with increased control 35 lb; 15 reps/1 set with cues for slow movements with increased control 35 lb; 20 reps/1 set with cues for slow movements with increased control 40 lb; 20 reps/1 set with cues for slow movements with increased control   Ambulation over level ground         Ascending/descending stairs    12 steps with reciprocal gait and no rail use per assessment above with minimal to moderate increase in pain     Step ups         Varying resistance at LEs in different planes of motion    Per strength assessment above              *given in HEP  [employer] Portal      Manual therapy (8 minutes): With pt in prone position, STM to R gastrocnemius and soleus as well as cross friction massage to R achilles tendon to reduce tightness and pain and improve ankle mobility. Followed with ice massage to R achilles tendon to reduce pain and inflammation. Modalities ( minutes).      Treatment/Session Assessment: Pt demonstrating improved ability to perform heel raises on higher incline with less pain reported this session. · Pain/ Symptoms: Initial:   1-2/10 in posterior R ankle  Post Session: 2/10  ·   Compliance with Program/Exercises: Good compliance with attending scheduled sessions. · Recommendations/Intent for next treatment session: \"Next visit will focus on advancements to more challenging activities and reduction in assistance provided\".  Manual therapy/modalities as needed to reduce tightness and pain; work on eccentric plantarflexor strengthening and balance  Total Treatment Duration:   PT Patient Time In/Time Out  Time In: 1505  Time Out: 1615 Domonique Quinones DPT    Future Appointments   Date Time Provider Jo Nolan   1/17/2019 10:15 AM VALENTINO Cameron [facility]   1/24/2019 10:15 AM VALENTINO Cameron [facility]   1/31/2019 11:00 AM VALENTINO Leslie   5/1/2019  8:45 AM JACINTA PM LAB/RAD [facility]ANDEEP [facility]   5/8/2019  9:20 AM MD RAMANDEEP Cano [facility]

## 2019-01-17 ENCOUNTER — HOSPITAL ENCOUNTER (OUTPATIENT)
Dept: PHYSICAL THERAPY | Age: 44
Discharge: HOME | End: 2019-01-17
Payer: MEDICARE

## 2019-01-17 PROBLEM — N32.81 OAB (OVERACTIVE BLADDER): Status: ACTIVE | Noted: 2019-01-17

## 2019-01-17 NOTE — PROGRESS NOTES
Therapy Center at [address]  [phone]   [phone]     OUTPATIENT DAILY NOTE    NAME: Erica Rodriguez    DATE: 1/17/2019    Patient canceled physical therapy appointment today due to infection in leg. Will plan to follow up on next scheduled visit.     KRISTINE HillT

## 2019-01-24 ENCOUNTER — HOSPITAL ENCOUNTER (OUTPATIENT)
Dept: PHYSICAL THERAPY | Age: 44
Discharge: HOME | End: 2019-01-24
Payer: MEDICARE

## 2019-01-24 PROCEDURE — 97110 THERAPEUTIC EXERCISES: CPT

## 2019-01-24 PROCEDURE — 97140 MANUAL THERAPY 1/> REGIONS: CPT

## 2019-01-24 NOTE — PROGRESS NOTES
Chandler Jasmine  : 1975  Primary: [place] [employer]  Secondary: [place] [facility] [address] [facility], [place] [address] [address] [facility]  [phone]   [phone]       OUTPATIENT PHYSICAL THERAPY:Daily Note 2019    ICD-10: Treatment Diagnosis: Pain in right ankle and joints of right foot (M25.571)      Stiffness of right ankle, not elsewhere classified (M25.671)    Difficulty in walking, not elsewhere classified (R26.2)   Precautions/Allergies:   Abilify [aripiprazole]; Geodon [ziprasidone hcl]; Rendell Fritter; Ambien [zolpidem]; and Topamax [topiramate]   Fall Risk Score: 3 (? 5 = High Risk)  MD Orders: Evaluate and treat  MEDICAL/REFERRING DIAGNOSIS:  foot, right   DATE OF ONSET: 2018  REFERRING PHYSICIAN: Radha Aguilar MD  RETURN PHYSICIAN APPOINTMENT: 2018 (pt unsure of date)   This section established at most recent assessment  ASSESSMENT:  Chandler Jasmine has now attended 23 physical therapy sessions for R ankle/foot pain that started in 2018 and has recently gotten worse. This session, she demonstrated improved B LE strength/endurance, less pain with R ankle mobility, less postural/gait deficits, and improved standing/walking tolerance. Despite the above listed improvements, she continues to demonstrate decreased B LE strength/endurance, pain with R ankle mobility, multiple postural/gait deficits, decreased standing/walking tolerance, and decreased functional mobility. Pt may continue to benefit from skilled PT to address the above listed deficits to improve ability to perform pain-free ADLs/IADLs and to improve overall quality of life prior to discharge. However, will reduce frequency to one time/week and extend treatment dates due to pt's slowing progress. PROBLEM LIST (Impacting functional limitations):  1. Decreased Strength  2.  Decreased ADL/Functional Activities  3. Decreased Transfer Abilities  4. Decreased Ambulation Ability/Technique  5. Decreased Balance  6. Increased Pain  7. Decreased Activity Tolerance  8. Increased Fatigue  9. Decreased Flexibility/Joint Mobility  10. Edema/Girth INTERVENTIONS PLANNED:  1. Balance Exercise  2. Bed Mobility  3. Cold  4. Electrical Stimulation  5. Family Education  6. Gait Training  7. Heat  8. Home Exercise Program (HEP)  9. Manual Therapy  10. Neuromuscular Re-education/Strengthening  11. Range of Motion (ROM)  12. Therapeutic Activites  13. Therapeutic Exercise/Strengthening  14. Transcutaneous Electrical Nerve Stimulation (TENS)  15. Transfer Training  16. Ultrasound (US)   TREATMENT PLAN:  Effective Dates: 12/31/2018 TO 2/15/2019. Frequency/Duration: 2 times a week for 1 additional week, followed by 1 time a week for 5 additional weeks  GOALS: (Goals have been discussed and agreed upon with patient.)  Short-Term Functional Goals: Time Frame: 30 days  1. Pt will be compliant with HEP in order to increase LE strength/endurance/mobility to improve functional mobility and overall quality of life. (MET 11/5/2018)  2. Pt will improve score on the Lower Extremity Functional Scale to 38/80 in order to demonstrate improved functional mobility and quality of life. (MET 11/5/2018)  3. Pt will improve score on the Foot and Ankle Ability Measure to 41/84 in order to demonstrate improved functional mobility and quality of life. (MET 11/5/2018)  4. Pt will report standing for > 5 minutes with minimal to no increase in pain in order to be able to stand for prolonged periods as needed to perform chores. (MET 11/5/2018)  5. Pt will be able to ascend/descend 6 steps with S with or without rail with safe gait pattern and minimal to no increase in pain in order to improve community mobility. (MET 11/5/2018)  Discharge Goals: Time Frame: 60 days  1.  Pt will be independent with HEP in order to increase LE strength/endurance/mobility to improve functional mobility and overall quality of life. (PROGRESSING , 12/31/2018)   2. Pt will improve score on the Lower Extremity Functional Scale to 50/80 in order to demonstrate improved functional mobility and quality of life. (MET , 12/31/2018)   3. Pt will improve score on the Foot and Ankle Ability Measure to 45/84 in order to demonstrate improved functional mobility and quality of life. (MET , 12/31/2018)   4. Pt will report standing for > 10 minutes with minimal to no increase in pain in order to be able to stand for prolonged periods as needed to perform chores. (MET , 11/27/2018)   5. Pt will be able to ascend/descend 12 steps Magdalena with or without rail with reciprocal gait pattern and minimal to no increase in pain in order to improve community mobility. (PROGRESSING , 12/31/2018)   6. Pt will improve score on the Lower Extremity Functional Scale to 58/80 in order to demonstrate improved functional mobility and quality of life. (NEW GOAL , 12/31/2018)   Rehabilitation Potential For Stated Goals: Good  Regarding Nahum Pelletier's therapy, I certify that the treatment plan above will be carried out by a therapist or under their direction. Thank you for this referral,  Juliette Hood, DPT     Referring Physician Signature: Tj Rocha MD          Date                                 HISTORY:   History of Present Injury/Illness (Reason for Referral): *History per pt or pt's family except where otherwise noted  Pt states pain in her R ankle started in July 2018 (states she was walking more than usual), and she states that she was being treated for it (with prednisone) and it had gotten better with taping, but it has recently gotten worse again (early September when she stopped doing taping and started everyday walking again). States the pain has gotten better temporarily with Prednisone, but states that when she goes off the Prednisone it starts hurting again.  Pain at worst is 6-7/10 (aggravating activities include walking > 5 minutes, prolonged standing > 1-2 minutes - even in shower). Pain at best is 0/10 (eases pain with prednisone, rest, taping, mobic, ice). Pt states she was given boot to wear to help with the pain but she cannot wear this while driving (she is supposed to wear this 4 weeks starting last Friday). Past Medical History/Comorbidities:   Ms. Fady Sifuentes  has a past medical history of Acid reflux, ADHD (attention deficit hyperactivity disorder), Allergic rhinitis (1/20/2014), Anxiety disorder (1/20/2014), Arthritis, Asthma, B12 deficiency (1/20/2014), Cerumen impaction, Chronic sinusitis, Depression (1/20/2014), Deviated nasal septum, Endocrine disease, Fibromyalgia, GERD (gastroesophageal reflux disease) (1/20/2014), Headache, Hypothyroidism, Low blood sugar, Meniere disease (1/20/2014), Migraine, Nasal obstruction, OCD (obsessive compulsive disorder), Psychiatric disorder, Special examinations, Special examinations, Tinnitus, and Unspecified hypothyroidism (1/20/2014). Ms. Fady Sifuentes  has a past surgical history that includes hx orthopaedic; hx heent; hx heent (07/03/2013); and hx heent (06/2017). Social History/Living Environment:    lives alone in one story apartment with elevator to get up (15 flights of stairs with B railing - occasionally has to take the stairs down if she has an appointment and the elevator is busy)  Prior Level of Function/Work/Activity:  On disability for bipolar disorder and anxiety; likes to walk downtown with her friends, does housework (vacuuming, tidying up, light cooking); has to shop for her groceries  Dominant Side:         RIGHT  Other Clinical Tests:          X-rays of R foot (showed heel spur according to pt)  Previous Treatment Approaches:          Prednisone has helped  Personal Factors:          Sex:  female        Age:  41 y.o.   Current Medications:    Current Outpatient Medications:     mirabegron ER (MYRBETRIQ) 25 mg ER tablet, Take 1 Tab by mouth nightly., Disp: 30 Tab, Rfl: 3    clindamycin (CLEOCIN) 300 mg capsule, Take 1 Cap by mouth three (3) times daily for 10 days. , Disp: 30 Cap, Rfl: 0    levothyroxine (SYNTHROID) 125 mcg tablet, Take 1 Tab by mouth daily. , Disp: 90 Tab, Rfl: 1    Dexlansoprazole (DEXILANT) 60 mg CpDB, Take 1 Cap by mouth every morning., Disp: 90 Cap, Rfl: 1    candesartan (ATACAND) 4 mg tablet, Take 1 Tab by mouth daily. , Disp: 90 Tab, Rfl: 1    albuterol (PROVENTIL HFA, VENTOLIN HFA, PROAIR HFA) 90 mcg/actuation inhaler, Take 2 Puffs by inhalation as needed for Wheezing., Disp: 3 Inhaler, Rfl: 1    montelukast (SINGULAIR) 10 mg tablet, Take 1 Tab by mouth daily. , Disp: 90 Tab, Rfl: 1    cetirizine (ZYRTEC) 10 mg tablet, Take 1 Tab by mouth daily. , Disp: 90 Tab, Rfl: 1    meloxicam (MOBIC) 15 mg tablet, Take 1 Tab by mouth daily. , Disp: 90 Tab, Rfl: 1    diclofenac (VOLTAREN) 1 % gel, Apply 2 g to affected area four (4) times daily. Apply to right foot as needed 2-4 times a day, Disp: 100 g, Rfl: 2    tiZANidine (ZANAFLEX) 4 mg tablet, Take 1 Tab by mouth three (3) times daily as needed. , Disp: 90 Tab, Rfl: 1    IRON, FERROUS SULFATE, PO, Take 45 mg by mouth daily. , Disp: , Rfl:     cyanocobalamin 1,000 mcg tablet, Take 1 Tab by mouth daily. , Disp: 60 Tab, Rfl: 0    butalbital-acetaminophen (BUPAP)  mg tab, Take 1 Tab by mouth daily as needed. , Disp: 30 Tab, Rfl: 5    venlafaxine-SR (EFFEXOR XR) 150 mg capsule, Take  by mouth daily. , Disp: , Rfl:     escitalopram oxalate (LEXAPRO) 20 mg tablet, Take 20 mg by mouth daily. , Disp: , Rfl:     Magnesium Oxide 500 mg cap, Take 500 mg by mouth daily. , Disp: , Rfl:     traMADol (ULTRAM) 50 mg tablet, Take 1 Tab by mouth every eight (8) hours as needed for Pain. Max Daily Amount: 150 mg., Disp: 30 Tab, Rfl: 1    AMITIZA 24 mcg capsule, TK 1 C PO BID WF AND WATER, Disp: , Rfl: 6    ALPRAZolam (XANAX) 1 mg tablet, 0.5 mg three (3) times daily as needed. , Disp: , Rfl: 4    risperiDONE (RISPERDAL) 2 mg tablet, Take 3 mg by mouth nightly., Disp: , Rfl: 3    LAMICTAL 200 mg tablet, TK 1 T PO BID, Disp: , Rfl: 4    pregabalin (LYRICA) 75 mg capsule, tid, Disp: , Rfl:     melatonin tab tablet, Take 5 mg by mouth., Disp: , Rfl:     cholecalciferol (VITAMIN D3) 5,000 unit capsule, Take 2,000 Units by mouth., Disp: , Rfl:    Date Last Reviewed:  1/24/2019   # of Personal Factors/Comorbidities that affect the Plan of Care: 3+: HIGH COMPLEXITY   EXAMINATION:   Reassessment on 12/31//2018  Observation/Orthostatic Postural Assessment:    The following postural deficits were noted in sitting:  loss of cervical lordosis, increased thoracic kyphosis, forward head, rounded shoulders, posterior pelvic tilt - improved upright on 11/27/2018  The following postural deficits were noted in standing: forward trunk flexion, pronated foot L > R, slight genu valgus noted B - improved upright posture on 11/27/2018  Palpation:          Pt with tenderness noted at posterior R heel and at dorsum of R foot - less tenderness throughout R foot/ankle noted on 11/5/2018  ROM:    Initial measurement: (on 10/10/2018) Initial measurement: (on 10/10/2018) Reassessment measurement: (on 11/5/2018) Reassessment measurement:    L LE: WFL throughout, but excessive hip IR passively R LE: WFL throughout, but excessive hip IR passively; ankle motion WFL but significant tightness noted in dorsiflexors with pain in ankle R LE: ankle motion WFL - tightness noted in dorsiflexors with minimal to no pain in ankle      Strength:    Initial measurement: (on 10/10/2018) Initial measurement: (on 10/10/2018) Reassessment measurement: (on 12/31/2018) Reassessment measurement: (on 12/31/2018)   L LE:  Hip flexion: 5/5   Hip extension: 4-/5  Hip abduction: 4-/5  Hip adduction: 5/5  Hip IR: 4+/5  Hip ER: 4/5   Knee flexion:  4/5  Knee extension: 5/5  Ankle DF: 4/5 R LE:  Hip flexion: 5/5  Hip extension: 4-/5  Hip abduction: 4/5  Hip adduction: 4/5  Hip IR: 4+/5  Hip ER: 4/5  Knee flexion: 4+/5  Knee extension: 5/5  Ankle DF: 4-/5 L LE:  Hip flexion: 5/5   Hip extension: 4/5  Hip abduction: 4/5  Hip adduction: 4/5  Hip IR: 4+/5  Hip ER: 4/5   Knee flexion:  4+/5  Knee extension: 5/5  Ankle DF: 4/5 R LE:  Hip flexion: 5/5  Hip extension: 4/5  Hip abduction: 4/5  Hip adduction: 4/5  Hip IR: 4+/5  Hip ER: 4/5  Knee flexion: 4+/5  Knee extension: 5/5  Ankle DF: 4/5     Special Tests:          No leg length discrepancy noted  Neurological Screen:        Myotomes:  WFL        Dermatomes:  No deficits noted  Functional Mobility:         Gait/Ambulation:  Pt ambulates with no AD with following gait deficits: decreased B step length/clearance, increased pronation at each foot in stance phase, decreased B heel strike, decreased B arm swing, slight forward trunk flexion, increased lateral sway - slightly improved upright posture and improved B step length/clearance and heel strike on 11/5/2018 - improved upright posture but continuing to notice decreased heel strike with excessive knee flexion during stance phase at each LE on 12/31/2018        Transfers:  Pt able to stand/sit with minimal to no UE use        Bed Mobility:  Pt able to perform Magdalena with increased time  Balance:          Slight lateral sway with ambulation    Body Structures Involved:   1. Nerves  2. Bones  3. Joints  4. Muscles  5. Ligaments Body Functions Affected:  1. Sensory/Pain  2. Neuromusculoskeletal  3. Movement Related Activities and Participation Affected:  1. General Tasks and Demands  2. Mobility  3. Self Care  4. Domestic Life  5. Interpersonal Interactions and Relationships  6. Community, Social and [place]   # of elements that affect the Plan of Care: 4+: HIGH COMPLEXITY   CLINICAL PRESENTATION:   Presentation: Evolving clinical presentation with changing clinical characteristics: MODERATE COMPLEXITY   CLINICAL DECISION MAKING:   Outcome Measure:    Tool Used: Lower Extremity Functional Scale (LEFS)  Score:  Initial: 27/80 Most Recent: 50/80 (Date: 12/31/2018)   Interpretation of Score: 20 questions each scored on a 5 point scale with 0 representing \"extreme difficulty or unable to perform\" and 4 representing \"no difficulty\". The lower the score, the greater the functional disability. 80/80 represents no disability. Minimal detectable change is 9 points. Score 80 79-63 62-48 47-32 31-16 15-1 0   Modifier CH CI CJ CK CL CM CN     ? Mobility - Walking and Moving Around:     - CURRENT STATUS: CJ - 20%-39% impaired, limited or restricted    - GOAL STATUS: CJ - 20%-39% impaired, limited or restricted    - D/C STATUS:  ---------------To be determined---------------    Tool Used: PT/OT FOOT AND ANKLE ABILITY MEASURE  Score:  Initial: 36/84 Most Recent:  56/84 (Date: 12/31/2018)   Interpretation of Score: For the \"Activities of Daily Living\", there are 21 questions each scored on a 5 point scale with 0 representing \"Unable to do\" and 4 representing \"No difficulty\". The lower the score, the greater the functional disability. 84/84 represents no disability. Minimal detectable change is 5.7 points. With the addition of the 8 questions in the \"Sports Subscale,\" there are 29 questions, each scored on a 5 point scale with 0 representing \"Unable to do\" and 4 representing \"No difficulty\". The lower the score, the greater the functional disability. 116/116 represents no disability. Minimal detectable change is 12.3 points.     Activities of Daily Living:  Score 84 83-68 67-51 50-34 33-18 17-1 0   Modifier CH CI CJ CK CL CM CN     Activities of Daily Living + Sports Subscale:  Score 116 115-94 93-71 70-47 46-24 23-1 0   Modifier CH CI CJ CK CL CM CN     Payor: [employer] MEDICARE / Plan: [address] [place] [address] HMO / Product Type: Managed Care Medicare /   Medical Necessity:   · Patient is expected to demonstrate progress in strength, range of motion, balance and functional technique to improve ability to perform pain-free standing/ambulation. · Patient demonstrates good rehab potential due to higher previous functional level. Reason for Services/Other Comments:  · Patient continues to require modification of therapeutic interventions to increase complexity of exercises. Use of outcome tool(s) and clinical judgement create a POC that gives a: Questionable prediction of patient's progress: MODERATE COMPLEXITY   TREATMENT:   (In addition to Assessment/Re-Assessment sessions the following treatments were rendered)  Subjective comments at beginning of session: Pt states her ankle pain has continued to be better; states when it is sore she exercises and it feels better  THERAPEUTIC EXERCISE: (32 minutes):  Exercises per grid below to improve mobility, strength, balance and dynamic movement of ankle - right to improve functional mobility. Required minimal visual, verbal and manual cues to promote proper body alignment, promote proper body posture, promote proper body mechanics and promote proper body breathing techniques. Progressed resistance, range, repetitions and complexity of movement as indicated.     Date:  12-26-18 Date:  12/28/2018 Date:  12/31/2018 Date:  1/3/2018 Date:  1/11/2019 Date:  1/24/2019   Activity/Exercise         Physiostep L3 resistance for 15 minutes to increase strength/ endurance  L3 resistance for 15 minutes to increase strength/ endurance L3 resistance for 15 minutes to increase strength/ endurance L3 progressing to L4 resistance (after 5 minutes) for 15 minutes to increase strength/ endurance L3 quickly progressing to L4 resistance (after 5 minutes) for 15 minutes to increase strength/ endurance L3 quickly progressing to L4 resistance (after 2 minutes) for 15 minutes to increase strength/ endurance   Ankle plantarflexor stretch on incline board 3 x 30 sec hold with knees extended  3 x 30 sec hold with knees extended then flexed 3 x 30 sec hold with knees extended then flexed 3 x 30 sec hold with knees extended then flexed 3 x 30 sec hold with knees extended then flexed; progressed to 3rd notch from bottom 3 x 30 sec hold with knees extended then flexed; progressed to 3rd notch from bottom   Heel raises on firm ground B UE support with feet on incline board (1st notch from bottom)  Slow descend   1 x 10  B UE support with feet on incline board (1st notch from bottom); slow lowering on B LEs for 15 reps/1 set B UE support with feet on incline boarbd (1st notch from bottom); slow lowering on R LE only for 15 reps/1 set B UE support with feet on incline board (1st notch from bottom); slow lowering on R LE only for 15 reps/1 set B UE support with feet on incline board (2nd notch from bottom); slow lowering on R LE only for 10 reps/1 set B UE support with feet on incline board (2nd notch from bottom); slow lowering on R LE only for 12 reps/1 set   Supine hamstring stretch         Leg press machine 30#  1 x 20 and 1 x 10  with slow controlled movements 30 lb; 20 reps/1 set with cues for slow movements with increased control 35 lb; 15 reps/1 set with cues for slow movements with increased control 35 lb; 20 reps/1 set with cues for slow movements with increased control 40 lb; 20 reps/1 set with cues for slow movements with increased control 20 lb with R LE only; 20 reps/1 set with cues for slow movements with increased control   Ambulation over level ground         Ascending/descending stairs   12 steps with reciprocal gait and no rail use per assessment above with minimal to moderate increase in pain      Step ups         Varying resistance at LEs in different planes of motion   Per strength assessment above      Standing LE marching on blue foam      Cues for slow movement; 20 reps/1 set with intermittent UE support and cues for control   *given in HEP  Calvin Portal      Manual therapy (8 minutes):  With pt in prone position, STM to R gastrocnemius and soleus as well as cross friction massage to R achilles tendon to reduce tightness and pain and improve ankle mobility. Followed with ice massage to R achilles tendon to reduce pain and inflammation. Modalities ( minutes). Treatment/Session Assessment: Pt demonstrating improved ability to perform all exercises this session with less reported pain. She states she is feeling better overall. Progressing quickly toward discharge - will focus more on balance and R ankle stability as tolerated within next few sessions. · Pain/ Symptoms: Initial:   1-2/10 in posterior R ankle  Post Session: pt stating no pain at end of session  ·   Compliance with Program/Exercises: Good compliance with attending scheduled sessions. · Recommendations/Intent for next treatment session: \"Next visit will focus on advancements to more challenging activities and reduction in assistance provided\".  Manual therapy/modalities as needed to reduce tightness and pain; work on eccentric plantarflexor strengthening and balance  Total Treatment Duration:   PT Patient Time In/Time Out  Time In: 1302  Time Out: 298 Rolando Barrios DPT    Future Appointments   Date Time Provider Jo An   1/31/2019 11:00 AM Ivan Jackson DPT [facility] SFWALI   2/6/2019  1:00 PM Ivan Jackson DPT [facility] DEBBIE   5/1/2019  8:45 AM MTTAMERA PM LAB/RAD RAMANDEEP WORKMAN [facility]   5/8/2019  9:20 AM MD RAMANDEEP Jordan [facility]

## 2019-01-31 ENCOUNTER — HOSPITAL ENCOUNTER (OUTPATIENT)
Dept: PHYSICAL THERAPY | Age: 44
Discharge: HOME | End: 2019-01-31
Payer: MEDICARE

## 2019-01-31 PROCEDURE — 97110 THERAPEUTIC EXERCISES: CPT

## 2019-01-31 PROCEDURE — 97140 MANUAL THERAPY 1/> REGIONS: CPT

## 2019-02-06 ENCOUNTER — HOSPITAL ENCOUNTER (OUTPATIENT)
Dept: PHYSICAL THERAPY | Age: 44
Discharge: HOME | End: 2019-02-06
Payer: MEDICARE

## 2019-02-06 PROCEDURE — 97110 THERAPEUTIC EXERCISES: CPT

## 2019-02-06 NOTE — PROGRESS NOTES
Saurabh lBum  : 1975  Primary: [place] [employer] [place]  [place]: [place] [facility] [address] [facility], [place] [address] [address] [place]  [phone]   [phone]       OUTPATIENT PHYSICAL THERAPY:Daily Note 2019    ICD-10: Treatment Diagnosis: Pain in right ankle and joints of right foot (M25.571)      Stiffness of right ankle, not elsewhere classified (M25.671)    Difficulty in walking, not elsewhere classified (R26.2)   Precautions/Allergies:   Abilify [aripiprazole]; Geodon [ziprasidone hcl]; Purnima Angie; Ambien [zolpidem]; and Topamax [topiramate]   Fall Risk Score: 3 (? 5 = High Risk)  MD Orders: Evaluate and treat  MEDICAL/REFERRING DIAGNOSIS:  foot, right   DATE OF ONSET: 2018  REFERRING PHYSICIAN: Philomena Pate MD  RETURN PHYSICIAN APPOINTMENT: 2018 (pt unsure of date)   This section established at most recent assessment  ASSESSMENT:  Saurabh Blum has now attended 23 physical therapy sessions for R ankle/foot pain that started in 2018 and has recently gotten worse. This session, she demonstrated improved B LE strength/endurance, less pain with R ankle mobility, less postural/gait deficits, and improved standing/walking tolerance. Despite the above listed improvements, she continues to demonstrate decreased B LE strength/endurance, pain with R ankle mobility, multiple postural/gait deficits, decreased standing/walking tolerance, and decreased functional mobility. Pt may continue to benefit from skilled PT to address the above listed deficits to improve ability to perform pain-free ADLs/IADLs and to improve overall quality of life prior to discharge. However, will reduce frequency to one time/week and extend treatment dates due to pt's slowing progress. PROBLEM LIST (Impacting functional limitations):  1. Decreased Strength  2. Decreased ADL/Functional Activities  3.  Decreased Transfer Abilities  4. Decreased Ambulation Ability/Technique  5. Decreased Balance  6. Increased Pain  7. Decreased Activity Tolerance  8. Increased Fatigue  9. Decreased Flexibility/Joint Mobility  10. Edema/Girth INTERVENTIONS PLANNED:  1. Balance Exercise  2. Bed Mobility  3. Cold  4. Electrical Stimulation  5. Family Education  6. Gait Training  7. Heat  8. Home Exercise Program (HEP)  9. Manual Therapy  10. Neuromuscular Re-education/Strengthening  11. Range of Motion (ROM)  12. Therapeutic Activites  13. Therapeutic Exercise/Strengthening  14. Transcutaneous Electrical Nerve Stimulation (TENS)  15. Transfer Training  16. Ultrasound (US)   TREATMENT PLAN:  Effective Dates: 12/31/2018 TO 2/15/2019. Frequency/Duration: 2 times a week for 1 additional week, followed by 1 time a week for 5 additional weeks  GOALS: (Goals have been discussed and agreed upon with patient.)  Short-Term Functional Goals: Time Frame: 30 days  1. Pt will be compliant with HEP in order to increase LE strength/endurance/mobility to improve functional mobility and overall quality of life. (MET 11/5/2018)  2. Pt will improve score on the Lower Extremity Functional Scale to 38/80 in order to demonstrate improved functional mobility and quality of life. (MET 11/5/2018)  3. Pt will improve score on the Foot and Ankle Ability Measure to 41/84 in order to demonstrate improved functional mobility and quality of life. (MET 11/5/2018)  4. Pt will report standing for > 5 minutes with minimal to no increase in pain in order to be able to stand for prolonged periods as needed to perform chores. (MET 11/5/2018)  5. Pt will be able to ascend/descend 6 steps with S with or without rail with safe gait pattern and minimal to no increase in pain in order to improve community mobility. (MET 11/5/2018)  Discharge Goals: Time Frame: 60 days  1.  Pt will be independent with HEP in order to increase LE strength/endurance/mobility to improve functional mobility and overall quality of life. (PROGRESSING , 12/31/2018)   2. Pt will improve score on the Lower Extremity Functional Scale to 50/80 in order to demonstrate improved functional mobility and quality of life. (MET , 12/31/2018)   3. Pt will improve score on the Foot and Ankle Ability Measure to 45/84 in order to demonstrate improved functional mobility and quality of life. (MET , 12/31/2018)   4. Pt will report standing for > 10 minutes with minimal to no increase in pain in order to be able to stand for prolonged periods as needed to perform chores. (MET , 11/27/2018)   5. Pt will be able to ascend/descend 12 steps Magdalena with or without rail with reciprocal gait pattern and minimal to no increase in pain in order to improve community mobility. (PROGRESSING , 12/31/2018)   6. Pt will improve score on the Lower Extremity Functional Scale to 58/80 in order to demonstrate improved functional mobility and quality of life. (NEW GOAL , 12/31/2018)   Rehabilitation Potential For Stated Goals: Good  Regarding Nickie Pelletier's therapy, I certify that the treatment plan above will be carried out by a therapist or under their direction. Thank you for this referral,  Chris Nicholas DPT     Referring Physician Signature: Michelle Moya MD          Date                                 HISTORY:   History of Present Injury/Illness (Reason for Referral): *History per pt or pt's family except where otherwise noted  Pt states pain in her R ankle started in July 2018 (states she was walking more than usual), and she states that she was being treated for it (with prednisone) and it had gotten better with taping, but it has recently gotten worse again (early September when she stopped doing taping and started everyday walking again). States the pain has gotten better temporarily with Prednisone, but states that when she goes off the Prednisone it starts hurting again.  Pain at worst is 6-7/10 (aggravating activities include walking > 5 minutes, prolonged standing > 1-2 minutes - even in shower). Pain at best is 0/10 (eases pain with prednisone, rest, taping, mobic, ice). Pt states she was given boot to wear to help with the pain but she cannot wear this while driving (she is supposed to wear this 4 weeks starting last Friday). Past Medical History/Comorbidities:   Ms. Nic Mcclure  has a past medical history of Acid reflux, ADHD (attention deficit hyperactivity disorder), Allergic rhinitis (1/20/2014), Anxiety disorder (1/20/2014), Arthritis, Asthma, B12 deficiency (1/20/2014), Cerumen impaction, Chronic sinusitis, Depression (1/20/2014), Deviated nasal septum, Endocrine disease, Fibromyalgia, GERD (gastroesophageal reflux disease) (1/20/2014), Headache, Hypothyroidism, Low blood sugar, Meniere disease (1/20/2014), Migraine, Nasal obstruction, OCD (obsessive compulsive disorder), Psychiatric disorder, Special examinations, Special examinations, Tinnitus, and Unspecified hypothyroidism (1/20/2014). Ms. Nic Mcclure  has a past surgical history that includes hx orthopaedic; hx heent; hx heent (07/03/2013); and hx heent (06/2017). Social History/Living Environment:    lives alone in one story apartment with elevator to get up (15 flights of stairs with B railing - occasionally has to take the stairs down if she has an appointment and the elevator is busy)  Prior Level of Function/Work/Activity:  On disability for bipolar disorder and anxiety; likes to walk downtown with her friends, does housework (vacuuming, tidying up, light cooking); has to shop for her groceries  Dominant Side:         RIGHT  Other Clinical Tests:          X-rays of R foot (showed heel spur according to pt)  Previous Treatment Approaches:          Prednisone has helped  Personal Factors:          Sex:  female        Age:  41 y.o. Current Medications:    Current Outpatient Medications:     mirabegron ER (MYRBETRIQ) 25 mg ER tablet, Take 1 Tab by mouth nightly. , Disp: 30 Tab, Rfl: 3    levothyroxine (SYNTHROID) 125 mcg tablet, Take 1 Tab by mouth daily. , Disp: 90 Tab, Rfl: 1    Dexlansoprazole (DEXILANT) 60 mg CpDB, Take 1 Cap by mouth every morning., Disp: 90 Cap, Rfl: 1    candesartan (ATACAND) 4 mg tablet, Take 1 Tab by mouth daily. , Disp: 90 Tab, Rfl: 1    albuterol (PROVENTIL HFA, VENTOLIN HFA, PROAIR HFA) 90 mcg/actuation inhaler, Take 2 Puffs by inhalation as needed for Wheezing., Disp: 3 Inhaler, Rfl: 1    montelukast (SINGULAIR) 10 mg tablet, Take 1 Tab by mouth daily. , Disp: 90 Tab, Rfl: 1    cetirizine (ZYRTEC) 10 mg tablet, Take 1 Tab by mouth daily. , Disp: 90 Tab, Rfl: 1    meloxicam (MOBIC) 15 mg tablet, Take 1 Tab by mouth daily. , Disp: 90 Tab, Rfl: 1    diclofenac (VOLTAREN) 1 % gel, Apply 2 g to affected area four (4) times daily. Apply to right foot as needed 2-4 times a day, Disp: 100 g, Rfl: 2    tiZANidine (ZANAFLEX) 4 mg tablet, Take 1 Tab by mouth three (3) times daily as needed. , Disp: 90 Tab, Rfl: 1    IRON, FERROUS SULFATE, PO, Take 45 mg by mouth daily. , Disp: , Rfl:     cyanocobalamin 1,000 mcg tablet, Take 1 Tab by mouth daily. , Disp: 60 Tab, Rfl: 0    butalbital-acetaminophen (BUPAP)  mg tab, Take 1 Tab by mouth daily as needed. , Disp: 30 Tab, Rfl: 5    venlafaxine-SR (EFFEXOR XR) 150 mg capsule, Take  by mouth daily. , Disp: , Rfl:     escitalopram oxalate (LEXAPRO) 20 mg tablet, Take 20 mg by mouth daily. , Disp: , Rfl:     Magnesium Oxide 500 mg cap, Take 500 mg by mouth daily. , Disp: , Rfl:     traMADol (ULTRAM) 50 mg tablet, Take 1 Tab by mouth every eight (8) hours as needed for Pain. Max Daily Amount: 150 mg., Disp: 30 Tab, Rfl: 1    AMITIZA 24 mcg capsule, TK 1 C PO BID WF AND WATER, Disp: , Rfl: 6    ALPRAZolam (XANAX) 1 mg tablet, 0.5 mg three (3) times daily as needed. , Disp: , Rfl: 4    risperiDONE (RISPERDAL) 2 mg tablet, Take 3 mg by mouth nightly., Disp: , Rfl: 3    LAMICTAL 200 mg tablet, TK 1 T PO BID, Disp: , Rfl: 4    pregabalin (LYRICA) 75 mg capsule, tid, Disp: , Rfl:     melatonin tab tablet, Take 5 mg by mouth., Disp: , Rfl:     cholecalciferol (VITAMIN D3) 5,000 unit capsule, Take 2,000 Units by mouth., Disp: , Rfl:    Date Last Reviewed:  2/6/2019   # of Personal Factors/Comorbidities that affect the Plan of Care: 3+: HIGH COMPLEXITY   EXAMINATION:   Reassessment on 12/31//2018  Observation/Orthostatic Postural Assessment:    The following postural deficits were noted in sitting:  loss of cervical lordosis, increased thoracic kyphosis, forward head, rounded shoulders, posterior pelvic tilt - improved upright on 11/27/2018  The following postural deficits were noted in standing: forward trunk flexion, pronated foot L > R, slight genu valgus noted B - improved upright posture on 11/27/2018  Palpation:          Pt with tenderness noted at posterior R heel and at dorsum of R foot - less tenderness throughout R foot/ankle noted on 11/5/2018  ROM:    Initial measurement: (on 10/10/2018) Initial measurement: (on 10/10/2018) Reassessment measurement: (on 11/5/2018) Reassessment measurement:    L LE: WFL throughout, but excessive hip IR passively R LE: WFL throughout, but excessive hip IR passively; ankle motion WFL but significant tightness noted in dorsiflexors with pain in ankle R LE: ankle motion WFL - tightness noted in dorsiflexors with minimal to no pain in ankle      Strength:    Initial measurement: (on 10/10/2018) Initial measurement: (on 10/10/2018) Reassessment measurement: (on 12/31/2018) Reassessment measurement: (on 12/31/2018)   L LE:  Hip flexion: 5/5   Hip extension: 4-/5  Hip abduction: 4-/5  Hip adduction: 5/5  Hip IR: 4+/5  Hip ER: 4/5   Knee flexion:  4/5  Knee extension: 5/5  Ankle DF: 4/5 R LE:  Hip flexion: 5/5  Hip extension: 4-/5  Hip abduction: 4/5  Hip adduction: 4/5  Hip IR: 4+/5  Hip ER: 4/5  Knee flexion: 4+/5  Knee extension: 5/5  Ankle DF: 4-/5 L LE:  Hip flexion: 5/5   Hip extension: 4/5  Hip abduction: 4/5  Hip adduction: 4/5  Hip IR: 4+/5  Hip ER: 4/5   Knee flexion:  4+/5  Knee extension: 5/5  Ankle DF: 4/5 R LE:  Hip flexion: 5/5  Hip extension: 4/5  Hip abduction: 4/5  Hip adduction: 4/5  Hip IR: 4+/5  Hip ER: 4/5  Knee flexion: 4+/5  Knee extension: 5/5  Ankle DF: 4/5     Special Tests:          No leg length discrepancy noted  Neurological Screen:        Myotomes:  WFL        Dermatomes:  No deficits noted  Functional Mobility:         Gait/Ambulation:  Pt ambulates with no AD with following gait deficits: decreased B step length/clearance, increased pronation at each foot in stance phase, decreased B heel strike, decreased B arm swing, slight forward trunk flexion, increased lateral sway - slightly improved upright posture and improved B step length/clearance and heel strike on 11/5/2018 - improved upright posture but continuing to notice decreased heel strike with excessive knee flexion during stance phase at each LE on 12/31/2018        Transfers:  Pt able to stand/sit with minimal to no UE use        Bed Mobility:  Pt able to perform Magdalena with increased time  Balance:          Slight lateral sway with ambulation    Body Structures Involved:   1. Nerves  2. Bones  3. Joints  4. Muscles  5. Ligaments Body Functions Affected:  1. Sensory/Pain  2. Neuromusculoskeletal  3. Movement Related Activities and Participation Affected:  1. General Tasks and Demands  2. Mobility  3. Self Care  4. Domestic Life  5. Interpersonal Interactions and Relationships  6. Community, Social and [employer]   # of elements that affect the Plan of Care: 4+: HIGH COMPLEXITY   CLINICAL PRESENTATION:   Presentation: Evolving clinical presentation with changing clinical characteristics: MODERATE COMPLEXITY   CLINICAL DECISION MAKING:   Outcome Measure:    Tool Used: Lower Extremity Functional Scale (LEFS)  Score:  Initial: 27/80 Most Recent: 50/80 (Date: 12/31/2018)   Interpretation of Score: 20 questions each scored on a 5 point scale with 0 representing \"extreme difficulty or unable to perform\" and 4 representing \"no difficulty\". The lower the score, the greater the functional disability. 80/80 represents no disability. Minimal detectable change is 9 points. Score 80 79-63 62-48 47-32 31-16 15-1 0   Modifier CH CI CJ CK CL CM CN     ? Mobility - Walking and Moving Around:     - CURRENT STATUS: CJ - 20%-39% impaired, limited or restricted    - GOAL STATUS: CJ - 20%-39% impaired, limited or restricted    - D/C STATUS:  ---------------To be determined---------------    Tool Used: PT/OT FOOT AND ANKLE ABILITY MEASURE  Score:  Initial: 36/84 Most Recent:  56/84 (Date: 12/31/2018)   Interpretation of Score: For the \"Activities of Daily Living\", there are 21 questions each scored on a 5 point scale with 0 representing \"Unable to do\" and 4 representing \"No difficulty\". The lower the score, the greater the functional disability. 84/84 represents no disability. Minimal detectable change is 5.7 points. With the addition of the 8 questions in the \"Sports Subscale,\" there are 29 questions, each scored on a 5 point scale with 0 representing \"Unable to do\" and 4 representing \"No difficulty\". The lower the score, the greater the functional disability. 116/116 represents no disability. Minimal detectable change is 12.3 points. Activities of Daily Living:  Score 84 83-68 67-51 50-34 33-18 17-1 0   Modifier CH CI CJ CK CL CM CN     Activities of Daily Living + Sports Subscale:  Score 116 115-94 93-71 70-47 46-24 23-1 0   Modifier CH CI CJ CK CL CM CN     Payor: [employer] MEDICARE / Plan: [address] [place] [address] HMO / Product Type: Managed Care Medicare /   Medical Necessity:   · Patient is expected to demonstrate progress in strength, range of motion, balance and functional technique to improve ability to perform pain-free standing/ambulation.   · Patient demonstrates good rehab potential due to higher previous functional level. Reason for Services/Other Comments:  · Patient continues to require modification of therapeutic interventions to increase complexity of exercises. Use of outcome tool(s) and clinical judgement create a POC that gives a: Questionable prediction of patient's progress: MODERATE COMPLEXITY   TREATMENT:   (In addition to Assessment/Re-Assessment sessions the following treatments were rendered)  Subjective comments at beginning of session: Pt states she was walking a lot the past several days and then standing at [employer] and her R ankle started bothering her more; states it hurt more today when she was trying to organize her pantry  THERAPEUTIC EXERCISE: (39 minutes):  Exercises per grid below to improve mobility, strength, balance and dynamic movement of ankle - right to improve functional mobility. Required minimal visual, verbal and manual cues to promote proper body alignment, promote proper body posture, promote proper body mechanics and promote proper body breathing techniques. Progressed resistance, range, repetitions and complexity of movement as indicated.     Date:  12/31/2018 Date:  1/3/2018 Date:  1/11/2019 Date:  1/24/2019 Date:  1/31/2019 Date:  2/6/2019   Activity/Exercise         Physiostep L3 resistance for 15 minutes to increase strength/ endurance L3 progressing to L4 resistance (after 5 minutes) for 15 minutes to increase strength/ endurance L3 quickly progressing to L4 resistance (after 5 minutes) for 15 minutes to increase strength/ endurance L3 quickly progressing to L4 resistance (after 2 minutes) for 15 minutes to increase strength/ endurance L1 gradually progressing to L4 resistance for 15 minutes to increase strength/ endurance L4 resistance for 15 minutes to increase strength/ endurance   Ankle plantarflexor stretch on incline board 3 x 30 sec hold with knees extended then flexed 3 x 30 sec hold with knees extended then flexed 3 x 30 sec hold with knees extended then flexed; progressed to 3rd notch from bottom 3 x 30 sec hold with knees extended then flexed; progressed to 3rd notch from bottom 3 x 30 sec hold with knees extended then flexed; progressed to 3rd notch from bottom 3 x 30 sec hold with knees extended then flexed; progressed to 3rd notch from bottom   Heel raises on firm ground B UE support with feet on incline boarbd (1st notch from bottom); slow lowering on R LE only for 15 reps/1 set B UE support with feet on incline board (1st notch from bottom); slow lowering on R LE only for 15 reps/1 set B UE support with feet on incline board (2nd notch from bottom); slow lowering on R LE only for 10 reps/1 set B UE support with feet on incline board (2nd notch from bottom); slow lowering on R LE only for 12 reps/1 set B UE support with feet on incline board (2nd notch from bottom); slow lowering on R LE only for 12 reps/1 set B UE support with feet on incline board (2nd notch from bottom); slow lowering on R LE only for 12 reps/1 set   Supine hamstring stretch         Leg press machine 35 lb; 15 reps/1 set with cues for slow movements with increased control 35 lb; 20 reps/1 set with cues for slow movements with increased control 40 lb; 20 reps/1 set with cues for slow movements with increased control 20 lb with R LE only; 20 reps/1 set with cues for slow movements with increased control 20 lb with R LE only; 15 reps/1 set with cues for slow movements with increased control 20 lb with R LE only; 20 reps/1 set with cues for slow movements with increased control   Ambulation over level ground         Ascending/descending stairs 12 steps with reciprocal gait and no rail use per assessment above with minimal to moderate increase in pain        Step ups         Varying resistance at LEs in different planes of motion Per strength assessment above        Standing LE marching on blue foam    Cues for slow movement; 20 reps/1 set with intermittent UE support and cues for control  Cues for slow movement; 20 reps/1 set with intermittent UE support and cues for control   Static standing on foam      Semi-tandem stance each LE forward; 20-30 second trials with occasional UE correction at bars   Cone taps      Standing on foam tapping cone placed anteriorly; 20 reps/1 set each LE with cues for no UE use at bars; minimal lateral sway with only occasional UE correction at bars   Anterior/posterior tapping on rocker board      20 reps/1 set each direction with minimal progressing to no UE support at bars   Lateral tapping on rocker board      20 reps/1 set each direction with minimal progressing to no UE support at bars   *given in HEP  [employer] Portal      Manual therapy ()    Modalities (10 minutes): With pt in supported supine position, cold pack (pillowcase layer) applied to R ankle to reduce pain and inflammation at end of session     Treatment/Session Assessment: Pt able to perform all exercises this session with no reports of increased pain, and was able to progress to performing multiple ankle stability exercises this session. Will plan on discharge next session if no significant changes. · Pain/ Symptoms: Initial:   3/10 in posterior R ankle  Post Session: 2-3/10 ·   Compliance with Program/Exercises: Good compliance with attending scheduled sessions. · Recommendations/Intent for next treatment session: \"Next visit will focus on advancements to more challenging activities and reduction in assistance provided\".  Manual therapy/modalities as needed to reduce tightness and pain; work on eccentric plantarflexor strengthening and balance  Total Treatment Duration:   PT Patient Time In/Time Out  Time In: 1300  Time Out: 15 Isiah Arenas DPT    Future Appointments   Date Time Provider Jo Nolan   5/1/2019  8:45 AM MT PM LAB/RAD [facility]   5/8/2019  9:20 AM Angie Aguilar MD [facility]

## 2019-02-14 NOTE — PROGRESS NOTES
Zhane Dykes  : 1975  Primary: Latisha Nunez Medicare Hmo  [place] Of [facility], [address]  [phone]   [phone]        I am accessing Ms. Pelletier's chart as a part of our department's internal chart auditing process. I certify that Ms. Jodi Avila is, or was, a patient in our department.   Thank you,  Ean Phan, DPT  2019

## 2019-02-15 ENCOUNTER — HOSPITAL ENCOUNTER (OUTPATIENT)
Dept: PHYSICAL THERAPY | Age: 44
Discharge: HOME | End: 2019-02-15
Payer: MEDICARE

## 2019-02-15 NOTE — PROGRESS NOTES
[facility] [place] [address] [address] [facility]  [phone]   [phone]     OUTPATIENT DAILY NOTE    NAME: Todd Vega    DATE: 2/15/2019    Patient canceled physical therapy appointment today due to injury to LE. Will plan to follow up on next scheduled visit.     KRISTINE ElizaldeT

## 2019-04-16 PROBLEM — G57.93 NEUROPATHY OF BOTH FEET: Status: ACTIVE | Noted: 2019-04-16

## 2019-07-23 PROBLEM — K58.9 IBS (IRRITABLE BOWEL SYNDROME): Status: ACTIVE | Noted: 2019-07-23

## 2019-11-01 NOTE — PROGRESS NOTES
Chandrakant David  : 1975  Primary: [employer] [place]  Secondary: [place] [facility] [facility], [place] [address] [address] [facility]  [phone]   [phone]       OUTPATIENT PHYSICAL THERAPY:Progress Report 2018    ICD-10: Treatment Diagnosis: Pain in right ankle and joints of right foot (M25.571)      Stiffness of right ankle, not elsewhere classified (M25.671)    Difficulty in walking, not elsewhere classified (R26.2)   Precautions/Allergies:   Abilify [aripiprazole]; Geodon [ziprasidone hcl]; Gonzales Katina; Ambien [zolpidem]; and Topamax [topiramate]   Fall Risk Score: 3 (? 5 = High Risk)  MD Orders: Evaluate and treat  MEDICAL/REFERRING DIAGNOSIS:  foot, right   DATE OF ONSET: 2018  REFERRING PHYSICIAN: Soo Velarde MD  RETURN PHYSICIAN APPOINTMENT: 2018 (pt unsure of date)   This section established at most recent assessment  ASSESSMENT:  Chandrakant David has now attended 8 physical therapy sessions for R ankle/foot pain that started in 2018 and has recently gotten worse. This session, she demonstrated improved B LE strength/endurance, less pain with R ankle mobility, less postural/gait deficits, improved standing/walking tolerance, and improved functional mobility as evident by a score of 57/84 on the Foot and Ankle Ability Measure (initial score of 36/84, with lower scores indicating increased disability) and a score of 45/80 on the Lower Extremity Functional Scale (initial score of 27/80, with lower scores indicating increased disability). Despite the above listed improvements, she continues to demonstrate decreased B LE strength/endurance, pain with R ankle mobility, multiple postural/gait deficits, decreased standing/walking tolerance, and decreased functional mobility.   Pt may continue to benefit from skilled PT to address the above listed deficits to improve ability to perform pain-free ADLs/IADLs and to improve overall quality of life prior to discharge. PROBLEM LIST (Impacting functional limitations):  1. Decreased Strength  2. Decreased ADL/Functional Activities  3. Decreased Transfer Abilities  4. Decreased Ambulation Ability/Technique  5. Decreased Balance  6. Increased Pain  7. Decreased Activity Tolerance  8. Increased Fatigue  9. Decreased Flexibility/Joint Mobility  10. Edema/Girth INTERVENTIONS PLANNED:  1. Balance Exercise  2. Bed Mobility  3. Cold  4. Electrical Stimulation  5. Family Education  6. Gait Training  7. Heat  8. Home Exercise Program (HEP)  9. Manual Therapy  10. Neuromuscular Re-education/Strengthening  11. Range of Motion (ROM)  12. Therapeutic Activites  13. Therapeutic Exercise/Strengthening  14. Transcutaneous Electrical Nerve Stimulation (TENS)  15. Transfer Training  16. Ultrasound (US)   TREATMENT PLAN:  Effective Dates: 10/10/2018 TO 12/9/2018 (60 days). Frequency/Duration: 2 times a week for 60 Days  GOALS: (Goals have been discussed and agreed upon with patient.)  Short-Term Functional Goals: Time Frame: 30 days  1. Pt will be compliant with HEP in order to increase LE strength/endurance/mobility to improve functional mobility and overall quality of life. (MET 11/5/2018)  2. Pt will improve score on the Lower Extremity Functional Scale to 38/80 in order to demonstrate improved functional mobility and quality of life. (MET 11/5/2018)  3. Pt will improve score on the Foot and Ankle Ability Measure to 41/84 in order to demonstrate improved functional mobility and quality of life. (MET 11/5/2018)  4. Pt will report standing for > 5 minutes with minimal to no increase in pain in order to be able to stand for prolonged periods as needed to perform chores. (MET 11/5/2018)  5. Pt will be able to ascend/descend 6 steps with S with or without rail with safe gait pattern and minimal to no increase in pain in order to improve community mobility.   (MET 11/5/2018)  Discharge Goals: Time Frame: 60 days  1. Pt will be independent with HEP in order to increase LE strength/endurance/mobility to improve functional mobility and overall quality of life. 2. Pt will improve score on the Lower Extremity Functional Scale to 50/80 in order to demonstrate improved functional mobility and quality of life. 3. Pt will improve score on the Foot and Ankle Ability Measure to 45/84 in order to demonstrate improved functional mobility and quality of life. 4. Pt will report standing for > 10 minutes with minimal to no increase in pain in order to be able to stand for prolonged periods as needed to perform chores. 5. Pt will be able to ascend/descend 12 steps Magdalena with or without rail with reciprocal gait pattern and minimal to no increase in pain in order to improve community mobility. Rehabilitation Potential For Stated Goals: Good  Regarding Jonathan Pelletier's therapy, I certify that the treatment plan above will be carried out by a therapist or under their direction. Thank you for this referral,  Radha Weiss DPT               HISTORY:   History of Present Injury/Illness (Reason for Referral): *History per pt or pt's family except where otherwise noted  Pt states pain in her R ankle started in July 2018 (states she was walking more than usual), and she states that she was being treated for it (with prednisone) and it had gotten better with taping, but it has recently gotten worse again (early September when she stopped doing taping and started everyday walking again). States the pain has gotten better temporarily with Prednisone, but states that when she goes off the Prednisone it starts hurting again. Pain at worst is 6-7/10 (aggravating activities include walking > 5 minutes, prolonged standing > 1-2 minutes - even in shower). Pain at best is 0/10 (eases pain with prednisone, rest, taping, mobic, ice).   Pt states she was given boot to wear to help with the pain but she cannot wear this while driving (she is supposed to wear this 4 weeks starting last Friday). Past Medical History/Comorbidities:   Ms. Cortez  has a past medical history of Acid reflux, ADHD (attention deficit hyperactivity disorder), Allergic rhinitis, Anxiety disorder, Arthritis, Asthma, B12 deficiency, Cerumen impaction, Chronic sinusitis, Depression, Deviated nasal septum, Endocrine disease, Fibromyalgia, GERD (gastroesophageal reflux disease), Headache, Hypothyroidism, Low blood sugar, Meniere disease, Migraine, Nasal obstruction, OCD (obsessive compulsive disorder), Psychiatric disorder, Special examinations, Special examinations, Tinnitus, and Unspecified hypothyroidism. Ms. Cortez  has a past surgical history that includes hx orthopaedic; hx heent; hx heent (07/03/2013); and hx heent (06/2017). Social History/Living Environment:    lives alone in one story apartment with elevator to get up (15 flights of stairs with B railing - occasionally has to take the stairs down if she has an appointment and the elevator is busy)  Prior Level of Function/Work/Activity:  On disability for bipolar disorder and anxiety; likes to walk downtown with her friends, does housework (vacuuming, tidying up, light cooking); has to shop for her groceries  Dominant Side:         RIGHT  Other Clinical Tests:          X-rays of R foot (showed heel spur according to pt)  Previous Treatment Approaches:          Prednisone has helped  Personal Factors:          Sex:  female        Age:  41 y.o. Current Medications:    Current Outpatient Medications:     levothyroxine (SYNTHROID) 125 mcg tablet, Take 1 Tab by mouth daily. , Disp: 90 Tab, Rfl: 1    Dexlansoprazole (DEXILANT) 60 mg CpDB, Take 1 Cap by mouth every morning., Disp: 90 Cap, Rfl: 1    candesartan (ATACAND) 4 mg tablet, Take 1 Tab by mouth daily. , Disp: 90 Tab, Rfl: 1    albuterol (PROVENTIL HFA, VENTOLIN HFA, PROAIR HFA) 90 mcg/actuation inhaler, Take 2 Puffs by inhalation as needed for Wheezing., Disp: 3 Inhaler, Rfl: 1    montelukast (SINGULAIR) 10 mg tablet, Take 1 Tab by mouth daily. , Disp: 90 Tab, Rfl: 1    cetirizine (ZYRTEC) 10 mg tablet, Take 1 Tab by mouth daily. , Disp: 90 Tab, Rfl: 1    meloxicam (MOBIC) 15 mg tablet, Take 1 Tab by mouth daily. , Disp: 90 Tab, Rfl: 1    mirabegron ER (MYRBETRIQ) 25 mg ER tablet, Take 1 Tab by mouth nightly., Disp: 30 Tab, Rfl: 0    diclofenac (VOLTAREN) 1 % gel, Apply 2 g to affected area four (4) times daily. Apply to right foot as needed 2-4 times a day, Disp: 100 g, Rfl: 2    tiZANidine (ZANAFLEX) 4 mg tablet, Take 1 Tab by mouth three (3) times daily as needed. , Disp: 90 Tab, Rfl: 1    IRON, FERROUS SULFATE, PO, Take 45 mg by mouth daily. , Disp: , Rfl:     cyanocobalamin 1,000 mcg tablet, Take 1 Tab by mouth daily. , Disp: 60 Tab, Rfl: 0    butalbital-acetaminophen (BUPAP)  mg tab, Take 1 Tab by mouth daily as needed. , Disp: 30 Tab, Rfl: 5    venlafaxine-SR (EFFEXOR XR) 150 mg capsule, Take  by mouth daily. , Disp: , Rfl:     escitalopram oxalate (LEXAPRO) 20 mg tablet, Take 20 mg by mouth daily. , Disp: , Rfl:     Magnesium Oxide 500 mg cap, Take 500 mg by mouth daily. , Disp: , Rfl:     traMADol (ULTRAM) 50 mg tablet, Take 1 Tab by mouth every eight (8) hours as needed for Pain. Max Daily Amount: 150 mg., Disp: 30 Tab, Rfl: 1    amphetamine-dextroamphetamine XR (ADDERALL XR) 20 mg XR capsule, TK 1 C PO QD, Disp: , Rfl: 0    AMITIZA 24 mcg capsule, TK 1 C PO BID WF AND WATER, Disp: , Rfl: 6    ALPRAZolam (XANAX) 1 mg tablet, 0.5 mg three (3) times daily as needed. , Disp: , Rfl: 4    risperiDONE (RISPERDAL) 2 mg tablet, Take 3 mg by mouth nightly., Disp: , Rfl: 3    LAMICTAL 200 mg tablet, TK 1 T PO BID, Disp: , Rfl: 4    pregabalin (LYRICA) 75 mg capsule, tid, Disp: , Rfl:     melatonin tab tablet, Take 5 mg by mouth., Disp: , Rfl:     cholecalciferol (VITAMIN D3) 5,000 unit capsule, Take 2,000 Units by mouth., Disp: , Rfl:    Date Last Reviewed:  11/5/2018   # of Personal Factors/Comorbidities that affect the Plan of Care: 3+: HIGH COMPLEXITY   EXAMINATION:   Reassessment on 11/5/2018  Observation/Orthostatic Postural Assessment:    The following postural deficits were noted in sitting:  loss of cervical lordosis, increased thoracic kyphosis, forward head, rounded shoulders, posterior pelvic tilt - improved upright on 11/5/2018  The following postural deficits were noted in standing: forward trunk flexion, pronated foot L > R, slight genu valgus noted B - improved upright posture on 11/5/2018  Palpation:          Pt with tenderness noted at posterior R heel and at dorsum of R foot - less tenderness throughout R foot/ankle noted on 11/5/2018  ROM:    Initial measurement: (on 10/10/2018) Initial measurement: (on 10/10/2018) Reassessment measurement: (on 11/5/2018) Reassessment measurement:    L LE: WFL throughout, but excessive hip IR passively R LE: WFL throughout, but excessive hip IR passively; ankle motion WFL but significant tightness noted in dorsiflexors with pain in ankle R LE: ankle motion WFL - tightness noted in dorsiflexors with minimal to no pain in ankle      Strength:    Initial measurement: (on 10/10/2018) Initial measurement: (on 10/10/2018) Reassessment measurement:  Reassessment measurement:    L LE:  Hip flexion: 5/5   Hip extension: 4-/5  Hip abduction: 4-/5  Hip adduction: 5/5  Hip IR: 4+/5  Hip ER: 4/5   Knee flexion:  4/5  Knee extension: 5/5  Ankle DF: 4/5 R LE:  Hip flexion: 5/5  Hip extension: 4-/5  Hip abduction: 4/5  Hip adduction: 4/5  Hip IR: 4+/5  Hip ER: 4/5  Knee flexion: 4+/5  Knee extension: 5/5  Ankle DF: 4-/5       Special Tests:          No leg length discrepancy noted  Neurological Screen:        Myotomes:  WFL        Dermatomes:  No deficits noted  Functional Mobility:         Gait/Ambulation:  Pt ambulates with no AD with following gait deficits: decreased B step length/clearance, increased pronation at each foot in stance phase, decreased B heel strike, decreased B arm swing, slight forward trunk flexion, increased lateral sway - slightly improved upright posture and improved B step length/clearance and heel strike on 11/5/2018        Transfers:  Pt able to stand/sit with minimal to no UE use        Bed Mobility:  Pt able to perform Magdalena with increased time  Balance:          Slight lateral sway with ambulation    Body Structures Involved:   1. Nerves  2. Bones  3. Joints  4. Muscles  5. Ligaments Body Functions Affected:  1. Sensory/Pain  2. Neuromusculoskeletal  3. Movement Related Activities and Participation Affected:  1. General Tasks and Demands  2. Mobility  3. Self Care  4. Domestic Life  5. Interpersonal Interactions and Relationships  6. Community, Social and [place]   # of elements that affect the Plan of Care: 4+: HIGH COMPLEXITY   CLINICAL PRESENTATION:   Presentation: Evolving clinical presentation with changing clinical characteristics: MODERATE COMPLEXITY   CLINICAL DECISION MAKING:   Outcome Measure: Tool Used: Lower Extremity Functional Scale (LEFS)  Score:  Initial: 27/80 Most Recent: 45/80 (Date: 11/5/2018)   Interpretation of Score: 20 questions each scored on a 5 point scale with 0 representing \"extreme difficulty or unable to perform\" and 4 representing \"no difficulty\". The lower the score, the greater the functional disability. 80/80 represents no disability. Minimal detectable change is 9 points. Score 80 79-63 62-48 47-32 31-16 15-1 0   Modifier CH CI CJ CK CL CM CN     ?  Mobility - Walking and Moving Around:     - CURRENT STATUS: CK - 40%-59% impaired, limited or restricted    - GOAL STATUS: CJ - 20%-39% impaired, limited or restricted    - D/C STATUS:  ---------------To be determined---------------    Tool Used: PT/OT FOOT AND ANKLE ABILITY MEASURE  Score:  Initial: 36/84 Most Recent: 57/84 (Date: 11/5/2018) Interpretation of Score: For the \"Activities of Daily Living\", there are 21 questions each scored on a 5 point scale with 0 representing \"Unable to do\" and 4 representing \"No difficulty\". The lower the score, the greater the functional disability. 84/84 represents no disability. Minimal detectable change is 5.7 points. With the addition of the 8 questions in the \"Sports Subscale,\" there are 29 questions, each scored on a 5 point scale with 0 representing \"Unable to do\" and 4 representing \"No difficulty\". The lower the score, the greater the functional disability. 116/116 represents no disability. Minimal detectable change is 12.3 points. Activities of Daily Living:  Score 84 83-68 67-51 50-34 33-18 17-1 0   Modifier CH CI CJ CK CL CM CN     Activities of Daily Living + Sports Subscale:  Score 116 115-94 93-71 70-47 46-24 23-1 0   Modifier CH CI CJ CK CL CM CN     Payor: [employer] MEDICARE / Plan: [address] [place] [address] HMO / Product Type: Managed Care Medicare /   Medical Necessity:   · Patient is expected to demonstrate progress in strength, range of motion, balance and functional technique to improve ability to perform pain-free standing/ambulation. · Patient demonstrates good rehab potential due to higher previous functional level. Reason for Services/Other Comments:  · Patient continues to require modification of therapeutic interventions to increase complexity of exercises.    Use of outcome tool(s) and clinical judgement create a POC that gives a: Questionable prediction of patient's progress: MODERATE COMPLEXITY   TREATMENT:   (In addition to Assessment/Re-Assessment sessions the following treatments were rendered)  Subjective comments at beginning of session: Pt states her pain is a little better today; states she hurt a little more over the weekend because she was doing more; states she is doing her exercises a little more  THERAPEUTIC EXERCISE: (23 minutes):  Exercises per grid below (and assessment above on 11/5/2018) to improve mobility, strength, balance and dynamic movement of ankle - right to improve functional mobility. Required minimal visual, verbal and manual cues to promote proper body alignment, promote proper body posture, promote proper body mechanics and promote proper body breathing techniques. Progressed resistance, range, repetitions and complexity of movement as indicated.     Date:  10/29/2018 Date:  10/31/2018 Date:  11/5/2018   Activity/Exercise      Physiostep L2 resistance for 12 minutes to increase strength/endurance L2 resistance for 12 minutes to increase strength/endurance L2 resistance for 13 minutes to increase strength/endurance   Ankle plantarflexor stretch on incline board 30 second hold x 3 reps with knees extended then flexed 30 second hold x 3 reps with knees extended then flexed    Heel raises on firm ground B UE support with feet on incline board (1st notch from bottom); 20 reps/1 set with cues for slow lowering on B LEs progressing to lowering on just R LE (last 5 reps) B UE support with feet on incline board (1st notch from bottom); 20 reps/1 set with cues for slow lowering on B LEs  Progressed to performing at second notch of incline board x 10 reps (B UE support, but cues to avoid relying too much on UEs)    Supine hamstring stretch 30 second hold x 3 reps R LE; pt stretching passively with belt  30 second hold x 3 reps each LE; pt stretching passively with belt   Leg press machine 10 lb resistance; 15 reps/1 set with cues to avoid hip IR/adduction and to keep knees apart when pushing; cues for slow movement 10 lb resistance; 15 reps/1 set with cues to avoid hip IR/adduction and to keep knees apart when pushing; cues for slow movement    Ambulation over level ground   Throughout session per assessment above   Ascending/descending stairs   Going up/down 6 steps per assessment above with reciprocal pattern and single rail use; S   *given in HEP  [email or web address] Manual therapy (15 minutes): With pt in supine position, gentle PROM performed at R ankle in all planes per assessment above. With pt in prone position, extensive STM to R gastrocnemius and soleus as well as cross friction massage to R achilles tendon to reduce tightness and pain and improve ankle mobility. With pt in prone position, ice massage along R achilles tendon to reduce inflammation and pain at end of session. Modalities (). Treatment/Session Assessment: See assessment above. · Pain/ Symptoms: Initial:   2/10 in posterior R ankle Post Session:  Pt stating same level of pain at end of session ·   Compliance with Program/Exercises: Good compliance with attending scheduled sessions. · Recommendations/Intent for next treatment session: \"Next visit will focus on advancements to more challenging activities and reduction in assistance provided\".  Manual therapy/modalities as needed to reduce tightness and pain; work on eccentric plantarflexor strengthening and balance  Total Treatment Duration:  PT Patient Time In/Time Out  Time In: 1030  Time Out: 100 [facility], DPT    Future Appointments   Date Time Provider Jo Nolan   11/7/2018 10:15 AM Catherine Corporal B, DPT [facility]   11/12/2018 10:30 AM Irean Corporal B, DPT [facility]   11/14/2018 10:30 AM Irean Corporal B, DPT [facility]   5/1/2019  8:45 AM MTV PM LAB/RAD [facility]   5/8/2019  9:20 AM Rondel Sandifer, MD [facility] Alpha Lipoic Acid 600 mg oral capsule: 1 cap(s) orally once a day  anastrozole 1 mg oral tablet: 1 tab(s) orally once a day  Ativan 0.5 mg oral tablet: 1 tab(s) orally once a day (at bedtime), As Needed - for insomnia  Claritin 10 mg oral tablet: 1 tab(s) orally once a day    cyclobenzaprine 10 mg oral tablet: 1 tab(s) orally 3 times a day, As Needed - for muscle spasm  Cymbalta 20 mg oral delayed release capsule: 1 cap(s) orally 2 times a day  Fioricet oral capsule: 1 cap(s) orally every 4 hours, As Needed migraines  Imodium 2 mg oral capsule: 1 cap(s) orally , As Needed  lisinopril 5 mg oral tablet: 1 tab(s) orally once a day  lysine 500 mg oral tablet: 1 tab(s) orally once a day  meloxicam 15 mg oral tablet: 1 tab(s) orally once a day  Multiple Vitamins oral tablet: 1 tab(s) orally once a day  oxyCODONE 5 mg oral tablet: 1 tab(s) orally once a day, As Needed - for moderate pain  SUMAtriptan 50 mg oral tablet: 1 tab(s) orally once, As Needed - for headache  Tylenol 500 mg oral tablet: 2 tab(s) orally , As Needed  Vitamin B-100: 1 tab(s) orally once a day  Zegerid OTC 20 mg-1100 mg oral capsule: 1 cap(s) orally once a day

## 2019-11-12 ENCOUNTER — HOSPITAL ENCOUNTER (OUTPATIENT)
Dept: MAMMOGRAPHY | Age: 44
Discharge: HOME | End: 2019-11-12
Attending: FAMILY MEDICINE
Payer: MEDICARE

## 2019-11-12 DIAGNOSIS — N64.4 PAIN OF LEFT BREAST: ICD-10-CM

## 2019-11-12 PROCEDURE — 77066 DX MAMMO INCL CAD BI: CPT

## 2020-06-01 PROBLEM — E03.9 HYPOTHYROIDISM: Status: ACTIVE | Noted: 2020-06-01

## 2020-08-18 PROBLEM — E03.9 HYPOTHYROIDISM: Status: RESOLVED | Noted: 2020-06-01 | Resolved: 2020-08-18

## 2021-11-17 PROBLEM — M19.90 OA (OSTEOARTHRITIS): Status: ACTIVE | Noted: 2021-11-17

## 2021-11-17 PROBLEM — M51.37 DDD (DEGENERATIVE DISC DISEASE), LUMBOSACRAL: Status: ACTIVE | Noted: 2021-11-17

## 2021-12-27 ENCOUNTER — HOSPITAL ENCOUNTER (OUTPATIENT)
Dept: PHYSICAL THERAPY | Age: 46
Discharge: HOME | End: 2021-12-27

## 2022-01-04 ENCOUNTER — HOSPITAL ENCOUNTER (OUTPATIENT)
Dept: PHYSICAL THERAPY | Age: 47
Discharge: HOME | End: 2022-01-04
Payer: MEDICARE

## 2022-01-04 PROCEDURE — 97162 PT EVAL MOD COMPLEX 30 MIN: CPT

## 2022-01-04 PROCEDURE — 97140 MANUAL THERAPY 1/> REGIONS: CPT

## 2022-01-04 NOTE — PROGRESS NOTES
Santo Antwanesteban  : 1975  Primary: [address] Medic*  Secondary:  [address] at [facility]  [address], [address], [place], [facility]  [phone]   [phone]       OUTPATIENT PHYSICAL THERAPY: Daily Treatment Note 2022  Visit Count:  1  ICD-10: Treatment Diagnosis: Low back pain (M54.5)  Pain in right leg (M79.604)   Difficulty in walking, not elsewhere classified (R26.2)  Muscle weakness (generalized) (M62.81)  Precautions/Allergies:   Latex, Abilify [aripiprazole], Geodon [ziprasidone hcl], Klonopin [clonazepam], Ambien [zolpidem], and Topamax [topiramate]   TREATMENT PLAN:  Effective Dates/Frequency/Duration: Twice per week from 2022 until 3/20/2022 (10 weeks). Pre-treatment Symptoms/Complaints:  See history above. Pain: Initial:   010 Post Session:  2/10   Medications Last Reviewed:  2022  Updated Objective Findings: See evaluation note from today   TREATMENT:   In addition to assessment today, the following treatments were provided:    Manual therapy (10 minutes): With pt in supported hooklying position:   - Performed muscle energy technique at pelvis (resisted R hip flexion and L hip extension and vice versa x 3 reps, followed by resisted hip abduction/adduction x 3 reps, and bridging x 3 reps) x 1 sets to improve pelvic symmetry and reduce muscular tightness and pain. With pt in prone position:   - STM and trigger point release to B upper glutes, quadratus lumborum, piriformis, and lower portion of latissimus dorsi to reduce muscular tightness and pain    Treatment/Session Summary:    · Response to Treatment:  See assessment above. No adverse reactions/unusual changes observed/reported in clinical status this session.   · Communication/Consultation:  None today  · Equipment provided today:  None today  · Recommendations/Intent for next treatment session: Next visit will focus on manual therapy/modalities as needed to reduce pain; address LE length discrepancy; core stability exercises.     Total Treatment Billable Duration:  10 minutes  PT Patient Time In/Time Out  Time In: 1300  Time Out: [address], DPT    Future Appointments   Date Time Provider Jo An   1/7/2022  1:00 PM Anahy Gan DPT [facility]   1/10/2022  1:00 PM Anahy Gan DPT [facility]   1/12/2022  2:30 PM Brent Baptiste, FNP [id number]   1/14/2022  1:00 PM Asher Ayalau, PTA [facility]   1/17/2022  1:45 PM Godwin Cee NP [facility]   5/31/2022  8:30 AM PRE LAB RESOURCE [facility]   6/7/2022 10:20 AM Torri Diaz MD [facility]        Visit Approval Visit # Therapist initials Date A NS / Cx < 24 hr >24 hr Cx Comments   99 visits total 1  1/4/2022 [x]  [] [] Initial evaluation       [] [] []        [] [] []        [] [] []        [] [] []        [] [] []        [] [] []        [] [] []        [] [] []        [] [] []        [] [] []        [] [] []        [] [] []        [] [] []        [] [] []        [] [] []        [] [] []        [] [] []

## 2022-01-04 NOTE — THERAPY EVALUATION
Chris Akers  : 1975  Primary: [address] Medic*  Secondary:  [address] at [facility], [address], [place], [facility]  [phone]   [phone]        OUTPATIENT PHYSICAL THERAPY:Initial Assessment 2022   ICD-10: Treatment Diagnosis: Low back pain (M54.5)  Pain in right leg (M79.604)   Difficulty in walking, not elsewhere classified (R26.2)  Muscle weakness (generalized) (M62.81)  Precautions/Allergies:   Latex, Abilify [aripiprazole], Geodon [ziprasidone hcl], Klonopin [clonazepam], Ambien [zolpidem], and Topamax [topiramate]   TREATMENT PLAN:  Effective Dates/Frequency/Duration: Twice per week from 2022 until 3/20/2022 (10 weeks). MEDICAL/REFERRING DIAGNOSIS:  Lumbar pain [M54.50]   DATE OF ONSET: 2021  REFERRING PHYSICIAN: Abhi Portillo NP MD Orders: Evaluate and treat  Return MD Appointment: unspecified   INITIAL ASSESSMENT:  Chris Akers is a 42 y.o. female who presents to physical therapy for insidious onset of R sided low back pain with occasional radiating pain down R LE starting in 2021. This session, pt demonstrated decreased B LE strength/endurance, decreased lumbar mobility with associated pain, multiple postural/gait deficits, decreased sitting tolerance, decreased standing tolerance and decreased functional mobility as evident by a score of 22/50 on the Modified Oswestry Low Back Pain Questionnaire (with higher scores indicating increased disability). Of note, pt is hypermobile and had pelvic/LE length asymmetry which may be contributing to her back pain. Pt may benefit from skilled PT to address the above listed deficits to improve ability to perform pain-free ADLs/IADLs and to improve overall quality of life prior to discharge. PROBLEM LIST (Impacting functional limitations):  1. Decreased Strength  2. Decreased ADL/Functional Activities  3. Decreased Transfer Abilities  4.  Decreased Ambulation Ability/Technique  5. Increased Pain  6. Decreased Activity Tolerance  7. Decreased Pacing Skills  8. Increased Fatigue  9. Decreased Flexibility/Joint Mobility  10. Edema/Girth INTERVENTIONS PLANNED: (Treatment may consist of any combination of the following)  1. Bed Mobility  2. Cold  3. Electrical Stimulation  4. Family Education  5. Gait Training  6. Heat  7. Home Exercise Program (HEP)  8. Manual Therapy  9. Neuromuscular Re-education/Strengthening  10. Range of Motion (ROM)  11. Therapeutic Activites  12. Therapeutic Exercise/Strengthening  13. Transcutaneous Electrical Nerve Stimulation (TENS)  14. Transfer Training  15. Ultrasound (US)     GOALS: (Goals have been discussed and agreed upon with patient.)  Short-Term Functional Goals: Time Frame: 5 weeks  1. Pt will be compliant with HEP in order to increase lumbar mobility and LE and core strength/endurance to improve quality of life. 2. Pt will reduce score on Modified Oswestry Low Back Pain Questionnaire to 19/50 in order to demonstrate improved functional mobility and quality of life. 3. Pt will report being able to vacuum for > 5 minutes with minimal to no increase in pain in order to be able to stand for prolonged periods as needed to perform house cleaning. 4. Pt will demonstrate increase in lumbar extension AROM to 60% or more of normal with minimal to no increase in pain in order to improve functional mobility and improve upright posture. Discharge Goals: Time Frame: 10 weeks  1. Pt will be independent with HEP in order to increase lumbar mobility and LE and core strength/endurance to improve quality of life. 2. Pt will reduce score on Modified Oswestry Low Back Pain Questionnaire to 16/50 in order to demonstrate improved functional mobility and quality of life.    3. Pt will report being able to vacuum for > 10 minutes with minimal to no increase in pain in order to be able to stand for prolonged periods as needed to perform house cleaning. 4. Pt will demonstrate increase in lumbar extension AROM to 75% or more of normal with minimal to no increase in pain in order to improve functional mobility and improve upright posture. OUTCOME MEASURE:   Tool Used: Modified Oswestry Low Back Pain Questionnaire  Score:  Initial: 22/50  Most Recent: X/50 (Date: -- )   Interpretation of Score: Each section is scored on a 0-5 scale, 5 representing the greatest disability. The scores of each section are added together for a total score of 50. FALL RISK:    Ambulatory/Rehab Services H2 Model Falls Risk Assessment   Risk Factors:       (1)  Any administered benzodiazepines Ability to Rise from Chair:       (1)  Pushes up, successful in one attempt   Falls Prevention Plan:       No modifications necessary   Total: (5 or greater = High Risk): 2   ©2010 [facility]. All Rights Reserved. [facility] States Patent #0,155,442. Federal Law prohibits the replication, distribution or use without written permission from [facility]:     Initial assessment only today: see objective section below for details    MEDICAL NECESSITY:   · Patient is expected to demonstrate progress in strength, range of motion and functional technique to improve ability to perform pain-free ADLs/IADLs adn to improve overall qualtiy of life. REASON FOR SERVICES/OTHER COMMENTS:  · Patient continues to require modification of therapeutic interventions to increase complexity of exercises. Total Duration:  PT Patient Time In/Time Out  Time In: 1300  Time Out: 1355    Rehabilitation Potential For Stated Goals: Good  Regarding Chelama Whitney Pelletier's therapy, I certify that the treatment plan above will be carried out by a therapist or under their direction.   Thank you for this referral,  Obdulia Justin DPT     Referring Physician Signature: Molly Godfrey NP                 PAIN/SUBJECTIVE:   Initial: 0/10 Post Session:  2/10 HISTORY:   History of Injury/Illness (Reason for Referral): *History per pt or pt's family except where otherwise noted       Location(s) of Injury: pain in R low back and down R LE       Date of Injury: July 2021 (initially pain came on infrequently until September 2021 when it was very consistent)       Mechanism of Injury: no known cause       Pain at Worst: 6/10       Aggravating Activities/Functional Limitations: sometimes back will randomly spasm when she is sitting (I.e. in the car), lying on R side, vacuuming/mopping/sweeping (can't vacuum right now due to pain), prolonged walking > 10-15 minutes, prolonged sitting >5-10 minutes, lifting >5 lbs       Pain at Best: 0/10       Easing Activities: prednisone (stopped taking it at the end of November 2021), advil/tylenol, biofreeze, heat, sometimes repositioning will help       Other Pertinent Information: Patient states 5 years ago she had to have epidural injections every so often due to back pain. Patient states prednisone initially improved a lot of her symptoms. Patient notes 10 lb weight gain over the past 2 years due to [place]. Patient-Stated Goal: to not have as much pain  Past Medical History/Comorbidities:   Ms. Cortez  has a past medical history of ADHD (attention deficit hyperactivity disorder), Allergic rhinitis (1/20/2014), Anxiety disorder (1/20/2014), Asthma, B12 deficiency (1/20/2014), Depression (1/20/2014), Fibromyalgia, GERD (gastroesophageal reflux disease) (1/20/2014), Hypothyroidism, Meniere disease (1/20/2014), Migraine, OCD (obsessive compulsive disorder), and Tinnitus.   Ms. Cortez  has a past surgical history that includes hx orthopaedic; hx heent; hx heent (07/03/2013); hx heent (06/2017); and hx other surgical.  Social History/Living Environment:    lives alone in one story apartment with elevator to get up (15 flights of stairs with B railing - occasionally has to take the stairs down if she has an appointment and the elevator is busy)  Prior Level of Function/Work/Activity:  On disability for bipolar disorder and anxiety; likes to walk downtown with her friends (due to back pain and endurance issues), does her own housework (sometimes has to have help from her friends); has to shop for her groceries  Dominant Side:         RIGHT  Other Clinical Tests:          X-ray results: \"There are multiple spondylotic changes noted including loss of disk height and osteophytes. No destructive lesion. No evidence for instability. Bone quality appears normal. Patient has moderate to space narrowing at L4-5 this is slightly increased from her previous studies and also L5-S1. \"  Previous Treatment Approaches:          predisone has helped  Personal Factors:          Sex:  female        Age:  42 y.o. Current Medications:       Current Outpatient Medications:     dexlansoprazole (Dexilant) 60 mg CpDB capsule (delayed release), Take 1 Capsule by mouth every morning., Disp: 90 Capsule, Rfl: 1    candesartan (ATACAND) 4 mg tablet, Take 1 Tablet by mouth daily. , Disp: 90 Tablet, Rfl: 1    levothyroxine (SYNTHROID) 88 mcg tablet, Take 1 Tablet by mouth Daily (before breakfast). , Disp: 90 Tablet, Rfl: 1    meloxicam (MOBIC) 15 mg tablet, Take 1 Tablet by mouth daily. , Disp: 90 Tablet, Rfl: 1    mirabegron ER (Myrbetriq) 50 mg ER tablet, Take 1 Tablet by mouth nightly., Disp: 90 Tablet, Rfl: 1    tiZANidine (ZANAFLEX) 4 mg tablet, Take 1 Tablet by mouth three (3) times daily as needed for Muscle Spasm(s). , Disp: 90 Tablet, Rfl: 5    lisdexamfetamine (Vyvanse) 20 mg capsule, , Disp: , Rfl:     trimethoprim-polymyxin b (Polytrim) ophthalmic solution, Apply  to eye., Disp: , Rfl:     ketoconazole (NIZORAL) 2 % shampoo, , Disp: , Rfl:     clobetasoL (TEMOVATE) 0.05 % external solution, , Disp: , Rfl:     buPROPion XL (Wellbutrin XL) 150 mg tablet, Take 150 mg by mouth daily. , Disp: , Rfl:     montelukast (SINGULAIR) 10 mg tablet, Take 1 Tablet by mouth daily. (Patient not taking: Reported on 12/6/2021), Disp: 90 Tablet, Rfl: 1    albuterol (PROVENTIL HFA, VENTOLIN HFA, PROAIR HFA) 90 mcg/actuation inhaler, Take 1-2 Puffs by inhalation every four (4) hours as needed for Wheezing., Disp: 1 Inhaler, Rfl: 5    risperiDONE (RISPERDAL) 1 mg tablet, Take 3 mg by mouth nightly., Disp: , Rfl:     LAMICTAL 100 mg tablet, T 3 TS PO QAM AND 2 TS QHS, Disp: , Rfl: 2    venlafaxine-SR (EFFEXOR XR) 150 mg capsule, Take  by mouth daily. , Disp: , Rfl:     traMADol (ULTRAM) 50 mg tablet, Take 1 Tab by mouth every eight (8) hours as needed for Pain. Max Daily Amount: 150 mg., Disp: 30 Tab, Rfl: 1    ALPRAZolam (XANAX) 1 mg tablet, 0.5 mg three (3) times daily as needed. , Disp: , Rfl: 4    pregabalin (LYRICA) 75 mg capsule, Take 225 mg by mouth daily. , Disp: , Rfl:     melatonin tab tablet, Take 5 mg by mouth., Disp: , Rfl:    Date Last Reviewed:  1/4/2022    Number of Personal Factors/Comorbidities that affect the Plan of Care: 1-2: MODERATE COMPLEXITY     EXAMINATION:   Initial assessment on 1/4/2022   Observation/Orthostatic Postural Assessment:    The following postural deficits were noted in sitting: loss of cervical lordosis, increased thoracic kyphosis, forward head, rounded shoulders and posterior pelvic tilt excessive tibial external rotation noted at R  The following postural deficits were noted in standing: forward trunk flexion and loss of lumbar lordosis   Palpation:          Significant pain with PA mobilizations at each SI joint, central PA mobilizations at lumbar spine stiff and painful, especially at lower levels; L LE functionally shorter with L pelvis posteriorly rotated  ROM:    Initial measurement: (on 1/4/2022) Initial measurement: (on 1/4/2022) Reassessment measurement:  Reassessment measurement:      WFL throughout L LE, but painful at end-range with following motions: L hip flexion, L hip IR (pain in R low back)  Pt very hypermobile in L hip, especially into hip IR   SLR to 90 degrees WFL and non-painful throughout R hip, knee, and ankle  Pt very hypermobile in R hip, especially into hip IR  SLR to 90 degrees (significant tightness in hamstring)       Initial measurement: (on 1/4/2022) Reassessment measurement: Reassessment measurement:      Lumbar AROM  Flexion (% of normal): 80%*  Extension (% of normal): 50%*  L sidebending (% of normal): 60%*  R sidebending (% of normal): 70%  *pain in R lower back            Strength:     Initial measurement: (on 1/4/2022) Initial measurement: (on 1/4/2022)  Reassessment Reassessment    L LE: R LE:     Hip flexion 5/5  4+/5      Hip ER 4-/5  4/5      Hip IR 4/5  4/5      Hip abduction 4/5  4/5      Hip adduction 4-/5  4-/5      Hip extension 4-/5  4-/5      Knee flexion 4/5  4/5      Knee extension 5/5  5/5      Ankle DF  4/5  4/5        Special Tests:          -  Neurological Screen:        Myotomes:  WFL        Dermatomes:  Pt reporting some numbness in B feet but not along any specific dermatome  Functional Mobility:   Gait/Ambulation: Pt ambulates with no AD with following gait deficits: increased forward trunk lean, slower gait speed, altered arm swing, decreased trunk rotation, wide GORAN and decreased B step length         Transfers:  Pushes up to stand, reaches back to sit; [place]        Bed Mobility:  WFL  Balance:          No deficits noted     Body Structures Involved:  11. Nerves  12. Bones  13. Joints  14. Muscles  15. Ligaments Body Functions Affected:  16. Sensory/Pain  17. Neuromusculoskeletal  18. Movement Related Activities and Participation Affected:  5. General Tasks and Demands  6. Mobility  7. Self Care  8. Domestic Life  9. Interpersonal Interactions and Relationships  10.  Community, Social and [place]   Number of elements (examined above) that affect the Plan of Care: 4+: HIGH COMPLEXITY   CLINICAL PRESENTATION:   Presentation: Evolving clinical presentation with changing clinical characteristics: MODERATE COMPLEXITY   CLINICAL DECISION MAKING:   Use of outcome tool(s) and clinical judgement create a POC that gives a: Questionable prediction of patient's progress: MODERATE COMPLEXITY      /

## 2022-01-07 ENCOUNTER — HOSPITAL ENCOUNTER (OUTPATIENT)
Dept: PHYSICAL THERAPY | Age: 47
Discharge: HOME | End: 2022-01-07
Payer: MEDICARE

## 2022-01-07 PROCEDURE — 97140 MANUAL THERAPY 1/> REGIONS: CPT

## 2022-01-07 PROCEDURE — 97110 THERAPEUTIC EXERCISES: CPT

## 2022-01-07 NOTE — PROGRESS NOTES
Jerry Alanis  : 1975  [facility] [address] [facility] [facility]  Secondary:  [address]  at [facility]  [address], [address], [place], [facility]  [phone]   [phone]       OUTPATIENT PHYSICAL THERAPY: Daily Treatment Note 2022  Visit Count:  2  ICD-10: Treatment Diagnosis: Low back pain (M54.5)  Pain in right leg (M79.604)   Difficulty in walking, not elsewhere classified (R26.2)  Muscle weakness (generalized) (M62.81)  Precautions/Allergies:   Latex, Abilify [aripiprazole], Geodon [ziprasidone hcl], Klonopin [clonazepam], Ambien [zolpidem], and Topamax [topiramate]   TREATMENT PLAN:  Effective Dates/Frequency/Duration: Twice per week from 2022 until 3/20/2022 (10 weeks). Pre-treatment Symptoms/Complaints:  Pt states her pain level was a little higher after last session, but then felt better again. Pain: Initial:   0/10 in low back, but has 2/10 in upper back Post Session:  2/10   Medications Last Reviewed:  2022  Updated Objective Findings: L LE functionally longer with L pelvis anteriorly rotated on 2022   TREATMENT:   Therapeutic Exercise: ( 32 minutes):  Exercises per grid below to improve mobility, strength and dynamic movement of lower back and bilateral legs to improve functional mobility. Required minimal visual, verbal and manual cues to promote proper body alignment, promote proper body posture, promote proper body mechanics and promote proper body breathing techniques. Progressed resistance, range, repetitions and complexity of movement as indicated.     Date:  2022 Date:   Date:   Activity/Exercise Parameters Parameters    Nustep L2 resistance for 10 minutes with B LEs and UEs to increase strength and endurance     Ankle plantarflexor stretch on incline board 30 second hold x 3 reps with knees extended then flexed; B UE support     Lower trunk rotation 20 reps/1 set with 3-5 second hold each direction     Bridging in hooklying 3 reps/3 sets to assess leg length; 20 reps/1 set with cues for increased hip extension AROM     Sidelying hip clamshells 20 reps/1 set each LE with cues for avoiding posterior lumbar rotation and for slow movement throughout     Supine SLR 12 reps/1 set each LE with cues to maintain knee extension                             *given in HEP  [employer] Portal   Pt given following band colors: [] Yellow          [] Red          [] Green          [] Blue          [] Grey     Manual therapy (8 minutes): With pt in supported hooklying position:   - Performed muscle energy technique at pelvis (resisted R hip flexion and L hip extension and vice versa x 3 reps, followed by resisted hip abduction/adduction x 3 reps, and bridging x 3 reps) x 2 sets to improve pelvic symmetry and reduce muscular tightness and pain. Treatment/Session Summary:    · Response to Treatment:  Patient able to slowly perform exercises with occasional cues needed to correct technique. Patient initially with leg length discrepancy and asymmetrical pelvis that resolved with muscle energy technique. She reported slight increase in pain at end of session. Instructed pt to see how she feels over next several days and let therapist know next session. No adverse reactions/unusual changes observed/reported in clinical status this session. · Communication/Consultation:  None today  · Equipment provided today:  None today  · Recommendations/Intent for next treatment session: Next visit will focus on manual therapy/modalities as needed to reduce pain; address LE length discrepancy; core stability exercises.     Total Treatment Billable Duration:  40 minutes  PT Patient Time In/Time Out  Time In: 1301  Time Out: Juancho Sage DPT    Future Appointments   Date Time Provider Jo Nolan   1/10/2022  1:00 PM Codey Tanner DPT [facility]   1/12/2022  2:30 PM CESARIO Fitch [id number] PGU   1/14/2022  1:00 PM Lina Sánchez Arden Cid PTA [facility]   1/17/2022  1:45 PM Pino Cee NP [facility] POA   5/31/2022  8:30 AM PRE LAB RESOURCE [facility] PRE PRE   6/7/2022 10:20 AM Naveed Paige MD [facility] PRE PRE        Visit Approval Visit # Therapist initials Date A NS / Cx < 24 hr >24 hr Cx Comments   99 visits total 1  1/4/2022 [x]  [] [] Initial evaluation    2  1/7/2022 [x] [] []        [] [] []        [] [] []        [] [] []        [] [] []        [] [] []        [] [] []        [] [] []        [] [] []        [] [] []        [] [] []        [] [] []        [] [] []        [] [] []        [] [] []        [] [] []        [] [] []

## 2022-01-10 ENCOUNTER — HOSPITAL ENCOUNTER (OUTPATIENT)
Dept: PHYSICAL THERAPY | Age: 47
Discharge: HOME | End: 2022-01-10
Payer: MEDICARE

## 2022-01-10 PROCEDURE — 97140 MANUAL THERAPY 1/> REGIONS: CPT

## 2022-01-10 PROCEDURE — 97110 THERAPEUTIC EXERCISES: CPT

## 2022-01-10 NOTE — PROGRESS NOTES
Hosea Mendoza  : 1975  Primary: [address] Medic*  Secondary:  [address] at [facility]  [address] [facility], [address] [place] [address], [place], [facility]  [phone]   [phone]       OUTPATIENT PHYSICAL THERAPY: Daily Treatment Note 1/10/2022  Visit Count:  3  ICD-10: Treatment Diagnosis: Low back pain (M54.5)  Pain in right leg (M79.604)   Difficulty in walking, not elsewhere classified (R26.2)  Muscle weakness (generalized) (M62.81)  Precautions/Allergies:   Latex, Abilify [aripiprazole], Geodon [ziprasidone hcl], Klonopin [clonazepam], Ambien [zolpidem], and Topamax [topiramate]   TREATMENT PLAN:  Effective Dates/Frequency/Duration: Twice per week from 2022 until 3/20/2022 (10 weeks). Pre-treatment Symptoms/Complaints:  Pt states her pain level was higher after her last therapy session, with sharp pains in her lower back as well. States her pain was worse when lying on L side (vs R side)  Pain: Initial:   2/10 Post Session:  2/10   Medications Last Reviewed:  1/10/2022  Updated Objective Findings: L LE functionally longer with L pelvis anteriorly rotated on 1/10/2022   TREATMENT:   Therapeutic Exercise: ( 31 minutes):  Exercises per grid below to improve mobility, strength and dynamic movement of lower back and bilateral legs to improve functional mobility. Required minimal visual, verbal and manual cues to promote proper body alignment, promote proper body posture, promote proper body mechanics and promote proper body breathing techniques. Progressed resistance, range, repetitions and complexity of movement as indicated.     Date:  2022 Date:  1/10/2022 Date:   Activity/Exercise Parameters Parameters    Nustep L2 resistance for 10 minutes with B LEs and UEs to increase strength and endurance L3 resistance for 10 minutes with B LEs and UEs to increase strength and endurance    Ankle plantarflexor stretch on incline board 30 second hold x 3 reps with knees extended then flexed; B UE support 30 second hold x 3 reps with knees extended then flexed; B UE support    Lower trunk rotation 20 reps/1 set with 3-5 second hold each direction 20 reps/1 set with 3-5 second hold each direction    Bridging in hooklying 3 reps/3 sets to assess leg length; 20 reps/1 set with cues for increased hip extension AROM     Sidelying hip clamshells 20 reps/1 set each LE with cues for avoiding posterior lumbar rotation and for slow movement throughout     Supine SLR 12 reps/1 set each LE with cues to maintain knee extension     Standing hip abduction  15 reps/1 set each LE with cues to maintain upright posture; light B UE support throughout    Supine hamstring stretch  30 second hold x 3 reps each LE; pt stretching passively with strap                *given in HEP  [employer] Portal   Pt given following band colors: [] Yellow          [] Red          [] Green          [] Blue          [] Grey     Manual therapy (8 minutes): With pt in supported hooklying position:   - Performed muscle energy technique at pelvis (resisted R hip flexion and L hip extension and vice versa x 3 reps, followed by resisted hip abduction/adduction x 3 reps, and bridging x 3 reps) x 1 sets to improve pelvic symmetry and reduce muscular tightness and pain. With pt in supported prone position:   - gentle PA mobilization to R SI joint (grade 2-3) to improve sacropelvic mobility    Treatment/Session Summary:    · Response to Treatment: Patient initially with leg length discrepancy and asymmetrical pelvis that resolved with muscle energy technique. Reporting no change in pain at end of session. She had some stiffness/tenderness in R SI joint that improved slightly with manual therapy. No adverse reactions/unusual changes observed/reported in clinical status this session.   · Communication/Consultation:  None today  · Equipment provided today:  None today  · Recommendations/Intent for next treatment session: Next visit will focus on manual therapy/modalities as needed to reduce pain; address LE length discrepancy; core stability exercises.     Total Treatment Billable Duration:  40 minutes  PT Patient Time In/Time Out  Time In: 1302  Time Out: 20 [facility]    Future Appointments   Date Time Provider Jo Nolan   1/12/2022  2:30 PM CESARIO Walsh [id number] PGU   1/14/2022  1:00 PM Esperanza Marti PTA [facility]   1/17/2022 10:30 AM Ruben Harada., MD POAG POA   1/17/2022  1:45 PM Viki Cee NP PO[facility] POA   1/18/2022  1:45 PM Mara SIMMS DPT [facility]   1/21/2022  1:00 PM KRISTINE GallegosT [facility]   5/31/2022  8:30 AM PRE LAB RESOURCE [facility] PRE PRE   6/7/2022 10:20 AM Arielle Galdamez MD [facility] PRE PRE        Visit Approval Visit # Therapist initials Date A NS / Cx < 24 hr >24 hr Cx Comments   99 visits total 1  1/4/2022 [x]  [] [] Initial evaluation    2  1/7/2022 [x] [] []     3  1/10/2022 [x] [] []        [] [] []        [] [] []        [] [] []        [] [] []        [] [] []        [] [] []        [] [] []        [] [] []        [] [] []        [] [] []        [] [] []        [] [] []        [] [] []        [] [] []        [] [] []

## 2022-01-14 ENCOUNTER — HOSPITAL ENCOUNTER (OUTPATIENT)
Dept: PHYSICAL THERAPY | Age: 47
Discharge: HOME | End: 2022-01-14
Payer: MEDICARE

## 2022-01-14 PROCEDURE — 97140 MANUAL THERAPY 1/> REGIONS: CPT

## 2022-01-14 PROCEDURE — 97110 THERAPEUTIC EXERCISES: CPT

## 2022-01-14 NOTE — PROGRESS NOTES
Jazmyne Hellen  : 1975  Primary: [address] Medic*  Secondary:  [address]  at [facility]  [address], [address], [place], [facility] [address] [facility]  [phone]   [phone]       OUTPATIENT PHYSICAL THERAPY: Daily Treatment Note 2022  Visit Count:  4  ICD-10: Treatment Diagnosis: Low back pain (M54.5)  Pain in right leg (M79.604)   Difficulty in walking, not elsewhere classified (R26.2)  Muscle weakness (generalized) (M62.81)  Precautions/Allergies:   Latex, Abilify [aripiprazole], Geodon [ziprasidone hcl], Klonopin [clonazepam], Ambien [zolpidem], and Topamax [topiramate]   TREATMENT PLAN:  Effective Dates/Frequency/Duration: Twice per week from 2022 until 3/20/2022 (10 weeks). Pre-treatment Symptoms/Complaints:  Pt states her pain is like a dull constant irritation (R side). Pain: Initial:   1/10, but constant dull irritation  Post Session:  2-2.5/10   Medications Last Reviewed:  2022  Updated Objective Findings: L LE functionally longer with L pelvis anteriorly rotated on 1/10/2022   TREATMENT:   Therapeutic Exercise: ( 35 minutes):  Exercises per grid below to improve mobility, strength and dynamic movement of lower back and bilateral legs to improve functional mobility. Required minimal visual, verbal and manual cues to promote proper body alignment, promote proper body posture, promote proper body mechanics and promote proper body breathing techniques. Progressed resistance, range, repetitions and complexity of movement as indicated.     Date:  2022 Date:  1/10/2022 Date:  2022   Activity/Exercise Parameters Parameters    Nustep L2 resistance for 10 minutes with B LEs and UEs to increase strength and endurance L3 resistance for 10 minutes with B LEs and UEs to increase strength and endurance L3 resistance for 10 minutes with B LE's UE's to increase strength and endurance    Ankle plantarflexor stretch on incline board 30 second hold x 3 reps with knees extended then flexed; B UE support 30 second hold x 3 reps with knees extended then flexed; B UE support 3 x 30 sec hold B with knees flexed and extended- B UE support    Lower trunk rotation 20 reps/1 set with 3-5 second hold each direction 20 reps/1 set with 3-5 second hold each direction 20 x 3 sec hold each direction    Bridging in hooklying 3 reps/3 sets to assess leg length; 20 reps/1 set with cues for increased hip extension AROM  2 x 10 -    Sidelying hip clamshells 20 reps/1 set each LE with cues for avoiding posterior lumbar rotation and for slow movement throughout  X 20 B with cues to avoid lumbar rotation. Supine SLR 12 reps/1 set each LE with cues to maintain knee extension     Standing hip abduction  15 reps/1 set each LE with cues to maintain upright posture; light B UE support throughout 2 x 10 B with light B UE support throughout    Supine hamstring stretch  30 second hold x 3 reps each LE; pt stretching passively with strap 3 x 30 sec hold B with strap                *given in HEP  MedBridge Portal   Pt given following band colors: [] Yellow          [] Red          [] Green          [] Blue          [] Grey     Manual therapy (13 minutes): With pt in supported hooklying position:   - Performed muscle energy technique at pelvis (resisted R hip flexion and L hip extension and vice versa x 3 reps, followed by resisted hip abduction/adduction x 3 reps, and bridging x 3 reps) x 1 sets to improve pelvic symmetry and reduce muscular tightness and pain. With pt in supported prone position:   - gentle PA mobilization to R SI joint (grade 2-3) to improve sacropelvic mobility- NOT TODAY    - gentle STM with hands and thera-roller to R hip and low back in L side lying with pillow between knees. Treatment/Session Summary:    · Response to Treatment: Patient  Encouraged to use pillow between legs at night to decrease  Pull on back while sleeping.  Pain at end of session 2-2. 5/10. No adverse reactions/unusual changes observed/reported in clinical status this session. · Communication/Consultation:  None today  · Equipment provided today:  None today  · Recommendations/Intent for next treatment session: Next visit will focus on manual therapy/modalities as needed to reduce pain; address LE length discrepancy; core stability exercises.     Total Treatment Billable Duration:  48 minutes  PT Patient Time In/Time Out  Time In: 1300  Time Out: [address], PTA    Future Appointments   Date Time Provider Jo Nolan   1/18/2022  1:45 PM Rayradha Head, DPT [facility]   1/21/2022  1:00 PM Rayradha Head [facility]   2/10/2022 10:50 AM Darrell Ogden MD [facility][1]   5/31/2022  8:30 AM PRE LAB RESOURCE [facility] PRE PRE   6/7/2022 10:20 AM Sheela Trevino MD [facility] PRE PRE        Visit Approval Visit # Therapist initials Date A NS / Cx < 24 hr >24 hr Cx Comments   99 visits total 1  1/4/2022 [x]  [] [] Initial evaluation    2  1/7/2022 [x] [] []     3  1/10/2022 [x] [] []     4 PDE 1- [x] [] []        [] [] []        [] [] []        [] [] []        [] [] []        [] [] []        [] [] []        [] [] []        [] [] []        [] [] []        [] [] []        [] [] []        [] [] []        [] [] []        [] [] []

## 2022-01-18 ENCOUNTER — HOSPITAL ENCOUNTER (OUTPATIENT)
Dept: PHYSICAL THERAPY | Age: 47
Discharge: HOME | End: 2022-01-18
Payer: MEDICARE

## 2022-01-18 NOTE — PROGRESS NOTES
Jeffrey Valle  : 1975  Primary: [address] [facility]*  Secondary:  Therapy Center at [facility] [address], [address], [place] [address] [address] [facility]  [phone]   [phone]        CANCELLATION NOTE    Ms. Aury Ayoub was a same-day cancellation for today's appointment due to snow.     2022  KRISTINE StaufferT

## 2022-01-19 ENCOUNTER — HOSPITAL ENCOUNTER (OUTPATIENT)
Dept: PHYSICAL THERAPY | Age: 47
Discharge: HOME | End: 2022-01-19
Payer: MEDICARE

## 2022-01-19 PROCEDURE — 97110 THERAPEUTIC EXERCISES: CPT

## 2022-01-19 NOTE — PROGRESS NOTES
Elda Jovel  : 1975  Primary:   Secondary:  [address] at [facility]  [address], [address], [place] [address] [address] [facility]  [phone]   [phone]       OUTPATIENT PHYSICAL THERAPY: Daily Treatment Note 2022  Visit Count:  5  ICD-10: Treatment Diagnosis: Low back pain (M54.5)  Pain in right leg (M79.604)   Difficulty in walking, not elsewhere classified (R26.2)  Muscle weakness (generalized) (M62.81)  Precautions/Allergies:   Latex, Abilify [aripiprazole], Geodon [ziprasidone hcl], Klonopin [clonazepam], Ambien [zolpidem], and Topamax [topiramate]   TREATMENT PLAN:  Effective Dates/Frequency/Duration: Twice per week from 2022 until 3/20/2022 (10 weeks). Pre-treatment Symptoms/Complaints:  Pt states her pain has not been bad at all the past few days, and is currently 0/10  Pain: Initial:   0/10 Post Session:  0.5/10   Medications Last Reviewed:  2022  Updated Objective Findings: no LE length discrepancy or pelvic asymmetry on 2022   TREATMENT:   Therapeutic Exercise: ( 40 minutes):  Exercises per grid below to improve mobility, strength and dynamic movement of lower back and bilateral legs to improve functional mobility. Required minimal visual, verbal and manual cues to promote proper body alignment, promote proper body posture, promote proper body mechanics and promote proper body breathing techniques. Progressed resistance, range, repetitions and complexity of movement as indicated.     Date:  1/10/2022 Date:  2022 Date:  2022   Activity/Exercise Parameters     Nustep L3 resistance for 10 minutes with B LEs and UEs to increase strength and endurance L3 resistance for 10 minutes with B LE's UE's to increase strength and endurance  L3 resistance for 10 minutes with B LE's UE's to increase strength and endurance    Ankle plantarflexor stretch on incline board 30 second hold x 3 reps with knees extended then flexed; B UE support 3 x 30 sec hold B with knees flexed and extended- B UE support  3 x 30 sec hold B with knees flexed and extended- B UE support    Lower trunk rotation 20 reps/1 set with 3-5 second hold each direction 20 x 3 sec hold each direction  20 reps/1 set with 3-5 second hold each direction   Bridging in hooklying  2 x 10 -  20 reps/1 set with cues to increase hip extension AROM   Sidelying hip clamshells  X 20 B with cues to avoid lumbar rotation. 20 reps/1 set each LE with cues to avoid posterior lumbar rotation   Supine SLR      Standing hip abduction 15 reps/1 set each LE with cues to maintain upright posture; light B UE support throughout 2 x 10 B with light B UE support throughout     Supine hamstring stretch 30 second hold x 3 reps each LE; pt stretching passively with strap 3 x 30 sec hold B with strap     Sitting forward lumbar flexion stretch at exercise ball   Green exercise ball; 30 second hold forward x 3 reps, then 30 second hold diagonally to each side stretch x 3 reps         *given in HEP  MedBridge Portal   Pt given following band colors: [] Yellow          [] Red          [] Green          [] Blue          [] Grey     Manual therapy (    Treatment/Session Summary:    · Response to Treatment: Pt with no leg length discrepancy or pelvic asymmetry this session. She is demonstrating improved ability to progress to more challenging LE and core strengthening and mobility exercises. Will continue to progress next time as able. No adverse reactions/unusual changes observed/reported in clinical status this session. · Communication/Consultation:  None today  · Equipment provided today:  None today  · Recommendations/Intent for next treatment session: Next visit will focus on manual therapy/modalities as needed to reduce pain; address LE length discrepancy; core stability exercises.     Total Treatment Billable Duration:  40 minutes  PT Patient Time In/Time Out  Time In: 5708  Time Out: Escobar DPT    Future Appointments   Date Time Provider Jo Nolan   1/21/2022  7:00 PM Lee Koroma DPT [facility]   2/10/2022 10:50 AM Rylee Campos MD [facility]   5/31/2022  8:30 AM PRE LAB RESOURCE [facility] PRE PRE   6/7/2022 10:20 AM Naveed Paige MD [facility] PRE PRE        Visit Approval Visit # Therapist initials Date A NS / Cx < 24 hr >24 hr Cx Comments   99 visits total 1  1/4/2022 [x]  [] [] Initial evaluation    2  1/7/2022 [x] [] []     3  1/10/2022 [x] [] []     4 PDE 1- [x] [] []     5  1/19/2022 [x] [] []        [] [] []        [] [] []        [] [] []        [] [] []        [] [] []        [] [] []        [] [] []        [] [] []        [] [] []        [] [] []        [] [] []        [] [] []        [] [] []

## 2022-01-21 ENCOUNTER — APPOINTMENT (OUTPATIENT)
Dept: PHYSICAL THERAPY | Age: 47
End: 2022-01-21
Payer: MEDICARE

## 2022-01-26 ENCOUNTER — HOSPITAL ENCOUNTER (OUTPATIENT)
Dept: PHYSICAL THERAPY | Age: 47
Discharge: HOME | End: 2022-01-26
Payer: MEDICARE

## 2022-01-26 PROCEDURE — 97110 THERAPEUTIC EXERCISES: CPT

## 2022-01-26 PROCEDURE — 97140 MANUAL THERAPY 1/> REGIONS: CPT

## 2022-01-26 NOTE — PROGRESS NOTES
Manolo Trinidad  : 1975  Primary:   Secondary:  [address] at [facility]  [address], [address], [place], [facility] [address] [facility]  [phone]   [phone]       OUTPATIENT PHYSICAL THERAPY: Daily Treatment Note 2022  Visit Count:  6  ICD-10: Treatment Diagnosis: Low back pain (M54.5)  Pain in right leg (M79.604)   Difficulty in walking, not elsewhere classified (R26.2)  Muscle weakness (generalized) (M62.81)  Precautions/Allergies:   Latex, Abilify [aripiprazole], Geodon [ziprasidone hcl], Klonopin [clonazepam], Ambien [zolpidem], and Topamax [topiramate]   TREATMENT PLAN:  Effective Dates/Frequency/Duration: Twice per week from 2022 until 3/20/2022 (10 weeks). Pre-treatment Symptoms/Complaints:  Pt states she hurt in her L knee after last session (and is still bothering her a little today), and she walked more than usual when shopping and her L lower back started hurting  Pain: Initial:   2/10 in R lower back Post Session:  3-4/10   Medications Last Reviewed:  2022  Updated Objective Findings: L LE functionally longer with L pelvis posteriorly rotated on 2022   TREATMENT:   Therapeutic Exercise: ( 29 minutes):  Exercises per grid below to improve mobility, strength and dynamic movement of lower back and bilateral legs to improve functional mobility. Required minimal visual, verbal and manual cues to promote proper body alignment, promote proper body posture, promote proper body mechanics and promote proper body breathing techniques. Progressed resistance, range, repetitions and complexity of movement as indicated.     Date:  2022 Date:  2022 Date:  2022   Activity/Exercise      Nustep L3 resistance for 10 minutes with B LE's UE's to increase strength and endurance  L3 resistance for 10 minutes with B LE's UE's to increase strength and endurance  L4 resistance for 10 minutes with B LE's UE's to increase strength and endurance    Ankle plantarflexor stretch on incline board 3 x 30 sec hold B with knees flexed and extended- B UE support  3 x 30 sec hold B with knees flexed and extended- B UE support  3 x 30 sec hold B with knees flexed and extended- B UE support    Lower trunk rotation 20 x 3 sec hold each direction  20 reps/1 set with 3-5 second hold each direction 20 reps/1 set with 3-5 second hold each direction   Bridging in hooklying 2 x 10 -  20 reps/1 set with cues to increase hip extension AROM 3 reps/2 sets to assess leg length   Sidelying hip clamshells X 20 B with cues to avoid lumbar rotation. 20 reps/1 set each LE with cues to avoid posterior lumbar rotation    Supine SLR      Standing hip abduction 2 x 10 B with light B UE support throughout      Supine hamstring stretch 3 x 30 sec hold B with strap      Sitting forward lumbar flexion stretch at exercise ball  Green exercise ball; 30 second hold forward x 3 reps, then 30 second hold diagonally to each side stretch x 3 reps    Prone press ups   Onto elbows; 10 reps/1 set with 10 second holds to increase joint mobility   Prone hip extensions   15 reps/1 set each LE with cues for slow lowering                *given in HEP  MedBridge Portal   Pt given following band colors: [] Yellow          [] Red          [] Green          [] Blue          [] Grey     Manual therapy (12 minutes): With pt in supported hooklying:   - Performed muscle energy technique at pelvis (resisted R hip flexion and L hip extension and vice versa x 3 reps, followed by resisted hip abduction/adduction x 3 reps, and bridging x 3 reps) x 1 set to improve pelvic symmetry and reduce muscular tightness and pain  With pt in prone position:   - PA mobilizations (grade 2-3) at R SI joint to improve sacropelvic mobility and reduce pain    Treatment/Session Summary:    · Response to Treatment: Pt with initial slight leg length discrepancy and pelvis asymmetry that resolved with muscle energy technique.  She is demonstrating less stiffness at R SI joint with PA mobilizations, but reported increased pain with mobilizations, most likely due to comorbidity of fibromyalgia with increased sensitivity to pressure. ·       No adverse reactions/unusual changes observed/reported in clinical status this session. · Communication/Consultation:  None today  · Equipment provided today:  None today  · Recommendations/Intent for next treatment session: Next visit will focus on manual therapy/modalities as needed to reduce pain; address LE length discrepancy; core stability exercises.     Total Treatment Billable Duration:  41 minutes  PT Patient Time In/Time Out  Time In: 3586  Time Out: [address]    Future Appointments   Date Time Provider Jo Nolan   1/28/2022  2:30 PM Babara Cooks, DPT [facility]   2/1/2022  2:30 PM Babara Cooks, DPT [facility]   2/4[facility]2022  [facility]   2/10/2022 10:50 AM Reynaldo Meyer MD [facility]   5/31/2022  8:30 AM PRE LAB RESOURCE [facility] PRE PRE   6/7/2022 10:20 AM Yosi Winston MD [facility] PRE PRE        Visit Approval Visit # Therapist initials Date A NS / Cx < 24 hr >24 hr Cx Comments   99 visits total 1 [facility] 1/4/2022 [x]  [] [] Initial evaluation    2 [facility] 1/7/2022 [x] [] []     3  1/10/2022 [x] [] []     4 PDE 1- [x] [] []     5  1/19/2022 [x] [] []     6  1/26/2022 [x] [] []        [] [] []        [] [] []        [] [] []        [] [] []        [] [] []        [] [] []        [] [] []        [] [] []        [] [] []        [] [] []        [] [] []        [] [] []

## 2022-01-28 ENCOUNTER — HOSPITAL ENCOUNTER (OUTPATIENT)
Dept: PHYSICAL THERAPY | Age: 47
Discharge: HOME | End: 2022-01-28
Payer: MEDICARE

## 2022-01-28 PROCEDURE — 97110 THERAPEUTIC EXERCISES: CPT

## 2022-01-28 NOTE — PROGRESS NOTES
Chetan Jonathan  : 1975  Primary:   Secondary:  [address]  at [facility]  [address], [address], [place], [facility] [address] [facility]  [phone]   [phone]       OUTPATIENT PHYSICAL THERAPY: Daily Treatment Note 2022  Visit Count:  7  ICD-10: Treatment Diagnosis: Low back pain (M54.5)  Pain in right leg (M79.604)   Difficulty in walking, not elsewhere classified (R26.2)  Muscle weakness (generalized) (M62.81)  Precautions/Allergies:   Latex, Abilify [aripiprazole], Geodon [ziprasidone hcl], Klonopin [clonazepam], Ambien [zolpidem], and Topamax [topiramate]   TREATMENT PLAN:  Effective Dates/Frequency/Duration: Twice per week from 2022 until 3/20/2022 (10 weeks). Pre-treatment Symptoms/Complaints:  Pt states the pain let up after last session; states she barely has pain today  Pain: Initial:   1/10 in R lower back Post Session:  0/10   Medications Last Reviewed:  2022  Updated Objective Findings: L LE functionally longer with L pelvis posteriorly rotated on 2022   TREATMENT:   Therapeutic Exercise: ( 40 minutes):  Exercises per grid below to improve mobility, strength and dynamic movement of lower back and bilateral legs to improve functional mobility. Required minimal visual, verbal and manual cues to promote proper body alignment, promote proper body posture, promote proper body mechanics and promote proper body breathing techniques. Progressed resistance, range, repetitions and complexity of movement as indicated.     Date:  2022 Date:  2022 Date:  2022   Activity/Exercise      Nustep L3 resistance for 10 minutes with B LE's UE's to increase strength and endurance  L4 resistance for 10 minutes with B LE's UE's to increase strength and endurance  L4 resistance for 10 minutes with B LE's UE's to increase strength and endurance    Ankle plantarflexor stretch on incline board 3 x 30 sec hold B with knees flexed and extended- B UE support  3 x 30 sec hold B with knees flexed and extended- B UE support  3 x 30 sec hold B with knees flexed and extended- B UE support    Lower trunk rotation 20 reps/1 set with 3-5 second hold each direction 20 reps/1 set with 3-5 second hold each direction 20 reps/1 set with 3-5 second hold each direction   Bridging in hooklying 20 reps/1 set with cues to increase hip extension AROM 3 reps/2 sets to assess leg length 3 reps/1 set to assess leg length; 20 reps/1 set with B LEs elevated on exercise ball to increase challenge to core   Sidelying hip clamshells 20 reps/1 set each LE with cues to avoid posterior lumbar rotation     Supine SLR      Standing hip abduction      Supine hamstring stretch      Sitting forward lumbar flexion stretch at exercise ball Green exercise ball; 30 second hold forward x 3 reps, then 30 second hold diagonally to each side stretch x 3 reps     Prone press ups  Onto elbows; 10 reps/1 set with 10 second holds to increase joint mobility Onto elbows; 10 reps/1 set with 10 second holds to increase joint mobility   Prone hip extensions  15 reps/1 set each LE with cues for slow lowering  18 reps/1 set each LE with cues for slow lowering    Sidelying hip abduction   20 reps/1 set each LE with initial cues for correct positioning   Supine pelvic tilts   Initially attempted in supine position but then had pt perform in sitting position secondary to pt not fully understanding how to initiate exercise; x 5 minutes                *given in HEP  MedBridge Portal   Pt given following band colors: [] Yellow          [] Red          [] Green          [] Blue          [] Grey     Manual therapy (    Treatment/Session Summary:    · Response to Treatment: Pt with no pelvic asymmetry or leg length discrepancy this session. She was able to focus on more challenging strengthening exercises with no reports of increased pain.  Pt struggled with trying to perform supine pelvic tilts secondary to decreased understanding of how to move her pelvis, but this improved slightly after trying in sitting position. Will continue to progress strengthening/stability exercises as tolerated by pt next session. ·       No adverse reactions/unusual changes observed/reported in clinical status this session. · Communication/Consultation:  None today  · Equipment provided today:  None today  · Recommendations/Intent for next treatment session: Next visit will focus on manual therapy/modalities as needed to reduce pain; address LE length discrepancy; core stability exercises.     Total Treatment Billable Duration:  40 minutes  PT Patient Time In/Time Out  Time In: 7364  Time Out: [address], DPT    Future Appointments   Date Time Provider Jo Nolan   2/1/2022  2:30 PM Vinita Swartz DPT [facility]   2/4/2022  2:30 PM VLAENTINO Kaba   2/8/2022  1:00 PM Felice Dunn PT [facility]   2/10/2022 10:50 AM Keysha Beltre MD [facility]   2/11/2022  2:30 PM VALENTINO Pace [facility]   5/31/2022  8:30 AM PRE LAB RESOURCE [facility] PRE PRE   6/7/2022 10:20 AM Dipak Warren MD [facility] PRE PRE        Visit Approval Visit # Therapist initials Date A NS / Cx < 24 hr >24 hr Cx Comments   99 visits total 1 [facility] 1/4/2022 [x]  [] [] Initial evaluation    2  1/7/2022 [x] [] []     3 [facility] 1/10/2022 [x] [] []     4 PDE 1- [x] [] []     5  1/19/2022 [x] [] []     6  1/26/2022 [x] [] []     7 [facility] 1/28/2022 [x] [] []        [] [] []        [] [] []        [] [] []        [] [] []        [] [] []        [] [] []        [] [] []        [] [] []        [] [] []        [] [] []        [] [] []

## 2022-02-01 ENCOUNTER — HOSPITAL ENCOUNTER (OUTPATIENT)
Dept: PHYSICAL THERAPY | Age: 47
Discharge: HOME | End: 2022-02-01
Payer: MEDICARE

## 2022-02-01 PROCEDURE — 97110 THERAPEUTIC EXERCISES: CPT

## 2022-02-01 NOTE — PROGRESS NOTES
Venus Mehreen  : 1975  Primary:   Secondary:  [address] at [facility]  [address], [address], [place], [facility]  [phone]   [phone]       OUTPATIENT PHYSICAL THERAPY: Daily Treatment Note 2022  Visit Count:  8  ICD-10: Treatment Diagnosis: Low back pain (M54.5)  Pain in right leg (M79.604)   Difficulty in walking, not elsewhere classified (R26.2)  Muscle weakness (generalized) (M62.81)  Precautions/Allergies:   Latex, Abilify [aripiprazole], Geodon [ziprasidone hcl], Klonopin [clonazepam], Ambien [zolpidem], and Topamax [topiramate]   TREATMENT PLAN:  Effective Dates/Frequency/Duration: Twice per week from 2022 until 3/20/2022 (10 weeks). Pre-treatment Symptoms/Complaints:  Pt states she had back spasms on Saturday that only lasted for a few minutes (states they occurred when she walked short distance and then sat down in low car seat) - states it hasn't bothered her since; states she was also doing some cleaning over the weekend and that might have led up to the spasms. States since starting therapy, she has noticed she can bend forward for longer with more ease as well as sit for longer periods of time. States she also no longer has pain when she is lying down at night to sleep  Pain: Initial:   0/10 in R lower back Post Session:  0/10   Medications Last Reviewed:  2022  Updated Objective Findings: See evaluation note from today   TREATMENT:   Therapeutic Exercise: ( 38 minutes):  Exercises per grid below to improve mobility, strength and dynamic movement of lower back and bilateral legs to improve functional mobility. Required minimal visual, verbal and manual cues to promote proper body alignment, promote proper body posture, promote proper body mechanics and promote proper body breathing techniques. Progressed resistance, range, repetitions and complexity of movement as indicated.     Date:  2022 Date:  2022 Date:  2022 Activity/Exercise      Nustep L4 resistance for 10 minutes with B LE's UE's to increase strength and endurance  L4 resistance for 10 minutes with B LE's UE's to increase strength and endurance  L4 resistance for 11 minutes with B LE's UE's to increase strength and endurance    Ankle plantarflexor stretch on incline board 3 x 30 sec hold B with knees flexed and extended- B UE support  3 x 30 sec hold B with knees flexed and extended- B UE support  3 x 30 sec hold B with knees flexed and extended- B UE support    Lower trunk rotation* 20 reps/1 set with 3-5 second hold each direction 20 reps/1 set with 3-5 second hold each direction 20 reps/1 set with 3-5 second hold each direction   Bridging in hooklying* 3 reps/2 sets to assess leg length 3 reps/1 set to assess leg length; 20 reps/1 set with B LEs elevated on exercise ball to increase challenge to core 3 reps/1 set to assess leg length   Sidelying hip clamshells*      Supine SLR*      Standing hip abduction      Supine hamstring stretch      Sitting forward lumbar flexion stretch at exercise ball      Prone press ups* Onto elbows; 10 reps/1 set with 10 second holds to increase joint mobility Onto elbows; 10 reps/1 set with 10 second holds to increase joint mobility    Prone hip extensions* 15 reps/1 set each LE with cues for slow lowering  18 reps/1 set each LE with cues for slow lowering     Sidelying hip abduction*  20 reps/1 set each LE with initial cues for correct positioning    Supine pelvic tilts  Initially attempted in supine position but then had pt perform in sitting position secondary to pt not fully understanding how to initiate exercise; x 5 minutes     Varying resistance at each LE in multiple different planes of motion   Per strength assessment in chart   Lumbar AROM   Per ROM assessment in chart   Reverse sidelying clamshells   20 reps/1 set each LE with cues for slow movement and correct technique                     *given in HEP ([id number])  [facility]   Pt given following band colors: [] Yellow          [] Red          [] Jacqui Hue          [] Blue          [] Grey     Manual therapy (    Treatment/Session Summary:    · Response to Treatment: Progress note today - see assessment in chart. Gave pt HEP at end of session. ·       No adverse reactions/unusual changes observed/reported in clinical status this session. · Communication/Consultation:  None today  · Equipment provided today:  None today  · Recommendations/Intent for next treatment session: Next visit will focus on manual therapy/modalities as needed to reduce pain; address LE length discrepancy; core stability exercises.     Total Treatment Billable Duration:  38 minutes  PT Patient Time In/Time Out  Time In: 1430  Time Out: [address]    Future Appointments   Date Time Provider Jo Nolan   2/4/2022  2:30 PM Keshia Garcia DPT [facility]   2/8/2022  3:15 PM Keshia Garcia DPT [facility]   2/10/2022 10:50 AM Mirna Webber MD [facility]   2/11/2022  2:30 PM Herminio SIMMS DPT [facility]   5/31/2022  8:30 AM PRE LAB RESOURCE [facility] PRE PRE   6/7/2022 10:20 AM Karla Avalos MD [facility] PRE PRE        Visit Approval Visit # Therapist initials Date A NS / Cx < 24 hr >24 hr Cx Comments   99 visits total 1  1/4/2022 [x]  [] [] Initial evaluation    2  1/7/2022 [x] [] []     3  1/10/2022 [x] [] []     4 PDE 1- [x] [] []     5  1/19/2022 [x] [] []     6  1/26/2022 [x] [] []     7  1/28/2022 [x] [] []     8  2/1/2022 [x] [] [] Progress note       [] [] []        [] [] []        [] [] []        [] [] []        [] [] []        [] [] []        [] [] []        [] [] []        [] [] []        [] [] []

## 2022-02-01 NOTE — PROGRESS NOTES
Galina Orozco  : 1975  Primary: [address] [facility]*  Secondary:  [address] [facility] [address], [place], [facility]  [phone]   [phone]        OUTPATIENT PHYSICAL THERAPY:Progress Report 2022   ICD-10: Treatment Diagnosis: Low back pain (M54.5)  Pain in right leg (M79.604)   Difficulty in walking, not elsewhere classified (R26.2)  Muscle weakness (generalized) (M62.81)  Precautions/Allergies:   Latex, Abilify [aripiprazole], Geodon [ziprasidone hcl], Klonopin [clonazepam], Ambien [zolpidem], and Topamax [topiramate]   TREATMENT PLAN:  Effective Dates/Frequency/Duration: Twice per week from 2022 until 3/20/2022 (10 weeks). MEDICAL/REFERRING DIAGNOSIS:  Lumbar pain [M54.50]   DATE OF ONSET: 2021  REFERRING PHYSICIAN: Patricia Rachel NP MD Orders: Evaluate and treat  Return MD Appointment: unspecified   ASSESSMENT:  Galina Orozco has now attended 8 physical therapy sessions for insidious onset of R sided low back pain with occasional radiating pain down R LE starting in 2021. This session, pt demonstrated improved B LE strength/endurance, improved lumbar mobility with no pain, less postural/gait deficits, improved sitting tolerance, and improved functional mobility as evident by a score of 12/50 on the Modified Oswestry Low Back Pain Questionnaire (initial score of 22/50, with higher scores indicating increased disability). Additionally, she had no obvious leg length discrepancy or pelvic asymmetry. Despite these improvements, pt continues to demonstrate decreased B LE strength/endurance, decreased lumbar mobility, multiple postural/gait deficits, decreased sitting tolerance, decreased standing tolerance and decreased functional mobility.  Pt may continue to benefit from skilled PT to address the above listed deficits to improve ability to perform pain-free ADLs/IADLs and to improve overall quality of life prior to discharge. PROBLEM LIST (Impacting functional limitations):  1. Decreased Strength  2. Decreased ADL/Functional Activities  3. Decreased Transfer Abilities  4. Decreased Ambulation Ability/Technique  5. Increased Pain  6. Decreased Activity Tolerance  7. Decreased Pacing Skills  8. Increased Fatigue  9. Decreased Flexibility/Joint Mobility  10. Edema/Girth INTERVENTIONS PLANNED: (Treatment may consist of any combination of the following)  1. Bed Mobility  2. Cold  3. Electrical Stimulation  4. Family Education  5. Gait Training  6. Heat  7. Home Exercise Program (HEP)  8. Manual Therapy  9. Neuromuscular Re-education/Strengthening  10. Range of Motion (ROM)  11. Therapeutic Activites  12. Therapeutic Exercise/Strengthening  13. Transcutaneous Electrical Nerve Stimulation (TENS)  14. Transfer Training  15. Ultrasound (US)     GOALS: (Goals have been discussed and agreed upon with patient.)  Short-Term Functional Goals: Time Frame: 5 weeks  1. Pt will be compliant with HEP in order to increase lumbar mobility and LE and core strength/endurance to improve quality of life. (PROGRESSING, 2/1/2022)   2. Pt will reduce score on Modified Oswestry Low Back Pain Questionnaire to 19/50 in order to demonstrate improved functional mobility and quality of life. (MET, 2/1/2022)   3. Pt will report being able to vacuum for > 5 minutes with minimal to no increase in pain in order to be able to stand for prolonged periods as needed to perform house cleaning. (PROGRESSING, 2/1/2022)   4. Pt will demonstrate increase in lumbar extension AROM to 60% or more of normal with minimal to no increase in pain in order to improve functional mobility and improve upright posture. (MET, 2/1/2022)   Discharge Goals: Time Frame: 10 weeks  1. Pt will be independent with HEP in order to increase lumbar mobility and LE and core strength/endurance to improve quality of life.    2. Pt will reduce score on Modified Oswestry Low Back Pain Questionnaire to 16/50 in order to demonstrate improved functional mobility and quality of life. 3. Pt will report being able to vacuum for > 10 minutes with minimal to no increase in pain in order to be able to stand for prolonged periods as needed to perform house cleaning. 4. Pt will demonstrate increase in lumbar extension AROM to 75% or more of normal with minimal to no increase in pain in order to improve functional mobility and improve upright posture. OUTCOME MEASURE:   Tool Used: Modified Oswestry Low Back Pain Questionnaire  Score:  Initial: 22/50  Most Recent: 12/50 (Date: 2/1/2022 )   Interpretation of Score: Each section is scored on a 0-5 scale, 5 representing the greatest disability. The scores of each section are added together for a total score of 50. FALL RISK:    Ambulatory/Rehab Services H2 Model Falls Risk Assessment   Risk Factors:       (1)  Any administered benzodiazepines Ability to Rise from Chair:       (1)  Pushes up, successful in one attempt   Falls Prevention Plan:       No modifications necessary   Total: (5 or greater = High Risk): 2   ©2010 [facility] [employer]. All Rights Reserved. [facility] [employer] Patent #[id number].  Federal Law prohibits the replication, distribution or use without written permission from [facility] of [address] [place] [address] FINDINGS:     Reassessment on 2/1/2022   Observation/Orthostatic Postural Assessment:    The following postural deficits were noted in sitting: loss of cervical lordosis, increased thoracic kyphosis, forward head, rounded shoulders and posterior pelvic tilt excessive tibial external rotation noted at R - improved upright posture on 2/1/2022  The following postural deficits were noted in standing: forward trunk flexion and loss of lumbar lordosis  (improved upright posture on 2/1/2022)  Palpation:          Significant pain with PA mobilizations at each SI joint, central PA mobilizations at lumbar spine stiff and painful, especially at lower levels; LE length and pelvis symmetrical on 2/1/2022  ROM:    Initial measurement: (on 1/4/2022) Initial measurement: (on 1/4/2022) Reassessment measurement:  Reassessment measurement:      WFL throughout L LE, but painful at end-range with following motions: L hip flexion, L hip IR (pain in R low back)  Pt very hypermobile in L hip, especially into hip IR   SLR to 90 degrees WFL and non-painful throughout R hip, knee, and ankle  Pt very hypermobile in R hip, especially into hip IR  SLR to 90 degrees (significant tightness in hamstring)       Initial measurement: (on 1/4/2022) Reassessment measurement: (on 2/1/2022) Reassessment measurement:      Lumbar AROM  Flexion (% of normal): 80%*  Extension (% of normal): 50%*  L sidebending (% of normal): 60%*  R sidebending (% of normal): 70%  *pain in R lower back Lumbar AROM  Flexion (% of normal): 90%  Extension (% of normal): 75%  L sidebending (% of normal): 95%  R sidebending (% of normal): 95%         Strength:     Initial measurement: (on 1/4/2022) Initial measurement: (on 1/4/2022)  Reassessment (on 2/1/2022) Reassessment (on 2/1/2022)    L LE: R LE: L LE: R LE:   Hip flexion 5/5  4+/5  5/5 5/5   Hip ER 4-/5  4/5  4/5 4+/5   Hip IR 4/5  4/5  5/5 5/5   Hip abduction 4/5  4/5  4/5 4+/5   Hip adduction 4-/5  4-/5  4/5 4/5   Hip extension 4-/5  4-/5  4/5 4-/5   Knee flexion 4/5  4/5  4/5 4/5   Knee extension 5/5  5/5  5/5 5/5   Ankle DF  4/5  4/5  4/5 4/5     Neurological Screen:        Myotomes:  WFL        Dermatomes:  Pt reporting some numbness in B feet but not along any specific dermatome  Functional Mobility:   Gait/Ambulation: Pt ambulates with no AD with following gait deficits: increased forward trunk lean, slower gait speed, altered arm swing, decreased trunk rotation, wide GORAN and decreased B step length - improved upright posture with narrower GORAN and longer step length on 2/1/2022        Transfers:  Pushes up to stand, reaches back to sit; Magdalena        Bed Mobility:  WFL  Balance:          No deficits noted  MEDICAL NECESSITY:   · Patient is expected to demonstrate progress in strength, range of motion and functional technique to improve ability to perform pain-free ADLs/IADLs adn to improve overall qualtiy of life. REASON FOR SERVICES/OTHER COMMENTS:  · Patient continues to require modification of therapeutic interventions to increase complexity of exercises. Rehabilitation Potential For Stated Goals: Good  Regarding Avi Pelletier's therapy, I certify that the treatment plan above will be carried out by a therapist or under their direction. Thank you for this referral,  Jensen Zaman DPT     Referring Physician Signature: Balwinder Santacruz NP No Signature is Required for this note.

## 2022-02-04 ENCOUNTER — HOSPITAL ENCOUNTER (OUTPATIENT)
Dept: PHYSICAL THERAPY | Age: 47
Discharge: HOME | End: 2022-02-04
Payer: MEDICARE

## 2022-02-04 PROCEDURE — 97110 THERAPEUTIC EXERCISES: CPT

## 2022-02-04 NOTE — PROGRESS NOTES
Celsa Toledo  : 1975  Primary:   Secondary:  [address] at [facility]  [address], [address], [place], [facility]  [phone]   [phone]       OUTPATIENT PHYSICAL THERAPY: Daily Treatment Note 2022  Visit Count:  9  ICD-10: Treatment Diagnosis: Low back pain (M54.5)  Pain in right leg (M79.604)   Difficulty in walking, not elsewhere classified (R26.2)  Muscle weakness (generalized) (M62.81)  Precautions/Allergies:   Latex, Abilify [aripiprazole], Geodon [ziprasidone hcl], Klonopin [clonazepam], Ambien [zolpidem], and Topamax [topiramate]   TREATMENT PLAN:  Effective Dates/Frequency/Duration: Twice per week from 2022 until 3/20/2022 (10 weeks). Pre-treatment Symptoms/Complaints:  Pt states she has no pain currently, but did have pain when she was vacuuming (about 8 minutes); states she thought she could stand for longer periods of time but realized she couldn't when she was trying to clean her apartment over the weekend  Pain: Initial:   0/10 in R lower back Post Session:  0/10   Medications Last Reviewed:  2022  Updated Objective Findings: no pelvic asymmetry or LE length discrepancy on 2022   TREATMENT:   Therapeutic Exercise: ( 39 minutes):  Exercises per grid below to improve mobility, strength and dynamic movement of lower back and bilateral legs to improve functional mobility. Required minimal visual, verbal and manual cues to promote proper body alignment, promote proper body posture, promote proper body mechanics and promote proper body breathing techniques. Progressed resistance, range, repetitions and complexity of movement as indicated.     Date:  2022 Date:  2022 Date:  2022   Activity/Exercise      Nustep L4 resistance for 10 minutes with B LE's UE's to increase strength and endurance  L4 resistance for 11 minutes with B LE's UE's to increase strength and endurance  L4 resistance for 11 minutes with B LE's UE's to increase strength and endurance    Ankle plantarflexor stretch on incline board 3 x 30 sec hold B with knees flexed and extended- B UE support  3 x 30 sec hold B with knees flexed and extended- B UE support  3 x 30 sec hold B with knees flexed and extended- B UE support    Lower trunk rotation* 20 reps/1 set with 3-5 second hold each direction 20 reps/1 set with 3-5 second hold each direction    Bridging in hooklying* 3 reps/1 set to assess leg length; 20 reps/1 set with B LEs elevated on exercise ball to increase challenge to core 3 reps/1 set to assess leg length 3 reps/1 set to assess leg length; 20 reps/1 set with B LEs elevated on exercise ball to increase challenge to core; cues for breathing throughout   Sidelying hip clamshells*      Supine SLR*      Standing hip abduction      Supine hamstring stretch      Sitting forward lumbar flexion stretch at exercise ball      Prone press ups* Onto elbows; 10 reps/1 set with 10 second holds to increase joint mobility     Prone hip extensions* 18 reps/1 set each LE with cues for slow lowering      Sidelying hip abduction* 20 reps/1 set each LE with initial cues for correct positioning  20 reps/1 set each LE with initial cues for correct positioning   Supine pelvic tilts Initially attempted in supine position but then had pt perform in sitting position secondary to pt not fully understanding how to initiate exercise; x 5 minutes   Performed in hooklying position with significant tactile cuing for correct movement at pelvis; x 5 minutes   Pt continues to have difficulty moving pelvis so again had her practice tilts in sitting as well which improved mobility slightly   Varying resistance at each LE in multiple different planes of motion  Per strength assessment in chart    Lumbar AROM  Per ROM assessment in chart    Reverse sidelying clamshells  20 reps/1 set each LE with cues for slow movement and correct technique    Deadbug exercise   5 reps/2 sets bringing each UE back while maintaining deadbug position  5 reps/1 set extending each LE (alternating) while maintaining deadbug position  Cues for breathing throughout   Quadruped cat/camel   Attempted 5 reps but pt unable to move pelvis correctly even with maximal verbal/tactile cues          *given in HEP ([id number])  [facility]   Pt given following band colors: [] Yellow          [] Red          [] Green          [] Blue          [] Grey     Manual therapy (    Treatment/Session Summary:    · Response to Treatment: Pt demonstrating improved technique with most exercises this session but continues to have difficulty with maintaining breathing while performing. She also has difficulty continuing to perform pelvic tilts despite maximum verbal/tactile cues, so will continue to work on this. ·       No adverse reactions/unusual changes observed/reported in clinical status this session. · Communication/Consultation:  None today  · Equipment provided today:  None today  · Recommendations/Intent for next treatment session: Next visit will focus on manual therapy/modalities as needed to reduce pain; address LE length discrepancy; core stability exercises.     Total Treatment Billable Duration:  39 minutes  PT Patient Time In/Time Out  Time In: 1430  Time Out: [address], DPT    Future Appointments   Date Time Provider Jo Nolan   2/8/2022  3:15 PM Faye Washington DPT [facility]   2/10/2022 10:50 AM Yobany Simons MD [facility] PO[facility]   2/11/2022  2:30 PM Dorothy SIMMS DPT [facility]   5/31/2022  8:30 AM PRE LAB RESOURCE [facility] PRE PRE   6/7/2022 10:20 AM Sanchez Villavicencio MD [facility] PRE PRE        Visit Approval Visit # Therapist initials Date A NS / Cx < 24 hr >24 hr Cx Comments   99 visits total 1  1/4/2022 [x]  [] [] Initial evaluation    2  1/7/2022 [x] [] []     3  1/10/2022 [x] [] []     4 PDE 1- [x] [] []     5  1/19/2022 [x] [] []     6  1/26/2022 [x] [] []     7  1/28/2022 [x] [] []     8  2/1/2022 [x] [] [] Progress note    9  2/4/2022 [x] [] []        [] [] []        [] [] []        [] [] []        [] [] []        [] [] []        [] [] []        [] [] []        [] [] []        [] [] []

## 2022-02-08 ENCOUNTER — HOSPITAL ENCOUNTER (OUTPATIENT)
Dept: PHYSICAL THERAPY | Age: 47
Discharge: HOME | End: 2022-02-08
Payer: MEDICARE

## 2022-02-08 PROCEDURE — 97110 THERAPEUTIC EXERCISES: CPT

## 2022-02-08 NOTE — PROGRESS NOTES
Santo Grey  : 1975  Primary:   Secondary:  [address] at [facility]  [address], [address], [address]  [phone]   [phone]       OUTPATIENT PHYSICAL THERAPY: Daily Treatment Note 2022  Visit Count:  10  ICD-10: Treatment Diagnosis: Low back pain (M54.5)  Pain in right leg (M79.604)   Difficulty in walking, not elsewhere classified (R26.2)  Muscle weakness (generalized) (M62.81)  Precautions/Allergies:   Latex, Abilify [aripiprazole], Geodon [ziprasidone hcl], Klonopin [clonazepam], Ambien [zolpidem], and Topamax [topiramate]   TREATMENT PLAN:  Effective Dates/Frequency/Duration: Twice per week from 2022 until 3/20/2022 (10 weeks). Pre-treatment Symptoms/Complaints:  Pt states she has no pain today, but did report having spasms in her mid and lower back on Friday night; states otherwise she has had no pain; states she still has increased difficulty with standing for a long time (states it bothers her L side rather than her R side that usually hurts)  Pain: Initial:   0/10 in R lower back Post Session:  0/10   Medications Last Reviewed:  2022  Updated Objective Findings: no pelvic asymmetry or LE length discrepancy on 2022   TREATMENT:   Therapeutic Exercise: ( 39 minutes):  Exercises per grid below to improve mobility, strength and dynamic movement of lower back and bilateral legs to improve functional mobility. Required minimal visual, verbal and manual cues to promote proper body alignment, promote proper body posture, promote proper body mechanics and promote proper body breathing techniques. Progressed resistance, range, repetitions and complexity of movement as indicated.     Date:  2022 Date:  2022 Date:  2022   Activity/Exercise      Nustep L4 resistance for 11 minutes with B LE's UE's to increase strength and endurance  L4 resistance for 11 minutes with B LE's UE's to increase strength and endurance  L4 resistance for 10 minutes with B LE's UE's to increase strength and endurance    Ankle plantarflexor stretch on incline board 3 x 30 sec hold B with knees flexed and extended- B UE support  3 x 30 sec hold B with knees flexed and extended- B UE support  3 x 30 sec hold B with knees flexed and extended- B UE support    Lower trunk rotation* 20 reps/1 set with 3-5 second hold each direction  20 reps/1 set with 3-5 second hold each direction   Bridging in hooklying* 3 reps/1 set to assess leg length 3 reps/1 set to assess leg length; 20 reps/1 set with B LEs elevated on exercise ball to increase challenge to core; cues for breathing throughout 3 reps/1 set to assess leg length; 20 reps/1 set with B LEs elevated on exercise ball to increase challenge to core; cues for breathing throughout   Sidelying hip clamshells*      Supine SLR*      Standing hip abduction   20 reps/1 set each LE with cues to avoid hip ER and maintain upright posture   Supine hamstring stretch      Sitting forward lumbar flexion stretch at exercise ball      Prone press ups*      Prone hip extensions*      Sidelying hip abduction*  20 reps/1 set each LE with initial cues for correct positioning 20 reps/1 set each LE with initial cues for correct positioning   Supine pelvic tilts  Performed in hooklying position with significant tactile cuing for correct movement at pelvis; x 5 minutes   Pt continues to have difficulty moving pelvis so again had her practice tilts in sitting as well which improved mobility slightly    Varying resistance at each LE in multiple different planes of motion Per strength assessment in chart     Lumbar AROM Per ROM assessment in chart     Reverse sidelying clamshells 20 reps/1 set each LE with cues for slow movement and correct technique     Deadbug exercise  5 reps/2 sets bringing each UE back while maintaining deadbug position  5 reps/1 set extending each LE (alternating) while maintaining deadbug position  Cues for breat/kathryn throughout 10 reps/1 set bringing each UE back while maintaining deadbug position  5 reps/1 set extending each LE (alternating) while maintaining deadbug position  Cues for breathing throughout   Quadruped cat/camel  Attempted 5 reps but pt unable to move pelvis correctly even with maximal verbal/tactile cues     Standing hip extension   20 reps/1 set each LE with cues to maintain upright posture throughout                     *given in HEP ([id number])  MedBridge Portal   Pt given following band colors: [] Yellow          [] Red          [] Green          [] Blue          [] Grey     Manual therapy (    Treatment/Session Summary:    · Response to Treatment: Pt continuing to demonstrate good symmetry at pelvis and with LE length. She is not having pain as frequently but still has difficulty with standing, so will try to work on standing exercises more next session to continue to improve her functional mobility. ·       No adverse reactions/unusual changes observed/reported in clinical status this session. · Communication/Consultation:  None today  · Equipment provided today:  None today  · Recommendations/Intent for next treatment session: Next visit will focus on manual therapy/modalities as needed to reduce pain; address LE length discrepancy; core stability exercises.     Total Treatment Billable Duration:  39 minutes  PT Patient Time In/Time Out  Time In: 5984  Time Out: 2[address] [facility]    Future Appointments   Date Time Provider Jo Nolan   2/10/2022 10:50 AM Elayne Lawrence MD [facility]   2/11/2022  2:30 PM Dioni SIMMS, DPT [facility]   5/31/2022  8:30 AM PRE LAB RESOURCE [facility] PRE PRE   6/7/2022 10:20 AM Eugenio Harada, MD [facility] PRE PRE        Visit Approval Visit # Therapist initials Date A NS / Cx < 24 hr >24 hr Cx Comments   99 visits total 1  1/4/2022 [x]  [] [] Initial evaluation    2  1/7/2022 [x] [] []     3  1/10/2022 [x] [] []     4 PDE 1- [x] [] []     5  1/19/2022 [x] [] []     6 [facility] 1/26/2022 [x] [] []     7 [facility] 1/28/2022 [x] [] []     8 [facility] 2/1/2022 [x] [] [] Progress note    9 [facility] 2/4/2022 [x] [] []     10 [facility] 2/8/2022 [x] [] []        [] [] []        [] [] []        [] [] []        [] [] []        [] [] []        [] [] []        [] [] []        [] [] []

## 2022-02-11 ENCOUNTER — HOSPITAL ENCOUNTER (OUTPATIENT)
Dept: PHYSICAL THERAPY | Age: 47
Discharge: HOME | End: 2022-02-11
Payer: MEDICARE

## 2022-02-11 PROCEDURE — 97110 THERAPEUTIC EXERCISES: CPT

## 2022-02-11 NOTE — PROGRESS NOTES
Kati Martinezield  : 1975  Primary:   Secondary:  [address] at [facility]  [address], [address], [place], [address] [place] [address]  [phone]   [phone]       OUTPATIENT PHYSICAL THERAPY: Daily Treatment Note 2022  Visit Count:  11  ICD-10: Treatment Diagnosis: Low back pain (M54.5)  Pain in right leg (M79.604)   Difficulty in walking, not elsewhere classified (R26.2)  Muscle weakness (generalized) (M62.81)  Precautions/Allergies:   Latex, Abilify [aripiprazole], Geodon [ziprasidone hcl], Klonopin [clonazepam], Ambien [zolpidem], and Topamax [topiramate]   TREATMENT PLAN:  Effective Dates/Frequency/Duration: Twice per week from 2022 until 3/20/2022 (10 weeks). Pre-treatment Symptoms/Complaints:  Pt states she has been continuing to struggle with muscle cramps - states her fibro symptoms are slightly increased overall since last night  Pain: Initial:   1/10 in R lower back Post Session:  1.5-2/10   Medications Last Reviewed:  2022  Updated Objective Findings: no pelvic asymmetry or LE length discrepancy on 2022   TREATMENT:   Therapeutic Exercise: ( 40 minutes):  Exercises per grid below to improve mobility, strength and dynamic movement of lower back and bilateral legs to improve functional mobility. Required minimal visual, verbal and manual cues to promote proper body alignment, promote proper body posture, promote proper body mechanics and promote proper body breathing techniques. Progressed resistance, range, repetitions and complexity of movement as indicated.     Date:  2022 Date:  2022 Date:  2022   Activity/Exercise      Nustep L4 resistance for 11 minutes with B LE's UE's to increase strength and endurance  L4 resistance for 10 minutes with B LE's UE's to increase strength and endurance  L4 resistance for 10 minutes with B LE's UE's to increase strength and endurance   Ankle plantarflexor stretch on incline board 3 x 30 sec hold B with knees flexed and extended- B UE support  3 x 30 sec hold B with knees flexed and extended- B UE support  3 x 30 sec hold B with knees flexed and extended- B UE support    Lower trunk rotation*  20 reps/1 set with 3-5 second hold each direction 20 reps/1 set with 3-5 second hold each direction   Bridging in hooklying* 3 reps/1 set to assess leg length; 20 reps/1 set with B LEs elevated on exercise ball to increase challenge to core; cues for breathing throughout 3 reps/1 set to assess leg length; 20 reps/1 set with B LEs elevated on exercise ball to increase challenge to core; cues for breathing throughout 3 reps/1 set to assess leg length; 20 reps/1 set with B LEs elevated on exercise ball to increase challenge to core; cues for breathing throughout   Sidelying hip clamshells*      Supine SLR*      Standing hip abduction  20 reps/1 set each LE with cues to avoid hip ER and maintain upright posture Standing on foam; 20 reps/1 set each LE with cues to maintain upright posture throughout   Supine hamstring stretch      Sitting forward lumbar flexion stretch at exercise ball      Prone press ups*      Prone hip extensions*      Sidelying hip abduction* 20 reps/1 set each LE with initial cues for correct positioning 20 reps/1 set each LE with initial cues for correct positioning    Supine pelvic tilts Performed in hooklying position with significant tactile cuing for correct movement at pelvis; x 5 minutes   Pt continues to have difficulty moving pelvis so again had her practice tilts in sitting as well which improved mobility slightly     Varying resistance at each LE in multiple different planes of motion      Lumbar AROM      Reverse sidelying clamshells      Deadbug exercise 5 reps/2 sets bringing each UE back while maintaining deadbug position  5 reps/1 set extending each LE (alternating) while maintaining deadbug position  Cues for breat/kathryn throughout 10 reps/1 set bringing each UE back while maintaining deadbug position  5 reps/1 set extending each LE (alternating) while maintaining deadbug position  Cues for breathing throughout 10 reps/1 set bringing each UE back while maintaining deadbug position  7 reps/1 set extending each LE (alternating) while maintaining deadbug position     Quadruped cat/camel Attempted 5 reps but pt unable to move pelvis correctly even with maximal verbal/tactile cues      Standing hip extension  20 reps/1 set each LE with cues to maintain upright posture throughout    Leg press machine   25 lb resistance; 20 reps/1 set with cues to avoid knee hyperextension and for slow lowering and control               *given in HEP ([id number])  MedBridge Portal   Pt given following band colors: [] Yellow          [] Red          [] Green          [] Blue          [] Grey     Manual therapy (    Treatment/Session Summary:    · Response to Treatment: Pt continuing to demonstrate good symmetry at pelvis and with LE length. Pt able to progress one of standing exercises this session but did indicate slightly increased pain with performing, so will focus more on standing exercises with increased stability next session. ·       No adverse reactions/unusual changes observed/reported in clinical status this session. · Communication/Consultation:  None today  · Equipment provided today:  None today  · Recommendations/Intent for next treatment session: Next visit will focus on manual therapy/modalities as needed to reduce pain; address LE length discrepancy; core stability exercises.     Total Treatment Billable Duration:  40 minutes  PT Patient Time In/Time Out  Time In: 1513  Time Out: VALENTINO Gorman    Future Appointments   Date Time Provider Jo Nolan   2/22/2022  1:45 PM Babara Cooks, DPT [facility]   2/24/2022 11:00 AM Babara Cooks, DPT [facility]   2/25/2022  8:30 AM Reynaldo Meyer MD [facility]   2/28/2022  2:30 PM Sulema SIMMS DPT [facility]   3/3/2022  2:30 PM Win Plater, DPT [facility]   3/8/2022  2:30 PM Bebe Plater, DPT [facility]   3/10/2022  2:30 PM Jeermiah SIMMS, DPT [facility]   5/31/2022  8:30 AM PRE LAB RESOURCE [facility] PRE PRE   6/7/2022 10:20 AM Clementina Allen MD [facility] PRE PRE   10/10/2022 11:30 AM Carmel Enriquez MD [facility]        Visit Approval Visit # Therapist initials Date A NS / Cx < 24 hr >24 hr Cx Comments   99 visits total 1 [facility] 1/4/2022 [x]  [] [] Initial evaluation    2 [facility] 1/7/2022 [x] [] []     3 [facility] 1/10/2022 [x] [] []     4 PDE 1- [x] [] []     5 [facility] 1/19/2022 [x] [] []     6 [facility] 1/26/2022 [x] [] []     7 [facility] 1/28/2022 [x] [] []     8 [facility] 2/1/2022 [x] [] [] Progress note    9 [facility] 2/4/2022 [x] [] []     10 [facility] 2/8/2022 [x] [] []     11 [facility] 2/11/2022 [x] [] []        [] [] []        [] [] []        [] [] []        [] [] []        [] [] []        [] [] []        [] [] []

## 2022-02-22 ENCOUNTER — HOSPITAL ENCOUNTER (OUTPATIENT)
Dept: PHYSICAL THERAPY | Age: 47
End: 2022-02-22
Payer: MEDICARE

## 2022-02-28 ENCOUNTER — HOSPITAL ENCOUNTER (OUTPATIENT)
Dept: PHYSICAL THERAPY | Age: 47
Discharge: HOME | End: 2022-02-28
Payer: MEDICARE

## 2022-02-28 PROCEDURE — 97110 THERAPEUTIC EXERCISES: CPT

## 2022-02-28 NOTE — PROGRESS NOTES
Elda Jovel  : 1975  Primary:   Secondary:  [address] at [facility]  [address], [address], [address]  [phone]   [phone]       OUTPATIENT PHYSICAL THERAPY: Daily Treatment Note 2022  Visit Count:  12  ICD-10: Treatment Diagnosis: Low back pain (M54.5)  Pain in right leg (M79.604)   Difficulty in walking, not elsewhere classified (R26.2)  Muscle weakness (generalized) (M62.81)  Precautions/Allergies:   Latex, Abilify [aripiprazole], Geodon [ziprasidone hcl], Klonopin [clonazepam], Ambien [zolpidem], and Topamax [topiramate]   TREATMENT PLAN:  Effective Dates/Frequency/Duration: Twice per week from 2022 until 3/20/2022 (10 weeks). Pre-treatment Symptoms/Complaints:  Pt states she started having more intense and frequent muscle cramping last week - she got injection in her back on Friday ()  Pain: Initial:   pt stating no pain Post Session:  010   Medications Last Reviewed:  2022  Updated Objective Findings: no pelvic asymmetry or LE length discrepancy on 2022   TREATMENT:   Therapeutic Exercise: ( 40 minutes):  Exercises per grid below to improve mobility, strength and dynamic movement of lower back and bilateral legs to improve functional mobility. Required minimal visual, verbal and manual cues to promote proper body alignment, promote proper body posture, promote proper body mechanics and promote proper body breathing techniques. Progressed resistance, range, repetitions and complexity of movement as indicated.     Date:  2022 Date:  2022 Date:  2022   Activity/Exercise      Nustep L4 resistance for 10 minutes with B LE's UE's to increase strength and endurance  L4 resistance for 10 minutes with B LE's UE's to increase strength and endurance L4 resistance for 10 minutes with B LE's UE's to increase strength and endurance   Ankle plantarflexor stretch on incline board 3 x 30 sec hold B with knees flexed and extended- B UE support  3 x 30 sec hold B with knees flexed and extended- B UE support  3 x 30 sec hold B with knees flexed and extended- B UE support    Lower trunk rotation* 20 reps/1 set with 3-5 second hold each direction 20 reps/1 set with 3-5 second hold each direction 20 reps/1 set with 3-5 second hold each direction   Bridging in hooklying* 3 reps/1 set to assess leg length; 20 reps/1 set with B LEs elevated on exercise ball to increase challenge to core; cues for breathing throughout 3 reps/1 set to assess leg length; 20 reps/1 set with B LEs elevated on exercise ball to increase challenge to core; cues for breathing throughout 3 reps/1 set to assess leg length; 20 reps/1 set with B LEs elevated on exercise ball to increase challenge to core; cues for breathing throughout   Sidelying hip clamshells*      Supine SLR*      Standing hip abduction 20 reps/1 set each LE with cues to avoid hip ER and maintain upright posture Standing on foam; 20 reps/1 set each LE with cues to maintain upright posture throughout Standing on foam; 20 reps/1 set each LE with cues to maintain upright posture throughout   Supine hamstring stretch      Sitting forward lumbar flexion stretch at exercise ball      Prone press ups*      Prone hip extensions*      Sidelying hip abduction* 20 reps/1 set each LE with initial cues for correct positioning  20 reps/1 set each LE with initial cues for correct positioning   Supine pelvic tilts      Varying resistance at each LE in multiple different planes of motion      Lumbar AROM      Reverse sidelying clamshells      Deadbug exercise 10 reps/1 set bringing each UE back while maintaining deadbug position  5 reps/1 set extending each LE (alternating) while maintaining deadbug position  Cues for breathing throughout 10 reps/1 set bringing each UE back while maintaining deadbug position  7 reps/1 set extending each LE (alternating) while maintaining deadbug position      Quadruped cat/camel      Standing hip extension 20 reps/1 set each LE with cues to maintain upright posture throughout     Leg press machine  25 lb resistance; 20 reps/1 set with cues to avoid knee hyperextension and for slow lowering and control 30 lb resistance; 20 reps/1 set with cues to avoid knee hyperextension and for slow lowering and control; cues to avoid hip IR               *given in HEP ([id number])  MedBridge Portal   Pt given following band colors: [] Yellow          [] Red          [] Green          [] Blue          [] Grey     Manual therapy (    Treatment/Session Summary:    · Response to Treatment: Pt continuing to demonstrate good symmetry at pelvis and with LE length this session. She was able to progress resistance/challenge with several LE and core strengthening exercises as a result with no reports of increased pain. ·       No adverse reactions/unusual changes observed/reported in clinical status this session. · Communication/Consultation:  None today  · Equipment provided today:  None today  · Recommendations/Intent for next treatment session: Next visit will focus on manual therapy/modalities as needed to reduce pain; address LE length discrepancy; core stability exercises.     Total Treatment Billable Duration:  40 minutes  PT Patient Time In/Time Out  Time In: 4381  Time Out: 2600 VALENTINO Shaw    Future Appointments   Date Time Provider Jo Nolan   3/3/2022  2:30 PM Nancy Lal DPT [facility]   [facility]2022  [facility]30 [facility]   3/10/2022  2:30 PM Nancy Lal DPT [facility]   3/14/2022  1:45 PM VALENTINO Back [facility]   3/17/2022  1:45 PM VALENTINO Gallegos   5/31/2022  8:30 AM PRE LAB RESOURCE [facility] PRE PRE   6/7/2022 10:20 AM Arielle Galdamez MD [facility] PRE PRE   10/10/2022 11:30 AM Ruben Harada., MD [facility]        Visit Approval Visit # Therapist initials Date A NS / Cx < 24 hr >24 hr Cx Comments   99 visits total 1  1/4/2022 [x]  [] [] Initial evaluation    2  1/7/2022 [x] [] []     3  1/10/2022 [x] [] []     4 PDE 1- [x] [] []     5  1/19/2022 [x] [] []     6 [facility] 1/26/2022 [x] [] []     7 [facility] 1/28/2022 [x] [] []     8 [facility] 2/1/2022 [x] [] [] Progress note    9 [facility] 2/4/2022 [x] [] []     10 [facility] 2/8/2022 [x] [] []     11 [facility] 2/11/2022 [x] [] []      [facility] 2/22/2022 [] [] [] Canceled due to therapist being sick     [facility] 2/24/2022 [] [x] [] No show - pt thought her appt time was later    12 [facility] 2/28/2022 [x] [] []        [] [] []        [] [] []        [] [] []        [] [] []

## 2022-03-03 ENCOUNTER — HOSPITAL ENCOUNTER (OUTPATIENT)
Dept: PHYSICAL THERAPY | Age: 47
Discharge: HOME | End: 2022-03-03
Payer: MEDICARE

## 2022-03-03 PROCEDURE — 97110 THERAPEUTIC EXERCISES: CPT

## 2022-03-03 NOTE — PROGRESS NOTES
Celsa Toledo  : 1975  Primary:   Secondary:  [address] at [facility]  [address], [place], [facility]  [phone]   [phone]       OUTPATIENT PHYSICAL THERAPY: Daily Treatment Note 3/3/2022  Visit Count:  13  ICD-10: Treatment Diagnosis: Low back pain (M54.5)  Pain in right leg (M79.604)   Difficulty in walking, not elsewhere classified (R26.2)  Muscle weakness (generalized) (M62.81)  Precautions/Allergies:   Latex, Abilify [aripiprazole], Geodon [ziprasidone hcl], Klonopin [clonazepam], Ambien [zolpidem], and Topamax [topiramate]   TREATMENT PLAN:  Effective Dates/Frequency/Duration: Twice per week from 2022 until 3/20/2022 (10 weeks). Pre-treatment Symptoms/Complaints:  Pt states she has not had any pain since last session despite being more active and standing longer periods of time. States she has not tried vacuuming yet. Pain: Initial:   pt stating no pain Post Session:  010   Medications Last Reviewed:  3/3/2022  Updated Objective Findings: no pelvic asymmetry or LE length discrepancy on 3/3/2022   TREATMENT:   Therapeutic Exercise: ( 40 minutes):  Exercises per grid below to improve mobility, strength and dynamic movement of lower back and bilateral legs to improve functional mobility. Required minimal visual, verbal and manual cues to promote proper body alignment, promote proper body posture, promote proper body mechanics and promote proper body breathing techniques. Progressed resistance, range, repetitions and complexity of movement as indicated.     Date:  2022 Date:  2022 Date:  3/3/2022   Activity/Exercise      Nustep L4 resistance for 10 minutes with B LE's UE's to increase strength and endurance L4 resistance for 10 minutes with B LE's UE's to increase strength and endurance L4 resistance for 10 minutes with B LE's UE's to increase strength and endurance   Ankle plantarflexor stretch on incline board 3 x 30 sec hold B with knees flexed and extended- B UE support  3 x 30 sec hold B with knees flexed and extended- B UE support  3 x 30 sec hold B with knees flexed and extended- B UE support    Lower trunk rotation* 20 reps/1 set with 3-5 second hold each direction 20 reps/1 set with 3-5 second hold each direction    Bridging in hooklying* 3 reps/1 set to assess leg length; 20 reps/1 set with B LEs elevated on exercise ball to increase challenge to core; cues for breathing throughout 3 reps/1 set to assess leg length; 20 reps/1 set with B LEs elevated on exercise ball to increase challenge to core; cues for breathing throughout 3 reps/1 set to assess leg length   Sidelying hip clamshells*      Supine SLR*      Standing hip abduction Standing on foam; 20 reps/1 set each LE with cues to maintain upright posture throughout Standing on foam; 20 reps/1 set each LE with cues to maintain upright posture throughout    Supine hamstring stretch      Sitting forward lumbar flexion stretch at exercise ball      Prone press ups*      Prone hip extensions*      Sidelying hip abduction*  20 reps/1 set each LE with initial cues for correct positioning 2 lb ankle weight; 20 reps/1 set each LE with initial cues for correct positioning   Supine pelvic tilts      Varying resistance at each LE in multiple different planes of motion      Lumbar AROM      Reverse sidelying clamshells      Deadbug exercise 10 reps/1 set bringing each UE back while maintaining deadbug position  7 reps/1 set extending each LE (alternating) while maintaining deadbug position       Quadruped cat/camel      Standing hip extension      Leg press machine 25 lb resistance; 20 reps/1 set with cues to avoid knee hyperextension and for slow lowering and control 30 lb resistance; 20 reps/1 set with cues to avoid knee hyperextension and for slow lowering and control; cues to avoid hip IR 35 lb resistance; 20 reps/1 set with cues to avoid knee hyperextension and for slow lowering and control; cues to avoid hip IR   Standing unilateral lat pulldown   Green theratubing resistance; 20 reps/1 set each UE with cues to maintain activation at core and glutes for good posture   Standing paloff press   Green theratubing resistance; 5 reps/1 set facing each direction with cues to activate core and maintain midline                      *given in HEP ([id number])  MedBridge Portal   Pt given following band colors: [] Yellow          [] Red          [] Green          [] Blue          [] Grey     Manual therapy (    Treatment/Session Summary:    · Response to Treatment: Pt continuing to demonstrate good symmetry at pelvis and with LE length this session. She was able to progress to more challenging standing core exercises with no reports of increased pain, and was able to maintain good stability and posture throughout. Discussed with pt that if she continues to have no pain for the next couple of weeks even after resuming her normal activity level, that we will go ahead and discharge. Also discussed that she should try vacuuming for just a few minutes to begin with (since she has not attempted that yet). Pt verbalizing agreement. ·       No adverse reactions/unusual changes observed/reported in clinical status this session. · Communication/Consultation:  None today  · Equipment provided today:  None today  · Recommendations/Intent for next treatment session: Next visit will focus on manual therapy/modalities as needed to reduce pain; address LE length discrepancy; core stability exercises.     Total Treatment Billable Duration:  40 minutes  PT Patient Time In/Time Out  Time In: 1430  Time Out: [address], [place]    Future Appointments   Date Time Provider Jo Nolan   3/8/2022  2:30 PM Faye Washington DPT [facility]   3/10/2022  2:30 PM Faye Washington DPT [facility]   3/14/2022  1:45 PM VALENTINO Soria   3/17/2022  1:45 PM VALENTINO Soria [facility] 5/31/2022  8:30 AM PRE LAB RESOURCE [facility] PRE PRE   6/7/2022 10:20 AM Alexi Howard MD [facility] PRE PRE   10/10/2022 11:30 AM Demian Fung MD [facility]        Visit Approval Visit # Therapist initials Date A NS / Cx < 24 hr >24 hr Cx Comments   99 visits total 1  1/4/2022 [x]  [] [] Initial evaluation    2 [facility] 1/7/2022 [x] [] []     3  1/10/2022 [x] [] []     4 PDE 1- [x] [] []     5  1/19/2022 [x] [] []     6 [facility] 1/26/2022 [x] [] []     7 [facility] 1/28/2022 [x] [] []     8 [facility] 2/1/2022 [x] [] [] Progress note    9 [facility] 2/4/2022 [x] [] []     10 [facility] 2/8/2022 [x] [] []     11 [facility] 2/11/2022 [x] [] []       2/22/2022 [] [] [] Canceled due to therapist being sick     [facility] 2/24/2022 [] [x] [] No show - pt thought her appt time was later    12 [facility] 2/28/2022 [x] [] []     13  3/3/2022 [x] [] []        [] [] []        [] [] []        [] [] []

## 2022-03-08 ENCOUNTER — HOSPITAL ENCOUNTER (OUTPATIENT)
Dept: PHYSICAL THERAPY | Age: 47
Discharge: HOME | End: 2022-03-08
Payer: MEDICARE

## 2022-03-08 NOTE — PROGRESS NOTES
Ailyn Norman  : 1975  Primary: [address] Medic*  Secondary:  Therapy Center at [facility] [address], [address], [place], [facility]  [phone]   [phone]        CANCELLATION NOTE    Ms. Cortez was a same-day cancellation for today's appointment due to family emergency (pt in distress).     # Recent No Shows/Same-Day Cancellations: 2    3/8/2022  Marisela Claude, DPT    Visit Approval Visit # Therapist initials Date A NS / Cx < 24 hr >24 hr Cx Comments   99 visits total 1  2022 [x]  [] [] Initial evaluation    2  2022 [x] [] []     3  1/10/2022 [x] [] []     4 PDE 2022 [x] [] []     5  2022 [x] [] []     6  2022 [x] [] []     7  2022 [x] [] []     8  2022 [x] [] [] Progress note    9  2022 [x] [] []     10  2022 [x] [] []     11  2022 [x] [] []       2022 [] [] [] Canceled due to therapist being sick      2022 [] [x] [] No show - pt thought her appt time was later    12  2022 [x] [] []     13  3/3/2022 [x] [] []       3/8/2022 [] [x] []        [] [] []        [] [] []

## 2022-03-10 ENCOUNTER — HOSPITAL ENCOUNTER (OUTPATIENT)
Dept: PHYSICAL THERAPY | Age: 47
Discharge: HOME | End: 2022-03-10
Payer: MEDICARE

## 2022-03-10 PROCEDURE — 97110 THERAPEUTIC EXERCISES: CPT

## 2022-03-10 NOTE — PROGRESS NOTES
Chetan Jonathan  : 1975  Primary:   Secondary:  [address] at [facility]  [address], [address], [place], [facility]  [phone]   [phone]       OUTPATIENT PHYSICAL THERAPY: Daily Treatment Note 3/10/2022  Visit Count:  14  ICD-10: Treatment Diagnosis: Low back pain (M54.5)  Pain in right leg (M79.604)   Difficulty in walking, not elsewhere classified (R26.2)  Muscle weakness (generalized) (M62.81)  Precautions/Allergies:   Latex, Abilify [aripiprazole], Geodon [ziprasidone hcl], Klonopin [clonazepam], Ambien [zolpidem], and Topamax [topiramate]   TREATMENT PLAN:  Effective Dates/Frequency/Duration: Twice per week from 2022 until 3/20/2022 (10 weeks). Pre-treatment Symptoms/Complaints:  Pt states she has continued to not have any pain this past week. States she tried some vacuuming for about 3-4 minutes in her kitchen a couple of days ago and had no increased pain. Pain: Initial:   pt stating no pain Post Session:  010   Medications Last Reviewed:  3/10/2022  Updated Objective Findings: no pelvic asymmetry or LE length discrepancy on 3/10/2022   TREATMENT:   Therapeutic Exercise: ( 40 minutes):  Exercises per grid below to improve mobility, strength and dynamic movement of lower back and bilateral legs to improve functional mobility. Required minimal visual, verbal and manual cues to promote proper body alignment, promote proper body posture, promote proper body mechanics and promote proper body breathing techniques. Progressed resistance, range, repetitions and complexity of movement as indicated.     Date:  2022 Date:  3/3/2022 Date:  3/10/2022   Activity/Exercise      Nustep L4 resistance for 10 minutes with B LE's UE's to increase strength and endurance L4 resistance for 10 minutes with B LE's UE's to increase strength and endurance L4 resistance for 10 minutes with B LE's UE's to increase strength and endurance   Ankle plantarflexor stretch on incline board 3 x 30 sec hold B with knees flexed and extended- B UE support  3 x 30 sec hold B with knees flexed and extended- B UE support  3 x 30 sec hold B with knees flexed and extended- B UE support    Lower trunk rotation* 20 reps/1 set with 3-5 second hold each direction     Bridging in hooklying* 3 reps/1 set to assess leg length; 20 reps/1 set with B LEs elevated on exercise ball to increase challenge to core; cues for breathing throughout 3 reps/1 set to assess leg length 3 reps/1 set to assess leg length; 20 reps/1 set with B LEs elevated on exercise ball to increase challenge to core; cues for breathing throughout   Sidelying hip clamshells*      Supine SLR*      Standing hip abduction Standing on foam; 20 reps/1 set each LE with cues to maintain upright posture throughout     Supine hamstring stretch      Sitting forward lumbar flexion stretch at exercise ball      Prone press ups*      Prone hip extensions*      Sidelying hip abduction* 20 reps/1 set each LE with initial cues for correct positioning 2 lb ankle weight; 20 reps/1 set each LE with initial cues for correct positioning 2 lb ankle weight; 20 reps/1 set each LE with initial cues for correct positioning (avoid hip flexion)   Supine pelvic tilts      Varying resistance at each LE in multiple different planes of motion      Lumbar AROM      Reverse sidelying clamshells      Deadbug exercise      Quadruped cat/camel      Standing hip extension      Leg press machine 30 lb resistance; 20 reps/1 set with cues to avoid knee hyperextension and for slow lowering and control; cues to avoid hip IR 35 lb resistance; 20 reps/1 set with cues to avoid knee hyperextension and for slow lowering and control; cues to avoid hip IR 35 lb resistance; 20 reps/1 set with cues to avoid knee hyperextension and for slow lowering and control; cues to avoid hip IR; red theraband around lateral thighs    Standing unilateral lat pulldown  Green theratubing resistance; 20 reps/1 set each UE with cues to maintain activation at core and glutes for good posture Green theratubing resistance; 20 reps/1 set each UE with cues to maintain activation at core and glutes for good posture; standing on foam   Standing paloff press  Green theratubing resistance; 5 reps/1 set facing each direction with cues to activate core and maintain midline  Green theratubing resistance; 5 reps/1 set facing each direction with cues to activate core and maintain midline; standing on foam                     *given in [facility] ([id number])  MedBridge Portal   Pt given following band colors: [] Yellow          [] Red          [] Green          [] Blue          [] Grey     Manual therapy (    Treatment/Session Summary:    · Response to Treatment: Pt continuing to demonstrate good symmetry at pelvis and with LE length this session. Was able to progress to more challenging LE and core strengthening exercises. Planning on discharge next week. ·       No adverse reactions/unusual changes observed/reported in clinical status this session. · Communication/Consultation:  None today  · Equipment provided today:  None today  · Recommendations/Intent for next treatment session: Next visit will focus on manual therapy/modalities as needed to reduce pain; address LE length discrepancy; core stability exercises.     Total Treatment Billable Duration:  40 minutes  PT Patient Time In/Time Out  Time In: 1726  Time Out: VALENTINO Cody    Future Appointments   Date Time Provider Jo Nolan   3/14/2022  1:45 PM Codey Tanner DPT [facility]   3/18/2022 11:00 AM Roseline SIMMS DPT [facility]   5/31/2022  8:30 AM PRE LAB RESOURCE [facility] PRE PRE   6/7/2022 10:20 AM Michael Ely MD [facility] PRE PRE   10/10/2022 11:30 AM Flores Farnsworth MD [facility]        Visit Approval Visit # Therapist initials Date A NS / Cx < 24 hr >24 hr Cx Comments   99 visits total 1  1/4/2022 [x]  [] [] Initial evaluation    2  1/7/2022 [x] [] []     3 [facility] 1/10/2022 [x] [] []     4 PDE 1- [x] [] []     5 [facility] 1/19/2022 [x] [] []     6 [facility] 1/26/2022 [x] [] []     7 [facility] 1/28/2022 [x] [] []     8 [facility] 2/1/2022 [x] [] [] Progress note    9 [facility] 2/4/2022 [x] [] []     10 [facility] 2/8/2022 [x] [] []     11 [facility] 2/11/2022 [x] [] []      [facility] 2/22/2022 [] [] [] Canceled due to therapist being sick     [facility] 2/24/2022 [] [x] [] No show - pt thought her appt time was later    12 [facility] 2/28/2022 [x] [] []     13 [facility] 3/3/2022 [x] [] []      [facility] 3/8/2022 [] [x] []     14 [facility] 3/10/2022 [x] [] []        [] [] []          [] [] []        [] [] []

## 2022-03-17 ENCOUNTER — APPOINTMENT (OUTPATIENT)
Dept: PHYSICAL THERAPY | Age: 47
End: 2022-03-17
Payer: MEDICARE

## 2022-03-18 ENCOUNTER — HOSPITAL ENCOUNTER (OUTPATIENT)
Dept: PHYSICAL THERAPY | Age: 47
Discharge: HOME | End: 2022-03-18
Payer: MEDICARE

## 2022-03-18 PROBLEM — N32.81 OAB (OVERACTIVE BLADDER): Status: ACTIVE | Noted: 2019-01-17

## 2022-03-18 PROCEDURE — 97110 THERAPEUTIC EXERCISES: CPT

## 2022-03-18 NOTE — PROGRESS NOTES
Rodney Schroeder  : 1975  Primary:   Secondary:  [address] at [facility]  [address], [address], [place] [address] [address] [facility]  [phone]   [phone]       OUTPATIENT PHYSICAL THERAPY: Daily Treatment Note 3/18/2022  Visit Count:  15  ICD-10: Treatment Diagnosis: Low back pain (M54.5)  Pain in right leg (M79.604)   Difficulty in walking, not elsewhere classified (R26.2)  Muscle weakness (generalized) (M62.81)  Precautions/Allergies:   Latex, Abilify [aripiprazole], Geodon [ziprasidone hcl], Klonopin [clonazepam], Ambien [zolpidem], and Topamax [topiramate]   TREATMENT PLAN:  Effective Dates/Frequency/Duration: Once per week from 3/18/2022 until 2022 (6 weeks). Pre-treatment Symptoms/Complaints:  Pt states she is a little sore today - states it is a dull ache. Pt states she has not had to use pain medication for her back (still takes medication for migraines)  Pain: Initial:   1/10 Post Session: pt stating no change in pain   Medications Last Reviewed:  3/18/2022  Updated Objective Findings: See evaluation note from today   TREATMENT:   Therapeutic Exercise: ( 40 minutes):  Exercises per grid below (and assessment in chart on 3/18/2022) to improve mobility, strength and dynamic movement of lower back and bilateral legs to improve functional mobility. Required minimal visual, verbal and manual cues to promote proper body alignment, promote proper body posture, promote proper body mechanics and promote proper body breathing techniques. Progressed resistance, range, repetitions and complexity of movement as indicated.     Date:  3/3/2022 Date:  3/10/2022 Date:  3/18/2022   Activity/Exercise      Nustep L4 resistance for 10 minutes with B LE's UE's to increase strength and endurance L4 resistance for 10 minutes with B LE's UE's to increase strength and endurance L5 resistance for 10 minutes with B LE's UE's to increase strength and endurance   Ankle plantarflexor stretch on incline board 3 x 30 sec hold B with knees flexed and extended- B UE support  3 x 30 sec hold B with knees flexed and extended- B UE support  3 x 30 sec hold B with knees flexed and extended- B UE support    Lower trunk rotation*   20 reps/1 set with 3-5 second hold each direction   Bridging in hooklying* 3 reps/1 set to assess leg length 3 reps/1 set to assess leg length; 20 reps/1 set with B LEs elevated on exercise ball to increase challenge to core; cues for breathing throughout 3 reps/1 set to assess leg length   Sidelying hip clamshells*      Supine SLR*      Standing hip abduction      Supine hamstring stretch      Sitting forward lumbar flexion stretch at exercise ball      Prone press ups*      Prone hip extensions*      Sidelying hip abduction* 2 lb ankle weight; 20 reps/1 set each LE with initial cues for correct positioning 2 lb ankle weight; 20 reps/1 set each LE with initial cues for correct positioning (avoid hip flexion)    Supine pelvic tilts      Varying resistance at each LE in multiple different planes of motion   Per strength assessment in chart   Lumbar AROM   Per ROM assessment in chart   Reverse sidelying clamshells      Deadbug exercise      Quadruped cat/camel      Standing hip extension      Leg press machine 35 lb resistance; 20 reps/1 set with cues to avoid knee hyperextension and for slow lowering and control; cues to avoid hip IR 35 lb resistance; 20 reps/1 set with cues to avoid knee hyperextension and for slow lowering and control; cues to avoid hip IR; red theraband around lateral thighs     Standing unilateral lat pulldown Green theratubing resistance; 20 reps/1 set each UE with cues to maintain activation at core and glutes for good posture Green theratubing resistance; 20 reps/1 set each UE with cues to maintain activation at core and glutes for good posture; standing on foam    Standing paloff press Green theratubing resistance; 5 reps/1 set facing each direction with cues to activate core and maintain midline  Green theratubing resistance; 5 reps/1 set facing each direction with cues to activate core and maintain midline; standing on foam    Sitting piriformis stretch   30 second hold x 3 reps each LE; therapist instructing pt on how to support LE and stretch correctly               *given in HEP ([id number])  [employer] Portal   Pt given following band colors: [] Yellow          [] Red          [] Benavides Candi          [] Blue          [] Grey     Manual therapy (    Treatment/Session Summary:    · Response to Treatment: Recertification note today - see assessment in chart. ·       No adverse reactions/unusual changes observed/reported in clinical status this session. · Communication/Consultation:  None today  · Equipment provided today:  None today  · Recommendations/Intent for next treatment session: Next visit will focus on manual therapy/modalities as needed to reduce pain; address LE length discrepancy; core stability exercises.     Total Treatment Billable Duration:  40 minutes  PT Patient Time In/Time Out  Time In: 1715  Time Out: Unique 18, DPT    Future Appointments   Date Time Provider Jo Nolan   3/25/2022 11:00 AM Victorino Torres DPT [facility]   4/1/2022 11:00 AM VALENTINO Davenport [facility]   4/8/2022 11:00 AM [facility]   4/15/2022 11:00 AM VALENTINO Davenport [facility]   5/31/2022  8:30 AM PRE LAB RESOURCE [facility] PRE PRE   6/7/2022 10:20 AM Juany Coffey MD [facility] PRE PRE   10/10/2022 11:30 AM Gerson nAdrade MD [facility]        Visit Approval Visit # Therapist initials Date A NS / Cx < 24 hr >24 hr Cx Comments   99 visits total 1  1/4/2022 [x]  [] [] Initial evaluation    2  1/7/2022 [x] [] []     3  1/10/2022 [x] [] []     4 PDE 1- [x] [] []     5  1/19/2022 [x] [] []     6  1/26/2022 [x] [] []     7  1/28/2022 [x] [] []     8  2/1/2022 [x] [] [] Progress note    9  2/4/2022 [x] [] []     10 [facility] 2/8/2022 [x] [] []     11 [facility] 2/11/2022 [x] [] []      [facility] 2/22/2022 [] [] [] Canceled due to therapist being sick     [facility] 2/24/2022 [] [x] [] No show - pt thought her appt time was later    12 [facility] 2/28/2022 [x] [] []     13 [facility] 3/3/2022 [x] [] []      [facility] 3/8/2022 [] [x] []     14 [facility] 3/10/2022 [x] [] []      [facility] 3/14/2022 [] [x] [] Pt forgot appointment      15 [facility] 3/18/2022 [x] [] []        [] [] []

## 2022-03-18 NOTE — PROGRESS NOTES
Lalit Lacey  : 1975  Primary: [address] Medic*  Secondary:  [address] at [facility]  [address], [address], [place], [address] [facility]  [phone]   [phone]        OUTPATIENT PHYSICAL [address] [facility] [address]   ICD-10: Treatment Diagnosis: Low back pain (M54.5)  Pain in right leg (M79.604)   Difficulty in walking, not elsewhere classified (R26.2)  Muscle weakness (generalized) (M62.81)  Precautions/Allergies:   Latex, Abilify [aripiprazole], Geodon [ziprasidone hcl], Klonopin [clonazepam], Ambien [zolpidem], and Topamax [topiramate]   TREATMENT PLAN:  Effective Dates/Frequency/Duration: Once per week from 3/18/2022 until 2022 (9 weeks)    MEDICAL/REFERRING DIAGNOSIS:  Lumbar pain [M54.50]   DATE OF ONSET: 2021  REFERRING PHYSICIAN: Tammy Marie NP MD Orders: Evaluate and treat  Return MD Appointment: unspecified   ASSESSMENT:  Lalit Lacey has now attended 15 physical therapy sessions for insidious onset of R sided low back pain with occasional radiating pain down R LE starting in 2021. This session, pt demonstrated improved B LE strength/endurance, slightly improved lumbar mobility with minimal pain, less postural/gait deficits, and improved functional mobility as evident by a score of 8/50 on the Modified Oswestry Low Back Pain Questionnaire (initial score of 22/50, with higher scores indicating increased disability). Additionally, she continues to have no obvious leg length discrepancy or pelvic asymmetry. Despite these improvements, pt continues to demonstrate decreased B LE strength/endurance, decreased lumbar mobility, multiple postural/gait deficits, decreased sitting tolerance, decreased standing tolerance and decreased functional mobility.  Pt may continue to benefit from skilled PT to address the above listed deficits to improve ability to perform pain-free ADLs/IADLs and to improve overall quality of life prior to discharge. Since pt has continued to make slower but steady progress, will extend plan of care by 9 weeks but reduce frequency of visits to one time a week to move toward independent HEP. PROBLEM LIST (Impacting functional limitations):  1. Decreased Strength  2. Decreased ADL/Functional Activities  3. Decreased Transfer Abilities  4. Decreased Ambulation Ability/Technique  5. Increased Pain  6. Decreased Activity Tolerance  7. Decreased Pacing Skills  8. Increased Fatigue  9. Decreased Flexibility/Joint Mobility  10. Edema/Girth INTERVENTIONS PLANNED: (Treatment may consist of any combination of the following)  1. Bed Mobility  2. Cold  3. Electrical Stimulation  4. Family Education  5. Gait Training  6. Heat  7. Home Exercise Program (HEP)  8. Manual Therapy  9. Neuromuscular Re-education/Strengthening  10. Range of Motion (ROM)  11. Therapeutic Activites  12. Therapeutic Exercise/Strengthening  13. Transcutaneous Electrical Nerve Stimulation (TENS)  14. Transfer Training  15. Ultrasound (US)     GOALS: (Goals have been discussed and agreed upon with patient.)  Short-Term Functional Goals: Time Frame: 5 weeks  1. Pt will be compliant with HEP in order to increase lumbar mobility and LE and core strength/endurance to improve quality of life.  (MET, 3/18/2022)   2. Pt will reduce score on Modified Oswestry Low Back Pain Questionnaire to 19/50 in order to demonstrate improved functional mobility and quality of life. (MET, 2/1/2022)   3. Pt will report being able to vacuum for > 5 minutes with minimal to no increase in pain in order to be able to stand for prolonged periods as needed to perform house cleaning.  (MET, 3/18/2022)   4. Pt will demonstrate increase in lumbar extension AROM to 60% or more of normal with minimal to no increase in pain in order to improve functional mobility and improve upright posture. (MET, 2/1/2022)   Discharge Goals: Time Frame: 10 weeks  1.  Pt will be independent with HEP in order to increase lumbar mobility and LE and core strength/endurance to improve quality of life. (PROGRESSING, 3/18/2022)   2. Pt will reduce score on Modified Oswestry Low Back Pain Questionnaire to 16/50 in order to demonstrate improved functional mobility and quality of life.  (MET, 3/18/2022)   3. Pt will report being able to vacuum for > 10 minutes with minimal to no increase in pain in order to be able to stand for prolonged periods as needed to perform house cleaning. (PROGRESSING, 3/18/2022)   4. Pt will demonstrate increase in lumbar extension AROM to 75% or more of normal with minimal to no increase in pain in order to improve functional mobility and improve upright posture. (MET, 3/18/2022)   5. Pt will reduce score on Modified Oswestry Low Back Pain Questionnaire to 6/50 in order to demonstrate improved functional mobility and quality of life. (NEW GOAL, 3/18/2022)      OUTCOME MEASURE:   Tool Used: Modified Oswestry Low Back Pain Questionnaire  Score:  Initial: 22/50  Most Recent: 8/50 (Date: 3/18/2022 )   Interpretation of Score: Each section is scored on a 0-5 scale, 5 representing the greatest disability. The scores of each section are added together for a total score of 50. FALL RISK:    Ambulatory/Rehab Services H2 Model Falls Risk Assessment   Risk Factors:       (1)  Any administered benzodiazepines Ability to Rise from Chair:       (1)  Pushes up, successful in one attempt   Falls Prevention Plan:       No modifications necessary   Total: (5 or greater = High Risk): 2   ©2010 [facility]. All Rights Reserved. [facility] States Patent #[id number].  Federal Law prohibits the replication, distribution or use without written permission from [facility]:     Reassessment on 3/18/2022   Observation/Orthostatic Postural Assessment:    The following postural deficits were noted in sitting: loss of cervical lordosis, increased thoracic kyphosis, forward head, rounded shoulders and posterior pelvic tilt excessive tibial external rotation noted at R - improved upright posture on 3/18/2022  The following postural deficits were noted in standing: forward trunk flexion and loss of lumbar lordosis  (improved upright posture on 3/18/2022)  Palpation:          Significant pain with PA mobilizations at each SI joint, central PA mobilizations at lumbar spine stiff and painful, especially at lower levels; LE length and pelvis symmetrical on 3/18/2022  ROM:    Initial measurement: (on 1/4/2022) Initial measurement: (on 1/4/2022) Reassessment measurement:  Reassessment measurement:      WFL throughout L LE, but painful at end-range with following motions: L hip flexion, L hip IR (pain in R low back)  Pt very hypermobile in L hip, especially into hip IR   SLR to 90 degrees WFL and non-painful throughout R hip, knee, and ankle  Pt very hypermobile in R hip, especially into hip IR  SLR to 90 degrees (significant tightness in hamstring)       Initial measurement: (on 1/4/2022) Reassessment measurement: (on 2/1/2022) Reassessment measurement: (on 3/18/2022)     Lumbar AROM  Flexion (% of normal): 80%*  Extension (% of normal): 50%*  L sidebending (% of normal): 60%*  R sidebending (% of normal): 70%  *pain in R lower back Lumbar AROM  Flexion (% of normal): 90%  Extension (% of normal): 75%  L sidebending (% of normal): 95%  R sidebending (% of normal): 95%    Lumbar AROM  Flexion (% of normal): 95% (slight pain in R lower back)  Extension (% of normal): 90%  L sidebending (% of normal): 95%  R sidebending (% of normal): 95%        Strength:     Initial measurement: (on 1/4/2022) Initial measurement: (on 1/4/2022)  Reassessment (on 2/1/2022) Reassessment (on 2/1/2022) Reassessment (on 3/18/2022) Reassessment (on 3/18/2022)    L LE: R LE: L LE: R LE: L LE: R LE:   Hip flexion 5/5  4+/5  5/5 5/5 5/5 5/5   Hip ER 4-/5  4/5  4/5 4+/5 4+/5 4+/5   Hip IR 4/5  4/5  5/5 5/5 5/5 5/5   Hip abduction 4/5  4/5  4/5 4+/5 4+/5 4+/5   Hip adduction 4-/5  4-/5  4/5 4/5 4+/5 5/5   Hip extension 4-/5  4-/5  4/5 4-/5 4/5 4/5   Knee flexion 4/5  4/5  4/5 4/5 4/5 4+/5   Knee extension 5/5  5/5  5/5 5/5 5/5 5/5   Ankle DF  4/5  4/5  4/5 4/5 4/5 4/5     Neurological Screen:        Myotomes:  WFL        Dermatomes:  Pt reporting some numbness in B feet but not along any specific dermatome  Functional Mobility:   Gait/Ambulation: Pt ambulates with no AD with following gait deficits: increased forward trunk lean, slower gait speed, altered arm swing, decreased trunk rotation, wide GORAN and decreased B step length - improved upright posture with narrower GORAN, faster gait speed and longer step length on 3/18/2022        Transfers:  Pushes up to stand, reaches back to sit; Magdalena        Bed Mobility:  WFL  Balance:          No deficits noted  MEDICAL NECESSITY:   · Patient is expected to demonstrate progress in strength, range of motion and functional technique to improve ability to perform pain-free ADLs/IADLs adn to improve overall qualtiy of life. REASON FOR SERVICES/OTHER COMMENTS:  · Patient continues to require modification of therapeutic interventions to increase complexity of exercises. Rehabilitation Potential For Stated Goals: Good  Regarding Jae Pelletier's therapy, I certify that the treatment plan above will be carried out by a therapist or under their direction.   Thank you for this referral,  Aleta Sparks DPT     Referring Physician Signature: Jayden Ceron NP

## 2022-03-19 PROBLEM — Z00.00 ANNUAL PHYSICAL EXAM: Status: ACTIVE | Noted: 2018-04-17

## 2022-03-19 PROBLEM — G57.93 NEUROPATHY OF BOTH FEET: Status: ACTIVE | Noted: 2019-04-16

## 2022-03-19 PROBLEM — F50.81 BINGE EATING DISORDER: Status: ACTIVE | Noted: 2018-11-01

## 2022-03-19 PROBLEM — E55.9 VITAMIN D DEFICIENCY: Status: ACTIVE | Noted: 2018-03-02

## 2022-03-19 PROBLEM — M19.90 OA (OSTEOARTHRITIS): Status: ACTIVE | Noted: 2021-11-17

## 2022-03-19 PROBLEM — F50.819 BINGE EATING DISORDER: Status: ACTIVE | Noted: 2018-11-01

## 2022-03-20 PROBLEM — K58.9 IBS (IRRITABLE BOWEL SYNDROME): Status: ACTIVE | Noted: 2019-07-23

## 2022-03-20 PROBLEM — M51.379 DDD (DEGENERATIVE DISC DISEASE), LUMBOSACRAL: Status: ACTIVE | Noted: 2021-11-17

## 2022-03-20 PROBLEM — M51.37 DDD (DEGENERATIVE DISC DISEASE), LUMBOSACRAL: Status: ACTIVE | Noted: 2021-11-17

## 2022-03-20 PROBLEM — D64.9 ANEMIA: Status: ACTIVE | Noted: 2018-05-16

## 2022-03-25 ENCOUNTER — HOSPITAL ENCOUNTER (OUTPATIENT)
Dept: PHYSICAL THERAPY | Age: 47
Discharge: HOME | End: 2022-03-25
Payer: MEDICARE

## 2022-03-25 PROCEDURE — 97110 THERAPEUTIC EXERCISES: CPT

## 2022-03-25 NOTE — PROGRESS NOTES
Kati Martinezield  : 1975  Primary:   Secondary:  [facility], [address], [place] [address] [address] [facility]  [phone]   [phone]       OUTPATIENT PHYSICAL THERAPY: Daily Treatment Note 3/25/2022  Visit Count:  16  ICD-10: Treatment Diagnosis: Low back pain (M54.5)  Pain in right leg (M79.604)   Difficulty in walking, not elsewhere classified (R26.2)  Muscle weakness (generalized) (M62.81)  Precautions/Allergies:   Latex, Abilify [aripiprazole], Geodon [ziprasidone hcl], Klonopin [clonazepam], Ambien [zolpidem], and Topamax [topiramate]   TREATMENT PLAN:  Effective Dates/Frequency/Duration: Once per week from 3/18/2022 until 2022 (6 weeks). Pre-treatment Symptoms/Complaints:  Pt states over the past few months (about 6 months ago) she has had several instances of her legs feeling like they are about to give out on her  Pain: Initial:   0/10 Post Session: pt stating no change in pain   Medications Last Reviewed:  3/25/2022  Updated Objective Findings: no pelvic asymmetry or leg length discrepancy on 3/25/2022   TREATMENT:   Therapeutic Exercise: ( 40 minutes):  Exercises per grid below to improve mobility, strength and dynamic movement of lower back and bilateral legs to improve functional mobility. Required minimal visual, verbal and manual cues to promote proper body alignment, promote proper body posture, promote proper body mechanics and promote proper body breathing techniques. Progressed resistance, range, repetitions and complexity of movement as indicated.     Date:  3/10/2022 Date:  3/18/2022 Date:  3/25/2022   Activity/Exercise      Nustep L4 resistance for 10 minutes with B LE's UE's to increase strength and endurance L5 resistance for 10 minutes with B LE's UE's to increase strength and endurance L5 resistance for 10 minutes with B LE's UE's to increase strength and endurance   Ankle plantarflexor stretch on incline board 3 x 30 sec hold B with knees flexed and extended- B UE support  3 x 30 sec hold B with knees flexed and extended- B UE support  3 x 30 sec hold B with knees flexed and extended- B UE support    Lower trunk rotation*  20 reps/1 set with 3-5 second hold each direction 20 reps/1 set with 3-5 second hold each direction   Bridging in hooklying* 3 reps/1 set to assess leg length; 20 reps/1 set with B LEs elevated on exercise ball to increase challenge to core; cues for breathing throughout 3 reps/1 set to assess leg length 3 reps/1 set to assess leg length; 20 reps/1 set with B LEs elevated on exercise ball to increase challenge to core; cues for breathing throughout   Sidelying hip clamshells*      Supine SLR*      Standing hip abduction      Supine hamstring stretch      Sitting forward lumbar flexion stretch at exercise ball      Prone press ups*      Prone hip extensions*      Sidelying hip abduction* 2 lb ankle weight; 20 reps/1 set each LE with initial cues for correct positioning (avoid hip flexion)  2.5 lb ankle weight; 20 reps/1 set each LE with initial cues for correct positioning (avoid hip flexion)   Supine pelvic tilts      Varying resistance at each LE in multiple different planes of motion  Per strength assessment in chart    Lumbar AROM  Per ROM assessment in chart    Reverse sidelying clamshells      Deadbug exercise      Quadruped cat/camel      Standing hip extension      Leg press machine 35 lb resistance; 20 reps/1 set with cues to avoid knee hyperextension and for slow lowering and control; cues to avoid hip IR; red theraband around lateral thighs   35 lb resistance; 20 reps/1 set with cues to avoid knee hyperextension and for slow lowering and control; cues to avoid hip IR; red theraband around lateral thighs    Standing unilateral lat pulldown Green theratubing resistance; 20 reps/1 set each UE with cues to maintain activation at core and glutes for good posture; standing on foam  Green theratubing resistance; 20 reps/1 set each UE with cues to maintain activation at core and glutes for good posture; standing on foam   Standing paloff press Green theratubing resistance; 5 reps/1 set facing each direction with cues to activate core and maintain midline; standing on foam     Sitting piriformis stretch  30 second hold x 3 reps each LE; therapist instructing pt on how to support LE and stretch correctly                *given in HEP ([id number])  MedBridge Portal   Pt given following band colors: [] Yellow          [] Red          [] Green          [] Blue          [] Grey     Manual therapy (    Treatment/Session Summary:    · Response to Treatment: Pt demonstrating good ability to perform exercises this session with improved quality of motion and no reports of increased pain. She continues to have difficulty with hip abduction, often trying to use hip flexors to compensate movement. Will continue to work on hip stability in further sessions. ·       No adverse reactions/unusual changes observed/reported in clinical status this session. · Communication/Consultation:  None today  · Equipment provided today:  None today  · Recommendations/Intent for next treatment session: Next visit will focus on manual therapy/modalities as needed to reduce pain; address LE length discrepancy; core stability exercises.     Total Treatment Billable Duration:  40 minutes  PT Patient Time In/Time Out  Time In: 1104  Time Out: [address], DPT    Future Appointments   Date Time Provider Jo Nolan   4/1/2022 11:00 AM Gaylord Kussmaul, DPT [facility]   4/8/2022 11:00 AM Derek Laughlin PTA [facility]   4/15/2022 11:00 AM Boone SIMMS DPT [facility]   5/31/2022  8:30 AM PRE LAB RESOURCE [facility] PRE PRE   6/7/2022 10:20 AM Maggie Pedersen MD [facility] PRE PRE   10/10/2022 11:30 AM Zakia Howell MD [facility]        Visit Approval Visit # Therapist initials Date A NS / Cx < 24 hr >24 hr Cx Comments   99 visits total 1 [facility] 1/4/2022 [x]  [] [] Initial evaluation    2 [facility] 1/7/2022 [x] [] []     3 [facility] 1/10/2022 [x] [] []     4 PDE 1- [x] [] []     5 [facility] 1/19/2022 [x] [] []     6 [facility] 1/26/2022 [x] [] []     7 [facility] 1/28/2022 [x] [] []     8 [facility] 2/1/2022 [x] [] [] Progress note    9 [facility] 2/4/2022 [x] [] []     10 [facility] 2/8/2022 [x] [] []     11 [facility] 2/11/2022 [x] [] []      [facility] 2/22/2022 [] [] [] Canceled due to therapist being sick     [facility] 2/24/2022 [] [x] [] No show - pt thought her appt time was later    12 [facility] 2/28/2022 [x] [] []     13 [facility] 3/3/2022 [x] [] []      [facility] 3/8/2022 [] [x] []     14 [facility] 3/10/2022 [x] [] []      [facility] 3/14/2022 [] [x] [] Pt forgot appointment      15 [facility] 2/51/3711 [x] [] [] Recertification    16 [facility] 3/25/2022 [x] [] []          [] [] []        [] [] []        [] [] []        [] [] []        [] [] []        [] [] []

## 2022-04-01 ENCOUNTER — HOSPITAL ENCOUNTER (OUTPATIENT)
Dept: PHYSICAL THERAPY | Age: 47
Discharge: HOME | End: 2022-04-01
Payer: MEDICARE

## 2022-04-01 PROCEDURE — 97110 THERAPEUTIC EXERCISES: CPT

## 2022-04-01 NOTE — PROGRESS NOTES
Hieu Barker  : 1975  Primary:   Secondary:  [facility], [address], [place], [facility] [address] [facility]  [phone]   [phone]       OUTPATIENT PHYSICAL THERAPY: Daily Treatment Note 2022  Visit Count:  17  ICD-10: Treatment Diagnosis: Low back pain (M54.5)  Pain in right leg (M79.604)   Difficulty in walking, not elsewhere classified (R26.2)  Muscle weakness (generalized) (M62.81)  Precautions/Allergies:   Latex, Abilify [aripiprazole], Geodon [ziprasidone hcl], Klonopin [clonazepam], Ambien [zolpidem], and Topamax [topiramate]   TREATMENT PLAN:  Effective Dates/Frequency/Duration: Once per week from 3/18/2022 until 2022 (6 weeks). Pre-treatment Symptoms/Complaints:  Pt states no pain today. States she continues to feel that her L LE is slightly weaker and hopes to get it stronger. Pain: Initial:   0/10 Post Session: pt stating no change in pain   Medications Last Reviewed:  2022  Updated Objective Findings: None Today   TREATMENT:   Therapeutic Exercise: ( 40 minutes):  Exercises per grid below to improve mobility, strength and dynamic movement of lower back and bilateral legs to improve functional mobility. Required minimal visual, verbal and manual cues to promote proper body alignment, promote proper body posture, promote proper body mechanics and promote proper body breathing techniques. Progressed resistance, range, repetitions and complexity of movement as indicated.     Date:  3/18/2022 Date:  3/25/2022 Date:  2022   Activity/Exercise      Nustep L5 resistance for 10 minutes with B LE's UE's to increase strength and endurance L5 resistance for 10 minutes with B LE's UE's to increase strength and endurance L5 resistance for 10 minutes with B LE's UE's to increase strength and endurance   Ankle plantarflexor stretch on incline board 3 x 30 sec hold B with knees flexed and extended- B UE support  3 x 30 sec hold B with knees flexed and extended- B UE support  3 x 30 sec hold B with knees flexed and extended- B UE support    Lower trunk rotation* 20 reps/1 set with 3-5 second hold each direction 20 reps/1 set with 3-5 second hold each direction 20 reps/1 set with 3-5 second hold each direction   Bridging in hooklying* 3 reps/1 set to assess leg length 3 reps/1 set to assess leg length; 20 reps/1 set with B LEs elevated on exercise ball to increase challenge to core; cues for breathing throughout 20 reps/1 set with B LEs elevated on exercise ball to increase challenge to core; cues for breathing throughout   Sidelying hip clamshells*      Supine SLR*      Standing hip abduction      Supine hamstring stretch      Sitting forward lumbar flexion stretch at exercise ball      Prone press ups*      Prone hip extensions*      Sidelying hip abduction*  2.5 lb ankle weight; 20 reps/1 set each LE with initial cues for correct positioning (avoid hip flexion) 2.5 lb ankle weight; 20 reps/1 set each LE with initial cues for correct positioning (avoid hip flexion)   Supine pelvic tilts      Varying resistance at each LE in multiple different planes of motion Per strength assessment in chart     Lumbar AROM Per ROM assessment in chart     Reverse sidelying clamshells      Deadbug exercise      Quadruped cat/camel      Standing hip extension      Leg press machine  35 lb resistance; 20 reps/1 set with cues to avoid knee hyperextension and for slow lowering and control; cues to avoid hip IR; red theraband around lateral thighs  40 lb resistance; 20 reps/1 set with cues to avoid knee hyperextension and for slow lowering and control (pt demonstrating good positioning at knees for majority of reps)   Standing unilateral lat pulldown  Green theratubing resistance; 20 reps/1 set each UE with cues to maintain activation at core and glutes for good posture; standing on foam    Standing paloff press      Sitting piriformis stretch 30 second hold x 3 reps each LE; therapist instructing pt on how to support LE and stretch correctly     Sidesteps with resistance   Red theraband resistance at lateral thighs; 5 reps/3 sets each direction with cues to maintain hip ER; light UE support at bars          *given in [facility]  MedBridge Portal   Pt given following band colors: [] Yellow          [] Red          [] Green          [] Blue          [] Grey     Manual therapy (    Treatment/Session Summary:    · Response to Treatment: Pt with no pain throughout exercises today, although she continues to demonstrate increased difficulty with hip stability and abduction, so will continue to work on exercises to target these to further improve her functional mobility. ·       No adverse reactions/unusual changes observed/reported in clinical status this session. · Communication/Consultation:  None today  · Equipment provided today:  None today  · Recommendations/Intent for next treatment session: Next visit will focus on manual therapy/modalities as needed to reduce pain; address LE length discrepancy; core stability exercises.     Total Treatment Billable Duration:  40 minutes  PT Patient Time In/Time Out  Time In: 1640  Time Out: VALENTINO Davila    Future Appointments   Date Time Provider Jo Nloan   4/8/2022 11:00 AM Darcy Howard PTA [facility]   4/15/2022 11:00 AM Ruth SIMMS DPT [facility]   5/31/2022  8:30 AM PRE LAB RESOURCE [facility] PRE PRE   6/7/2022 10:20 AM Maksim Campbell MD [facility] PRE PRE   10/10/2022 11:30 AM Nancy Nguyen MD [facility]        Visit Approval Visit # Therapist initials Date A NS / Cx < 24 hr >24 hr Cx Comments   99 visits total 1  1/4/2022 [x]  [] [] Initial evaluation    2  1/7/2022 [x] [] []     3  1/10/2022 [x] [] []     4 PDE 1- [x] [] []     5  1/19/2022 [x] [] []     6  1/26/2022 [x] [] []     7  1/28/2022 [x] [] []     8  2/1/2022 [x] [] [] Progress note    9  2/4/2022 [x] [] []     10 [facility] 2/8/2022 [x] [] []     11 [facility] 2/11/2022 [x] [] []      [facility] 2/22/2022 [] [] [] Canceled due to therapist being sick     [facility] 2/24/2022 [] [x] [] No show - pt thought her appt time was later    12 [facility] 2/28/2022 [x] [] []     13 [facility] 3/3/2022 [x] [] []      [facility] 3/8/2022 [] [x] []     14 [facility] 3/10/2022 [x] [] []      [facility] 3/14/2022 [] [x] [] Pt forgot appointment      15 [facility] 6/29/3054 [x] [] [] Recertification    16 [facility] 3/25/2022 [x] [] []       17 [facility] 4/1/2022 [x] [] []        [] [] []        [] [] []        [] [] []        [] [] []        [] [] [] 19-Feb-2022 13:43

## 2022-04-08 ENCOUNTER — APPOINTMENT (OUTPATIENT)
Dept: PHYSICAL THERAPY | Age: 47
End: 2022-04-08
Payer: MEDICARE

## 2022-04-12 ENCOUNTER — HOSPITAL ENCOUNTER (OUTPATIENT)
Dept: PHYSICAL THERAPY | Age: 47
Discharge: HOME | End: 2022-04-12
Payer: MEDICARE

## 2022-04-12 PROCEDURE — 97110 THERAPEUTIC EXERCISES: CPT

## 2022-04-12 NOTE — PROGRESS NOTES
Clinton Rubio  : 1975  Primary:   Secondary:  [address] at [facility]  [address], [address], [place], [address] [place] [address]  [phone]   [phone]       OUTPATIENT PHYSICAL THERAPY: Daily Treatment Note 2022  Visit Count:  18  ICD-10: Treatment Diagnosis: Low back pain (M54.5)  Pain in right leg (M79.604)   Difficulty in walking, not elsewhere classified (R26.2)  Muscle weakness (generalized) (M62.81)  Precautions/Allergies:   Latex, Abilify [aripiprazole], Geodon [ziprasidone hcl], Klonopin [clonazepam], Ambien [zolpidem], and Topamax [topiramate]   TREATMENT PLAN:  Effective Dates/Frequency/Duration: Once per week from 3/18/2022 until 2022 (6 weeks). Pre-treatment Symptoms/Complaints:  Pt states her upper back has been spasming today (states it has not been doing this any other days this week). States her low back still hurts occasionally when walking around for a while or sitting down (also after walking a while)  Pain: Initial:   2/10 in upper back Post Session: pt stating no change in pain   Medications Last Reviewed:  2022  Updated Objective Findings: None Today   TREATMENT:   Therapeutic Exercise: ( 38 minutes):  Exercises per grid below to improve mobility, strength and dynamic movement of lower back and bilateral legs to improve functional mobility. Required minimal visual, verbal and manual cues to promote proper body alignment, promote proper body posture, promote proper body mechanics and promote proper body breathing techniques. Progressed resistance, range, repetitions and complexity of movement as indicated.     Date:  3/25/2022 Date:  2022 Date:  2022   Activity/Exercise      Nustep L5 resistance for 10 minutes with B LE's UE's to increase strength and endurance L5 resistance for 10 minutes with B LE's UE's to increase strength and endurance L5 resistance for 10 minutes with B LE's UE's to increase strength and endurance Ankle plantarflexor stretch on incline board 3 x 30 sec hold B with knees flexed and extended- B UE support  3 x 30 sec hold B with knees flexed and extended- B UE support  3 x 30 sec hold B with knees flexed and extended- B UE support    Lower trunk rotation* 20 reps/1 set with 3-5 second hold each direction 20 reps/1 set with 3-5 second hold each direction    Bridging in hooklying* 3 reps/1 set to assess leg length; 20 reps/1 set with B LEs elevated on exercise ball to increase challenge to core; cues for breathing throughout 20 reps/1 set with B LEs elevated on exercise ball to increase challenge to core; cues for breathing throughout    Sidelying hip clamshells*      Supine SLR*      Standing hip abduction      Supine hamstring stretch      Sitting forward lumbar flexion stretch at exercise ball      Prone press ups*      Prone hip extensions*      Sidelying hip abduction* 2.5 lb ankle weight; 20 reps/1 set each LE with initial cues for correct positioning (avoid hip flexion) 2.5 lb ankle weight; 20 reps/1 set each LE with initial cues for correct positioning (avoid hip flexion)    Supine pelvic tilts      Varying resistance at each LE in multiple different planes of motion      Lumbar AROM      Reverse sidelying clamshells      Deadbug exercise      Quadruped cat/camel      Standing hip extension      Leg press machine 35 lb resistance; 20 reps/1 set with cues to avoid knee hyperextension and for slow lowering and control; cues to avoid hip IR; red theraband around lateral thighs  40 lb resistance; 20 reps/1 set with cues to avoid knee hyperextension and for slow lowering and control (pt demonstrating good positioning at knees for majority of reps) 20 lb resistance; 15 reps/1 set unilaterally each LE with cues to avoid knee hyperextension and for slow lowering and control    Standing unilateral lat pulldown Green theratubing resistance; 20 reps/1 set each UE with cues to maintain activation at core and glutes for good posture; standing on foam  Green theratubing resistance; 20 reps/1 set each UE with cues to maintain activation at core and glutes for good posture; standing on foam   Standing paloff press      Sitting piriformis stretch      Sidesteps with resistance  Red theraband resistance at lateral thighs; 5 reps/3 sets each direction with cues to maintain hip ER; light UE support at bars    Standing LE marching with sustained unilateral overhead press    3 lb weight overhead; 10 reps/1 set at each side alternating LEs with cues to maintain activation at cores and glutes   Sitting LE marching on exercise ball   10 reps/1 set alternating LEs with cues to maintain activation at core and glutes                     given in HEP ([id number])  MedBridge Portal   Pt given following band colors: [] Yellow          [] Red          [] Green          [] Blue          [] Grey     Manual therapy (    Treatment/Session Summary:    · Response to Treatment: Pt able to perform more challenging core exercises this session with minimal to no reports of increased pain, but continues to require significant cuing for maintaining good posture throughout. ·       No adverse reactions/unusual changes observed/reported in clinical status this session. · Communication/Consultation:  None today  · Equipment provided today:  None today  · Recommendations/Intent for next treatment session: Next visit will focus on manual therapy/modalities as needed to reduce pain; address LE length discrepancy; core stability exercises.     Total Treatment Billable Duration: 38 minutes  PT Patient Time In/Time Out  Time In: 4702  Time Out: Nat Espinoza 4, DPT    Future Appointments   Date Time Provider Jo Nolan   4/19/2022  1:00 PM Maximilian Chavarria DPT [facility]   4/25/2022  2:45 PM Marilee Rash, NP [facility]   4/27/2022  1:45 PM Samira SIMMS DPT [facility]   5/31/2022  8:30 AM PRE LAB RESOURCE [facility] PRE PRE   6/7/2022 10:20 AM Tyson Geiger MD [facility] PRE PRE   10/10/2022 11:30 AM Ayla Thakur MD [facility]        Visit Approval Visit # Therapist initials Date A NS / Cx < 24 hr >24 hr Cx Comments   99 visits total 1 [facility] 1/4/2022 [x]  [] [] Initial evaluation    2 [facility] 1/7/2022 [x] [] []     3 [facility] 1/10/2022 [x] [] []     4 PDE 1- [x] [] []     5 [facility] 1/19/2022 [x] [] []     6 [facility] 1/26/2022 [x] [] []     7 [facility] 1/28/2022 [x] [] []     8 [facility] 2/1/2022 [x] [] [] Progress note    9 [facility] 2/4/2022 [x] [] []     10 [facility] 2/8/2022 [x] [] []     11 [facility] 2/11/2022 [x] [] []      [facility] 2/22/2022 [] [] [] Canceled due to therapist being sick     [facility] 2/24/2022 [] [x] [] No show - pt thought her appt time was later    12 [facility] 2/28/2022 [x] [] []     13 [facility] 3/3/2022 [x] [] []      [facility] 3/8/2022 [] [x] []     14 [facility] 3/10/2022 [x] [] []      [facility] 3/14/2022 [] [x] [] Pt forgot appointment      15 [facility] 7/36/8555 [x] [] [] Recertification    16 [facility] 3/25/2022 [x] [] []       17 [facility] 4/1/2022 [x] [] []     18 [facility] 4/12/2022 [x] [] []        [] [] []        [] [] []        [] [] []        [] [] []

## 2022-04-15 ENCOUNTER — APPOINTMENT (OUTPATIENT)
Dept: PHYSICAL THERAPY | Age: 47
End: 2022-04-15
Payer: MEDICARE

## 2022-04-22 ENCOUNTER — HOSPITAL ENCOUNTER (OUTPATIENT)
Dept: PHYSICAL THERAPY | Age: 47
Discharge: HOME | End: 2022-04-22
Payer: MEDICARE

## 2022-04-22 PROCEDURE — 97110 THERAPEUTIC EXERCISES: CPT

## 2022-04-22 NOTE — PROGRESS NOTES
Clinton Rubio  : 1975  Primary:   Secondary:  [address] at [facility]  [address], [address], [place], [facility] [address] [facility]  [phone]   [phone]       OUTPATIENT PHYSICAL THERAPY: Daily Treatment Note 2022  Visit Count:  19  ICD-10: Treatment Diagnosis: Low back pain (M54.5)  Pain in right leg (M79.604)   Difficulty in walking, not elsewhere classified (R26.2)  Muscle weakness (generalized) (M62.81)  Precautions/Allergies:   Latex, Abilify [aripiprazole], Geodon [ziprasidone hcl], Klonopin [clonazepam], Ambien [zolpidem], and Topamax [topiramate]   TREATMENT PLAN:  Effective Dates/Frequency/Duration: Once per week from 3/18/2022 until 2022 (6 weeks). Pre-treatment Symptoms/Complaints:  Pt states she has been having a little more pain today but notes that she has been sitting more so that might be why. Pt stating she started having \"aching\" pain in L knee that started about 3 weeks ago (she is not sure why)  Pain: Initial:   -2/10 in upper back Post Session: pt stating no change in pain   Medications Last Reviewed:  2022  Updated Objective Findings: no pelvic asymmetry or LE length discrepancy noted on 2022   TREATMENT:   Therapeutic Exercise: ( 40 minutes):  Exercises per grid below to improve mobility, strength and dynamic movement of lower back and bilateral legs to improve functional mobility. Required minimal visual, verbal and manual cues to promote proper body alignment, promote proper body posture, promote proper body mechanics and promote proper body breathing techniques. Progressed resistance, range, repetitions and complexity of movement as indicated.     Date:  2022 Date:  2022 Date:  2022   Activity/Exercise      Nustep L5 resistance for 10 minutes with B LE's UE's to increase strength and endurance L5 resistance for 10 minutes with B LE's UE's to increase strength and endurance L5 resistance for 10 minutes with B LE's UE's to increase strength and endurance   Ankle plantarflexor stretch on incline board 3 x 30 sec hold B with knees flexed and extended- B UE support  3 x 30 sec hold B with knees flexed and extended- B UE support  3 x 30 sec hold B with knees flexed and extended- B UE support    Lower trunk rotation* 20 reps/1 set with 3-5 second hold each direction  20 reps/1 set with 3-5 second hold each direction; cues to avoid painful range   Bridging in hooklying* 20 reps/1 set with B LEs elevated on exercise ball to increase challenge to core; cues for breathing throughout  3 reps/1 set to assess leg length   Sidelying hip clamshells*      Supine SLR*      Standing hip abduction      Supine hamstring stretch      Sitting forward lumbar flexion stretch at exercise ball   At green exercise ball; 3 reps forward, then diagonally to each side with 30 second holds   Prone press ups*      Prone hip extensions*      Sidelying hip abduction* 2.5 lb ankle weight; 20 reps/1 set each LE with initial cues for correct positioning (avoid hip flexion)     Supine pelvic tilts      Varying resistance at each LE in multiple different planes of motion      Lumbar AROM      Reverse sidelying clamshells      Deadbug exercise      Quadruped cat/camel      Standing hip extension      Leg press machine 40 lb resistance; 20 reps/1 set with cues to avoid knee hyperextension and for slow lowering and control (pt demonstrating good positioning at knees for majority of reps) 20 lb resistance; 15 reps/1 set unilaterally each LE with cues to avoid knee hyperextension and for slow lowering and control  25 lb resistance; 15 reps/1 set unilaterally each LE with cues to avoid knee hyperextension and for slow lowering and control    Standing unilateral lat pulldown  Green theratubing resistance; 20 reps/1 set each UE with cues to maintain activation at core and glutes for good posture; standing on foam Green theratubing resistance; 20 reps/1 set each UE with cues to maintain activation at core and glutes for good posture; standing on foam   Standing paloff press      Sitting piriformis stretch      Sidesteps with resistance Red theraband resistance at lateral thighs; 5 reps/3 sets each direction with cues to maintain hip ER; light UE support at bars     Standing LE marching with sustained unilateral overhead press   3 lb weight overhead; 10 reps/1 set at each side alternating LEs with cues to maintain activation at cores and glutes    Sitting LE marching on exercise ball  10 reps/1 set alternating LEs with cues to maintain activation at core and glutes                      given in [facility]  MedBridge Portal   Pt given following band colors: [] Yellow          [] Red          [] Green          [] Blue          [] Grey     Manual therapy (    Treatment/Session Summary:    · Response to Treatment: Pt reporting more pain during beginning of session today, so checked pelvic symmetry and leg length to ensure no discrepancies. Pt with no asymmetries or discrepancies noted. Pt able to continue with normal exercises with slight modifications to reduce pain. ·       No adverse reactions/unusual changes observed/reported in clinical status this session. · Communication/Consultation:  None today  · Equipment provided today:  None today  · Recommendations/Intent for next treatment session: Next visit will focus on manual therapy/modalities as needed to reduce pain; address LE length discrepancy; core stability exercises.     Total Treatment Billable Duration: 40 minutes  PT Patient Time In/Time Out  Time In: 7239  Time Out: Nat Corona, DPT    Future Appointments   Date Time Provider Jo Nolan   4/25/2022  2:45 PM Lossie Cockayne, NP [facility]   4/26/2022  2:30 PM Aurelio Garcia DPT [facility]   5/31/2022  8:30 AM PRE LAB RESOURCE [facility] PRE PRE   6/7/2022 10:20 AM Geovany Stevenson MD [facility] PRE PRE   10/10/2022 11:30 AM West Reddy MD [facility]        Visit Approval Visit # Therapist initials Date A NS / Cx < 24 hr >24 hr Cx Comments   99 visits total 1 [facility] 1/4/2022 [x]  [] [] Initial evaluation    2 [facility] 1/7/2022 [x] [] []     3 [facility] 1/10/2022 [x] [] []     4 PDE 1- [x] [] []     5 [facility] 1/19/2022 [x] [] []     6 [facility] 1/26/2022 [x] [] []     7 [facility] 1/28/2022 [x] [] []     8 [facility] 2/1/2022 [x] [] [] Progress note    9 [facility] 2/4/2022 [x] [] []     10 [facility] 2/8/2022 [x] [] []     11 [facility] 2/11/2022 [x] [] []      [facility] 2/22/2022 [] [] [] Canceled due to therapist being sick     [facility] 2/24/2022 [] [x] [] No show - pt thought her appt time was later    12 [facility] 2/28/2022 [x] [] []     13 [facility] 3/3/2022 [x] [] []      [facility] 3/8/2022 [] [x] []     14 [facility] 3/10/2022 [x] [] []      [facility] 3/14/2022 [] [x] [] Pt forgot appointment      15 [facility] 5/66/0237 [x] [] [] Recertification    16 [facility] 3/25/2022 [x] [] []       17 [facility] 4/1/2022 [x] [] []     18 [facility] 4/12/2022 [x] [] []     19 [facility] 4/22/2022 [x] [] []        [] [] []        [] [] []        [] [] []

## 2022-04-26 ENCOUNTER — HOSPITAL ENCOUNTER (OUTPATIENT)
Dept: PHYSICAL THERAPY | Age: 47
Discharge: HOME | End: 2022-04-26
Payer: MEDICARE

## 2022-04-26 ENCOUNTER — NURSE TRIAGE (OUTPATIENT)
Dept: OTHER | Facility: CLINIC | Age: 47
End: 2022-04-26

## 2022-04-26 PROCEDURE — 97110 THERAPEUTIC EXERCISES: CPT

## 2022-04-26 NOTE — PROGRESS NOTES
Clinton Rubio  : 1975  Primary:   Secondary:  [address] at [facility]  [address], [address], [place], [facility] [address] [facility]  [phone]   [phone]       OUTPATIENT PHYSICAL THERAPY: Daily Treatment Note 2022  Visit Count:  20  ICD-10: Treatment Diagnosis: Low back pain (M54.5)  Pain in right leg (M79.604)   Difficulty in walking, not elsewhere classified (R26.2)  Muscle weakness (generalized) (M62.81)  Precautions/Allergies:   Latex, Abilify [aripiprazole], Geodon [ziprasidone hcl], Klonopin [clonazepam], Ambien [zolpidem], and Topamax [topiramate]   TREATMENT PLAN:  Effective Dates/Frequency/Duration: Once per week from 3/18/2022 until 2022 (6 weeks). Pre-treatment Symptoms/Complaints:  Pt states she has been hurting a lot today because she was vacuuming and dusting today for about 15 or so minutes without taking a break; states the pain got worse when she was driving to therapy session (about 10 minutes). Pt states she fell a couple of days ago out of her bed and landed flat on her back and bumped her head  Pain: Initial:   3-4/10 Post Session: 2.5/10   Medications Last Reviewed:  2022  Updated Objective Findings: None Today   TREATMENT:   Therapeutic Exercise: ( 38 minutes):  Exercises per grid below to improve mobility, strength and dynamic movement of lower back and bilateral legs to improve functional mobility. Required minimal visual, verbal and manual cues to promote proper body alignment, promote proper body posture, promote proper body mechanics and promote proper body breathing techniques. Progressed resistance, range, repetitions and complexity of movement as indicated.     Date:  2022 Date:  2022 Date:  2022   Activity/Exercise      Nustep L5 resistance for 10 minutes with B LE's UE's to increase strength and endurance L5 resistance for 10 minutes with B LE's UE's to increase strength and endurance L5 resistance for 10 minutes with B LE's UE's to increase strength and endurance   Ankle plantarflexor stretch on incline board 3 x 30 sec hold B with knees flexed and extended- B UE support  3 x 30 sec hold B with knees flexed and extended- B UE support  3 x 30 sec hold B with knees flexed and extended- B UE support    Lower trunk rotation*  20 reps/1 set with 3-5 second hold each direction; cues to avoid painful range 20 reps/1 set with 3-5 second hold each direction; cues to avoid painful range   Bridging in hooklying*  3 reps/1 set to assess leg length    Sidelying hip clamshells*      Supine SLR*      Standing hip abduction      Supine hamstring stretch   30 second hold x 3 reps each LE; pt stretching with strap (therapist also supporting LE)   Sitting forward lumbar flexion stretch at exercise ball  At green exercise ball; 3 reps forward, then diagonally to each side with 30 second holds At green exercise ball; 3 reps forward, then diagonally to each side with 30 second holds   Prone press ups*      Prone hip extensions*      Sidelying hip abduction*      Supine pelvic tilts      Varying resistance at each LE in multiple different planes of motion      Lumbar AROM      Reverse sidelying clamshells      Deadbug exercise      Quadruped cat/camel      Standing hip extension      Leg press machine 20 lb resistance; 15 reps/1 set unilaterally each LE with cues to avoid knee hyperextension and for slow lowering and control  25 lb resistance; 15 reps/1 set unilaterally each LE with cues to avoid knee hyperextension and for slow lowering and control     Standing unilateral lat pulldown Green theratubing resistance; 20 reps/1 set each UE with cues to maintain activation at core and glutes for good posture; standing on foam Green theratubing resistance; 20 reps/1 set each UE with cues to maintain activation at core and glutes for good posture; standing on foam    Standing paloff press      Sitting piriformis stretch      Sidesteps with resistance      Standing LE marching with sustained unilateral overhead press 3 lb weight overhead; 10 reps/1 set at each side alternating LEs with cues to maintain activation at cores and glutes     Sitting LE marching on exercise ball 10 reps/1 set alternating LEs with cues to maintain activation at core and glutes                       given in HEP [facility])  MedBridge Portal   Pt given following band colors: [] Yellow          [] Red          [] Green          [] Blue          [] Grey     Manual therapy (    Treatment/Session Summary:    · Response to Treatment: Pt requiring several short rest breaks throughout session today due to increased pain levels and increased sensitivity to pain. She required increased time to perform all exercises due to increased pain as well. ·       No adverse reactions/unusual changes observed/reported in clinical status this session. · Communication/Consultation:  None today  · Equipment provided today:  None today  · Recommendations/Intent for next treatment session: Next visit will focus on manual therapy/modalities as needed to reduce pain; address LE length discrepancy; core stability exercises.     Total Treatment Billable Duration: 38 minutes  PT Patient Time In/Time Out  Time In: 2481  Time Out: 280 Alexa Pride DPT    Future Appointments   Date Time Provider Jo Nolan   5/2/2022  2:30 PM Kye Galaviz DPT [facility]   5/10/2022  2:15 PM Sheila Fowler NP [facility]   5/31/2022  8:30 AM PRE LAB RESOURCE [facility] PRE PRE   6/7/2022 10:20 AM Alphonse Garcia MD [facility] PRE PRE   10/10/2022 11:30 AM Josseline Michel MD [facility]        Visit Approval Visit # Therapist initials Date A NS / Cx < 24 hr >24 hr Cx Comments   99 visits total 1  1/4/2022 [x]  [] [] Initial evaluation    2  1/7/2022 [x] [] []     3  1/10/2022 [x] [] []     4 PDE 1- [x] [] []     5  1/19/2022 [x] [] []     6  1/26/2022 [x] [] []     7  1/28/2022 [x] [] []     8 [facility] 2/1/2022 [x] [] [] Progress note    9 [facility] 2/4/2022 [x] [] []     10 [facility] 2/8/2022 [x] [] []     11 [facility] 2/11/2022 [x] [] []      [facility] 2/22/2022 [] [] [] Canceled due to therapist being sick     [facility] 2/24/2022 [] [x] [] No show - pt thought her appt time was later    12 [facility] 2/28/2022 [x] [] []     13 [facility] 3/3/2022 [x] [] []      [facility] 3/8/2022 [] [x] []     14 [facility] 3/10/2022 [x] [] []      [facility] 3/14/2022 [] [x] [] Pt forgot appointment      15 [facility] 6/21/0498 [x] [] [] Recertification    16 [facility] 3/25/2022 [x] [] []       17 [facility] 4/1/2022 [x] [] []     18 [facility] 4/12/2022 [x] [] []     19 [facility] 4/22/2022 [x] [] []     20 [facility] 4/26/2022 [x] [] []        [] [] []        [] [] []

## 2022-04-27 ENCOUNTER — HOSPITAL ENCOUNTER (EMERGENCY)
Age: 47
Discharge: HOME | End: 2022-04-27
Attending: EMERGENCY MEDICINE
Payer: MEDICARE

## 2022-04-27 ENCOUNTER — HOSPITAL ENCOUNTER (OUTPATIENT)
Dept: PHYSICAL THERAPY | Age: 47
End: 2022-04-27
Payer: MEDICARE

## 2022-04-27 ENCOUNTER — APPOINTMENT (OUTPATIENT)
Dept: GENERAL RADIOLOGY | Age: 47
End: 2022-04-27
Attending: NURSE PRACTITIONER
Payer: MEDICARE

## 2022-04-27 VITALS
DIASTOLIC BLOOD PRESSURE: 81 MMHG | WEIGHT: 195 LBS | RESPIRATION RATE: 18 BRPM | HEART RATE: 95 BPM | HEIGHT: 63 IN | SYSTOLIC BLOOD PRESSURE: 122 MMHG | OXYGEN SATURATION: 100 % | TEMPERATURE: 98.3 F | BODY MASS INDEX: 34.55 KG/M2

## 2022-04-27 DIAGNOSIS — M53.3 COCCYX PAIN: ICD-10-CM

## 2022-04-27 DIAGNOSIS — S09.90XA MINOR HEAD INJURY, INITIAL ENCOUNTER: ICD-10-CM

## 2022-04-27 DIAGNOSIS — M54.6 ACUTE LEFT-SIDED THORACIC BACK PAIN: Primary | ICD-10-CM

## 2022-04-27 DIAGNOSIS — R51.9 NONINTRACTABLE HEADACHE, UNSPECIFIED CHRONICITY PATTERN, UNSPECIFIED HEADACHE TYPE: ICD-10-CM
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
## 2022-04-27 LAB
BACTERIA URNS QL MICRO: NORMAL /HPF
BILIRUB UR QL: ABNORMAL
CASTS URNS QL MICRO: NORMAL /LPF
CRYSTALS URNS QL MICRO: 0 /LPF
EPI CELLS #/AREA URNS HPF: NORMAL /HPF
GLUCOSE UR QL STRIP.AUTO: NEGATIVE MG/DL
KETONES UR-MCNC: ABNORMAL MG/DL
LEUKOCYTE ESTERASE UR QL STRIP: NEGATIVE
MUCOUS THREADS URNS QL MICRO: 0 /LPF
NITRITE UR QL: NEGATIVE
PH UR: 6 [PH] (ref 5–9)
PROT UR QL: ABNORMAL MG/DL
RBC # UR STRIP: ABNORMAL /UL
RBC #/AREA URNS HPF: NORMAL /HPF
SERVICE CMNT-IMP: ABNORMAL
SP GR UR: >1.03 (ref 1–1.02)
UROBILINOGEN UR QL: 0.2 EU/DL (ref 0.2–1)
WBC URNS QL MICRO: NORMAL /HPF

## 2022-04-27 PROCEDURE — 99284 EMERGENCY DEPT VISIT MOD MDM: CPT

## 2022-04-27 PROCEDURE — 72070 X-RAY EXAM THORAC SPINE 2VWS: CPT

## 2022-04-27 PROCEDURE — 81015 MICROSCOPIC EXAM OF URINE: CPT

## 2022-04-27 PROCEDURE — 90471 IMMUNIZATION ADMIN: CPT

## 2022-04-27 PROCEDURE — 81003 URINALYSIS AUTO W/O SCOPE: CPT

## 2022-04-27 PROCEDURE — 72220 X-RAY EXAM SACRUM TAILBONE: CPT

## 2022-04-27 PROCEDURE — 90714 TD VACC NO PRESV 7 YRS+ IM: CPT | Performed by: NURSE PRACTITIONER

## 2022-04-27 PROCEDURE — 74011250636 HC RX REV CODE- 250/636: Performed by: NURSE PRACTITIONER

## 2022-04-27 PROCEDURE — 74011250637 HC RX REV CODE- 250/637: Performed by: NURSE PRACTITIONER

## 2022-04-27 RX ORDER — ACETAMINOPHEN 500 MG
1000 TABLET ORAL ONCE
Status: COMPLETED | OUTPATIENT
Start: 2022-04-27 | End: 2022-04-27

## 2022-04-27 RX ORDER — IBUPROFEN 600 MG/1
600 TABLET ORAL
Qty: 9 TABLET | Refills: 0 | Status: SHIPPED | OUTPATIENT
Start: 2022-04-27 | End: 2022-04-30

## 2022-04-27 RX ORDER — LIDOCAINE 50 MG/G
1 PATCH TOPICAL EVERY 24 HOURS
Qty: 4 EACH | Refills: 0 | Status: SHIPPED | OUTPATIENT
Start: 2022-04-27 | End: 2022-04-30

## 2022-04-27 RX ADMIN — TETANUS AND DIPHTHERIA TOXOIDS ADSORBED 0.5 ML: 2; 2 INJECTION INTRAMUSCULAR at 15:05

## 2022-04-27 RX ADMIN — ACETAMINOPHEN 1000 MG: 500 TABLET ORAL at 14:15

## 2022-04-27 NOTE — ED NOTES
I have reviewed discharge instructions with the patient. The patient verbalized understanding. Patient left ED via Discharge Method: ambulatory to Home with self    Opportunity for questions and clarification provided. Patient given 2 scripts. To continue your aftercare when you leave the hospital, you may receive an automated call from our care team to check in on how you are doing. This is a free service and part of our promise to provide the best care and service to meet your aftercare needs.  If you have questions, or wish to unsubscribe from this service please call [phone]. Thank you for Choosing our [facility] Emergency Department.

## 2022-04-27 NOTE — ED PROVIDER NOTES
HPI   61-year-old female with history reviewed as below, presents to the emergency department with complaint of upper back pain and tailbone pain, intermittent headache x3 days; subsequent accidental injury sustained in fall. Patient states that she was \"scooting up,\" in a bed when she fell from the bed and landed on the floor. Suspect she may have struck her upper back on nightstand during fall. States she struck her head, but did not lose consciousness. Denies prolonged downtime, subsequent LOC, vomiting, visual change, numbness/tingling/weakness, thunderclap onset of headache, denies worst headache of her life. No known therapeutic measures. Pain made worse with sitting upright, which she states exacerbates her tailbone pain. Nothing else known to improve. No known therapeutic measures. States she went to urgent care today to be evaluated and they stated she may need x-rays and should come to the emergency department   states that she had a headache the day followed her injury, no headache yesterday and has a mild headache today. Denies blood thinner use. Denies other other complaint. Patient is seated upright in ED lobby and ambulatory to care area without increased effort. Alert and orient x4, hemodynamic stable, afebrile.   Nontoxic-appearing appears no distress      Past Medical History:   Diagnosis Date    ADHD (attention deficit hyperactivity disorder)     Allergic rhinitis 1/20/2014    Anxiety disorder 1/20/2014    Asthma     B12 deficiency 1/20/2014    Depression 1/20/2014    Fibromyalgia     GERD (gastroesophageal reflux disease) 1/20/2014    Hypothyroidism     Meniere disease 1/20/2014    Migraine     OCD (obsessive compulsive disorder)     Tinnitus        Past Surgical History:   Procedure Laterality Date    HX HEENT      eye    HX HEENT  07/03/2013    Septo / Fess / release CP    HX HEENT  06/2017    nasal procedure (balloon inserted, drainage)    HX ORTHOPAEDIC      jaw  HX OTHER SURGICAL      sinus implants         Family History:   Problem Relation Age of Onset    Cancer Father     Heart Disease Mother     Hypertension Mother     Elevated Lipids Mother     Asthma Other     Cancer Other     Diabetes Other     Heart Disease Other     Hypertension Other     Hypertension Brother     Elevated Lipids Brother     Breast Cancer Maternal Grandmother 80       Social History     Socioeconomic History    Marital status:      Spouse name: Not on file    Number of children: Not on file    Years of education: Not on file    Highest education level: Not on file   Occupational History    Not on file   Tobacco Use    Smoking status: Former Smoker     Quit date: 2008     Years since quittin.0    Smokeless tobacco: Never Used   Substance and Sexual Activity    Alcohol use: No     Alcohol/week: 0.0 standard drinks    Drug use: No    Sexual activity: Not Currently   Other Topics Concern    Not on file   Social History Narrative    Not on file     Social Determinants of Health     Financial Resource Strain:     Difficulty of Paying Living Expenses: Not on file   Food Insecurity:     Worried About [address] in the Last Year: Not on file    [address] in the Last Year: Not on file   Transportation Needs:     Lack of Transportation (Medical): Not on file    Lack of Transportation (Non-Medical):  Not on file   Physical Activity:     Days of Exercise per Week: Not on file    Minutes of Exercise per Session: Not on file   Stress:     Feeling of Stress : Not on file   Social Connections:     Frequency of Communication with Friends and Family: Not on file    Frequency of Social Gatherings with Friends and Family: Not on file    Attends [employer] Services: Not on file    Active Member of Clubs or Organizations: Not on file    Attends Club or Organization Meetings: Not on file    Marital Status: Not on file   Intimate Partner Violence:     Fear of Current or Ex-Partner: Not on file    Emotionally Abused: Not on file    Physically Abused: Not on file    Sexually Abused: Not on file   Housing Stability:     Unable to Pay for Housing in the Last Year: Not on file    Number of [place] in the Last Year: Not on file    Unstable Housing in the Last Year: Not on file         ALLERGIES: Latex, Abilify [aripiprazole], Geodon [ziprasidone hcl], Klonopin [clonazepam], Ambien [zolpidem], and Topamax [topiramate]    Review of Systems  Constitutional: Negative for fever. Negative for appetite change, chills, diaphoresis and unexpected weight change. HENT: As in HPI   Eyes: Negative   Respiratory: Negative  Cardiovascular: Negative  Musculoskeletal: As in HPI   skin: Negative     Allergic/Immunologic: Negative  Neurological: As in HPI                          Vitals:    04/27/22 1304   BP: 122/81   Pulse: 95   Resp: 18   Temp: 98.3 °F (36.8 °C)   SpO2: 100%   Weight: 88.5 kg (195 lb)   Height: 5' 3\" (1.6 m)            Physical Exam   Constitutional: Oriented to person, place, and time. Appears well-developed and well-nourished. No distress. HENT:    Head: Normocephalic and atraumatic. Hematoma, laceration noted. No bleeding or drainage from ears or naris. No crepitus or depression. No tenderness. No raccoon eyes or keith signs. No hemotympanums and no septal hematoma. Right Ear: External ear normal.    Left Ear: External ear normal.     Nose: Nose normal.   Mouth/Throat: Mouth normal.    Eyes: Conjunctivae are normal. Pupils are equal, round, and reactive to light. Neck: Supple. No tracheal deviation. Completely nontender, to include midline. No step-off. Full active range of motion without limitation  Cardiovascular: Normal rate, intact distal pulses. Brisk capillary refill intact, less than 2 seconds. Regular rhythm present. Pulmonary/Chest: Lungs are equal bilaterally. No respiratory distress. No Diminished or adventitious sounds. No crepitus or instability  Abdominal: Soft. There is no tenderness. Musculoskeletal: Back: + TTP at left thoracic and midline coccyx. Small abrasion with appropriate wound healing noted at left thoracic, small amount of bruising surrounding. No swelling, no deformity. No lumbar tenderness, to include midline. Normal active range of motion, but with report of increased back pain. No pain with passive ROM. No pain with internal/external rotation of the hips bilaterally. No edema, instability, crepitus, or deformity. Neurological: Alert and oriented to person, place, and time. Normal muscle tone. Coordination normal. GCS= 15. Sensation: Intact and symmetric from L2 - S1 bilaterally. Brisk reflexes present, 2/2, bilateral lower extremities. Negative clonus at the ankles. Negative SLR. 5/5 strength and intact of lower extremities, bilaterally. Normal gait - no difficulty with Tandem gait. No saddle anesthesia. No incontinence. Skin: Skin is warm and dry. Capillary refill takes less than 2 seconds. No abrasion, no lesion, no petechiae and no rash noted. Not diaphoretic. No cyanosis, erythema, or pallor. Psychiatric: Normal mood and affect. Behavior is normal.    Nursing note and vitals reviewed. MDM   81-year-old ED with pain secondary to ground-level fall. As in HPI. Patient is pleasant very well-appearing. Denies incontinence or difficulty emptying her bladder or bowels, gait change, numbness/tingling weakness, radiating pain, abdominal pain, flank pain, urinary complaint, vaginal bleeding or discharge, fever chills, recent unplanned weight loss, history of IV drug use, spinal surgery, spinal abscess. Denies all other complaints. Is alert and orient x4, neurovascular intact with no focal deficits  As urinary complaint, concern for pregnancy. No numbness or tingling, no new or worsening symptoms.   Reports history of migraine headaches, states she has very mild headache at this time was consistent with her that she regularly experiences. No syncope or syncope, dizziness or loss of conscious, numbness or tingling or weakness, visual change. Denies other injury other complaint. Updated tetanus, abrasion upper back requires no further management. Radiographs were obtained of the coccyx sacrum and thoracic with no acute process. Urinalysis reassuring, negative urine hCG. A broad differential of diagnoses has been considered for evaluation of this patient's back pain. This includes but is not limited to: acute vascular emergencies such as aortic dissection and aortic anneurysm rupture, cauda equina syndrome, spinal / epidural abscess, pneumothorax, pyelonephritis, obstructive urolithiasis, muscle strain and disc herniation   Conditions that are considered to have sufficiently low pre-test probability after history and physical exam will receive no additional laboratory, imaging or invasive workup urgently in the Emergency Department. Low suspicion at this point for acute infectious, orthopedic, neurologic or spinal cord emergencies. Low suspicion for cardiovascular emergencies at this time. Plan for supportive care measures. Patient is afebrile, hemodynamically stable and in no acute distress. Patient is not ill-appearing. All findings and plans were discussed with the patient. Patient verbalizes desire to be discharged home at this time. All questions answered. Discussed with the patient that an unremarkable evaluation in the ED does not preclude the development or presence of a serious or life threatening condition. Patient was instructed to return immediately for any worsening or change in current symptoms, or if symptoms do not continue to improve.  I instructed them to follow up with their primary care provider, own specialist, or medical provider that I am recommending for him within the next 2-3 days   The patient acknowledged understanding plan of care and affirmed approval. Patient is discharged home, with no further complaint.      Disposition: Discharged           Signed by: ZOYA Hall     Procedures

## 2022-04-27 NOTE — ED TRIAGE NOTES
Pt ambulatory unassisted to triage with mask in place. Pt complains of a fall Sunday night. Reports she hit her head and back. Complains of headache, back pain and tailbone pain. Denies LOC. Denies blood thinners.

## 2022-05-02 ENCOUNTER — HOSPITAL ENCOUNTER (OUTPATIENT)
Dept: PHYSICAL THERAPY | Age: 47
Discharge: HOME | End: 2022-05-02
Payer: MEDICARE

## 2022-05-02 PROCEDURE — 97110 THERAPEUTIC EXERCISES: CPT

## 2022-05-02 NOTE — PROGRESS NOTES
Chris Fortune  : 1975  Primary:   Secondary:  [address] at [facility]  [address], [address], [address]  [phone]   [phone]       OUTPATIENT PHYSICAL THERAPY: Daily Treatment Note 2022  Visit Count:  21  ICD-10: Treatment Diagnosis: Low back pain (M54.5)  Pain in right leg (M79.604)   Difficulty in walking, not elsewhere classified (R26.2)  Muscle weakness (generalized) (M62.81)  Precautions/Allergies:   Latex, Abilify [aripiprazole], Geodon [ziprasidone hcl], Klonopin [clonazepam], Ambien [zolpidem], and Topamax [topiramate]   TREATMENT PLAN:  Effective Dates/Frequency/Duration: Once per week from 3/18/2022 until 2022 (6 weeks). Pre-treatment Symptoms/Complaints:  Pt states she felt a lot better after exercises last session. Pain: Initial:   1/10 Post Session: .5/10   Medications Last Reviewed:  2022  Updated Objective Findings: None Today   TREATMENT:   Therapeutic Exercise: ( 39 minutes):  Exercises per grid below to improve mobility, strength and dynamic movement of lower back and bilateral legs to improve functional mobility. Required minimal visual, verbal and manual cues to promote proper body alignment, promote proper body posture, promote proper body mechanics and promote proper body breathing techniques. Progressed resistance, range, repetitions and complexity of movement as indicated.     Date:  2022 Date:  2022 Date:  2022   Activity/Exercise      Nustep L5 resistance for 10 minutes with B LE's UE's to increase strength and endurance L5 resistance for 10 minutes with B LE's UE's to increase strength and endurance L4 resistance for 10 minutes with B LE's UE's to increase strength and endurance   Ankle plantarflexor stretch on incline board 3 x 30 sec hold B with knees flexed and extended- B UE support  3 x 30 sec hold B with knees flexed and extended- B UE support  3 x 30 sec hold B with knees flexed and extended- B UE support    Lower trunk rotation* 20 reps/1 set with 3-5 second hold each direction; cues to avoid painful range 20 reps/1 set with 3-5 second hold each direction; cues to avoid painful range 20 reps/1 set with 3-5 second hold each direction; cues to avoid painful range   Bridging in hooklying* 3 reps/1 set to assess leg length  20 reps/1 set with B LEs elevated on exercise ball for increased challenge to core   Sidelying hip clamshells*      Supine SLR*      Standing hip abduction      Supine hamstring stretch  30 second hold x 3 reps each LE; pt stretching with strap (therapist also supporting LE)    Sitting forward lumbar flexion stretch at exercise ball At green exercise ball; 3 reps forward, then diagonally to each side with 30 second holds At green exercise ball; 3 reps forward, then diagonally to each side with 30 second holds At green exercise ball; 3 reps forward, then diagonally to each side with 30 second holds   Prone press ups*      Prone hip extensions*      Sidelying hip abduction*      Supine pelvic tilts      Varying resistance at each LE in multiple different planes of motion      Lumbar AROM      Reverse sidelying clamshells      Deadbug exercise      Quadruped cat/camel      Standing hip extension      Leg press machine 25 lb resistance; 15 reps/1 set unilaterally each LE with cues to avoid knee hyperextension and for slow lowering and control      Standing unilateral lat pulldown Green theratubing resistance; 20 reps/1 set each UE with cues to maintain activation at core and glutes for good posture; standing on foam     Standing paloff press      Sitting piriformis stretch      Sidesteps with resistance      Standing LE marching with sustained unilateral overhead press      Sitting LE marching on exercise ball      Ambulation over level ground   Several bouts of 100-200 feet with cues for increased glute activation, longer step length, and increased relaxation at arms for improved gait pattern   Sit to stands   10 reps/1 set with cues for increased upright posture with each rep; cues to avoid hip IR with each rep         given in HEP ([id number])  [employer] Portal   Pt given following band colors: [] Yellow          [] Red          [] Green          [] Blue          [] Grey     Manual therapy (    Treatment/Session Summary:    · Response to Treatment: Pt demonstrating improved ability to perform regular exercises with no reports of increased pain or need for rest breaks this session. She continues to have a forward flexed posture, short step length, and shuffling gait pattern so worked on improving this with her - pt demonstrating improved upright posture with longer step length and improved step clearance at end of session. ·       No adverse reactions/unusual changes observed/reported in clinical status this session. · Communication/Consultation:  None today  · Equipment provided today:  None today  · Recommendations/Intent for next treatment session: Next visit will focus on manual therapy/modalities as needed to reduce pain; address LE length discrepancy; core stability exercises.     Total Treatment Billable Duration: 39 minutes  PT Patient Time In/Time Out  Time In: 1430  Time Out: 100 Lynn Tanisha, DPT    Future Appointments   Date Time Provider Jo Nolan   5/10/2022  2:15 PM Huseyin Zarate NP [id number]   5/11/2022  1:45 PM Alxi SIMMS DPT [facility]   5/31/2022  8:30 AM PRE [facility] PRE PRE   6/7/2022 10:20 AM Devante Arellano MD [facility] PRE PRE   10/10/2022 11:30 AM Nikki Tariq MD [facility]        Visit Approval Visit # Therapist initials Date A NS / Cx < 24 hr >24 hr Cx Comments   99 visits total 1  1/4/2022 [x]  [] [] Initial evaluation    2  1/7/2022 [x] [] []     3  1/10/2022 [x] [] []     4 PDE 1- [x] [] []     5  1/19/2022 [x] [] []     6  1/26/2022 [x] [] []     7  1/28/2022 [x] [] []     8  2/1/2022 [x] [] [] Progress note    9 [facility] 2/4/2022 [x] [] []     10 [facility] 2/8/2022 [x] [] []     11 [facility] 2/11/2022 [x] [] []      [facility] 2/22/2022 [] [] [] Canceled due to therapist being sick     [facility] 2/24/2022 [] [x] [] No show - pt thought her appt time was later    12 [facility] 2/28/2022 [x] [] []     13 [facility] 3/3/2022 [x] [] []      [facility] 3/8/2022 [] [x] []     14 [facility] 3/10/2022 [x] [] []      [facility] 3/14/2022 [] [x] [] Pt forgot appointment      15 [facility] 7/68/1727 [x] [] [] Recertification    16 [facility] 3/25/2022 [x] [] []       17 [facility] 4/1/2022 [x] [] []     18 [facility] 4/12/2022 [x] [] []     19 [facility] 4/22/2022 [x] [] []     20 [facility] 4/26/2022 [x] [] []     21 [facility] 5/2/2022 [x] [] []        [] [] []          [] [] []        [] [] []

## 2022-05-11 ENCOUNTER — HOSPITAL ENCOUNTER (OUTPATIENT)
Dept: PHYSICAL THERAPY | Age: 47
Discharge: HOME | End: 2022-05-11
Payer: MEDICARE

## 2022-05-11 PROCEDURE — 97110 THERAPEUTIC EXERCISES: CPT

## 2022-05-11 NOTE — PROGRESS NOTES
Rodney Martinez  : 1975  Primary: [address] [facility]*  Secondary:  [address] [facility] [address], [place], [facility]  [phone]   [phone]        OUTPATIENT PHYSICAL THERAPY:Discharge Summary 2022   ICD-10: Treatment Diagnosis: Low back pain (M54.5)  Pain in right leg (M79.604)   Difficulty in walking, not elsewhere classified (R26.2)  Muscle weakness (generalized) (M62.81)  Precautions/Allergies:   Latex, Abilify [aripiprazole], Geodon [ziprasidone hcl], Klonopin [clonazepam], Ambien [zolpidem], and Topamax [topiramate]   TREATMENT PLAN:  Effective Dates/Frequency/Duration: Once per week from 3/18/2022 until 2022 (9 weeks)    MEDICAL/REFERRING DIAGNOSIS:  Lumbar pain [M54.50]   DATE OF ONSET: 2021  REFERRING PHYSICIAN: Manisha Porter NP MD Orders: Evaluate and treat  Return MD Appointment: unspecified   ASSESSMENT:  Rodney Martinez has now attended 22 physical therapy sessions for insidious onset of R sided low back pain with occasional radiating pain down R LE starting in 2021. During this time, she has met 4 out of 4 of her short term goals and 3 out of 5 of her discharge goals. She is now mostly pain-free and has a better understanding of how to pace her activities to avoid further pain. Will discharge her from therapy secondary to her reaching maximal functional progress at this time. PROBLEM LIST (Impacting functional limitations):  1. Decreased Strength  2. Decreased ADL/Functional Activities  3. Decreased Transfer Abilities  4. Decreased Ambulation Ability/Technique  5. Increased Pain  6. Decreased Activity Tolerance  7. Decreased Pacing Skills  8. Increased Fatigue  9. Decreased Flexibility/Joint Mobility  10. Edema/Girth INTERVENTIONS PLANNED: (Treatment may consist of any combination of the following)  1. Bed Mobility  2. Cold  3. Electrical Stimulation  4. Family Education  5.  Gait Training  6. Heat  7. Home Exercise Program (HEP)  8. Manual Therapy  9. Neuromuscular Re-education/Strengthening  10. Range of Motion (ROM)  11. Therapeutic Activites  12. Therapeutic Exercise/Strengthening  13. Transcutaneous Electrical Nerve Stimulation (TENS)  14. Transfer Training  15. Ultrasound (US)     GOALS: (Goals have been discussed and agreed upon with patient.)  Short-Term Functional Goals: Time Frame: 5 weeks  1. Pt will be compliant with HEP in order to increase lumbar mobility and LE and core strength/endurance to improve quality of life.  (MET, 3/18/2022)   2. Pt will reduce score on Modified Oswestry Low Back Pain Questionnaire to 19/50 in order to demonstrate improved functional mobility and quality of life. (MET, 2/1/2022)   3. Pt will report being able to vacuum for > 5 minutes with minimal to no increase in pain in order to be able to stand for prolonged periods as needed to perform house cleaning.  (MET, 3/18/2022)   4. Pt will demonstrate increase in lumbar extension AROM to 60% or more of normal with minimal to no increase in pain in order to improve functional mobility and improve upright posture. (MET, 2/1/2022)   Discharge Goals: Time Frame: 10 weeks  1. Pt will be independent with HEP in order to increase lumbar mobility and LE and core strength/endurance to improve quality of life. (MET, 5/11/2022)   2. Pt will reduce score on Modified Oswestry Low Back Pain Questionnaire to 16/50 in order to demonstrate improved functional mobility and quality of life.  (MET, 3/18/2022)   3. Pt will report being able to vacuum for > 10 minutes with minimal to no increase in pain in order to be able to stand for prolonged periods as needed to perform house cleaning. (NOT MET, 5/11/2022)   4. Pt will demonstrate increase in lumbar extension AROM to 75% or more of normal with minimal to no increase in pain in order to improve functional mobility and improve upright posture. (MET, 3/18/2022)   5.  Pt will reduce score on Modified Oswestry Low Back Pain Questionnaire to 6/50 in order to demonstrate improved functional mobility and quality of life. (NOT MET, 5/11/2022)      OUTCOME MEASURE:   Tool Used: Modified Oswestry Low Back Pain Questionnaire  Score:  Initial: 22/50  Most Recent: 8/50 (Date: 5/11/2022 )   Interpretation of Score: Each section is scored on a 0-5 scale, 5 representing the greatest disability. The scores of each section are added together for a total score of 50. FALL RISK:    Ambulatory/Rehab Services H2 Model Falls Risk Assessment   Risk Factors:       (1)  Any administered benzodiazepines Ability to Rise from Chair:       (1)  Pushes up, successful in one attempt   Falls Prevention Plan:       No modifications necessary   Total: (5 or greater = High Risk): 2   ©2010 [facility] [employer]. All Rights Reserved. [facility] Patent #[id number].  Federal Law prohibits the replication, distribution or use without written permission from [facility] of [address] [place] [address] FINDINGS:     Reassessment on 5/11/2022   Observation/Orthostatic Postural Assessment:    The following postural deficits were noted in sitting: loss of cervical lordosis, increased thoracic kyphosis, forward head, rounded shoulders and posterior pelvic tilt excessive tibial external rotation noted at R - improved upright posture on 3/18/2022  The following postural deficits were noted in standing: forward trunk flexion and loss of lumbar lordosis  (improved upright posture on 3/18/2022)  Palpation:          Significant pain with PA mobilizations at each SI joint, central PA mobilizations at lumbar spine stiff and painful, especially at lower levels; LE length and pelvis symmetrical on 3/18/2022  ROM:    Initial measurement: (on 1/4/2022) Initial measurement: (on 1/4/2022) Reassessment measurement:  Reassessment measurement:      WFL throughout L LE, but painful at end-range with following motions: L hip flexion, L hip IR (pain in R low back)  Pt very hypermobile in L hip, especially into hip IR   SLR to 90 degrees WFL and non-painful throughout R hip, knee, and ankle  Pt very hypermobile in R hip, especially into hip IR  SLR to 90 degrees (significant tightness in hamstring)       Initial measurement: (on 1/4/2022) Reassessment measurement: (on 2/1/2022) Reassessment measurement: (on 3/18/2022) Reassessment measurement: (on 5/11/2022)     Lumbar AROM  Flexion (% of normal): 80%*  Extension (% of normal): 50%*  L sidebending (% of normal): 60%*  R sidebending (% of normal): 70%  *pain in R lower back Lumbar AROM  Flexion (% of normal): 90%  Extension (% of normal): 75%  L sidebending (% of normal): 95%  R sidebending (% of normal): 95%    Lumbar AROM  Flexion (% of normal): 95% (slight pain in R lower back)  Extension (% of normal): 90%  L sidebending (% of normal): 95%  R sidebending (% of normal): 95%    Lumbar AROM  Flexion (% of normal): 98% (slight pain in L low back)  Extension (% of normal): 95% (slight pain in back)  L sidebending (% of normal): 95%  R sidebending (% of normal): 95% (slight pain in R)     Strength:     Initial measurement: (on 1/4/2022) Initial measurement: (on 1/4/2022)  Reassessment (on 3/18/2022) Reassessment (on 3/18/2022) Reassessment (on 5/11/2022) Reassessment (on 5/11/2022)    L LE: R LE: L LE: R LE: L LE: R LE:   Hip flexion 5/5  4+/5  5/5 5/5 5/5 5/5   Hip ER 4-/5  4/5  4+/5 4+/5 4+/5 5/5   Hip IR 4/5  4/5  5/5 5/5 5/5 5/5   Hip abduction 4/5  4/5  4+/5 4+/5 4+/5 4+/5   Hip adduction 4-/5  4-/5  4+/5 5/5 5/5 5/5   Hip extension 4-/5  4-/5  4/5 4/5 4+/5 4+/5   Knee flexion 4/5  4/5  4/5 4+/5 4+/5 4+/5   Knee extension 5/5  5/5  5/5 5/5 5/5 5/5   Ankle DF  4/5  4/5  4/5 4/5 4+/5 4/5     Neurological Screen:        Myotomes:  WFL        Dermatomes:  Pt reporting some numbness in B feet but not along any specific dermatome  Functional Mobility:   Gait/Ambulation: Pt ambulates with no AD with only minimal knee flexion in ipsilateral stance and slight forward trunk flexion        Transfers:  Pushes up to stand, reaches back to sit; Magdalena        Bed Mobility:  [facility]    Thank you for this referral,  Guillaume Wallace DPT     Referring Physician Signature: Manisha Porter NP No Signature is Required for this note.

## 2022-05-11 NOTE — PROGRESS NOTES
John Hilario  : 1975  Primary:   Secondary:  [address]  at [facility]  [address], [address], [place], [facility]  [phone]   [phone]       OUTPATIENT PHYSICAL THERAPY: Daily Treatment Note 2022  Visit Count:  22  ICD-10: Treatment Diagnosis: Low back pain (M54.5)  Pain in right leg (M79.604)   Difficulty in walking, not elsewhere classified (R26.2)  Muscle weakness (generalized) (M62.81)  Precautions/Allergies:   Latex, Abilify [aripiprazole], Geodon [ziprasidone hcl], Klonopin [clonazepam], Ambien [zolpidem], and Topamax [topiramate]   TREATMENT PLAN:  Effective Dates/Frequency/Duration: Once per week from 3/18/2022 until 2022 (6 weeks). Pre-treatment Symptoms/Complaints:  Pt states her pain mainly doesn't bother her at all unless if she is bending. Pt states she did household chores without any issues (made sure to take multiple breaks last week)  Pain: Initial:   0-1/10 Post Session: no pain verbalized   Medications Last Reviewed:  2022  Updated Objective Findings: See evaluation note from today   TREATMENT:   Therapeutic Exercise: ( 38 minutes):  Exercises per grid below (and assessment in chart on 2022) to improve mobility, strength and dynamic movement of lower back and bilateral legs to improve functional mobility. Required minimal visual, verbal and manual cues to promote proper body alignment, promote proper body posture, promote proper body mechanics and promote proper body breathing techniques. Progressed resistance, range, repetitions and complexity of movement as indicated.     Date:  2022 Date:  2022 Date:  2022   Activity/Exercise      Nustep L5 resistance for 10 minutes with B LE's UE's to increase strength and endurance L4 resistance for 10 minutes with B LE's UE's to increase strength and endurance L4 resistance for 10 minutes with B LE's UE's to increase strength and endurance   Ankle plantarflexor stretch on incline board 3 x 30 sec hold B with knees flexed and extended- B UE support  3 x 30 sec hold B with knees flexed and extended- B UE support  30 second hold x 3 reps/1 set with knees extended then flexed; B UE support   Lower trunk rotation* 20 reps/1 set with 3-5 second hold each direction; cues to avoid painful range 20 reps/1 set with 3-5 second hold each direction; cues to avoid painful range 20 reps/1 set with 3-5 second hold each direction; cues to avoid painful range   Bridging in hooklying*  20 reps/1 set with B LEs elevated on exercise ball for increased challenge to core    Sidelying hip clamshells*      Supine SLR*      Standing hip abduction      Supine hamstring stretch 30 second hold x 3 reps each LE; pt stretching with strap (therapist also supporting LE)     Sitting forward lumbar flexion stretch at exercise ball At green exercise ball; 3 reps forward, then diagonally to each side with 30 second holds At green exercise ball; 3 reps forward, then diagonally to each side with 30 second holds    Prone press ups*      Prone hip extensions*      Sidelying hip abduction*      Supine pelvic tilts      Varying resistance at each LE in multiple different planes of motion   Per strength assessment in chart   Lumbar AROM   Per ROM assessment in chart   Reverse sidelying clamshells      Deadbug exercise      Quadruped cat/camel      Standing hip extension      Leg press machine      Standing unilateral lat pulldown      Standing paloff press      Sitting piriformis stretch      Sidesteps with resistance      Standing LE marching with sustained unilateral overhead press      Sitting LE marching on exercise ball      Ambulation over level ground  Several bouts of 100-200 feet with cues for increased glute activation, longer step length, and increased relaxation at arms for improved gait pattern    Sit to stands  10 reps/1 set with cues for increased upright posture with each rep; cues to avoid hip IR with each rep          given in HEP ([id number])  [employer]   Pt given following band colors: [] Yellow          [] Red          [] Sang Waters          [] Blue          [] Grey     Manual therapy (    Treatment/Session Summary:    · Response to Treatment: Discharge note today - see assessment in chart. ·       No adverse reactions/unusual changes observed/reported in clinical status this session. · Communication/Consultation:  None today  · Equipment provided today:  None today    Total Treatment Billable Duration: 38 minutes  PT Patient Time In/Time Out  Time In: 1343  Time Out: 1309 Kris Pastor, DPT    No future appointments.      Visit Approval Visit # Therapist initials Date A NS / Cx < 24 hr >24 hr Cx Comments   99 visits total 1 [facility] 1/4/2022 [x]  [] [] Initial evaluation    2 [facility] 1/7/2022 [x] [] []     3 [facility] 1/10/2022 [x] [] []     4 PDE 1- [x] [] []     5 [facility] 1/19/2022 [x] [] []     6 [facility] 1/26/2022 [x] [] []     7 [facility] 1/28/2022 [x] [] []     8 [facility] 2/1/2022 [x] [] [] Progress note    9 [facility] 2/4/2022 [x] [] []     10 [facility] 2/8/2022 [x] [] []     11 [facility] 2/11/2022 [x] [] []       2/22/2022 [] [] [] Canceled due to therapist being sick     [facility] 2/24/2022 [] [x] [] No show - pt thought her appt time was later    12 [facility] 2/28/2022 [x] [] []     13 [facility] 3/3/2022 [x] [] []       3/8/2022 [] [x] []     14 [facility] 3/10/2022 [x] [] []      [facility] 3/14/2022 [] [x] [] Pt forgot appointment      15 [facility] 9/31/1778 [x] [] [] Recertification    16 [facility] 3/25/2022 [x] [] []       17 [facility] 4/1/2022 [x] [] []     18 [facility] 4/12/2022 [x] [] []     19 [facility] 4/22/2022 [x] [] []     20 [facility] 4/26/2022 [x] [] []     21 [facility] 5/2/2022 [x] [] []     22 [facility] 5/11/2022 [x] [] [] Discharge         [] [] []        [] [] []

## 2022-05-16 PROBLEM — R07.2 PRECORDIAL PAIN: Status: ACTIVE | Noted: 2022-05-16

## 2022-05-16 PROBLEM — Z72.0 TOBACCO USE: Status: ACTIVE | Noted: 2022-05-16

## 2022-05-16 PROBLEM — R94.31 ABNORMAL EKG: Status: ACTIVE | Noted: 2022-05-16

## 2022-05-30 ENCOUNTER — PATIENT MESSAGE (OUTPATIENT)
Dept: FAMILY MEDICINE CLINIC | Facility: CLINIC | Age: 47
End: 2022-05-30

## 2022-05-30 DIAGNOSIS — Z83.3 FAMILY HISTORY OF DIABETES MELLITUS (DM): Primary | ICD-10-CM

## 2022-05-31 DIAGNOSIS — E55.9 VITAMIN D DEFICIENCY: ICD-10-CM

## 2022-05-31 DIAGNOSIS — I10 PRIMARY HYPERTENSION: ICD-10-CM

## 2022-05-31 DIAGNOSIS — Z00.00 ROUTINE GENERAL MEDICAL EXAMINATION AT A HEALTH CARE FACILITY: ICD-10-CM

## 2022-05-31 DIAGNOSIS — E53.8 VITAMIN B12 DEFICIENCY: ICD-10-CM

## 2022-05-31 DIAGNOSIS — Z83.3 FAMILY HISTORY OF DIABETES MELLITUS (DM): ICD-10-CM

## 2022-05-31 DIAGNOSIS — E03.9 HYPOTHYROIDISM (ACQUIRED): ICD-10-CM

## 2022-05-31 LAB
FOLATE SERPL-MCNC: 5 NG/ML (ref 3.1–17.5)
VIT B12 SERPL-MCNC: 295 PG/ML (ref 193–986)

## 2022-05-31 NOTE — TELEPHONE ENCOUNTER
From: Candance Schiller  To: Dr. Xin Shermanaps: 5/30/2022 10:56 PM EDT  Subject: A1C     Hi. I have no idea if you'll get this before my labs, but am hoping so. I noticed in my last labs that my A1C wasn't checked. Could you please order this for my labs? I know my fasting blood sugar has been normal each blood draw you've ordered, but my mother was diabetic and I am very overweight. I really appreciate your checking this for me.    Thank you so much ,  EXODUS [facility]

## 2022-06-01 ENCOUNTER — OFFICE VISIT (OUTPATIENT)
Dept: ORTHOPEDIC SURGERY | Age: 47
End: 2022-06-01
Payer: MEDICARE

## 2022-06-01 DIAGNOSIS — M25.562 LEFT KNEE PAIN, UNSPECIFIED CHRONICITY: Primary | ICD-10-CM

## 2022-06-01 LAB
25(OH)D3 SERPL-MCNC: 33.1 NG/ML (ref 30–100)
ALBUMIN SERPL-MCNC: 3.6 G/DL (ref 3.5–5)
ALBUMIN/GLOB SERPL: 1 {RATIO} (ref 1.2–3.5)
ALP SERPL-CCNC: 77 U/L (ref 50–136)
ALT SERPL-CCNC: 18 U/L (ref 12–65)
ANION GAP SERPL CALC-SCNC: 5 MMOL/L (ref 7–16)
AST SERPL-CCNC: 9 U/L (ref 15–37)
BASOPHILS # BLD: 0.1 K/UL (ref 0–0.2)
BASOPHILS NFR BLD: 1 % (ref 0–2)
BILIRUB SERPL-MCNC: 0.2 MG/DL (ref 0.2–1.1)
BUN SERPL-MCNC: 11 MG/DL (ref 6–23)
CALCIUM SERPL-MCNC: 9.2 MG/DL (ref 8.3–10.4)
CHLORIDE SERPL-SCNC: 106 MMOL/L (ref 98–107)
CHOLEST SERPL-MCNC: 157 MG/DL
CO2 SERPL-SCNC: 28 MMOL/L (ref 21–32)
CREAT SERPL-MCNC: 1 MG/DL (ref 0.6–1)
DIFFERENTIAL METHOD BLD: ABNORMAL
EOSINOPHIL # BLD: 0.4 K/UL (ref 0–0.8)
EOSINOPHIL NFR BLD: 6 % (ref 0.5–7.8)
ERYTHROCYTE [DISTWIDTH] IN BLOOD BY AUTOMATED COUNT: 16.1 % (ref 11.9–14.6)
EST. AVERAGE GLUCOSE BLD GHB EST-MCNC: NORMAL MG/DL
GLOBULIN SER CALC-MCNC: 3.5 G/DL (ref 2.3–3.5)
GLUCOSE SERPL-MCNC: 82 MG/DL (ref 65–100)
HBA1C MFR BLD: 4.8 % (ref 4.2–6.3)
HCT VFR BLD AUTO: 38.2 % (ref 35.8–46.3)
HDLC SERPL-MCNC: 52 MG/DL (ref 40–60)
HDLC SERPL: 3 {RATIO}
HGB BLD-MCNC: 11.5 G/DL (ref 11.7–15.4)
IMM GRANULOCYTES # BLD AUTO: 0 K/UL (ref 0–0.5)
IMM GRANULOCYTES NFR BLD AUTO: 0 % (ref 0–5)
LDLC SERPL CALC-MCNC: 91 MG/DL
LYMPHOCYTES # BLD: 1.7 K/UL (ref 0.5–4.6)
LYMPHOCYTES NFR BLD: 27 % (ref 13–44)
MCH RBC QN AUTO: 27.4 PG (ref 26.1–32.9)
MCHC RBC AUTO-ENTMCNC: 30.1 G/DL (ref 31.4–35)
MCV RBC AUTO: 91.2 FL (ref 79.6–97.8)
MONOCYTES # BLD: 0.6 K/UL (ref 0.1–1.3)
MONOCYTES NFR BLD: 11 % (ref 4–12)
NEUTS SEG # BLD: 3.3 K/UL (ref 1.7–8.2)
NEUTS SEG NFR BLD: 55 % (ref 43–78)
NRBC # BLD: 0 K/UL (ref 0–0.2)
PLATELET # BLD AUTO: 214 K/UL (ref 150–450)
PMV BLD AUTO: 11.6 FL (ref 9.4–12.3)
POTASSIUM SERPL-SCNC: 4 MMOL/L (ref 3.5–5.1)
PROT SERPL-MCNC: 7.1 G/DL (ref 6.3–8.2)
RBC # BLD AUTO: 4.19 M/UL (ref 4.05–5.2)
SODIUM SERPL-SCNC: 139 MMOL/L (ref 136–145)
T4 FREE SERPL-MCNC: 1.3 NG/DL (ref 0.9–1.8)
TRIGL SERPL-MCNC: 70 MG/DL (ref 35–150)
TSH, 3RD GENERATION: 8.43 UIU/ML (ref 0.36–3.74)
VLDLC SERPL CALC-MCNC: 14 MG/DL (ref 6–23)
WBC # BLD AUTO: 6 K/UL (ref 4.3–11.1)

## 2022-06-01 PROCEDURE — 4004F PT TOBACCO SCREEN RCVD TLK: CPT | Performed by: SPECIALIST/TECHNOLOGIST

## 2022-06-01 PROCEDURE — 20610 DRAIN/INJ JOINT/BURSA W/O US: CPT | Performed by: SPECIALIST/TECHNOLOGIST

## 2022-06-01 PROCEDURE — G8419 CALC BMI OUT NRM PARAM NOF/U: HCPCS | Performed by: SPECIALIST/TECHNOLOGIST

## 2022-06-01 PROCEDURE — 99203 OFFICE O/P NEW LOW 30 MIN: CPT | Performed by: SPECIALIST/TECHNOLOGIST

## 2022-06-01 PROCEDURE — G8427 DOCREV CUR MEDS BY ELIG CLIN: HCPCS | Performed by: SPECIALIST/TECHNOLOGIST

## 2022-06-01 RX ORDER — DOXYCYCLINE HYCLATE 100 MG/1
CAPSULE ORAL
COMMUNITY
Start: 2022-05-31 | End: 2022-10-20

## 2022-06-01 RX ORDER — RISPERIDONE 4 MG/1
TABLET, FILM COATED ORAL
COMMUNITY
Start: 2022-04-04

## 2022-06-01 RX ORDER — BUPROPION HYDROCHLORIDE 300 MG/1
TABLET ORAL
COMMUNITY
Start: 2022-05-12

## 2022-06-01 RX ORDER — TRIAMCINOLONE ACETONIDE 40 MG/ML
40 INJECTION, SUSPENSION INTRA-ARTICULAR; INTRAMUSCULAR ONCE
Status: COMPLETED | OUTPATIENT
Start: 2022-06-01 | End: 2022-06-01

## 2022-06-01 RX ORDER — KETOCONAZOLE 20 MG/ML
SHAMPOO TOPICAL
COMMUNITY
Start: 2022-04-13

## 2022-06-01 RX ORDER — IBUPROFEN 600 MG/1
TABLET ORAL
COMMUNITY
Start: 2022-04-27

## 2022-06-01 RX ORDER — VENLAFAXINE HYDROCHLORIDE 75 MG/1
CAPSULE, EXTENDED RELEASE ORAL
COMMUNITY
Start: 2022-05-16

## 2022-06-01 RX ORDER — RISPERIDONE 0.5 MG/1
TABLET, FILM COATED ORAL
COMMUNITY
Start: 2022-05-16 | End: 2022-10-20

## 2022-06-01 RX ORDER — CLOBETASOL PROPIONATE 0.46 MG/ML
SOLUTION TOPICAL
COMMUNITY
Start: 2021-11-01 | End: 2022-10-20

## 2022-06-01 RX ADMIN — TRIAMCINOLONE ACETONIDE 40 MG: 40 INJECTION, SUSPENSION INTRA-ARTICULAR; INTRAMUSCULAR at 10:14

## 2022-06-01 NOTE — PROGRESS NOTES
Name: Anjel Ye  YOB: 1975  Gender: female  MRN: [id number]      CC: Knee Pain (L knee)       HPI: Anjel Ye is a 42 y.o. female who presents with Knee Pain (L knee)  Miss Eren Tovar is a new patient who presents with left knee pain. She notes that her pain has been ongoing for the past for about 3 months but does not recall any mechanism of injury. She notes that her pain is intermittent and localized around the patella. She has completed formal physical therapy for an unrelated issue, but notes that it did benefit her knee discomfort. ROS/Meds/PSH/PMH/FH/SH: I personally reviewed the patients standard intake form. Below are the pertinents    Tobacco:  reports that she has been smoking. She has never used smokeless tobacco.  Diabetes: none  Other: Hypertension, GERD, bipolar disorder, hypothyroidism, fibromyalgia    Physical Examination:  General: no acute distress  Lungs: breathing easily  CV: regular rhythm by pulse  Left Knee: Minimal effusion present. Tenderness to palpation in the medial joint line and pes anserine bursa. Full active and passive range of motion with no pain in the extremes. Negative ligamentous exam x4. Positive Elida's with reproduction of patient's symptoms in the medial joint line, negative bounce home test.      Imaging:   Knee XR: 4 views     Clinical Indication  1. Left knee pain, unspecified chronicity           Report: AP, lateral, PA flexion, sunrise views of the Left knee demonstrates no acute fracture dislocation, or advanced degenerative changes    Impression: No acute findings as above           All imaging interpreted by myself CHANDA Munoz independent of radiology review        Assessment:   1. Left knee pain, unspecified chronicity         Plan:   I think the majority of the patient's knee pain may be due to a degenerative meniscal tear.   I discussed conservative treatment options to include corticosteroid injection, formal physical therapy and advanced imaging. Patient reports that she has had conservative treatment with physical therapy for her back which helped improve her knee pain. She reports that she has continued intermittent pain and swelling. I think she would get excellent benefit from a corticosteroid injection which she agreed to proceed with today. If she does not get benefit from the corticosteroid injection the next that would be an MRI to evaluate for meniscal pathology. The patient elected to proceed with an intraarticular knee injection today. After verbal informed consent was obtained after sterile prep the left knee  was injected from a anterior lateral approach with 4 cc of 0.25% Marcaine and 1 cc of 40 mg Kenalog. The patient tolerated the procedure well was given postinjection flare precautions.         Louie Lemus, MS, APRN, FNP-BC  Orthopaedics and Sports Medicine

## 2022-06-07 ENCOUNTER — TELEPHONE (OUTPATIENT)
Dept: ORTHOPEDIC SURGERY | Age: 47
End: 2022-06-07

## 2022-06-07 ENCOUNTER — OFFICE VISIT (OUTPATIENT)
Dept: FAMILY MEDICINE CLINIC | Facility: CLINIC | Age: 47
End: 2022-06-07
Payer: MEDICARE

## 2022-06-07 VITALS
RESPIRATION RATE: 18 BRPM | OXYGEN SATURATION: 99 % | WEIGHT: 191 LBS | SYSTOLIC BLOOD PRESSURE: 139 MMHG | HEART RATE: 72 BPM | BODY MASS INDEX: 33.84 KG/M2 | TEMPERATURE: 97 F | DIASTOLIC BLOOD PRESSURE: 68 MMHG | HEIGHT: 63 IN

## 2022-06-07 DIAGNOSIS — N32.81 OAB (OVERACTIVE BLADDER): ICD-10-CM

## 2022-06-07 DIAGNOSIS — F31.9 BIPOLAR 1 DISORDER (HCC): ICD-10-CM

## 2022-06-07 DIAGNOSIS — K21.9 GASTROESOPHAGEAL REFLUX DISEASE, UNSPECIFIED WHETHER ESOPHAGITIS PRESENT: ICD-10-CM

## 2022-06-07 DIAGNOSIS — E53.8 VITAMIN B12 DEFICIENCY: ICD-10-CM

## 2022-06-07 DIAGNOSIS — I10 HYPERTENSION, UNSPECIFIED TYPE: Primary | ICD-10-CM

## 2022-06-07 DIAGNOSIS — E55.9 VITAMIN D DEFICIENCY: ICD-10-CM

## 2022-06-07 DIAGNOSIS — M15.9 OSTEOARTHRITIS OF MULTIPLE JOINTS, UNSPECIFIED OSTEOARTHRITIS TYPE: ICD-10-CM

## 2022-06-07 DIAGNOSIS — F19.11 HISTORY OF SUBSTANCE ABUSE (HCC): ICD-10-CM

## 2022-06-07 DIAGNOSIS — E03.9 HYPOTHYROIDISM (ACQUIRED): ICD-10-CM

## 2022-06-07 DIAGNOSIS — F17.210 TOBACCO DEPENDENCE DUE TO CIGARETTES: ICD-10-CM

## 2022-06-07 DIAGNOSIS — E22.1 HYPERPROLACTINEMIA (HCC): ICD-10-CM

## 2022-06-07 DIAGNOSIS — M79.7 FIBROMYALGIA: ICD-10-CM

## 2022-06-07 PROBLEM — Z72.0 TOBACCO USE: Status: RESOLVED | Noted: 2022-05-16 | Resolved: 2022-06-07

## 2022-06-07 PROCEDURE — 99214 OFFICE O/P EST MOD 30 MIN: CPT | Performed by: FAMILY MEDICINE

## 2022-06-07 PROCEDURE — 4004F PT TOBACCO SCREEN RCVD TLK: CPT | Performed by: FAMILY MEDICINE

## 2022-06-07 PROCEDURE — G8427 DOCREV CUR MEDS BY ELIG CLIN: HCPCS | Performed by: FAMILY MEDICINE

## 2022-06-07 PROCEDURE — G8417 CALC BMI ABV UP PARAM F/U: HCPCS | Performed by: FAMILY MEDICINE

## 2022-06-07 RX ORDER — LEVOTHYROXINE SODIUM 88 UG/1
88 TABLET ORAL
Qty: 90 TABLET | Refills: 1 | Status: SHIPPED | OUTPATIENT
Start: 2022-06-07

## 2022-06-07 RX ORDER — VALSARTAN 80 MG/1
80 TABLET ORAL DAILY
Qty: 90 TABLET | Refills: 1 | Status: SHIPPED | OUTPATIENT
Start: 2022-06-07 | End: 2022-10-20 | Stop reason: ALTCHOICE

## 2022-06-07 RX ORDER — MELOXICAM 15 MG/1
15 TABLET ORAL DAILY
Qty: 90 TABLET | Refills: 1 | Status: SHIPPED | OUTPATIENT
Start: 2022-06-07

## 2022-06-07 RX ORDER — CANDESARTAN 4 MG/1
4 TABLET ORAL DAILY
Qty: 90 TABLET | Refills: 1 | Status: CANCELLED | OUTPATIENT
Start: 2022-06-07

## 2022-06-07 RX ORDER — DEXLANSOPRAZOLE 60 MG/1
60 CAPSULE, DELAYED RELEASE ORAL DAILY
Qty: 90 CAPSULE | Refills: 1 | Status: SHIPPED | OUTPATIENT
Start: 2022-06-07

## 2022-06-07 RX ORDER — TIZANIDINE 4 MG/1
4 TABLET ORAL 3 TIMES DAILY PRN
Qty: 270 TABLET | Refills: 1 | Status: SHIPPED | OUTPATIENT
Start: 2022-06-07

## 2022-06-07 NOTE — TELEPHONE ENCOUNTER
Called and scheduled patient for repeat trigger point injections with Dr. Cole Carrera 6/10 [facility] Denise

## 2022-06-07 NOTE — PROGRESS NOTES
Kimberly  [address] [place] [address], [address] [place] [address]  Phone: [phone]  Fax: [phone]  [email or web address]      Encounter [address]; Established patient 42 y. o.female; seen 6/7/2022 for: Hypothyroidism, Arthritis, Hypertension, and Gastroesophageal Reflux      Assessment & Plan    1. Hypertension, unspecified type  2. Hypothyroidism (acquired)  -     levothyroxine (SYNTHROID) 88 MCG tablet; Take 1 tablet by mouth every morning (before breakfast), Disp-90 tablet, R-1Normal  3. Osteoarthritis of multiple joints, unspecified osteoarthritis type  -     meloxicam (MOBIC) 15 MG tablet; Take 1 tablet by mouth daily, Disp-90 tablet, R-1Normal  4. OAB (overactive bladder)  -     mirabegron (MYRBETRIQ) 50 MG TB24; Take 50 mg by mouth daily, Disp-90 tablet, R-1Normal  5. Vitamin B12 deficiency  6. Vitamin D deficiency  7. Tobacco dependence due to cigarettes  8. Fibromyalgia  -     meloxicam (MOBIC) 15 MG tablet; Take 1 tablet by mouth daily, Disp-90 tablet, R-1Normal  -     tiZANidine (ZANAFLEX) 4 MG tablet; Take 1 tablet by mouth 3 times daily as needed (pain or muscle spasms), Disp-270 tablet, R-1Normal  9. Gastroesophageal reflux disease, unspecified whether esophagitis present  10. Bipolar 1 disorder [facility])  Assessment & Plan:  Problem is managed by appropriate specialist and patient encouraged to attend all scheduled visits and take all medications as directed. 11. History of substance abuse ([facility] [address]  Assessment & Plan:  Noted  12. Hyperprolactinemia ([facility] [address]  Assessment & Plan:  Problem is managed by appropriate specialist and patient encouraged to attend all scheduled visits and take all medications as directed. Problem and/or Symptoms are currently stable and/or improving on current treatment plan. Will continue and have patient follow up as directed. Pertinent labs reviewed & pertinent meds refilled X 6 M.        Check Out Instructions  Return in about 6 months (around 12/7/2022) for Virtual Visit, Previsit Vitals and Labs. Subjective & Objective    HPI  Patient states that chronic health problems are stable on current regimens and has no acute concerns today except as mentioned. Review of Systems  Physical Exam  Vitals:    06/07/22 1035   BP: 139/68   Site: Left Upper Arm   Position: Sitting   Cuff Size: Large Adult   Pulse: 72   Resp: 18   Temp: 97 °F (36.1 °C)   TempSrc: Temporal   SpO2: 99%   Weight: 191 lb (86.6 kg)   Height: 5' 3\" (1.6 m)     BP Readings from Last 3 Encounters:   06/07/22 139/68   05/16/22 118/84   05/10/22 119/72     Body mass index is 33.83 kg/m². Wt Readings from Last 3 Encounters:   06/07/22 191 lb (86.6 kg)   05/16/22 189 lb 9.6 oz (86 kg)   05/10/22 190 lb (86.2 kg)     TIME    We discussed the typical prognosis and potential complications of the concern(s), including treatment options. Medication risks, benefits, costs, interactions, and alternatives were discussed as appropriate. I advised her to contact the office if her condition worsens or fails to improve as anticipated. She expressed understanding with the discussion and plan of care. An electronic signature was used to authenticate this note.   -- Jenny Mclain MD

## 2022-06-07 NOTE — TELEPHONE ENCOUNTER
She is having muscle spasms. She has not tried the Tizanidine. I told her she may want to try that while waiting on another inj. She would like for Shantal Morales to call her about getting another shot.

## 2022-06-08 ENCOUNTER — TELEPHONE (OUTPATIENT)
Dept: ORTHOPEDIC SURGERY | Age: 47
End: 2022-06-08

## 2022-06-08 DIAGNOSIS — M54.50 LOW BACK PAIN, UNSPECIFIED BACK PAIN LATERALITY, UNSPECIFIED CHRONICITY, UNSPECIFIED WHETHER SCIATICA PRESENT: ICD-10-CM

## 2022-06-08 DIAGNOSIS — M47.816 LUMBAR SPONDYLOSIS: Primary | ICD-10-CM

## 2022-06-08 NOTE — LETTER
[address] [address]  Phone: [phone]  Fax: [phone]    Carolyn Blackman MD        2022      Lumbar Spine Injection Precert Authorization Form    Name: Duong Hauser  : 1975  Age: 42 y.o. Gender: female    Clinical Information    Referring  Arnaldo GARCÍA  Diagnosis: M47.816 Spondylosis of lumbar spine    ICD-10-CM    1. Lumbar spondylosis  M47.816    2. Low back pain, unspecified back pain laterality, unspecified chronicity, unspecified whether sciatica present  M54.50    . Body Part: lumbar spine    Type of Service    [ ] 72617+- KIM Caudal or Lumbar    [ ] 16913- Facet Lumbar (single Level)    [ ] 98855- Facet 2nd Level    [ ] 14102- Facet 3 or more level    [ ] 86173- Sacroiliac joint     [ ] 70643- SNRB Transforaminal KIM Lumbar or Sacral (single level)    [ ] 22435- SNRB add levels Lumbar or Sacral     [ ] 40587- Sciatic Nerve, single.      [ ] 75951- Radiofrequency L/S single facet     [ ] 91106- Radiofrequency L/s additional facet     [phone] Trigger points x2    Comments: Bilateral Lower Lumbar trigger points    Insurance:    1st Payor          Sincerely,        Carolyn Blackman MD

## 2022-06-08 NOTE — TELEPHONE ENCOUNTER
She says her insurance requires two weeks to get prior-auth so she needs to move her appt out about two weeks. Please give her a call.

## 2022-06-09 NOTE — TELEPHONE ENCOUNTER
Left voicemail informing patient that the trigger point injections with Dr. Kanchan Machado have been approved. She is still scheduled for 6/10 and can call if she is still interested in rescheduling.

## 2022-07-11 ENCOUNTER — OFFICE VISIT (OUTPATIENT)
Dept: ORTHOPEDIC SURGERY | Age: 47
End: 2022-07-11
Payer: MEDICARE

## 2022-07-11 DIAGNOSIS — M54.50 LUMBAR BACK PAIN: Primary | ICD-10-CM

## 2022-07-11 DIAGNOSIS — M47.816 SPONDYLOSIS OF LUMBAR REGION WITHOUT MYELOPATHY OR RADICULOPATHY: ICD-10-CM

## 2022-07-11 PROCEDURE — 20552 NJX 1/MLT TRIGGER POINT 1/2: CPT | Performed by: PHYSICAL MEDICINE & REHABILITATION

## 2022-07-11 RX ORDER — METHYLPREDNISOLONE ACETATE 40 MG/ML
160 INJECTION, SUSPENSION INTRA-ARTICULAR; INTRALESIONAL; INTRAMUSCULAR; SOFT TISSUE ONCE
Status: COMPLETED | OUTPATIENT
Start: 2022-07-11 | End: 2022-07-11

## 2022-07-11 RX ORDER — METHYLPREDNISOLONE ACETATE 40 MG/ML
40 INJECTION, SUSPENSION INTRA-ARTICULAR; INTRALESIONAL; INTRAMUSCULAR; SOFT TISSUE ONCE
Status: SHIPPED | OUTPATIENT
Start: 2022-07-11

## 2022-07-11 RX ADMIN — METHYLPREDNISOLONE ACETATE 160 MG: 40 INJECTION, SUSPENSION INTRA-ARTICULAR; INTRALESIONAL; INTRAMUSCULAR; SOFT TISSUE at 12:06

## 2022-07-11 NOTE — PROGRESS NOTES
Date: 07/11/22   Name: Honey Thomas    Pre-Procedural Diagnosis:    Diagnosis Orders   1. Spondylosis of lumbar region without myelopathy or radiculopathy     2. Lumbar back pain         LUMBAR TRIGGER POINT INJECTION(S)    Patient presents for repeat lumbar trigger point injections. Had a nice response to injections 4 months ago. Brief exam is consistent with above impression. Will repeat injections today. Trigger Point Injection(s):  After informed oral consent, patient was prepped per routine. The bilateral lumbar Paraspinal area(s) were injected. 80 mg of DepoMedrol with 1 cc of 0.25% Marcaine injected at each site. Patient tolerated well. Band aid(s) applied.      Carmen Steele MD

## 2022-07-18 ENCOUNTER — OFFICE VISIT (OUTPATIENT)
Dept: ORTHOPEDIC SURGERY | Age: 47
End: 2022-07-18
Payer: MEDICARE

## 2022-07-18 DIAGNOSIS — M25.562 LEFT KNEE PAIN, UNSPECIFIED CHRONICITY: Primary | ICD-10-CM

## 2022-07-18 PROCEDURE — G8427 DOCREV CUR MEDS BY ELIG CLIN: HCPCS | Performed by: SPECIALIST/TECHNOLOGIST

## 2022-07-18 PROCEDURE — 4004F PT TOBACCO SCREEN RCVD TLK: CPT | Performed by: SPECIALIST/TECHNOLOGIST

## 2022-07-18 PROCEDURE — G8417 CALC BMI ABV UP PARAM F/U: HCPCS | Performed by: SPECIALIST/TECHNOLOGIST

## 2022-07-18 PROCEDURE — 99212 OFFICE O/P EST SF 10 MIN: CPT | Performed by: SPECIALIST/TECHNOLOGIST

## 2022-07-18 NOTE — PROGRESS NOTES
Name: Caity Constantino  YOB: 1975  Gender: female  MRN: [id number]      CC: Follow-up (L knee recheck)       HPI: Caity Constantino is a 42 y.o. female who returns for follow up on left knee pain. She reports no pain and significant relief from the corticosteroid injection she received last visit. She reports no limitations with the left knee. Physical Examination:  General: no acute distress  Lungs: breathing easily  CV: regular rhythm by pulse  Left Knee: Negative effusion. No tenderness to palpation. Full active and passive range of motion with no pain in the extremes. Negative ligamentous exam x4. Negative Elida's, bounce home test.        Assessment:   1. Left knee pain, unspecified chronicity         Plan:   Patient returns with excellent relief from corticosteroid injection and no limitations of function with her left knee. At this time I recommended we continue to proceed with a trial of life and if she has return of her knee pain then we can consider repeat injection versus advanced imaging.   She can see me as needed    ROSANNA Adame - CNP    Orthopaedics and Sports Medicine

## 2022-08-31 NOTE — PROGRESS NOTES
Problem: Adult Inpatient Plan of Care  Goal: Plan of Care Review  Outcome: Ongoing, Progressing  Goal: Patient-Specific Goal (Individualized)  Outcome: Ongoing, Progressing  Goal: Absence of Hospital-Acquired Illness or Injury  Outcome: Ongoing, Progressing  Goal: Optimal Comfort and Wellbeing  Outcome: Ongoing, Progressing  Goal: Readiness for Transition of Care  Outcome: Ongoing, Progressing     Problem: Adjustment to Illness (Stroke, Hemorrhagic)  Goal: Optimal Coping  Outcome: Ongoing, Progressing     Problem: Bowel Elimination Impaired (Stroke, Hemorrhagic)  Goal: Effective Bowel Elimination  Outcome: Ongoing, Progressing     Problem: Cerebral Tissue Perfusion (Stroke, Hemorrhagic)  Goal: Optimal Cerebral Tissue Perfusion  Outcome: Ongoing, Progressing     Problem: Cognitive Impairment (Stroke, Hemorrhagic)  Goal: Optimal Cognitive Function  Outcome: Ongoing, Progressing     Problem: Communication Impairment (Stroke, Hemorrhagic)  Goal: Effective Communication Skills  Outcome: Ongoing, Progressing     Problem: Functional Ability Impaired (Stroke, Hemorrhagic)  Goal: Optimal Functional Ability  Outcome: Ongoing, Progressing     Problem: Pain (Stroke, Hemorrhagic)  Goal: Acceptable Pain Control  Outcome: Ongoing, Progressing     Problem: Respiratory Compromise (Stroke, Hemorrhagic)  Goal: Effective Oxygenation and Ventilation  Outcome: Ongoing, Progressing     Problem: Sensorimotor Impairment (Stroke, Hemorrhagic)  Goal: Improved Sensorimotor Function  Outcome: Ongoing, Progressing     Problem: Swallowing Impairment (Stroke, Hemorrhagic)  Goal: Optimal Eating and Swallowing Without Aspiration  Outcome: Ongoing, Progressing     Problem: Urinary Elimination Impaired (Stroke, Hemorrhagic)  Goal: Effective Urinary Elimination  Outcome: Ongoing, Progressing     Problem: Fall Injury Risk  Goal: Absence of Fall and Fall-Related Injury  Outcome: Ongoing, Progressing     Problem: Fluid Imbalance (Pneumonia)  Goal: Fluid  Medhat Fay  : 1975  Primary: Rach Nunez Medicare Hmo  [facility]: [place] [facility] [facility], [place] [address] [address] [facility]  [phone]   [phone]       OUTPATIENT PHYSICAL THERAPY:Daily Note 2019    ICD-10: Treatment Diagnosis: Pain in right ankle and joints of right foot (M25.571)      Stiffness of right ankle, not elsewhere classified (M25.671)    Difficulty in walking, not elsewhere classified (R26.2)   Precautions/Allergies:   Abilify [aripiprazole]; Geodon [ziprasidone hcl]; Samuel Spruce; Ambien [zolpidem]; and Topamax [topiramate]   Fall Risk Score: 3 (? 5 = High Risk)  MD Orders: Evaluate and treat  MEDICAL/REFERRING DIAGNOSIS:  foot, right   DATE OF ONSET: 2018  REFERRING PHYSICIAN: Trisha Medrano MD  RETURN PHYSICIAN APPOINTMENT: 2018 (pt unsure of date)   This section established at most recent assessment  ASSESSMENT:  Medhat Fay has now attended 23 physical therapy sessions for R ankle/foot pain that started in 2018 and has recently gotten worse. This session, she demonstrated improved B LE strength/endurance, less pain with R ankle mobility, less postural/gait deficits, and improved standing/walking tolerance. Despite the above listed improvements, she continues to demonstrate decreased B LE strength/endurance, pain with R ankle mobility, multiple postural/gait deficits, decreased standing/walking tolerance, and decreased functional mobility. Pt may continue to benefit from skilled PT to address the above listed deficits to improve ability to perform pain-free ADLs/IADLs and to improve overall quality of life prior to discharge. However, will reduce frequency to one time/week and extend treatment dates due to pt's slowing progress. PROBLEM LIST (Impacting functional limitations):  1. Decreased Strength  2.  Decreased ADL/Functional Activities  3. Decreased Transfer Abilities  4. Decreased Ambulation Ability/Technique  5. Decreased Balance  6. Increased Pain  7. Decreased Activity Tolerance  8. Increased Fatigue  9. Decreased Flexibility/Joint Mobility  10. Edema/Girth INTERVENTIONS PLANNED:  1. Balance Exercise  2. Bed Mobility  3. Cold  4. Electrical Stimulation  5. Family Education  6. Gait Training  7. Heat  8. Home Exercise Program (HEP)  9. Manual Therapy  10. Neuromuscular Re-education/Strengthening  11. Range of Motion (ROM)  12. Therapeutic Activites  13. Therapeutic Exercise/Strengthening  14. Transcutaneous Electrical Nerve Stimulation (TENS)  15. Transfer Training  16. Ultrasound (US)   TREATMENT PLAN:  Effective Dates: 12/31/2018 TO 2/15/2019. Frequency/Duration: 2 times a week for 1 additional week, followed by 1 time a week for 5 additional weeks  GOALS: (Goals have been discussed and agreed upon with patient.)  Short-Term Functional Goals: Time Frame: 30 days  1. Pt will be compliant with HEP in order to increase LE strength/endurance/mobility to improve functional mobility and overall quality of life. (MET 11/5/2018)  2. Pt will improve score on the Lower Extremity Functional Scale to 38/80 in order to demonstrate improved functional mobility and quality of life. (MET 11/5/2018)  3. Pt will improve score on the Foot and Ankle Ability Measure to 41/84 in order to demonstrate improved functional mobility and quality of life. (MET 11/5/2018)  4. Pt will report standing for > 5 minutes with minimal to no increase in pain in order to be able to stand for prolonged periods as needed to perform chores. (MET 11/5/2018)  5. Pt will be able to ascend/descend 6 steps with S with or without rail with safe gait pattern and minimal to no increase in pain in order to improve community mobility. (MET 11/5/2018)  Discharge Goals: Time Frame: 60 days  1.  Pt will be independent with HEP in order to increase LE strength/endurance/mobility to Balance  Outcome: Ongoing, Progressing     Problem: Infection (Pneumonia)  Goal: Resolution of Infection Signs and Symptoms  Outcome: Ongoing, Progressing     Problem: Respiratory Compromise (Pneumonia)  Goal: Effective Oxygenation and Ventilation  Outcome: Ongoing, Progressing     Problem: Fever  Goal: Body Temperature in Desired Range  Outcome: Ongoing, Progressing      improve functional mobility and overall quality of life. (PROGRESSING , 12/31/2018)   2. Pt will improve score on the Lower Extremity Functional Scale to 50/80 in order to demonstrate improved functional mobility and quality of life. (MET , 12/31/2018)   3. Pt will improve score on the Foot and Ankle Ability Measure to 45/84 in order to demonstrate improved functional mobility and quality of life. (MET , 12/31/2018)   4. Pt will report standing for > 10 minutes with minimal to no increase in pain in order to be able to stand for prolonged periods as needed to perform chores. (MET , 11/27/2018)   5. Pt will be able to ascend/descend 12 steps Magdalena with or without rail with reciprocal gait pattern and minimal to no increase in pain in order to improve community mobility. (PROGRESSING , 12/31/2018)   6. Pt will improve score on the Lower Extremity Functional Scale to 58/80 in order to demonstrate improved functional mobility and quality of life. (NEW GOAL , 12/31/2018)   Rehabilitation Potential For Stated Goals: Good  Regarding Antolin Pelletier's therapy, I certify that the treatment plan above will be carried out by a therapist or under their direction. Thank you for this referral,  Seth Lucero DPT     Referring Physician Signature: Velma Walls MD          Date                                 HISTORY:   History of Present Injury/Illness (Reason for Referral): *History per pt or pt's family except where otherwise noted  Pt states pain in her R ankle started in July 2018 (states she was walking more than usual), and she states that she was being treated for it (with prednisone) and it had gotten better with taping, but it has recently gotten worse again (early September when she stopped doing taping and started everyday walking again). States the pain has gotten better temporarily with Prednisone, but states that when she goes off the Prednisone it starts hurting again.  Pain at worst is 6-7/10 (aggravating activities include walking > 5 minutes, prolonged standing > 1-2 minutes - even in shower). Pain at best is 0/10 (eases pain with prednisone, rest, taping, mobic, ice). Pt states she was given boot to wear to help with the pain but she cannot wear this while driving (she is supposed to wear this 4 weeks starting last Friday). Past Medical History/Comorbidities:   Ms. Devin Harper  has a past medical history of Acid reflux, ADHD (attention deficit hyperactivity disorder), Allergic rhinitis (1/20/2014), Anxiety disorder (1/20/2014), Arthritis, Asthma, B12 deficiency (1/20/2014), Cerumen impaction, Chronic sinusitis, Depression (1/20/2014), Deviated nasal septum, Endocrine disease, Fibromyalgia, GERD (gastroesophageal reflux disease) (1/20/2014), Headache, Hypothyroidism, Low blood sugar, Meniere disease (1/20/2014), Migraine, Nasal obstruction, OCD (obsessive compulsive disorder), Psychiatric disorder, Special examinations, Special examinations, Tinnitus, and Unspecified hypothyroidism (1/20/2014). Ms. Devin Harper  has a past surgical history that includes hx orthopaedic; hx heent; hx heent (07/03/2013); and hx heent (06/2017). Social History/Living Environment:    lives alone in one story apartment with elevator to get up (15 flights of stairs with B railing - occasionally has to take the stairs down if she has an appointment and the elevator is busy)  Prior Level of Function/Work/Activity:  On disability for bipolar disorder and anxiety; likes to walk downtown with her friends, does housework (vacuuming, tidying up, light cooking); has to shop for her groceries  Dominant Side:         RIGHT  Other Clinical Tests:          X-rays of R foot (showed heel spur according to pt)  Previous Treatment Approaches:          Prednisone has helped  Personal Factors:          Sex:  female        Age:  41 y.o.   Current Medications:    Current Outpatient Medications:     mirabegron ER (MYRBETRIQ) 25 mg ER tablet, Take 1 Tab by mouth nightly., Disp: 30 Tab, Rfl: 3    levothyroxine (SYNTHROID) 125 mcg tablet, Take 1 Tab by mouth daily. , Disp: 90 Tab, Rfl: 1    Dexlansoprazole (DEXILANT) 60 mg CpDB, Take 1 Cap by mouth every morning., Disp: 90 Cap, Rfl: 1    candesartan (ATACAND) 4 mg tablet, Take 1 Tab by mouth daily. , Disp: 90 Tab, Rfl: 1    albuterol (PROVENTIL HFA, VENTOLIN HFA, PROAIR HFA) 90 mcg/actuation inhaler, Take 2 Puffs by inhalation as needed for Wheezing., Disp: 3 Inhaler, Rfl: 1    montelukast (SINGULAIR) 10 mg tablet, Take 1 Tab by mouth daily. , Disp: 90 Tab, Rfl: 1    cetirizine (ZYRTEC) 10 mg tablet, Take 1 Tab by mouth daily. , Disp: 90 Tab, Rfl: 1    meloxicam (MOBIC) 15 mg tablet, Take 1 Tab by mouth daily. , Disp: 90 Tab, Rfl: 1    diclofenac (VOLTAREN) 1 % gel, Apply 2 g to affected area four (4) times daily. Apply to right foot as needed 2-4 times a day, Disp: 100 g, Rfl: 2    tiZANidine (ZANAFLEX) 4 mg tablet, Take 1 Tab by mouth three (3) times daily as needed. , Disp: 90 Tab, Rfl: 1    IRON, FERROUS SULFATE, PO, Take 45 mg by mouth daily. , Disp: , Rfl:     cyanocobalamin 1,000 mcg tablet, Take 1 Tab by mouth daily. , Disp: 60 Tab, Rfl: 0    butalbital-acetaminophen (BUPAP)  mg tab, Take 1 Tab by mouth daily as needed. , Disp: 30 Tab, Rfl: 5    venlafaxine-SR (EFFEXOR XR) 150 mg capsule, Take  by mouth daily. , Disp: , Rfl:     escitalopram oxalate (LEXAPRO) 20 mg tablet, Take 20 mg by mouth daily. , Disp: , Rfl:     Magnesium Oxide 500 mg cap, Take 500 mg by mouth daily. , Disp: , Rfl:     traMADol (ULTRAM) 50 mg tablet, Take 1 Tab by mouth every eight (8) hours as needed for Pain. Max Daily Amount: 150 mg., Disp: 30 Tab, Rfl: 1    AMITIZA 24 mcg capsule, TK 1 C PO BID WF AND WATER, Disp: , Rfl: 6    ALPRAZolam (XANAX) 1 mg tablet, 0.5 mg three (3) times daily as needed. , Disp: , Rfl: 4    risperiDONE (RISPERDAL) 2 mg tablet, Take 3 mg by mouth nightly., Disp: , Rfl: 3    LAMICTAL 200 mg tablet, TK 1 T PO BID, Disp: , Rfl: 4    pregabalin (LYRICA) 75 mg capsule, tid, Disp: , Rfl:     melatonin tab tablet, Take 5 mg by mouth., Disp: , Rfl:     cholecalciferol (VITAMIN D3) 5,000 unit capsule, Take 2,000 Units by mouth., Disp: , Rfl:    Date Last Reviewed:  1/31/2019   # of Personal Factors/Comorbidities that affect the Plan of Care: 3+: HIGH COMPLEXITY   EXAMINATION:   Reassessment on 12/31//2018  Observation/Orthostatic Postural Assessment:    The following postural deficits were noted in sitting:  loss of cervical lordosis, increased thoracic kyphosis, forward head, rounded shoulders, posterior pelvic tilt - improved upright on 11/27/2018  The following postural deficits were noted in standing: forward trunk flexion, pronated foot L > R, slight genu valgus noted B - improved upright posture on 11/27/2018  Palpation:          Pt with tenderness noted at posterior R heel and at dorsum of R foot - less tenderness throughout R foot/ankle noted on 11/5/2018  ROM:    Initial measurement: (on 10/10/2018) Initial measurement: (on 10/10/2018) Reassessment measurement: (on 11/5/2018) Reassessment measurement:    L LE: WFL throughout, but excessive hip IR passively R LE: WFL throughout, but excessive hip IR passively; ankle motion WFL but significant tightness noted in dorsiflexors with pain in ankle R LE: ankle motion WFL - tightness noted in dorsiflexors with minimal to no pain in ankle      Strength:    Initial measurement: (on 10/10/2018) Initial measurement: (on 10/10/2018) Reassessment measurement: (on 12/31/2018) Reassessment measurement: (on 12/31/2018)   L LE:  Hip flexion: 5/5   Hip extension: 4-/5  Hip abduction: 4-/5  Hip adduction: 5/5  Hip IR: 4+/5  Hip ER: 4/5   Knee flexion:  4/5  Knee extension: 5/5  Ankle DF: 4/5 R LE:  Hip flexion: 5/5  Hip extension: 4-/5  Hip abduction: 4/5  Hip adduction: 4/5  Hip IR: 4+/5  Hip ER: 4/5  Knee flexion: 4+/5  Knee extension: 5/5  Ankle DF: 4-/5 L LE:  Hip flexion: 5/5   Hip extension: 4/5  Hip abduction: 4/5  Hip adduction: 4/5  Hip IR: 4+/5  Hip ER: 4/5   Knee flexion:  4+/5  Knee extension: 5/5  Ankle DF: 4/5 R LE:  Hip flexion: 5/5  Hip extension: 4/5  Hip abduction: 4/5  Hip adduction: 4/5  Hip IR: 4+/5  Hip ER: 4/5  Knee flexion: 4+/5  Knee extension: 5/5  Ankle DF: 4/5     Special Tests:          No leg length discrepancy noted  Neurological Screen:        Myotomes:  WFL        Dermatomes:  No deficits noted  Functional Mobility:         Gait/Ambulation:  Pt ambulates with no AD with following gait deficits: decreased B step length/clearance, increased pronation at each foot in stance phase, decreased B heel strike, decreased B arm swing, slight forward trunk flexion, increased lateral sway - slightly improved upright posture and improved B step length/clearance and heel strike on 11/5/2018 - improved upright posture but continuing to notice decreased heel strike with excessive knee flexion during stance phase at each LE on 12/31/2018        Transfers:  Pt able to stand/sit with minimal to no UE use        Bed Mobility:  Pt able to perform Magdalena with increased time  Balance:          Slight lateral sway with ambulation    Body Structures Involved:   1. Nerves  2. Bones  3. Joints  4. Muscles  5. Ligaments Body Functions Affected:  1. Sensory/Pain  2. Neuromusculoskeletal  3. Movement Related Activities and Participation Affected:  1. General Tasks and Demands  2. Mobility  3. Self Care  4. Domestic Life  5. Interpersonal Interactions and Relationships  6. Community, Social and [place] B[place]in   # of elements that affect the Plan of Care: 4+: HIGH COMPLEXITY   CLINICAL PRESENTATION:   Presentation: Evolving clinical presentation with changing clinical characteristics: MODERATE COMPLEXITY   CLINICAL DECISION MAKING:   Outcome Measure:    Tool Used: Lower Extremity Functional Scale (LEFS)  Score:  Initial: 27/80 Most Recent: 50/80 (Date: 12/31/2018)   Interpretation of Score: 20 questions each scored on a 5 point scale with 0 representing \"extreme difficulty or unable to perform\" and 4 representing \"no difficulty\". The lower the score, the greater the functional disability. 80/80 represents no disability. Minimal detectable change is 9 points. Score 80 79-63 62-48 47-32 31-16 15-1 0   Modifier CH CI CJ CK CL CM CN     ? Mobility - Walking and Moving Around:     - CURRENT STATUS: CJ - 20%-39% impaired, limited or restricted    - GOAL STATUS: CJ - 20%-39% impaired, limited or restricted    - D/C STATUS:  ---------------To be determined---------------    Tool Used: PT/OT FOOT AND ANKLE ABILITY MEASURE  Score:  Initial: 36/84 Most Recent:  56/84 (Date: 12/31/2018)   Interpretation of Score: For the \"Activities of Daily Living\", there are 21 questions each scored on a 5 point scale with 0 representing \"Unable to do\" and 4 representing \"No difficulty\". The lower the score, the greater the functional disability. 84/84 represents no disability. Minimal detectable change is 5.7 points. With the addition of the 8 questions in the \"Sports Subscale,\" there are 29 questions, each scored on a 5 point scale with 0 representing \"Unable to do\" and 4 representing \"No difficulty\". The lower the score, the greater the functional disability. 116/116 represents no disability. Minimal detectable change is 12.3 points. Activities of Daily Living:  Score 84 83-68 67-51 50-34 33-18 17-1 0   Modifier CH CI CJ CK CL CM CN     Activities of Daily Living + Sports Subscale:  Score 116 115-94 93-71 70-47 46-24 23-1 0   Modifier CH CI CJ CK CL CM CN     Payor: [employer] MEDICARE / Plan: [address] [place] [address] HMO / Product Type: Managed Care Medicare /   Medical Necessity:   · Patient is expected to demonstrate progress in strength, range of motion, balance and functional technique to improve ability to perform pain-free standing/ambulation.   · Patient demonstrates good rehab potential due to higher previous functional level. Reason for Services/Other Comments:  · Patient continues to require modification of therapeutic interventions to increase complexity of exercises. Use of outcome tool(s) and clinical judgement create a POC that gives a: Questionable prediction of patient's progress: MODERATE COMPLEXITY   TREATMENT:   (In addition to Assessment/Re-Assessment sessions the following treatments were rendered)  Subjective comments at beginning of session: Pt states she was walking up a lot of stairs (6 flights) yesterday, and it flared her ankle up a little, although now it feels better again; states her fibro has also been flaring up more with the changing weather  THERAPEUTIC EXERCISE: (32 minutes):  Exercises per grid below to improve mobility, strength, balance and dynamic movement of ankle - right to improve functional mobility. Required minimal visual, verbal and manual cues to promote proper body alignment, promote proper body posture, promote proper body mechanics and promote proper body breathing techniques. Progressed resistance, range, repetitions and complexity of movement as indicated.     Date:  12/28/2018 Date:  12/31/2018 Date:  1/3/2018 Date:  1/11/2019 Date:  1/24/2019 Date:  1/31/2019   Activity/Exercise         Physiostep L3 resistance for 15 minutes to increase strength/ endurance L3 resistance for 15 minutes to increase strength/ endurance L3 progressing to L4 resistance (after 5 minutes) for 15 minutes to increase strength/ endurance L3 quickly progressing to L4 resistance (after 5 minutes) for 15 minutes to increase strength/ endurance L3 quickly progressing to L4 resistance (after 2 minutes) for 15 minutes to increase strength/ endurance L1 gradually progressing to L4 resistance for 15 minutes to increase strength/ endurance   Ankle plantarflexor stretch on incline board 3 x 30 sec hold with knees extended then flexed 3 x 30 sec hold with knees extended then flexed 3 x 30 sec hold with knees extended then flexed 3 x 30 sec hold with knees extended then flexed; progressed to 3rd notch from bottom 3 x 30 sec hold with knees extended then flexed; progressed to 3rd notch from bottom 3 x 30 sec hold with knees extended then flexed; progressed to 3rd notch from bottom   Heel raises on firm ground B UE support with feet on incline board (1st notch from bottom); slow lowering on B LEs for 15 reps/1 set B UE support with feet on incline boarbd (1st notch from bottom); slow lowering on R LE only for 15 reps/1 set B UE support with feet on incline board (1st notch from bottom); slow lowering on R LE only for 15 reps/1 set B UE support with feet on incline board (2nd notch from bottom); slow lowering on R LE only for 10 reps/1 set B UE support with feet on incline board (2nd notch from bottom); slow lowering on R LE only for 12 reps/1 set B UE support with feet on incline board (2nd notch from bottom); slow lowering on R LE only for 12 reps/1 set   Supine hamstring stretch         Leg press machine 30 lb; 20 reps/1 set with cues for slow movements with increased control 35 lb; 15 reps/1 set with cues for slow movements with increased control 35 lb; 20 reps/1 set with cues for slow movements with increased control 40 lb; 20 reps/1 set with cues for slow movements with increased control 20 lb with R LE only; 20 reps/1 set with cues for slow movements with increased control 20 lb with R LE only; 15 reps/1 set with cues for slow movements with increased control   Ambulation over level ground         Ascending/descending stairs  12 steps with reciprocal gait and no rail use per assessment above with minimal to moderate increase in pain       Step ups         Varying resistance at LEs in different planes of motion  Per strength assessment above       Standing LE marching on blue foam     Cues for slow movement; 20 reps/1 set with intermittent UE support and cues for control    *given in HEP  [employer] Portal      Manual therapy (8 minutes): With pt in prone position, STM to R gastrocnemius and soleus as well as cross friction massage to R achilles tendon to reduce tightness and pain and improve ankle mobility. Followed with ice massage to R achilles tendon to reduce pain and inflammation. Modalities ( minutes). Treatment/Session Assessment: Pt had a little more difficulty with exercises this session secondary to increased R thigh soreness (potentially from going up 6 flights of stairs). She reported slightly increased pain at end of session. Progressing quickly toward discharge - will focus more on balance and R ankle stability as tolerated within next few sessions. · Pain/ Symptoms: Initial:   1-2/10 in posterior R ankle  Post Session: 3-4/10 ·   Compliance with Program/Exercises: Good compliance with attending scheduled sessions. · Recommendations/Intent for next treatment session: \"Next visit will focus on advancements to more challenging activities and reduction in assistance provided\".  Manual therapy/modalities as needed to reduce tightness and pain; work on eccentric plantarflexor strengthening and balance  Total Treatment Duration:   PT Patient Time In/Time Out  Time In: 1101  Time Out: Yary Cardenas 107, DPT    Future Appointments   Date Time Provider Jo Nolan   2/6/2019  1:00 PM Geoffrey Vargas DPT [facility]   5/1/2019  8:45 AM MTV PM LAB/RAD [facility]   5/8/2019  9:20 AM Zully Cheema MD [facility]

## 2022-10-20 ENCOUNTER — TELEMEDICINE (OUTPATIENT)
Dept: FAMILY MEDICINE CLINIC | Facility: CLINIC | Age: 47
End: 2022-10-20
Payer: MEDICARE

## 2022-10-20 DIAGNOSIS — I10 HYPERTENSION, UNSPECIFIED TYPE: Primary | ICD-10-CM

## 2022-10-20 DIAGNOSIS — J45.40 MODERATE PERSISTENT ASTHMA, UNSPECIFIED WHETHER COMPLICATED: ICD-10-CM

## 2022-10-20 PROCEDURE — G8427 DOCREV CUR MEDS BY ELIG CLIN: HCPCS | Performed by: FAMILY MEDICINE

## 2022-10-20 PROCEDURE — 99214 OFFICE O/P EST MOD 30 MIN: CPT | Performed by: FAMILY MEDICINE

## 2022-10-20 RX ORDER — CLONAZEPAM 1 MG/1
TABLET ORAL
COMMUNITY
Start: 2022-10-09

## 2022-10-20 RX ORDER — CANDESARTAN 4 MG/1
4 TABLET ORAL DAILY
Qty: 30 TABLET | Refills: 1 | Status: SHIPPED | OUTPATIENT
Start: 2022-10-20

## 2022-10-20 RX ORDER — ALBUTEROL SULFATE 90 UG/1
1-2 AEROSOL, METERED RESPIRATORY (INHALATION) EVERY 4 HOURS PRN
Qty: 18 G | Refills: 2 | Status: SHIPPED | OUTPATIENT
Start: 2022-10-20

## 2022-10-20 SDOH — ECONOMIC STABILITY: FOOD INSECURITY: WITHIN THE PAST 12 MONTHS, YOU WORRIED THAT YOUR FOOD WOULD RUN OUT BEFORE YOU GOT MONEY TO BUY MORE.: NEVER TRUE

## 2022-10-20 SDOH — ECONOMIC STABILITY: FOOD INSECURITY: WITHIN THE PAST 12 MONTHS, THE FOOD YOU BOUGHT JUST DIDN'T LAST AND YOU DIDN'T HAVE MONEY TO GET MORE.: NEVER TRUE

## 2022-10-20 ASSESSMENT — PATIENT HEALTH QUESTIONNAIRE - PHQ9
3. TROUBLE FALLING OR STAYING ASLEEP: 0
SUM OF ALL RESPONSES TO PHQ QUESTIONS 1-9: 7
1. LITTLE INTEREST OR PLEASURE IN DOING THINGS: 0
8. MOVING OR SPEAKING SO SLOWLY THAT OTHER PEOPLE COULD HAVE NOTICED. OR THE OPPOSITE, BEING SO FIGETY OR RESTLESS THAT YOU HAVE BEEN MOVING AROUND A LOT MORE THAN USUAL: 0
10. IF YOU CHECKED OFF ANY PROBLEMS, HOW DIFFICULT HAVE THESE PROBLEMS MADE IT FOR YOU TO DO YOUR WORK, TAKE CARE OF THINGS AT HOME, OR GET ALONG WITH OTHER PEOPLE: 0
SUM OF ALL RESPONSES TO PHQ QUESTIONS 1-9: 7
6. FEELING BAD ABOUT YOURSELF - OR THAT YOU ARE A FAILURE OR HAVE LET YOURSELF OR YOUR FAMILY DOWN: 2
9. THOUGHTS THAT YOU WOULD BE BETTER OFF DEAD, OR OF HURTING YOURSELF: 0
5. POOR APPETITE OR OVEREATING: 2
SUM OF ALL RESPONSES TO PHQ QUESTIONS 1-9: 7
SUM OF ALL RESPONSES TO PHQ QUESTIONS 1-9: 7
2. FEELING DOWN, DEPRESSED OR HOPELESS: 1
4. FEELING TIRED OR HAVING LITTLE ENERGY: 1
SUM OF ALL RESPONSES TO PHQ9 QUESTIONS 1 & 2: 1
7. TROUBLE CONCENTRATING ON THINGS, SUCH AS READING THE NEWSPAPER OR WATCHING TELEVISION: 1

## 2022-10-20 ASSESSMENT — SOCIAL DETERMINANTS OF HEALTH (SDOH): HOW HARD IS IT FOR YOU TO PAY FOR THE VERY BASICS LIKE FOOD, HOUSING, MEDICAL CARE, AND HEATING?: NOT HARD AT ALL

## 2022-10-20 NOTE — PROGRESS NOTES
Kimberly  [address] [place] [address]  Phone: [phone]  Fax: [phone]  [email or web address]      Encounter [id number]; Established patient 41 y. o.female; seen 10/20/2022 for: Follow-up, Hypertension, and Hypothyroidism      Assessment & Plan    1. Hypertension, unspecified type  -     candesartan (ATACAND) 4 MG tablet; Take 1 tablet by mouth daily, Disp-30 tablet, R-1Normal  2. Moderate persistent asthma, unspecified whether complicated  -     albuterol sulfate HFA (PROVENTIL;VENTOLIN;PROAIR) 108 (90 Base) MCG/ACT inhaler; Inhale 1-2 puffs into the lungs every 4 hours as needed for Wheezing or Shortness of Breath, Disp-18 g, R-2Normal    Problem and/or Symptoms are currently not stable and/or well controlled on current treatment plan. Will have patient follow up as directed and make the following changes for further evaluation and/or treatment:     Changing Valsartan back to Candesartan since pt felt better on this regimen; also RF rescue MDI today. Check Out Instructions  Return for Next Scheduled. Subjective & Objective    HPI  Pt reports her BP readings being quite labile, sometimes low & other times high, and that she's feeling lightheaded fairly often as well. This started after her Candesartan was changed to Valsartan at her last visit. Review of Systems     Physical Exam  No flowsheet data found. BP Readings from Last 3 Encounters:   06/07/22 139/68   05/16/22 118/84   05/10/22 119/72     Wt Readings from Last 3 Encounters:   06/07/22 191 lb (86.6 kg)   05/16/22 189 lb 9.6 oz (86 kg)   05/10/22 190 lb (86.2 kg)     There is no height or weight on file to calculate BMI. We discussed the typical prognosis and potential complications of the concern(s), including treatment options. Medication risks, benefits, costs, interactions, and alternatives were discussed as appropriate.   I advised her to contact the office if her condition worsens or fails to improve as anticipated. She expressed understanding with the discussion and plan of care. Bola Petersen, was evaluated through a synchronous (real-time) audio and/or video encounter. The patient (or guardian if applicable) is aware that this is a billable service, which includes applicable co-pays. This Virtual Visit was conducted with patient's (and/or legal guardian's) consent. The visit was conducted pursuant to the emergency declaration under the [address] [place] [address], [address] [place] [address] authority and the [employer] and [employer] General Act. Patient identification was verified, and a caregiver was present when appropriate. The patient was located at Home: [address] [address]. Provider was located at [facility] (Appt Dept): [id number] [place] [address]. An electronic signature was used to authenticate this note.   -- Haynes Kawasaki, MD

## 2022-10-27 ENCOUNTER — OFFICE VISIT (OUTPATIENT)
Dept: ORTHOPEDIC SURGERY | Age: 47
End: 2022-10-27
Payer: MEDICARE

## 2022-10-27 DIAGNOSIS — M54.50 LUMBAR BACK PAIN: ICD-10-CM

## 2022-10-27 DIAGNOSIS — M47.816 SPONDYLOSIS OF LUMBAR REGION WITHOUT MYELOPATHY OR RADICULOPATHY: Primary | ICD-10-CM

## 2022-10-27 DIAGNOSIS — M54.16 LUMBAR RADICULOPATHY: ICD-10-CM

## 2022-10-27 PROCEDURE — G8417 CALC BMI ABV UP PARAM F/U: HCPCS | Performed by: PHYSICAL MEDICINE & REHABILITATION

## 2022-10-27 PROCEDURE — 4004F PT TOBACCO SCREEN RCVD TLK: CPT | Performed by: PHYSICAL MEDICINE & REHABILITATION

## 2022-10-27 PROCEDURE — G8428 CUR MEDS NOT DOCUMENT: HCPCS | Performed by: PHYSICAL MEDICINE & REHABILITATION

## 2022-10-27 PROCEDURE — G8484 FLU IMMUNIZE NO ADMIN: HCPCS | Performed by: PHYSICAL MEDICINE & REHABILITATION

## 2022-10-27 PROCEDURE — 99213 OFFICE O/P EST LOW 20 MIN: CPT | Performed by: PHYSICAL MEDICINE & REHABILITATION

## 2022-10-27 NOTE — PROGRESS NOTES
Date:  10/27/22     HPI:  I am seeing Richelle Horne in evalution/folowup. I last saw her in the office setting in February. She has pain in the lower lumbar paraspinal areas as of late. She has had bilateral lower lumbar trigger point injections that can have a benefit for up to 3 months. These were most recently performed in early July. The pain is come back somewhat but she does not feel it is to the point she wants reinjection. She is tried topical medications. She has had some response to physical therapy as well as oral steroids. She did have some radicular pain down into the right foot but that is gotten better. Prior MR imaging of the lumbar spine from around 6 years ago revealed degenerative changes. That time a translaminar lumbar KIM provided few months of benefit. She apparently carries a diagnosis of fibromyalgia. ROS: As above    PE: Alert and cooperative. Good strength in the lower extremities. Tenderness in the lower lumbar paraspinal areas. Good range of motion in the hips. Ambulates without assistance    Assessment/Plan:  PREDOMINANTLY MUSCULOSKELETAL LUMBOSACRAL  SPINE PAIN. Currently issues are most referable to facet joint arthropathy. Has had favorable responses to trigger point injections but only for about 3 months at best.  We can always repeat those when she feels the need, she does not think anything needs to be done today. We could try 1 set of bilateral lumbar facet injections see if we cannot get a better response. If they are no better than the triggerpoints, we can always resort to the latter. If she needs a nerve root block or epidural for recurrent radicular symptoms, she is aware that injection would have to be done under fluoroscopy. I do not see an immediate indication to repeat an MRI of the lumbar spine. I like to see her back in a year for continuity of care but will presumably need to intervene prior to that.     Jin Alberto MD

## 2022-11-29 DIAGNOSIS — E53.8 VITAMIN B12 DEFICIENCY: ICD-10-CM

## 2022-11-29 DIAGNOSIS — E03.9 HYPOTHYROIDISM (ACQUIRED): ICD-10-CM

## 2022-11-29 DIAGNOSIS — I10 HYPERTENSION, UNSPECIFIED TYPE: ICD-10-CM

## 2022-11-29 DIAGNOSIS — E55.9 VITAMIN D DEFICIENCY: ICD-10-CM

## 2022-11-29 LAB
25(OH)D3 SERPL-MCNC: 31.6 NG/ML (ref 30–100)
ALBUMIN SERPL-MCNC: 3.5 G/DL (ref 3.5–5)
ALBUMIN/GLOB SERPL: 1.1 {RATIO} (ref 0.4–1.6)
ALP SERPL-CCNC: 58 U/L (ref 50–136)
ALT SERPL-CCNC: 21 U/L (ref 12–65)
ANION GAP SERPL CALC-SCNC: 6 MMOL/L (ref 2–11)
AST SERPL-CCNC: 9 U/L (ref 15–37)
BILIRUB SERPL-MCNC: 0.3 MG/DL (ref 0.2–1.1)
BUN SERPL-MCNC: 12 MG/DL (ref 6–23)
CALCIUM SERPL-MCNC: 9.2 MG/DL (ref 8.3–10.4)
CHLORIDE SERPL-SCNC: 108 MMOL/L (ref 101–110)
CO2 SERPL-SCNC: 28 MMOL/L (ref 21–32)
CREAT SERPL-MCNC: 1 MG/DL (ref 0.6–1)
GLOBULIN SER CALC-MCNC: 3.2 G/DL (ref 2.8–4.5)
GLUCOSE SERPL-MCNC: 87 MG/DL (ref 65–100)
POTASSIUM SERPL-SCNC: 4.4 MMOL/L (ref 3.5–5.1)
PROT SERPL-MCNC: 6.7 G/DL (ref 6.3–8.2)
SODIUM SERPL-SCNC: 142 MMOL/L (ref 133–143)
T4 FREE SERPL-MCNC: 1 NG/DL (ref 0.78–1.46)
TSH, 3RD GENERATION: 10.6 UIU/ML (ref 0.36–3.74)
VIT B12 SERPL-MCNC: 262 PG/ML (ref 193–986)

## 2022-11-30 ENCOUNTER — APPOINTMENT (OUTPATIENT)
Dept: CT IMAGING | Age: 47
End: 2022-11-30
Payer: MEDICARE

## 2022-11-30 ENCOUNTER — HOSPITAL ENCOUNTER (OUTPATIENT)
Age: 47
Setting detail: OBSERVATION
Discharge: HOME | End: 2022-12-01
Attending: EMERGENCY MEDICINE | Admitting: FAMILY MEDICINE
Payer: MEDICARE

## 2022-11-30 DIAGNOSIS — M15.9 OSTEOARTHRITIS OF MULTIPLE JOINTS, UNSPECIFIED OSTEOARTHRITIS TYPE: ICD-10-CM

## 2022-11-30 DIAGNOSIS — M79.7 FIBROMYALGIA: ICD-10-CM

## 2022-11-30 DIAGNOSIS — G43.909 MIGRAINE WITHOUT STATUS MIGRAINOSUS, NOT INTRACTABLE, UNSPECIFIED MIGRAINE TYPE: ICD-10-CM

## 2022-11-30 DIAGNOSIS — R20.0 RIGHT SIDED NUMBNESS: Primary | ICD-10-CM

## 2022-11-30 PROBLEM — R29.90 STROKE-LIKE SYMPTOM: Status: ACTIVE | Noted: 2022-11-30

## 2022-11-30 LAB
ALBUMIN SERPL-MCNC: 3.4 G/DL (ref 3.5–5)
ALBUMIN/GLOB SERPL: 0.9 {RATIO} (ref 0.4–1.6)
ALP SERPL-CCNC: 58 U/L (ref 50–136)
ALT SERPL-CCNC: 21 U/L (ref 12–65)
ANION GAP SERPL CALC-SCNC: 6 MMOL/L (ref 2–11)
APPEARANCE UR: ABNORMAL
AST SERPL-CCNC: 14 U/L (ref 15–37)
BACTERIA URNS QL MICRO: ABNORMAL /HPF
BASOPHILS # BLD: 0.1 K/UL (ref 0–0.2)
BASOPHILS NFR BLD: 1 % (ref 0–2)
BILIRUB SERPL-MCNC: 0.3 MG/DL (ref 0.2–1.1)
BILIRUB UR QL: NEGATIVE
BUN SERPL-MCNC: 12 MG/DL (ref 6–23)
CALCIUM SERPL-MCNC: 9.5 MG/DL (ref 8.3–10.4)
CASTS URNS QL MICRO: ABNORMAL /LPF
CHLORIDE SERPL-SCNC: 107 MMOL/L (ref 101–110)
CO2 SERPL-SCNC: 26 MMOL/L (ref 21–32)
COLOR UR: ABNORMAL
CREAT SERPL-MCNC: 1.2 MG/DL (ref 0.6–1)
DIFFERENTIAL METHOD BLD: ABNORMAL
EKG ATRIAL RATE: 82 BPM
EKG DIAGNOSIS: NORMAL
EKG P AXIS: 74 DEGREES
EKG P-R INTERVAL: 174 MS
EKG Q-T INTERVAL: 386 MS
EKG QRS DURATION: 105 MS
EKG QTC CALCULATION (BAZETT): 451 MS
EKG R AXIS: 89 DEGREES
EKG T AXIS: -14 DEGREES
EKG VENTRICULAR RATE: 82 BPM
EOSINOPHIL # BLD: 0.2 K/UL (ref 0–0.8)
EOSINOPHIL NFR BLD: 4 % (ref 0.5–7.8)
EPI CELLS #/AREA URNS HPF: ABNORMAL /HPF
ERYTHROCYTE [DISTWIDTH] IN BLOOD BY AUTOMATED COUNT: 15.5 % (ref 11.9–14.6)
GLOBULIN SER CALC-MCNC: 3.9 G/DL (ref 2.8–4.5)
GLUCOSE BLD STRIP.AUTO-MCNC: 128 MG/DL (ref 65–100)
GLUCOSE SERPL-MCNC: 141 MG/DL (ref 65–100)
GLUCOSE UR STRIP.AUTO-MCNC: NEGATIVE MG/DL
HCT VFR BLD AUTO: 37.4 % (ref 35.8–46.3)
HGB BLD-MCNC: 12 G/DL (ref 11.7–15.4)
HGB UR QL STRIP: NEGATIVE
IMM GRANULOCYTES # BLD AUTO: 0 K/UL (ref 0–0.5)
IMM GRANULOCYTES NFR BLD AUTO: 0 % (ref 0–5)
INR PPP: 0.9
KETONES UR QL STRIP.AUTO: NEGATIVE MG/DL
LEUKOCYTE ESTERASE UR QL STRIP.AUTO: NEGATIVE
LYMPHOCYTES # BLD: 1.5 K/UL (ref 0.5–4.6)
LYMPHOCYTES NFR BLD: 23 % (ref 13–44)
MCH RBC QN AUTO: 28.6 PG (ref 26.1–32.9)
MCHC RBC AUTO-ENTMCNC: 32.1 G/DL (ref 31.4–35)
MCV RBC AUTO: 89.3 FL (ref 82–102)
MONOCYTES # BLD: 0.4 K/UL (ref 0.1–1.3)
MONOCYTES NFR BLD: 6 % (ref 4–12)
MUCOUS THREADS URNS QL MICRO: 0 /LPF
NEUTS SEG # BLD: 4 K/UL (ref 1.7–8.2)
NEUTS SEG NFR BLD: 66 % (ref 43–78)
NITRITE UR QL STRIP.AUTO: NEGATIVE
NRBC # BLD: 0 K/UL (ref 0–0.2)
PH UR STRIP: 8 [PH] (ref 5–9)
PLATELET # BLD AUTO: 167 K/UL (ref 150–450)
PMV BLD AUTO: 11.2 FL (ref 9.4–12.3)
POTASSIUM SERPL-SCNC: 3.9 MMOL/L (ref 3.5–5.1)
PROT SERPL-MCNC: 7.3 G/DL (ref 6.3–8.2)
PROT UR STRIP-MCNC: NEGATIVE MG/DL
PROTHROMBIN TIME: 13 SEC (ref 12.6–14.3)
RBC # BLD AUTO: 4.19 M/UL (ref 4.05–5.2)
RBC #/AREA URNS HPF: ABNORMAL /HPF
SERVICE CMNT-IMP: ABNORMAL
SODIUM SERPL-SCNC: 139 MMOL/L (ref 133–143)
SP GR UR REFRACTOMETRY: >1.035 (ref 1–1.02)
TROPONIN I SERPL HS-MCNC: 3.6 PG/ML (ref 0–14)
URINE CULTURE IF INDICATED: ABNORMAL
UROBILINOGEN UR QL STRIP.AUTO: 0.2 EU/DL (ref 0.2–1)
WBC # BLD AUTO: 6.2 K/UL (ref 4.3–11.1)
WBC URNS QL MICRO: ABNORMAL /HPF

## 2022-11-30 PROCEDURE — 94640 AIRWAY INHALATION TREATMENT: CPT

## 2022-11-30 PROCEDURE — 6370000000 HC RX 637 (ALT 250 FOR IP): Performed by: FAMILY MEDICINE

## 2022-11-30 PROCEDURE — 99285 EMERGENCY DEPT VISIT HI MDM: CPT

## 2022-11-30 PROCEDURE — 2580000003 HC RX 258: Performed by: FAMILY MEDICINE

## 2022-11-30 PROCEDURE — 96372 THER/PROPH/DIAG INJ SC/IM: CPT

## 2022-11-30 PROCEDURE — APPNB60 APP NON BILLABLE TIME 46-60 MINS: Performed by: NURSE PRACTITIONER

## 2022-11-30 PROCEDURE — 6360000002 HC RX W HCPCS: Performed by: FAMILY MEDICINE

## 2022-11-30 PROCEDURE — 81001 URINALYSIS AUTO W/SCOPE: CPT

## 2022-11-30 PROCEDURE — 94664 DEMO&/EVAL PT USE INHALER: CPT

## 2022-11-30 PROCEDURE — G0378 HOSPITAL OBSERVATION PER HR: HCPCS

## 2022-11-30 PROCEDURE — 70450 CT HEAD/BRAIN W/O DYE: CPT

## 2022-11-30 PROCEDURE — 6360000004 HC RX CONTRAST MEDICATION: Performed by: EMERGENCY MEDICINE

## 2022-11-30 PROCEDURE — 80053 COMPREHEN METABOLIC PANEL: CPT

## 2022-11-30 PROCEDURE — 84484 ASSAY OF TROPONIN QUANT: CPT

## 2022-11-30 PROCEDURE — 85610 PROTHROMBIN TIME: CPT

## 2022-11-30 PROCEDURE — 70498 CT ANGIOGRAPHY NECK: CPT

## 2022-11-30 PROCEDURE — 85025 COMPLETE CBC W/AUTO DIFF WBC: CPT

## 2022-11-30 PROCEDURE — 82962 GLUCOSE BLOOD TEST: CPT

## 2022-11-30 PROCEDURE — 93005 ELECTROCARDIOGRAM TRACING: CPT | Performed by: EMERGENCY MEDICINE

## 2022-11-30 PROCEDURE — 2580000003 HC RX 258: Performed by: EMERGENCY MEDICINE

## 2022-11-30 RX ORDER — SODIUM CHLORIDE 9 MG/ML
INJECTION, SOLUTION INTRAVENOUS CONTINUOUS
Status: DISCONTINUED | OUTPATIENT
Start: 2022-11-30 | End: 2022-12-01 | Stop reason: HOSPADM

## 2022-11-30 RX ORDER — CYANOCOBALAMIN 1000 UG/ML
1000 INJECTION, SOLUTION INTRAMUSCULAR; SUBCUTANEOUS ONCE
Status: COMPLETED | OUTPATIENT
Start: 2022-11-30 | End: 2022-11-30

## 2022-11-30 RX ORDER — ASPIRIN 300 MG/1
300 SUPPOSITORY RECTAL DAILY
Status: DISCONTINUED | OUTPATIENT
Start: 2022-11-30 | End: 2022-12-01 | Stop reason: HOSPADM

## 2022-11-30 RX ORDER — PREGABALIN 75 MG/1
225 CAPSULE ORAL DAILY
Status: DISCONTINUED | OUTPATIENT
Start: 2022-12-01 | End: 2022-11-30

## 2022-11-30 RX ORDER — 0.9 % SODIUM CHLORIDE 0.9 %
100 INTRAVENOUS SOLUTION INTRAVENOUS
Status: COMPLETED | OUTPATIENT
Start: 2022-11-30 | End: 2022-11-30

## 2022-11-30 RX ORDER — NICOTINE 21 MG/24HR
1 PATCH, TRANSDERMAL 24 HOURS TRANSDERMAL EVERY 24 HOURS
Status: DISCONTINUED | OUTPATIENT
Start: 2022-11-30 | End: 2022-12-01 | Stop reason: HOSPADM

## 2022-11-30 RX ORDER — LAMOTRIGINE 100 MG/1
200 TABLET ORAL NIGHTLY
Status: DISCONTINUED | OUTPATIENT
Start: 2022-11-30 | End: 2022-12-01 | Stop reason: HOSPADM

## 2022-11-30 RX ORDER — FAMOTIDINE 20 MG/1
20 TABLET, FILM COATED ORAL 2 TIMES DAILY
Status: DISCONTINUED | OUTPATIENT
Start: 2022-11-30 | End: 2022-12-01 | Stop reason: HOSPADM

## 2022-11-30 RX ORDER — ALBUTEROL SULFATE 2.5 MG/3ML
2.5 SOLUTION RESPIRATORY (INHALATION) EVERY 6 HOURS PRN
Status: DISCONTINUED | OUTPATIENT
Start: 2022-11-30 | End: 2022-12-01 | Stop reason: HOSPADM

## 2022-11-30 RX ORDER — ATORVASTATIN CALCIUM 80 MG/1
80 TABLET, FILM COATED ORAL NIGHTLY
Status: DISCONTINUED | OUTPATIENT
Start: 2022-11-30 | End: 2022-12-01 | Stop reason: HOSPADM

## 2022-11-30 RX ORDER — CLONAZEPAM 0.5 MG/1
1 TABLET ORAL EVERY 8 HOURS PRN
Status: DISCONTINUED | OUTPATIENT
Start: 2022-11-30 | End: 2022-11-30

## 2022-11-30 RX ORDER — ENOXAPARIN SODIUM 100 MG/ML
40 INJECTION SUBCUTANEOUS DAILY
Status: DISCONTINUED | OUTPATIENT
Start: 2022-11-30 | End: 2022-12-01 | Stop reason: HOSPADM

## 2022-11-30 RX ORDER — FOLIC ACID 1 MG/1
1 TABLET ORAL DAILY
Status: DISCONTINUED | OUTPATIENT
Start: 2022-11-30 | End: 2022-12-01 | Stop reason: HOSPADM

## 2022-11-30 RX ORDER — VENLAFAXINE HYDROCHLORIDE 37.5 MG/1
75 CAPSULE, EXTENDED RELEASE ORAL
Status: DISCONTINUED | OUTPATIENT
Start: 2022-12-01 | End: 2022-12-01 | Stop reason: HOSPADM

## 2022-11-30 RX ORDER — ONDANSETRON 8 MG/1
4 TABLET, ORALLY DISINTEGRATING ORAL EVERY 8 HOURS PRN
Status: DISCONTINUED | OUTPATIENT
Start: 2022-11-30 | End: 2022-12-01 | Stop reason: HOSPADM

## 2022-11-30 RX ORDER — CHOLECALCIFEROL (VITAMIN D3) 125 MCG
5 CAPSULE ORAL NIGHTLY PRN
Status: ON HOLD | COMMUNITY
End: 2022-12-01 | Stop reason: HOSPADM

## 2022-11-30 RX ORDER — CLONAZEPAM 0.5 MG/1
0.5 TABLET ORAL EVERY 12 HOURS SCHEDULED
Status: DISCONTINUED | OUTPATIENT
Start: 2022-11-30 | End: 2022-12-01 | Stop reason: HOSPADM

## 2022-11-30 RX ORDER — LEVOTHYROXINE SODIUM 88 UG/1
88 TABLET ORAL
Status: DISCONTINUED | OUTPATIENT
Start: 2022-12-01 | End: 2022-12-01 | Stop reason: HOSPADM

## 2022-11-30 RX ORDER — ASPIRIN 81 MG/1
81 TABLET ORAL DAILY
Status: DISCONTINUED | OUTPATIENT
Start: 2022-11-30 | End: 2022-12-01 | Stop reason: HOSPADM

## 2022-11-30 RX ORDER — VALSARTAN 40 MG/1
20 TABLET ORAL DAILY
Status: DISCONTINUED | OUTPATIENT
Start: 2022-12-01 | End: 2022-12-01 | Stop reason: HOSPADM

## 2022-11-30 RX ORDER — BUPROPION HYDROCHLORIDE 150 MG/1
150 TABLET, EXTENDED RELEASE ORAL 2 TIMES DAILY
Status: DISCONTINUED | OUTPATIENT
Start: 2022-12-01 | End: 2022-12-01 | Stop reason: HOSPADM

## 2022-11-30 RX ORDER — LAMOTRIGINE 100 MG/1
300 TABLET ORAL DAILY
Status: DISCONTINUED | OUTPATIENT
Start: 2022-12-01 | End: 2022-12-01 | Stop reason: HOSPADM

## 2022-11-30 RX ORDER — PREGABALIN 25 MG/1
75 CAPSULE ORAL DAILY
Status: DISCONTINUED | OUTPATIENT
Start: 2022-12-01 | End: 2022-12-01 | Stop reason: HOSPADM

## 2022-11-30 RX ORDER — ONDANSETRON 2 MG/ML
4 INJECTION INTRAMUSCULAR; INTRAVENOUS EVERY 6 HOURS PRN
Status: DISCONTINUED | OUTPATIENT
Start: 2022-11-30 | End: 2022-12-01 | Stop reason: HOSPADM

## 2022-11-30 RX ORDER — VITAMIN B COMPLEX
1000 TABLET ORAL DAILY
Status: DISCONTINUED | OUTPATIENT
Start: 2022-12-01 | End: 2022-12-01 | Stop reason: HOSPADM

## 2022-11-30 RX ORDER — MONTELUKAST SODIUM 10 MG/1
10 TABLET ORAL NIGHTLY
Status: DISCONTINUED | OUTPATIENT
Start: 2022-11-30 | End: 2022-12-01 | Stop reason: HOSPADM

## 2022-11-30 RX ORDER — CLONAZEPAM 0.5 MG/1
0.5 TABLET ORAL DAILY PRN
Status: DISCONTINUED | OUTPATIENT
Start: 2022-12-01 | End: 2022-12-01 | Stop reason: HOSPADM

## 2022-11-30 RX ORDER — ACETAMINOPHEN 325 MG/1
650 TABLET ORAL EVERY 4 HOURS PRN
Status: DISCONTINUED | OUTPATIENT
Start: 2022-11-30 | End: 2022-12-01 | Stop reason: HOSPADM

## 2022-11-30 RX ORDER — RISPERIDONE 1 MG/1
4 TABLET ORAL NIGHTLY
Status: DISCONTINUED | OUTPATIENT
Start: 2022-11-30 | End: 2022-12-01 | Stop reason: HOSPADM

## 2022-11-30 RX ORDER — POLYETHYLENE GLYCOL 3350 17 G/17G
17 POWDER, FOR SOLUTION ORAL DAILY PRN
Status: DISCONTINUED | OUTPATIENT
Start: 2022-11-30 | End: 2022-12-01 | Stop reason: HOSPADM

## 2022-11-30 RX ORDER — LABETALOL HYDROCHLORIDE 5 MG/ML
10 INJECTION, SOLUTION INTRAVENOUS EVERY 10 MIN PRN
Status: DISCONTINUED | OUTPATIENT
Start: 2022-11-30 | End: 2022-12-01 | Stop reason: HOSPADM

## 2022-11-30 RX ORDER — SODIUM CHLORIDE 0.9 % (FLUSH) 0.9 %
10 SYRINGE (ML) INJECTION
Status: COMPLETED | OUTPATIENT
Start: 2022-11-30 | End: 2022-11-30

## 2022-11-30 RX ORDER — PANTOPRAZOLE SODIUM 40 MG/1
40 TABLET, DELAYED RELEASE ORAL
Status: DISCONTINUED | OUTPATIENT
Start: 2022-12-01 | End: 2022-11-30

## 2022-11-30 RX ORDER — ACETAMINOPHEN 650 MG/1
650 SUPPOSITORY RECTAL EVERY 4 HOURS PRN
Status: DISCONTINUED | OUTPATIENT
Start: 2022-11-30 | End: 2022-12-01 | Stop reason: HOSPADM

## 2022-11-30 RX ADMIN — FAMOTIDINE 20 MG: 20 TABLET, FILM COATED ORAL at 23:52

## 2022-11-30 RX ADMIN — MONTELUKAST 10 MG: 10 TABLET, FILM COATED ORAL at 23:52

## 2022-11-30 RX ADMIN — SODIUM CHLORIDE 100 ML: 9 INJECTION, SOLUTION INTRAVENOUS at 14:37

## 2022-11-30 RX ADMIN — ASPIRIN 81 MG: 81 TABLET ORAL at 18:36

## 2022-11-30 RX ADMIN — CLONAZEPAM 0.5 MG: 0.5 TABLET ORAL at 23:52

## 2022-11-30 RX ADMIN — MOMETASONE FUROATE AND FORMOTEROL FUMARATE DIHYDRATE 2 PUFF: 100; 5 AEROSOL RESPIRATORY (INHALATION) at 19:47

## 2022-11-30 RX ADMIN — PREGABALIN 150 MG: 75 CAPSULE ORAL at 23:54

## 2022-11-30 RX ADMIN — ENOXAPARIN SODIUM 40 MG: 100 INJECTION SUBCUTANEOUS at 18:36

## 2022-11-30 RX ADMIN — CYANOCOBALAMIN 1000 MCG: 1000 INJECTION, SOLUTION INTRAMUSCULAR; SUBCUTANEOUS at 23:57

## 2022-11-30 RX ADMIN — ATORVASTATIN CALCIUM 80 MG: 40 TABLET, FILM COATED ORAL at 23:53

## 2022-11-30 RX ADMIN — FOLIC ACID 1 MG: 1 TABLET ORAL at 18:36

## 2022-11-30 RX ADMIN — SODIUM CHLORIDE, PRESERVATIVE FREE 10 ML: 5 INJECTION INTRAVENOUS at 14:36

## 2022-11-30 RX ADMIN — SODIUM CHLORIDE: 9 INJECTION, SOLUTION INTRAVENOUS at 18:47

## 2022-11-30 RX ADMIN — VENLAFAXINE HYDROCHLORIDE 225 MG: 75 CAPSULE, EXTENDED RELEASE ORAL at 23:59

## 2022-11-30 RX ADMIN — IOPAMIDOL 100 ML: 755 INJECTION, SOLUTION INTRAVENOUS at 14:37

## 2022-11-30 RX ADMIN — LAMOTRIGINE 200 MG: 100 TABLET ORAL at 23:55

## 2022-11-30 RX ADMIN — RISPERIDONE 4 MG: 1 TABLET ORAL at 23:56

## 2022-11-30 ASSESSMENT — PAIN SCALES - GENERAL: PAINLEVEL_OUTOF10: 0

## 2022-11-30 ASSESSMENT — ENCOUNTER SYMPTOMS
NAUSEA: 0
VOMITING: 0
COUGH: 0
TROUBLE SWALLOWING: 0
SHORTNESS OF BREATH: 0

## 2022-11-30 ASSESSMENT — PAIN - FUNCTIONAL ASSESSMENT: PAIN_FUNCTIONAL_ASSESSMENT: 0-10

## 2022-11-30 NOTE — CONSULTS
[facility] [address]  [address] [place] [address], [address] [facility]            Daya Bray is a 41 y.o. female who presents on referral from  The emergency room for an acute Code S patient awakened this morning with numbness on the right side and some degree feeling of disorientation such that she did not feel well enough to drive.   The patient has had a couple of episodes of this type of phenomenon in the past she has not had otherwise any transient focal neurologic difficulty although does have a background history of Ménière's which may suggest vertigo in the past.  She carries a number of other diagnoses including B12 deficiency and fibromyalgia    Past Medical History:   Diagnosis Date    ADHD (attention deficit hyperactivity disorder)     Allergic rhinitis 1/20/2014    Anxiety disorder 1/20/2014    Asthma     B12 deficiency 1/20/2014    Depression 1/20/2014    Essential hypertension     Fibromyalgia     GERD (gastroesophageal reflux disease) 1/20/2014    Hypothyroidism     Meniere disease 1/20/2014    Migraine     OCD (obsessive compulsive disorder)     Tinnitus        Past Surgical History:   Procedure Laterality Date    HEENT  06/2017    nasal procedure (balloon inserted, drainage)    HEENT  07/03/2013    Septo / Fess / release CP    HEENT      eye    ORTHOPEDIC SURGERY      jaw    OTHER SURGICAL HISTORY      sinus implants        Family History   Problem Relation Age of Onset    Breast Cancer Maternal Grandmother 80    Hypertension Mother     Hypertension Brother     Heart Disease Mother     Hypertension Other     Heart Disease Other     Elevated Lipids Brother     Elevated Lipids Mother     Cancer Other     Asthma Other     Cancer Father     Coronary Art Dis Mother     Diabetes Other         Social History     Socioeconomic History    Marital status:    Tobacco Use    Smoking status: Some Days     Packs/day: 0.50     Types: Cigarettes     Last attempt to quit: 4/2/2008 Years since quittin.6    Smokeless tobacco: Never    Tobacco comments:     Quit smoking: STARTED BACK 2021   Vaping Use    Vaping Use: Never used   Substance and Sexual Activity    Alcohol use: No     Alcohol/week: 0.0 standard drinks    Drug use: No     Social Determinants of Health     Financial Resource Strain: Low Risk     Difficulty of Paying Living Expenses: Not hard at all   Food Insecurity: No Food Insecurity    Worried About Running Out of Food in the Last Year: Never true    Ran Out of Food in the Last Year: Never true         Current Facility-Administered Medications   Medication Dose Route Frequency Provider Last Rate Last Admin    0.9 % sodium chloride bolus  100 mL IntraVENous ONCE PRN Luisana Win MD 49.6 mL/hr at 22 1437 100 mL at 22 1437    methylPREDNISolone acetate (DEPO-MEDROL) injection 40 mg  40 mg Intra-artICUlar Once Marylu Suggs MD         Current Outpatient Medications   Medication Sig Dispense Refill    clonazePAM (KLONOPIN) 1 MG tablet TAKE 1 TABLET BY MOUTH THREE TIMES DAILY AS NEEDED      albuterol sulfate HFA (PROVENTIL;VENTOLIN;PROAIR) 108 (90 Base) MCG/ACT inhaler Inhale 1-2 puffs into the lungs every 4 hours as needed for Wheezing or Shortness of Breath 18 g 2    candesartan (ATACAND) 4 MG tablet Take 1 tablet by mouth daily 30 tablet 1    mirabegron (MYRBETRIQ) 50 MG TB24 Take 50 mg by mouth daily 90 tablet 1    dexlansoprazole (DEXILANT) 60 MG CPDR delayed release capsule Take 1 capsule by mouth daily 90 capsule 1    levothyroxine (SYNTHROID) 88 MCG tablet Take 1 tablet by mouth every morning (before breakfast) 90 tablet 1    meloxicam (MOBIC) 15 MG tablet Take 1 tablet by mouth daily 90 tablet 1    tiZANidine (ZANAFLEX) 4 MG tablet Take 1 tablet by mouth 3 times daily as needed (pain or muscle spasms) 270 tablet 1    buPROPion (WELLBUTRIN XL) 300 MG extended release tablet TAKE 1 TABLET BY MOUTH DAILY      ibuprofen (ADVIL;MOTRIN) 600 MG tablet TAKE 1 TABLET BY MOUTH EVERY 8 HOURS FOR UP TO 3 DAYS AS NEEDED FOR PAIN      ketoconazole (NIZORAL) 2 % shampoo       risperiDONE (RISPERDAL) 4 MG tablet TAKE 1 TABLET BY MOUTH AT BEDTIME      venlafaxine (EFFEXOR XR) 75 MG extended release capsule TAKE 1 CAPSULE BY MOUTH EVERY DAY      ALPRAZolam (XANAX) 1 MG tablet 0.5 mg 3 times daily as needed. Fluticasone furoate-vilanterol (BREO ELLIPTA) 200-25 MCG/INH AEPB inhaler Inhale 1 puff into the lungs daily      lamoTRIgine (LAMICTAL) 100 MG tablet T 3 TS PO QAM AND 2 TS QHS      melatonin 5 MG TABS tablet Take 5 mg by mouth      montelukast (SINGULAIR) 10 MG tablet Take 10 mg by mouth daily      pregabalin (LYRICA) 75 MG capsule Take 225 mg by mouth daily. risperiDONE (RISPERDAL) 1 MG tablet Take 3 mg by mouth      traMADol (ULTRAM) 50 MG tablet Take 50 mg by mouth every 8 hours as needed. venlafaxine (EFFEXOR XR) 150 MG extended release capsule Take by mouth daily          Allergies   Allergen Reactions    Latex Rash    Aripiprazole Other (See Comments)     Other reaction(s): Agitation-I  Caused severe akathesia, couldn't sit still. \"Worse 3 days of my life. \"    Ziprasidone Hcl Other (See Comments)     Other reaction(s): Agitation-I  \"moods swing off the chain\"  caused hospitalization, unsure of reaction    Topiramate Other (See Comments)    Zolpidem Other (See Comments)     'hallucinates'  hallucinations       Review of Systems  Review of systems  General reports normal energy. Sleep wake cycle appetite  ENT no sinus congestion no epistaxis no unilateral hearing loss no swallowing difficulty  Pulmonary-denies shortness of breath, no orthopnea, no wheezing, no productive sputum no hemoptysis  Cardiac denies chest pain palpitations peripheral edema  GI weight is stable appetite steady no constipation diarrhea abdominal pain  Joints no inflammation no hip pain or shoulder pain no inflammation.   No new onset back pain  Skin no rash or ecchymosis  Neuro see history and physical otherwise no syncope vertigo diplopia dysarthria dysphagia difficulty with balance or coordination unilateral weakness   no nocturia. Urinary control is normal.    Psychiatric no mood disorder no fritz no depression no outbursts or belligerent behavior    /72   Pulse 87   Temp 97.7 °F (36.5 °C) (Oral)   Resp 18   SpO2 97%     Neurologic Exam    The patient is alert cooperative and oriented. No evident skin lesions no evidence of excessive bruising no trauma  Patient looks well-hydrated. Does not appear chronically ill. Cranial nerve examination normal visual fields. Normal random eye movements. No ptosis. No lid lag  Face moves strongly symmetrically and equally  Speech is normal  Motor  Upper extremities  Normal bulk strength tone and symmetric arm swing  Lower extremities  Normal bulk strength tone  Deep tendon reflexes 1+ and symmetric at biceps brachioradialis and triceps  Casual movement of upper and lower extremities is observed while the patient is getting on the CT scanner table and this actually  Fine motor coordination is normal in the hands bilaterally    Peripheral sensation light touch normal  There are no carotid bruits  Vital signs are reviewed    Most recent MRI   Results for orders placed in visit on 06/05/18    MRI BRAIN W WO CONTRAST       Most recent MRA   No results found for this or any previous visit. Most recent CTA  Results for orders placed during the hospital encounter of 11/30/22    CT HEAD WO CONTRAST    Narrative  EXAM: CT HEAD WITHOUT CONTRAST    INDICATION: Stroke. COMPARISON: None. TECHNIQUE: Contiguous axial images were obtained from the skull base through the  vertex without IV contrast. Radiation dose reduction techniques were used for  this study. Our CT scanners use one or all of the following: Automated exposure  control, adjustment of the mA and/or kV according to patient size, iterative  reconstruction.     FINDINGS:  Normal appearance of the brain parenchyma without evidence of acute infarction,  hemorrhage, or mass lesion. No hydrocephalus or midline shift. No extra-axial  mass or hemorrhage. The basal cisterns are patent. The visualized portions of the orbits are normal. Right maxillary sinus mucosal  thickening. The visualized vascular structures have an unremarkable noncontrast appearance. Partially imaged bilateral facial reconstruction. The calvarium and soft tissues appear normal.    Impression  No acute intracranial abnormality evident by CT. Most recent Echo  No results found for this or any previous visit. Most recent lipid panels  Lab Results   Component Value Date/Time    CHOL 157 05/31/2022 12:43 PM    HDL 52 05/31/2022 12:43 PM    VLDL 13 06/03/2021 03:49 PM       Most recent Hgb A1C  No results found for: HBA1C, [id number]      Assessment/Plan:  Episodic right-sided numbness with very low NIH score.   Based upon the patient's age    Based upon the patient's age there would be several considerations in terms of vascular or possibly demyelination in the differential diagnosis additionally functional neurologic disorder is in the differential  Would recommend MRI brain with and without contrast and if it is positive for ischemia proceed with echocardiogram with bubble study, as the patient is in the age group where specific correction of a PFO if present would be entertained  If MRI of the brain shows lesions suggestive of demyelination proceed with treatment in that regard  Therapeutically initiate aspirin 81 mg  Defer determination with reference to statin until ischemic versus nonischemic nature is ascertained  Absolute tobacco cessation is necessary regardless of pathological process          Aleyda Hodges MD

## 2022-11-30 NOTE — ED PROVIDER NOTES
Emergency Department Provider Note                   PCP:                Rosendo Cuba MD               Age: 41 y.o. Sex: female       ICD-10-CM    1. Right sided numbness  R20.0 Transthoracic echocardiogram (TTE) complete with contrast, bubble, strain, and 3D PRN     Transthoracic echocardiogram (TTE) complete with contrast, bubble, strain, and 3D PRN          DISPOSITION Admitted 11/30/2022 04:22:41 PM       MDM  Number of Diagnoses or Management Options  Right sided numbness  Diagnosis management comments: Patient was activated as a code stroke. She was seen by neuro. Her head CT was negative. They recommended MRI. I called and spoke with MRI and we are unable to obtain the images today so she will be admitted to the hospitalist       Amount and/or Complexity of Data Reviewed  Clinical lab tests: ordered and reviewed  Tests in the radiology section of CPT®: ordered and reviewed  Independent visualization of images, tracings, or specimens: yes    Risk of Complications, Morbidity, and/or Mortality  Presenting problems: high  Diagnostic procedures: minimal  Management options: high               Orders Placed This Encounter   Procedures    CT HEAD WO CONTRAST    CTA HEAD NECK W CONTRAST    MRI brain with and without contrast    CBC with Auto Differential    Comprehensive Metabolic Panel    Protime-INR    Troponin    CBC    Hemoglobin A1c    Lipid Panel    Vitamin D 25 Hydroxy    Ferritin    Transferrin Saturation    Homocysteine, Plasma    Sedimentation Rate    Urinalysis with Reflex to Culture    Comprehensive Metabolic Panel    Magnesium    Diet NPO    NIHSS    Nursing swallow assessment    Vital signs    Up as tolerated    Adv Diet as Tolerated (nurse communication)    NIHSS/Neuro Checks    Tobacco cessation education    Swallow screen by nursing before diet and oral medications started.     Stroke education    Telemetry monitoring - 72 hour duration    Notify Physician    Full code    OT eval and treat    PT evaluation and treat    Initiate Oxygen Therapy Protocol    Speech Language Pathology (SLP) eval and treat    POCT Glucose    POCT Glucose    EKG 12 Lead    Place in Observation Service        Medications   ondansetron (ZOFRAN-ODT) disintegrating tablet 4 mg (has no administration in time range)     Or   ondansetron (ZOFRAN) injection 4 mg (has no administration in time range)   polyethylene glycol (GLYCOLAX) packet 17 g (has no administration in time range)   enoxaparin (LOVENOX) injection 40 mg (has no administration in time range)   aspirin EC tablet 81 mg (has no administration in time range)     Or   aspirin suppository 300 mg (has no administration in time range)   0.9 % sodium chloride infusion (has no administration in time range)   acetaminophen (TYLENOL) tablet 650 mg (has no administration in time range)     Or   acetaminophen (TYLENOL) suppository 650 mg (has no administration in time range)   atorvastatin (LIPITOR) tablet 80 mg (has no administration in time range)   labetalol (NORMODYNE;TRANDATE) injection 10 mg (has no administration in time range)   folic acid (FOLVITE) tablet 1 mg (has no administration in time range)   cyanocobalamin injection 1,000 mcg (has no administration in time range)   mometasone-formoterol (DULERA) 100-5 MCG/ACT inhaler 2 puff (has no administration in time range)   montelukast (SINGULAIR) tablet 10 mg (has no administration in time range)   albuterol (PROVENTIL) nebulizer solution 2.5 mg (has no administration in time range)   valsartan (DIOVAN) tablet 20 mg (has no administration in time range)   risperiDONE (RISPERDAL) tablet 4 mg (has no administration in time range)   buPROPion [facility]) extended release tablet 150 mg (has no administration in time range)   clonazePAM (KLONOPIN) tablet 1 mg (has no administration in time range)   lamoTRIgine (LAMICTAL) tablet 300 mg (has no administration in time range)     And   lamoTRIgine (LAMICTAL) tablet 200 mg (has no administration in time range)   levothyroxine (SYNTHROID) tablet 88 mcg (has no administration in time range)   pregabalin (LYRICA) capsule 225 mg (has no administration in time range)   venlafaxine (EFFEXOR XR) extended release capsule 225 mg (has no administration in time range)   nicotine (NICODERM CQ) 21 MG/24HR 1 patch (has no administration in time range)   famotidine (PEPCID) tablet 20 mg (has no administration in time range)   Vitamin D (CHOLECALCIFEROL) tablet 1,000 Units (has no administration in time range)   0.9 % sodium chloride bolus (0 mLs IntraVENous Stopped 11/30/22 1439)   sodium chloride flush 0.9 % injection 10 mL (10 mLs IntraVENous Given 11/30/22 1436)   iopamidol (ISOVUE-370) 76 % injection 100 mL (100 mLs IntraVENous Given 11/30/22 1437)       New Prescriptions    No medications on file        Edna Figueroa is a 41 y.o. female who presents to the Emergency Department with chief complaint of  No chief complaint on file. Presents with R sided numbness from head to toe, felt disoriented. All started 10 am when she woke up. States has had this before times the last being about a month ago and it went away. Today it is worse and more intense and did not go away. Felt very lethargic and is more alert now. No weakness, no dizziness currently, feels off balance but able to walk ok. Pos tob, no ETOH, no blood thinners. The history is provided by the patient. All other systems reviewed and are negative unless otherwise stated in the history of present illness section. Review of Systems   Constitutional:  Negative for chills and fever. HENT:  Negative for trouble swallowing. Respiratory:  Negative for cough and shortness of breath. Cardiovascular:  Negative for chest pain and leg swelling. Gastrointestinal:  Negative for nausea and vomiting. Skin:  Negative for rash and wound. Neurological:  Negative for syncope.    All other systems reviewed and are negative.     Past Medical History:   Diagnosis Date    ADHD (attention deficit hyperactivity disorder)     Allergic rhinitis 2014    Anxiety disorder 2014    Asthma     B12 deficiency 2014    Depression 2014    Essential hypertension     Fibromyalgia     GERD (gastroesophageal reflux disease) 2014    Hypothyroidism     Meniere disease 2014    Migraine     OCD (obsessive compulsive disorder)     Tinnitus         Past Surgical History:   Procedure Laterality Date    HEENT  2017    nasal procedure (balloon inserted, drainage)    HEENT  2013    Septo / Fess / release CP    HEENT      eye    ORTHOPEDIC SURGERY      jaw    OTHER SURGICAL HISTORY      sinus implants        Family History   Problem Relation Age of Onset    Breast Cancer Maternal Grandmother 80    Hypertension Mother     Hypertension Brother     Heart Disease Mother     Hypertension Other     Heart Disease Other     Elevated Lipids Brother     Elevated Lipids Mother     Cancer Other     Asthma Other     Cancer Father     Coronary Art Dis Mother     Diabetes Other         Social History     Socioeconomic History    Marital status:    Tobacco Use    Smoking status: Some Days     Packs/day: 0.50     Types: Cigarettes     Last attempt to quit: 2008     Years since quittin.6    Smokeless tobacco: Never    Tobacco comments:     Quit smoking: STARTED BACK 2021   Vaping Use    Vaping Use: Never used   Substance and Sexual Activity    Alcohol use: No     Alcohol/week: 0.0 standard drinks    Drug use: No     Social Determinants of Health     Financial Resource Strain: Low Risk     Difficulty of Paying Living Expenses: Not hard at all   Food Insecurity: No Food Insecurity    Worried About Running Out of Food in the Last Year: Never true    Ran Out of Food in the Last Year: Never true        Allergies: Latex, Aripiprazole, Ziprasidone hcl, Topiramate, and Zolpidem    Previous Medications    ALBUTEROL SULFATE HFA (PROVENTIL;VENTOLIN;PROAIR) 108 (90 BASE) MCG/ACT INHALER    Inhale 1-2 puffs into the lungs every 4 hours as needed for Wheezing or Shortness of Breath    ALPRAZOLAM (XANAX) 1 MG TABLET    0.5 mg 3 times daily as needed. BUPROPION (WELLBUTRIN XL) 300 MG EXTENDED RELEASE TABLET    TAKE 1 TABLET BY MOUTH DAILY    CANDESARTAN (ATACAND) 4 MG TABLET    Take 1 tablet by mouth daily    CLONAZEPAM (KLONOPIN) 1 MG TABLET    TAKE 1 TABLET BY MOUTH THREE TIMES DAILY AS NEEDED    DEXLANSOPRAZOLE (DEXILANT) 60 MG CPDR DELAYED RELEASE CAPSULE    Take 1 capsule by mouth daily    FLUTICASONE FUROATE-VILANTEROL (BREO ELLIPTA) 200-25 MCG/INH AEPB INHALER    Inhale 1 puff into the lungs daily    IBUPROFEN (ADVIL;MOTRIN) 600 MG TABLET    TAKE 1 TABLET BY MOUTH EVERY 8 HOURS FOR UP TO 3 DAYS AS NEEDED FOR PAIN    KETOCONAZOLE (NIZORAL) 2 % SHAMPOO        LAMOTRIGINE (LAMICTAL) 100 MG TABLET    T 3 TS PO QAM AND 2 TS QHS    LEVOTHYROXINE (SYNTHROID) 88 MCG TABLET    Take 1 tablet by mouth every morning (before breakfast)    MELATONIN 5 MG TABS TABLET    Take 5 mg by mouth    MELOXICAM (MOBIC) 15 MG TABLET    Take 1 tablet by mouth daily    MIRABEGRON (MYRBETRIQ) 50 MG TB24    Take 50 mg by mouth daily    MONTELUKAST (SINGULAIR) 10 MG TABLET    Take 10 mg by mouth daily    PREGABALIN (LYRICA) 75 MG CAPSULE    Take 225 mg by mouth daily. RISPERIDONE (RISPERDAL) 1 MG TABLET    Take 3 mg by mouth    RISPERIDONE (RISPERDAL) 4 MG TABLET    TAKE 1 TABLET BY MOUTH AT BEDTIME    TIZANIDINE (ZANAFLEX) 4 MG TABLET    Take 1 tablet by mouth 3 times daily as needed (pain or muscle spasms)    TRAMADOL (ULTRAM) 50 MG TABLET    Take 50 mg by mouth every 8 hours as needed. VENLAFAXINE (EFFEXOR XR) 150 MG EXTENDED RELEASE CAPSULE    Take by mouth daily    VENLAFAXINE (EFFEXOR XR) 75 MG EXTENDED RELEASE CAPSULE    TAKE 1 CAPSULE BY MOUTH EVERY DAY        Vitals signs and nursing note reviewed.    Patient Vitals for the past 4 hrs:   Temp Pulse Resp BP SpO2   11/30/22 1645 -- 77 16 118/83 97 %   11/30/22 1630 -- 73 19 113/87 96 %   11/30/22 1616 -- 81 18 109/71 97 %   11/30/22 1600 -- 75 17 121/72 96 %   11/30/22 1545 -- 79 17 108/71 96 %   11/30/22 1530 -- 81 15 104/61 96 %   11/30/22 1500 -- 86 16 112/65 96 %   11/30/22 1445 -- 87 18 117/72 --   11/30/22 1439 -- -- -- 121/66 97 %   11/30/22 1412 97.7 °F (36.5 °C) 87 15 113/74 98 %          Physical Exam  Vitals and nursing note reviewed. Constitutional:       General: She is not in acute distress. Appearance: Normal appearance. She is obese. She is not ill-appearing. HENT:      Head: Normocephalic and atraumatic. Nose: Nose normal.      Mouth/Throat:      Mouth: Mucous membranes are moist.      Pharynx: Oropharynx is clear. Eyes:      General:         Right eye: No discharge. Left eye: No discharge. Conjunctiva/sclera: Conjunctivae normal.   Cardiovascular:      Rate and Rhythm: Normal rate and regular rhythm. Pulmonary:      Effort: Pulmonary effort is normal. No respiratory distress. Breath sounds: Normal breath sounds. No wheezing. Abdominal:      Palpations: Abdomen is soft. Tenderness: There is no abdominal tenderness. There is no guarding. Musculoskeletal:         General: Normal range of motion. Cervical back: Normal range of motion and neck supple. Right lower leg: No edema. Left lower leg: No edema. Skin:     General: Skin is warm and dry. Neurological:      General: No focal deficit present. Mental Status: She is alert and oriented to person, place, and time. Motor: No weakness.       Coordination: Coordination normal.      Gait: Gait normal.      Comments: See NIH   Psychiatric:         Mood and Affect: Mood normal.         Behavior: Behavior normal.        Procedures    ED EKG Interpretation  EKG was interpreted in the absence of a cardiologist.    Rate: 82  EKG Interpretation: EKG Interpretation: sinus rhythm  ST Segments: Nonspecific ST segments - NO STEMI  Twave inversion    Results for orders placed or performed during the hospital encounter of 11/30/22   CT HEAD WO CONTRAST    Narrative    EXAM: CT HEAD WITHOUT CONTRAST    INDICATION: Stroke. COMPARISON: None. TECHNIQUE: Contiguous axial images were obtained from the skull base through the  vertex without IV contrast. Radiation dose reduction techniques were used for  this study. Our CT scanners use one or all of the following: Automated exposure  control, adjustment of the mA and/or kV according to patient size, iterative  reconstruction. FINDINGS:   Normal appearance of the brain parenchyma without evidence of acute infarction,  hemorrhage, or mass lesion. No hydrocephalus or midline shift. No extra-axial  mass or hemorrhage. The basal cisterns are patent. The visualized portions of the orbits are normal. Right maxillary sinus mucosal  thickening. The visualized vascular structures have an unremarkable noncontrast appearance. Partially imaged bilateral facial reconstruction. The calvarium and soft tissues appear normal.      Impression    No acute intracranial abnormality evident by CT. CTA HEAD NECK W CONTRAST    Narrative    Title:  CT arteriogram of the neck and head. Indication: Code stroke. Numbness from the right face to the right foot,  disorientation. Technique: Axial images of the neck and head were obtained after the uneventful   administration of intravenous iodinated contrast media. Contrast was used to  best identify the arterial structures. Images were reviewed on a separate, free  standing, three-dimensional workstation as per the referring physicians request.       All stenosis percentages derived by comparing the narrowest segment with the  distal Internal Carotid Artery luminal diameter, as described in the [place]  American Symptomatic Carotid Endarterectomy Trial (NASCET) criteria.      All CT scans at this facility are performed using dose reduction/dose modulation  techniques, as appropriate the performed exam, including the following:   Automated Exposure Control; Adjustment of the mA and/or kV according to patient  size (this includes techniques or standardized protocols for targeted exams  where dose is matched to indication/reason for exam); and Use of Iterative  Reconstruction Technique. Comparison: Head CT same date. Findings:   Lungs:  Clear. Bones:  Metallic plate and screws on the anterior right maxillary sinus wall. Paranasal sinuses:  Extensive mucosal thickening in the right maxillary sinus. Brain:  No mass effect. Soft tissues:  Unremarkable. Superior sagittal sinus:  Patent. Right transverse sinus:  Patent. Left transverse sinus:  Patent. Aortic arch:  Patent. Right brachiocephalic artery:  Patent. Right subclavian artery:  Patent. Left subclavian artery:  Patent. Right common carotid artery:  Patent. Right external carotid artery:  Patent. Cervical Right internal carotid artery:  Mildly tortuous, patent. Left common carotid artery: Patent. Left external carotid artery:  Patent. Cervical Left internal carotid artery:  Patent. Right vertebral artery:  Patent. Left vertebral artery:  Patent with fairly rapid tapering intracranially. Basilar artery: Moderately tortuous, patent. Venous contamination degrades the intracranial imaging. Intracranial right internal carotid artery:  Patent. Right middle cerebral artery:  Patent. Right anterior cerebral artery:  Small diameter A1 segment, more normal diameter  A2 segment, patent. Anterior communicating artery: Patent. Left anterior cerebral artery:  Patent. Left middle cerebral artery:  Patent. Intracranial left internal carotid artery:  Patent. Right posterior communicating artery: Small diameter, patent. Left posterior communicating artery:  Patent.     Right posterior cerebral artery:  Extremely small diameter, patency cannot be  confirmed. Left posterior cerebral artery:  Patent. Impression    No definite intracranial arterial occlusion or aneurysm.         CBC with Auto Differential   Result Value Ref Range    WBC 6.2 4.3 - 11.1 K/uL    RBC 4.19 4.05 - 5.2 M/uL    Hemoglobin 12.0 11.7 - 15.4 g/dL    Hematocrit 37.4 35.8 - 46.3 %    MCV 89.3 82 - 102 FL    MCH 28.6 26.1 - 32.9 PG    MCHC 32.1 31.4 - 35.0 g/dL    RDW 15.5 (H) 11.9 - 14.6 %    Platelets 589 729 - 599 K/uL    MPV 11.2 9.4 - 12.3 FL    nRBC 0.00 0.0 - 0.2 K/uL    Differential Type AUTOMATED      Seg Neutrophils 66 43 - 78 %    Lymphocytes 23 13 - 44 %    Monocytes 6 4.0 - 12.0 %    Eosinophils % 4 0.5 - 7.8 %    Basophils 1 0.0 - 2.0 %    Immature Granulocytes 0 0.0 - 5.0 %    Segs Absolute 4.0 1.7 - 8.2 K/UL    Absolute Lymph # 1.5 0.5 - 4.6 K/UL    Absolute Mono # 0.4 0.1 - 1.3 K/UL    Absolute Eos # 0.2 0.0 - 0.8 K/UL    Basophils Absolute 0.1 0.0 - 0.2 K/UL    Absolute Immature Granulocyte 0.0 0.0 - 0.5 K/UL   Comprehensive Metabolic Panel   Result Value Ref Range    Sodium 139 133 - 143 mmol/L    Potassium 3.9 3.5 - 5.1 mmol/L    Chloride 107 101 - 110 mmol/L    CO2 26 21 - 32 mmol/L    Anion Gap 6 2 - 11 mmol/L    Glucose 141 (H) 65 - 100 mg/dL    BUN 12 6 - 23 MG/DL    Creatinine 1.20 (H) 0.6 - 1.0 MG/DL    Est, Glom Filt Rate 57 (L) >60 ml/min/1.73m2    Calcium 9.5 8.3 - 10.4 MG/DL    Total Bilirubin 0.3 0.2 - 1.1 MG/DL    ALT 21 12 - 65 U/L    AST 14 (L) 15 - 37 U/L    Alk Phosphatase 58 50 - 136 U/L    Total Protein 7.3 6.3 - 8.2 g/dL    Albumin 3.4 (L) 3.5 - 5.0 g/dL    Globulin 3.9 2.8 - 4.5 g/dL    Albumin/Globulin Ratio 0.9 0.4 - 1.6     Protime-INR   Result Value Ref Range    Protime 13.0 12.6 - 14.3 sec    INR 0.9     Troponin   Result Value Ref Range    Troponin, High Sensitivity 3.6 0 - 14 pg/mL   POCT Glucose   Result Value Ref Range    POC Glucose 128 (H) 65 - 100 mg/dL    Performed by: [email or web address]    EKG 12 Lead   Result Value Ref Range    Ventricular Rate 82 BPM    Atrial Rate 82 BPM    P-R Interval 174 ms    QRS Duration 105 ms    Q-T Interval 386 ms    QTc Calculation (Bazett) 451 ms    P Axis 74 degrees    R Axis 89 degrees    T Axis -14 degrees    Diagnosis Sinus rhythm         CT HEAD WO CONTRAST   Final Result   No acute intracranial abnormality evident by CT. CTA HEAD NECK W CONTRAST   Final Result   No definite intracranial arterial occlusion or aneurysm. MRI brain with and without contrast    (Results Pending)        NIH Stroke Scale  Interval: Reassessment  Level of Consciousness (1a): Alert  LOC Questions (1b): Answers both correctly  LOC Commands (1c): Performs both tasks correctly  Best Gaze (2): Normal  Visual (3): No visual loss  Facial Palsy (4): Normal symmetrical movement  Motor Arm, Left (5a): No drift  Motor Arm, Right (5b): No drift  Motor Leg, Left (6a): No drift  Motor Leg, Right (6b): No drift  Limb Ataxia (7): Absent  Sensory (8): (!) Mild to Moderate (only reports decreased sensation to RLE at this time)  Best Language (9): No aphasia  Dysarthria (10): Normal                Voice dictation software was used during the making of this note. This software is not perfect and grammatical and other typographical errors may be present. This note has not been completely proofread for errors.      Stephenie Lujan MD  11/30/22 1569

## 2022-11-30 NOTE — ED TRIAGE NOTES
Pt ambulatory to triage. Pt reports she woke up about 1000 and had R sided numbness from her face all the way down to her R foot. Pt states she also felt disoriented when she woke up as well. Pt reports she did not feel well enough to  herself, her friend drove her today. Pt denies ever being seen by a doctor in the past for this .

## 2022-11-30 NOTE — H&P
Hospitalist History and Physical   Admit Date:  2022  2:19 PM   Name:  Hannah Das   Age:  41 y.o. Sex:  female  :  1975   MRN:  [id number]   Room:  ER13/13    Presenting Complaint: No chief complaint on file. Reason(s) for Admission: Stroke-like symptom [R29.90]     History of Present Illness:   Hannah Das is a 41 y.o. female with medical history of obesity, fibromyalgia, b12 deficiency, vitamin D deficiency, GERD, Asthma, bipolar d/o, PTSD who presented with right sided numbness present on awakening this AM around 10 am. Has occurred a couple times per before but self resolved. Associated with some disorientation this AM and not feeling well. Did not feel well en ough to drive herself today. This has never happened before. CODE S called upon arrival. Afebrile. VSS. /66. Evaluated by IP neurology at bedside. CTH and CTA no acute findings. History of B12 deficiency, not currently treated    No history of CVA on mother's side. Does not know father's side. Started back smoking 1.5 years ago smokes about 13 cig per day, had quit for about 13 years. Review of Systems:  10 systems reviewed and negative except as noted in HPI.   Assessment & Plan:   Stroke-like symptoms - Ischemic CVA rule out   Home antiplatelets/anticoagulation: none   CTA - no LVOS   - start asa daily   - High intensity statin for now pending MRI findings   - Fasting hgbA1C, Lipid panel, ferritin, ESR, tsat, vit D, TSH   - MRI brain without and with contrast ; TTE with bubble  - Permissive hypertension to 220/120 mm Hg x 24 hours   - NS 75 cc/hr x 24 hours   - Consider Long term rhythm monitor at DC to eval for paroxysmal afib/flutter pending work up   - Outpatient referral to Neurology for followup      GERD (gastroesophageal reflux disease)  Plan: change PPI to pepcid given b12 def     Fibromyalgia  Plan: cont lyrica     Asthma  Plan: stable cont LABA-ICS singulair and prn albuterol     Vitamin B12 deficiency  Plan: admin b12 IM x 1. Check homocysteine. Start folic acid 1 mg in AM     PTSD (post-traumatic stress disorder)  Plan: stable. Cont home regimen     AR (allergic rhinitis)  Plan: stable       Anticipated discharge needs:         Diet: ADULT DIET; Regular; 4 carb choices (60 gm/meal); Low Fat/Low Chol/High Fiber/ELIUD  VTE ppx: lovenox  Code status: Full Code    Hospital Problems:  Principal Problem:    Stroke-like symptom  Active Problems:    Right sided numbness    GERD (gastroesophageal reflux disease)    Fibromyalgia    Asthma    Vitamin B12 deficiency    PTSD (post-traumatic stress disorder)    AR (allergic rhinitis)  Resolved Problems:    * No resolved hospital problems.  *       Past History:     Past Medical History:   Diagnosis Date    ADHD (attention deficit hyperactivity disorder)     Allergic rhinitis 2014    Anxiety disorder 2014    Asthma     B12 deficiency 2014    Depression 2014    Essential hypertension     Fibromyalgia     GERD (gastroesophageal reflux disease) 2014    Hypothyroidism     Meniere disease 2014    Migraine     OCD (obsessive compulsive disorder)     Tinnitus        Past Surgical History:   Procedure Laterality Date    HEENT  2017    nasal procedure (balloon inserted, drainage)    HEENT  2013    Septo / Fess / release CP    HEENT      eye    ORTHOPEDIC SURGERY      jaw    OTHER SURGICAL HISTORY      sinus implants        Social History     Tobacco Use    Smoking status: Some Days     Packs/day: 0.50     Types: Cigarettes     Last attempt to quit: 2008     Years since quittin.6    Smokeless tobacco: Never    Tobacco comments:     Quit smoking: STARTED BACK 2021   Substance Use Topics    Alcohol use: No     Alcohol/week: 0.0 standard drinks      Social History     Substance and Sexual Activity   Drug Use No       Family History   Problem Relation Age of Onset    Breast Cancer Maternal Grandmother 80    Hypertension Mother Hypertension Brother     Heart Disease Mother     Hypertension Other     Heart Disease Other     Elevated Lipids Brother     Elevated Lipids Mother     Cancer Other     Asthma Other     Cancer Father     Coronary Art Dis Mother     Diabetes Other         Immunization History   Administered Date(s) Administered    COVID-19, PFIZER PURPLE top, DILUTE for use, (age 15 y+), 30mcg/0.3mL 04/01/2021, 04/27/2021    Influenza Virus Vaccine 10/31/2008, 10/30/2014, 10/05/2015, 09/05/2018, 10/24/2019    Influenza, FLUARIX, FLULAVAL, FLUZONE (age 10 mo+) AND AFLURIA, (age 1 y+), PF, 0.5mL 10/30/2014, 09/05/2018, 10/24/2019    Pneumococcal Polysaccharide ([id number]) 03/15/2016    Td (Adult), 2 Lf Tetanus Toxoid, Pf (Td, Absorbed) 04/27/2022    Td vaccine (adult) 01/31/2005     Allergies   Allergen Reactions    Latex Rash    Aripiprazole Other (See Comments)     Other reaction(s): Agitation-I  Caused severe akathesia, couldn't sit still. \"Worse 3 days of my life. \"    Ziprasidone Hcl Other (See Comments)     Other reaction(s): Agitation-I  \"moods swing off the chain\"  caused hospitalization, unsure of reaction    Topiramate Other (See Comments)    Zolpidem Other (See Comments)     'hallucinates'  hallucinations     Prior to Admit Medications:  Current Outpatient Medications   Medication Instructions    albuterol sulfate HFA (PROVENTIL;VENTOLIN;PROAIR) 108 (90 Base) MCG/ACT inhaler 1-2 puffs, Inhalation, EVERY 4 HOURS PRN    ALPRAZolam (XANAX) 0.5 mg, 3 TIMES DAILY PRN    buPROPion (WELLBUTRIN XL) 300 mg, EVERY MORNING    candesartan (ATACAND) 4 mg, Oral, DAILY    clonazePAM (KLONOPIN) 1 MG tablet Pt states takes 0.5 mg morning and night, in afternoon she takes 0.5 - 1mg    dexlansoprazole (DEXILANT) 60 mg, Oral, DAILY    Fluticasone furoate-vilanterol (BREO ELLIPTA) 200-25 MCG/INH AEPB inhaler 1 puff, Inhalation, DAILY    ibuprofen (ADVIL;MOTRIN) 600 MG tablet TAKE 1 TABLET BY MOUTH EVERY 8 HOURS FOR UP TO 3 DAYS AS NEEDED FOR PAIN    ketoconazole (NIZORAL) 2 % shampoo No dose, route, or frequency recorded. lamoTRIgine (LAMICTAL) 100 MG tablet 2 TIMES DAILY, T 3 TS PO QAM AND 2 TS QHS<BR><BR>Pt takes 200mg in a.m. and 300mg nightly    levothyroxine (SYNTHROID) 88 mcg, Oral, DAILY BEFORE BREAKFAST    melatonin 5 mg, Oral, NIGHTLY PRN    melatonin 5 mg, Oral, NIGHTLY PRN    meloxicam (MOBIC) 15 mg, Oral, DAILY    mirabegron (MYRBETRIQ) 50 mg, Oral, DAILY    montelukast (SINGULAIR) 10 mg, Oral, DAILY    pregabalin (LYRICA) 75 mg, Oral, DAILY, Pt states takes 75mg in a.m. and 150 mg in p.m.    risperiDONE (RISPERDAL) 3 mg, Oral    risperiDONE (RISPERDAL) 4 mg, Nightly    tiZANidine (ZANAFLEX) 4 mg, Oral, 3 TIMES DAILY PRN    traMADol (ULTRAM) 50 mg, Oral, EVERY 8 HOURS PRN    venlafaxine (EFFEXOR XR) 150 MG extended release capsule Oral, DAILY    venlafaxine (EFFEXOR XR) 75 MG extended release capsule 75 mg Pt states she takes 75 mg in a.m. and 225mg nightly         Objective:   Patient Vitals for the past 24 hrs:   Temp Pulse Resp BP SpO2   11/30/22 1947 -- 85 21 -- 98 %   11/30/22 1745 -- 74 17 129/75 97 %   11/30/22 1645 -- 77 16 118/83 97 %   11/30/22 1630 -- 73 19 113/87 96 %   11/30/22 1616 -- 81 18 109/71 97 %   11/30/22 1600 -- 75 17 121/72 96 %   11/30/22 1545 -- 79 17 108/71 96 %   11/30/22 1530 -- 81 15 104/61 96 %   11/30/22 1500 -- 86 16 112/65 96 %   11/30/22 1445 -- 87 18 117/72 --   11/30/22 1439 -- -- -- 121/66 97 %   11/30/22 1412 97.7 °F (36.5 °C) 87 15 113/74 98 %       Oxygen Therapy  SpO2: 98 %  Pulse Oximeter Device Mode: Intermittent  O2 Device: None (Room air)    Estimated body mass index is 33.83 kg/m² as calculated from the following:    Height as of 6/7/22: 5' 3\" (1.6 m). Weight as of this encounter: 191 lb (86.6 kg). No intake or output data in the 24 hours ending 11/30/22 4904      Physical Exam:    Blood pressure 129/75, pulse 85, temperature 97.7 °F (36.5 °C), temperature source Oral, resp.  rate 21, weight 191 lb (86.6 kg), SpO2 98 %. General:    Well nourished. Obese. Head:  Normocephalic, atraumatic  Eyes:  Sclerae appear normal.  Pupils equally round. ENT:  Nares appear normal, no drainage. Moist oral mucosa  Neck:  No restricted ROM. Trachea midline   CV:   RRR. No m/r/g. No jugular venous distension. Lungs:   CTAB. No wheezing, rhonchi, or rales. Symmetric expansion. Abdomen: Bowel sounds present. Soft, nontender, nondistended. Extremities: No cyanosis or clubbing. No edema  Skin:     No rashes and normal coloration. Warm and dry. Neuro:  Normal shin to heel. CN II-XII grossly intact. Sensation intact decreased on right side. A&Ox3  Psych:  Normal mood and affect. I have personally reviewed labs and tests showing:  Recent Labs:  Recent Results (from the past 24 hour(s))   POCT Glucose    Collection Time: 11/30/22  2:20 PM   Result Value Ref Range    POC Glucose 128 (H) 65 - 100 mg/dL    Performed by:  Elio    CBC with Auto Differential    Collection Time: 11/30/22  2:24 PM   Result Value Ref Range    WBC 6.2 4.3 - 11.1 K/uL    RBC 4.19 4.05 - 5.2 M/uL    Hemoglobin 12.0 11.7 - 15.4 g/dL    Hematocrit 37.4 35.8 - 46.3 %    MCV 89.3 82 - 102 FL    MCH 28.6 26.1 - 32.9 PG    MCHC 32.1 31.4 - 35.0 g/dL    RDW 15.5 (H) 11.9 - 14.6 %    Platelets 214 381 - 476 K/uL    MPV 11.2 9.4 - 12.3 FL    nRBC 0.00 0.0 - 0.2 K/uL    Differential Type AUTOMATED      Seg Neutrophils 66 43 - 78 %    Lymphocytes 23 13 - 44 %    Monocytes 6 4.0 - 12.0 %    Eosinophils % 4 0.5 - 7.8 %    Basophils 1 0.0 - 2.0 %    Immature Granulocytes 0 0.0 - 5.0 %    Segs Absolute 4.0 1.7 - 8.2 K/UL    Absolute Lymph # 1.5 0.5 - 4.6 K/UL    Absolute Mono # 0.4 0.1 - 1.3 K/UL    Absolute Eos # 0.2 0.0 - 0.8 K/UL    Basophils Absolute 0.1 0.0 - 0.2 K/UL    Absolute Immature Granulocyte 0.0 0.0 - 0.5 K/UL   Comprehensive Metabolic Panel    Collection Time: 11/30/22  2:24 PM   Result Value Ref Range    Sodium 139 133 - 143 mmol/L    Potassium 3.9 3.5 - 5.1 mmol/L    Chloride 107 101 - 110 mmol/L    CO2 26 21 - 32 mmol/L    Anion Gap 6 2 - 11 mmol/L    Glucose 141 (H) 65 - 100 mg/dL    BUN 12 6 - 23 MG/DL    Creatinine 1.20 (H) 0.6 - 1.0 MG/DL    Est, Glom Filt Rate 57 (L) >60 ml/min/1.73m2    Calcium 9.5 8.3 - 10.4 MG/DL    Total Bilirubin 0.3 0.2 - 1.1 MG/DL    ALT 21 12 - 65 U/L    AST 14 (L) 15 - 37 U/L    Alk Phosphatase 58 50 - 136 U/L    Total Protein 7.3 6.3 - 8.2 g/dL    Albumin 3.4 (L) 3.5 - 5.0 g/dL    Globulin 3.9 2.8 - 4.5 g/dL    Albumin/Globulin Ratio 0.9 0.4 - 1.6     Protime-INR    Collection Time: 11/30/22  2:24 PM   Result Value Ref Range    Protime 13.0 12.6 - 14.3 sec    INR 0.9     Troponin    Collection Time: 11/30/22  2:24 PM   Result Value Ref Range    Troponin, High Sensitivity 3.6 0 - 14 pg/mL   EKG 12 Lead    Collection Time: 11/30/22  2:41 PM   Result Value Ref Range    Ventricular Rate 82 BPM    Atrial Rate 82 BPM    P-R Interval 174 ms    QRS Duration 105 ms    Q-T Interval 386 ms    QTc Calculation (Bazett) 451 ms    P Axis 74 degrees    R Axis 89 degrees    T Axis -14 degrees    Diagnosis Sinus rhythm    Urinalysis with Reflex to Culture    Collection Time: 11/30/22  5:44 PM    Specimen: Urine   Result Value Ref Range    Color, UA YELLOW/STRAW      Appearance CLOUDY      Specific Gravity, UA >1.035 (H) 1.001 - 1.023    pH, Urine 8.0 5.0 - 9.0      Protein, UA Negative NEG mg/dL    Glucose, UA Negative mg/dL    Ketones, Urine Negative NEG mg/dL    Bilirubin Urine Negative NEG      Blood, Urine Negative NEG      Urobilinogen, Urine 0.2 0.2 - 1.0 EU/dL    Nitrite, Urine Negative NEG      Leukocyte Esterase, Urine Negative NEG      Urine Culture if Indicated CULTURE NOT INDICATED BY UA RESULT      WBC, UA 0-4 U4 /hpf    RBC, UA 0-5 U5 /hpf    BACTERIA, URINE 4+ (A) NEG /hpf    Epithelial Cells UA 20-50 (A) U5 /hpf    Casts 0-2 U2 /lpf    Mucus, UA 0 0 /lpf       I have personally reviewed imaging studies showing:  CT HEAD WO CONTRAST    Result Date: 11/30/2022  EXAM: CT HEAD WITHOUT CONTRAST INDICATION: Stroke. COMPARISON: None. TECHNIQUE: Contiguous axial images were obtained from the skull base through the vertex without IV contrast. Radiation dose reduction techniques were used for this study. Our CT scanners use one or all of the following: Automated exposure control, adjustment of the mA and/or kV according to patient size, iterative reconstruction. FINDINGS: Normal appearance of the brain parenchyma without evidence of acute infarction, hemorrhage, or mass lesion. No hydrocephalus or midline shift. No extra-axial mass or hemorrhage. The basal cisterns are patent. The visualized portions of the orbits are normal. Right maxillary sinus mucosal thickening. The visualized vascular structures have an unremarkable noncontrast appearance. Partially imaged bilateral facial reconstruction. The calvarium and soft tissues appear normal.     No acute intracranial abnormality evident by CT. CTA HEAD NECK W CONTRAST    Result Date: 11/30/2022  Title:  CT arteriogram of the neck and head. Indication: Code stroke. Numbness from the right face to the right foot, disorientation. Technique: Axial images of the neck and head were obtained after the uneventful administration of intravenous iodinated contrast media. Contrast was used to best identify the arterial structures. Images were reviewed on a separate, free standing, three-dimensional workstation as per the referring physicians request.  All stenosis percentages derived by comparing the narrowest segment with the distal Internal Carotid Artery luminal diameter, as described in the [place] [employer] Symptomatic Carotid Endarterectomy Trial (NASCET) criteria. All CT scans at this facility are performed using dose reduction/dose modulation techniques, as appropriate the performed exam, including the following: Automated Exposure Control;  Adjustment of the mA and/or kV according to patient size (this includes techniques or standardized protocols for targeted exams where dose is matched to indication/reason for exam); and Use of Iterative Reconstruction Technique. Comparison: Head CT same date. Findings: Lungs:  Clear. Bones:  Metallic plate and screws on the anterior right maxillary sinus wall. Paranasal sinuses:  Extensive mucosal thickening in the right maxillary sinus. Brain:  No mass effect. Soft tissues:  Unremarkable. Superior sagittal sinus:  Patent. Right transverse sinus:  Patent. Left transverse sinus:  Patent. Aortic arch:  Patent. Right brachiocephalic artery:  Patent. Right subclavian artery:  Patent. Left subclavian artery:  Patent. Right common carotid artery:  Patent. Right external carotid artery:  Patent. Cervical Right internal carotid artery:  Mildly tortuous, patent. Left common carotid artery: Patent. Left external carotid artery:  Patent. Cervical Left internal carotid artery:  Patent. Right vertebral artery:  Patent. Left vertebral artery:  Patent with fairly rapid tapering intracranially. Basilar artery: Moderately tortuous, patent. Venous contamination degrades the intracranial imaging. Intracranial right internal carotid artery:  Patent. Right middle cerebral artery:  Patent. Right anterior cerebral artery:  Small diameter A1 segment, more normal diameter A2 segment, patent. Anterior communicating artery: Patent. Left anterior cerebral artery:  Patent. Left middle cerebral artery:  Patent. Intracranial left internal carotid artery:  Patent. Right posterior communicating artery: Small diameter, patent. Left posterior communicating artery:  Patent. Right posterior cerebral artery:  Extremely small diameter, patency cannot be confirmed. Left posterior cerebral artery:  Patent. No definite intracranial arterial occlusion or aneurysm.         Echocardiogram:  No results found for this or any previous visit.         Orders Placed This Encounter   Medications    0.9 % sodium chloride bolus    sodium chloride flush 0.9 % injection 10 mL    iopamidol (ISOVUE-370) 76 % injection 100 mL    OR Linked Order Group     ondansetron (ZOFRAN-ODT) disintegrating tablet 4 mg     ondansetron (ZOFRAN) injection 4 mg    polyethylene glycol (GLYCOLAX) packet 17 g    enoxaparin (LOVENOX) injection 40 mg     Order Specific Question:   Indication of Use     Answer:   Prophylaxis-DVT/PE    OR Linked Order Group     aspirin EC tablet 81 mg     aspirin suppository 300 mg    0.9 % sodium chloride infusion    OR Linked Order Group     acetaminophen (TYLENOL) tablet 650 mg     acetaminophen (TYLENOL) suppository 650 mg    atorvastatin (LIPITOR) tablet 80 mg    labetalol (NORMODYNE;TRANDATE) injection 10 mg    folic acid (FOLVITE) tablet 1 mg    cyanocobalamin injection 1,000 mcg    mometasone-formoterol (DULERA) 100-5 MCG/ACT inhaler 2 puff    montelukast (SINGULAIR) tablet 10 mg    albuterol (PROVENTIL) nebulizer solution 2.5 mg     Order Specific Question:   Initiate RT Bronchodilator Protocol     Answer:   No    valsartan (DIOVAN) tablet 20 mg    risperiDONE (RISPERDAL) tablet 4 mg    buPROPion (WELLBUTRIN SR) extended release tablet 150 mg    DISCONTD: clonazePAM (KLONOPIN) tablet 1 mg    DISCONTD: pantoprazole (PROTONIX) tablet 40 mg    AND Linked Order Group     lamoTRIgine (LAMICTAL) tablet 300 mg     lamoTRIgine (LAMICTAL) tablet 200 mg    levothyroxine (SYNTHROID) tablet 88 mcg    DISCONTD: pregabalin (LYRICA) capsule 225 mg    venlafaxine (EFFEXOR XR) extended release capsule 225 mg    nicotine (NICODERM CQ) 21 MG/24HR 1 patch    famotidine (PEPCID) tablet 20 mg    Vitamin D (CHOLECALCIFEROL) tablet 1,000 Units    clonazePAM (KLONOPIN) tablet 0.5 mg    clonazePAM (KLONOPIN) tablet 0.5 mg    AND Linked Order Group     pregabalin (LYRICA) capsule 75 mg     pregabalin (LYRICA) capsule 150 mg    venlafaxine (EFFEXOR XR) extended release capsule 75 mg         Signed:  Young Herring DO, DO    Part of this note may have been written by using a voice dictation software. The note has been proof read but may still contain some grammatical/other typographical errors.

## 2022-12-01 ENCOUNTER — APPOINTMENT (OUTPATIENT)
Dept: NON INVASIVE DIAGNOSTICS | Age: 47
End: 2022-12-01
Payer: MEDICARE

## 2022-12-01 ENCOUNTER — APPOINTMENT (OUTPATIENT)
Dept: MRI IMAGING | Age: 47
End: 2022-12-01
Payer: MEDICARE

## 2022-12-01 VITALS
SYSTOLIC BLOOD PRESSURE: 114 MMHG | DIASTOLIC BLOOD PRESSURE: 76 MMHG | TEMPERATURE: 97.5 F | WEIGHT: 158.29 LBS | OXYGEN SATURATION: 97 % | HEART RATE: 78 BPM | RESPIRATION RATE: 18 BRPM | HEIGHT: 63 IN | BODY MASS INDEX: 28.05 KG/M2

## 2022-12-01 LAB
25(OH)D3 SERPL-MCNC: 36.4 NG/ML (ref 30–100)
ALBUMIN SERPL-MCNC: 3 G/DL (ref 3.5–5)
ALBUMIN/GLOB SERPL: 0.9 {RATIO} (ref 0.4–1.6)
ALP SERPL-CCNC: 55 U/L (ref 50–136)
ALT SERPL-CCNC: 16 U/L (ref 12–65)
ANION GAP SERPL CALC-SCNC: 5 MMOL/L (ref 2–11)
AST SERPL-CCNC: 9 U/L (ref 15–37)
BILIRUB SERPL-MCNC: 0.4 MG/DL (ref 0.2–1.1)
BUN SERPL-MCNC: 12 MG/DL (ref 6–23)
CALCIUM SERPL-MCNC: 8.7 MG/DL (ref 8.3–10.4)
CHLORIDE SERPL-SCNC: 112 MMOL/L (ref 101–110)
CHOLEST SERPL-MCNC: 160 MG/DL
CO2 SERPL-SCNC: 25 MMOL/L (ref 21–32)
CREAT SERPL-MCNC: 0.9 MG/DL (ref 0.6–1)
ECHO AO ASC DIAM: 2.7 CM
ECHO AO ASCENDING AORTA INDEX: 1.54 CM/M2
ECHO AO ROOT DIAM: 2.5 CM
ECHO AO ROOT INDEX: 1.43 CM/M2
ECHO AV AREA PEAK VELOCITY: 2 CM2
ECHO AV AREA VTI: 2.1 CM2
ECHO AV AREA/BSA PEAK VELOCITY: 1.1 CM2/M2
ECHO AV AREA/BSA VTI: 1.2 CM2/M2
ECHO AV MEAN GRADIENT: 8 MMHG
ECHO AV MEAN GRADIENT: 8 MMHG
ECHO AV MEAN VELOCITY: 1.3 M/S
ECHO AV PEAK GRADIENT: 17 MMHG
ECHO AV PEAK VELOCITY: 2 M/S
ECHO AV VELOCITY RATIO: 0.8
ECHO AV VTI: 42.5 CM
ECHO BSA: 1.79 M2
ECHO LA AREA 2C: 23.3 CM2
ECHO LA AREA 4C: 19.2 CM2
ECHO LA DIAMETER INDEX: 1.83 CM/M2
ECHO LA DIAMETER: 3.2 CM
ECHO LA MAJOR AXIS: 6.8 CM
ECHO LA MINOR AXIS: 6.1 CM
ECHO LA TO AORTIC ROOT RATIO: 1.28
ECHO LA VOL 2C: 73 ML (ref 22–52)
ECHO LA VOL 4C: 43 ML (ref 22–52)
ECHO LA VOL BP: 58 ML (ref 22–52)
ECHO LA VOL/BSA BIPLANE: 33 ML/M2 (ref 16–34)
ECHO LA VOLUME INDEX A2C: 42 ML/M2 (ref 16–34)
ECHO LA VOLUME INDEX A4C: 25 ML/M2 (ref 16–34)
ECHO LV E' LATERAL VELOCITY: 18 CM/S
ECHO LV E' SEPTAL VELOCITY: 15 CM/S
ECHO LV EDV A2C: 123 ML
ECHO LV EDV A4C: 77 ML
ECHO LV EDV INDEX A4C: 44 ML/M2
ECHO LV EDV NDEX A2C: 70 ML/M2
ECHO LV EJECTION FRACTION A2C: 65 %
ECHO LV EJECTION FRACTION A4C: 51 %
ECHO LV EJECTION FRACTION BIPLANE: 55 % (ref 55–100)
ECHO LV ESV A2C: 44 ML
ECHO LV ESV A4C: 38 ML
ECHO LV ESV INDEX A2C: 25 ML/M2
ECHO LV ESV INDEX A4C: 22 ML/M2
ECHO LV FRACTIONAL SHORTENING: 33 % (ref 28–44)
ECHO LV INTERNAL DIMENSION DIASTOLE INDEX: 3.09 CM/M2
ECHO LV INTERNAL DIMENSION DIASTOLIC: 5.4 CM (ref 3.9–5.3)
ECHO LV INTERNAL DIMENSION SYSTOLIC INDEX: 2.06 CM/M2
ECHO LV INTERNAL DIMENSION SYSTOLIC: 3.6 CM
ECHO LV IVSD: 0.6 CM (ref 0.6–0.9)
ECHO LV MASS 2D: 119.8 G (ref 67–162)
ECHO LV MASS INDEX 2D: 68.5 G/M2 (ref 43–95)
ECHO LV POSTERIOR WALL DIASTOLIC: 0.7 CM (ref 0.6–0.9)
ECHO LV RELATIVE WALL THICKNESS RATIO: 0.26
ECHO LVOT AREA: 2.5 CM2
ECHO LVOT AV VTI INDEX: 0.81
ECHO LVOT DIAM: 1.8 CM
ECHO LVOT MEAN GRADIENT: 5 MMHG
ECHO LVOT PEAK GRADIENT: 11 MMHG
ECHO LVOT PEAK VELOCITY: 1.6 M/S
ECHO LVOT STROKE VOLUME INDEX: 50.1 ML/M2
ECHO LVOT SV: 87.7 ML
ECHO LVOT VTI: 34.5 CM
ECHO MV A VELOCITY: 0.65 M/S
ECHO MV E DECELERATION TIME (DT): 224 MS
ECHO MV E VELOCITY: 1.14 M/S
ECHO MV E/A RATIO: 1.75
ECHO MV E/E' LATERAL: 6.33
ECHO MV E/E' RATIO (AVERAGED): 6.97
ECHO MV E/E' SEPTAL: 7.6
ECHO MV REGURGITANT PEAK GRADIENT: 58 MMHG
ECHO MV REGURGITANT PEAK VELOCITY: 3.8 M/S
ECHO PV MAX VELOCITY: 1 M/S
ECHO PV PEAK GRADIENT: 4 MMHG
ECHO RV BASAL DIMENSION: 3.3 CM
ECHO RV FREE WALL PEAK S': 13 CM/S
ECHO TV REGURGITANT MAX VELOCITY: 2.65 M/S
ECHO TV REGURGITANT PEAK GRADIENT: 28 MMHG
ERYTHROCYTE [DISTWIDTH] IN BLOOD BY AUTOMATED COUNT: 15.5 % (ref 11.9–14.6)
ERYTHROCYTE [SEDIMENTATION RATE] IN BLOOD: 25 MM/HR (ref 0–20)
EST. AVERAGE GLUCOSE BLD GHB EST-MCNC: 97 MG/DL
FERRITIN SERPL-MCNC: 14 NG/ML (ref 8–388)
GLOBULIN SER CALC-MCNC: 3.3 G/DL (ref 2.8–4.5)
GLUCOSE SERPL-MCNC: 82 MG/DL (ref 65–100)
HBA1C MFR BLD: 5 % (ref 4.8–5.6)
HCT VFR BLD AUTO: 35.2 % (ref 35.8–46.3)
HDLC SERPL-MCNC: 55 MG/DL (ref 40–60)
HDLC SERPL: 2.9 {RATIO}
HGB BLD-MCNC: 11.2 G/DL (ref 11.7–15.4)
IRON SATN MFR SERPL: 27 %
IRON SERPL-MCNC: 82 UG/DL (ref 35–150)
LDLC SERPL CALC-MCNC: 88.2 MG/DL
MAGNESIUM SERPL-MCNC: 2.2 MG/DL (ref 1.8–2.4)
MCH RBC QN AUTO: 28.7 PG (ref 26.1–32.9)
MCHC RBC AUTO-ENTMCNC: 31.8 G/DL (ref 31.4–35)
MCV RBC AUTO: 90.3 FL (ref 82–102)
NRBC # BLD: 0 K/UL (ref 0–0.2)
PLATELET # BLD AUTO: 169 K/UL (ref 150–450)
PMV BLD AUTO: 10.9 FL (ref 9.4–12.3)
POTASSIUM SERPL-SCNC: 3.9 MMOL/L (ref 3.5–5.1)
PROT SERPL-MCNC: 6.3 G/DL (ref 6.3–8.2)
RBC # BLD AUTO: 3.9 M/UL (ref 4.05–5.2)
SODIUM SERPL-SCNC: 142 MMOL/L (ref 133–143)
TIBC SERPL-MCNC: 305 UG/DL (ref 250–450)
TRIGL SERPL-MCNC: 84 MG/DL (ref 35–150)
VLDLC SERPL CALC-MCNC: 16.8 MG/DL (ref 6–23)
WBC # BLD AUTO: 5.5 K/UL (ref 4.3–11.1)

## 2022-12-01 PROCEDURE — G0378 HOSPITAL OBSERVATION PER HR: HCPCS

## 2022-12-01 PROCEDURE — 83036 HEMOGLOBIN GLYCOSYLATED A1C: CPT

## 2022-12-01 PROCEDURE — 92610 EVALUATE SWALLOWING FUNCTION: CPT

## 2022-12-01 PROCEDURE — A4216 STERILE WATER/SALINE, 10 ML: HCPCS | Performed by: INTERNAL MEDICINE

## 2022-12-01 PROCEDURE — 97165 OT EVAL LOW COMPLEX 30 MIN: CPT

## 2022-12-01 PROCEDURE — 85652 RBC SED RATE AUTOMATED: CPT

## 2022-12-01 PROCEDURE — APPSS45 APP SPLIT SHARED TIME 31-45 MINUTES: Performed by: NURSE PRACTITIONER

## 2022-12-01 PROCEDURE — 82306 VITAMIN D 25 HYDROXY: CPT

## 2022-12-01 PROCEDURE — 83540 ASSAY OF IRON: CPT

## 2022-12-01 PROCEDURE — 80061 LIPID PANEL: CPT

## 2022-12-01 PROCEDURE — 83090 ASSAY OF HOMOCYSTEINE: CPT

## 2022-12-01 PROCEDURE — 82728 ASSAY OF FERRITIN: CPT

## 2022-12-01 PROCEDURE — 85027 COMPLETE CBC AUTOMATED: CPT

## 2022-12-01 PROCEDURE — 6370000000 HC RX 637 (ALT 250 FOR IP): Performed by: FAMILY MEDICINE

## 2022-12-01 PROCEDURE — 97535 SELF CARE MNGMENT TRAINING: CPT

## 2022-12-01 PROCEDURE — 6360000004 HC RX CONTRAST MEDICATION: Performed by: FAMILY MEDICINE

## 2022-12-01 PROCEDURE — 6360000004 HC RX CONTRAST MEDICATION: Performed by: INTERNAL MEDICINE

## 2022-12-01 PROCEDURE — 80053 COMPREHEN METABOLIC PANEL: CPT

## 2022-12-01 PROCEDURE — 99225 PR SBSQ OBSERVATION CARE/DAY 25 MINUTES: CPT | Performed by: PSYCHIATRY & NEUROLOGY

## 2022-12-01 PROCEDURE — 70553 MRI BRAIN STEM W/O & W/DYE: CPT

## 2022-12-01 PROCEDURE — A9579 GAD-BASE MR CONTRAST NOS,1ML: HCPCS | Performed by: FAMILY MEDICINE

## 2022-12-01 PROCEDURE — 36415 COLL VENOUS BLD VENIPUNCTURE: CPT

## 2022-12-01 PROCEDURE — 6360000002 HC RX W HCPCS: Performed by: FAMILY MEDICINE

## 2022-12-01 PROCEDURE — 97161 PT EVAL LOW COMPLEX 20 MIN: CPT

## 2022-12-01 PROCEDURE — C8929 TTE W OR WO FOL WCON,DOPPLER: HCPCS

## 2022-12-01 PROCEDURE — 96372 THER/PROPH/DIAG INJ SC/IM: CPT

## 2022-12-01 PROCEDURE — 83735 ASSAY OF MAGNESIUM: CPT

## 2022-12-01 PROCEDURE — 94760 N-INVAS EAR/PLS OXIMETRY 1: CPT

## 2022-12-01 PROCEDURE — 2580000003 HC RX 258: Performed by: INTERNAL MEDICINE

## 2022-12-01 PROCEDURE — 93306 TTE W/DOPPLER COMPLETE: CPT | Performed by: INTERNAL MEDICINE

## 2022-12-01 PROCEDURE — 94640 AIRWAY INHALATION TREATMENT: CPT

## 2022-12-01 PROCEDURE — 2580000003 HC RX 258: Performed by: FAMILY MEDICINE

## 2022-12-01 RX ORDER — FOLIC ACID 1 MG/1
1 TABLET ORAL DAILY
Qty: 30 TABLET | Refills: 0 | Status: SHIPPED | OUTPATIENT
Start: 2022-12-02

## 2022-12-01 RX ORDER — MELOXICAM 15 MG/1
15 TABLET ORAL DAILY PRN
Qty: 3 TABLET | Refills: 0
Start: 2022-12-01 | End: 2022-12-07 | Stop reason: SDUPTHER

## 2022-12-01 RX ORDER — CHOLECALCIFEROL (VITAMIN D3) 25 MCG
1000 TABLET ORAL DAILY
Qty: 30 TABLET | Refills: 0 | Status: SHIPPED | OUTPATIENT
Start: 2022-12-02

## 2022-12-01 RX ORDER — SODIUM CHLORIDE 0.9 % (FLUSH) 0.9 %
10 SYRINGE (ML) INJECTION AS NEEDED
Status: DISCONTINUED | OUTPATIENT
Start: 2022-12-01 | End: 2022-12-01 | Stop reason: HOSPADM

## 2022-12-01 RX ORDER — LANOLIN ALCOHOL/MO/W.PET/CERES
1000 CREAM (GRAM) TOPICAL DAILY
Qty: 30 TABLET | Refills: 0 | Status: SHIPPED | OUTPATIENT
Start: 2022-12-01

## 2022-12-01 RX ADMIN — VITAMIN D, TAB 1000IU (100/BT) 1000 UNITS: 25 TAB at 08:52

## 2022-12-01 RX ADMIN — FAMOTIDINE 20 MG: 20 TABLET, FILM COATED ORAL at 08:51

## 2022-12-01 RX ADMIN — LAMOTRIGINE 300 MG: 100 TABLET ORAL at 08:52

## 2022-12-01 RX ADMIN — GADOTERIDOL 15 ML: 279.3 INJECTION, SOLUTION INTRAVENOUS at 12:36

## 2022-12-01 RX ADMIN — BUPROPION HYDROCHLORIDE 150 MG: 150 TABLET, EXTENDED RELEASE ORAL at 08:52

## 2022-12-01 RX ADMIN — LEVOTHYROXINE SODIUM 88 MCG: 0.09 TABLET ORAL at 05:29

## 2022-12-01 RX ADMIN — ENOXAPARIN SODIUM 40 MG: 100 INJECTION SUBCUTANEOUS at 08:55

## 2022-12-01 RX ADMIN — VALSARTAN 20 MG: 40 TABLET, FILM COATED ORAL at 08:50

## 2022-12-01 RX ADMIN — SODIUM CHLORIDE, PRESERVATIVE FREE 10 ML: 5 INJECTION INTRAVENOUS at 12:37

## 2022-12-01 RX ADMIN — CLONAZEPAM 0.5 MG: 0.5 TABLET ORAL at 08:55

## 2022-12-01 RX ADMIN — FOLIC ACID 1 MG: 1 TABLET ORAL at 08:55

## 2022-12-01 RX ADMIN — ASPIRIN 81 MG: 81 TABLET ORAL at 08:52

## 2022-12-01 RX ADMIN — PREGABALIN 75 MG: 25 CAPSULE ORAL at 08:54

## 2022-12-01 RX ADMIN — VENLAFAXINE HYDROCHLORIDE 75 MG: 37.5 CAPSULE, EXTENDED RELEASE ORAL at 08:51

## 2022-12-01 RX ADMIN — PERFLUTREN 0.6 ML: 6.52 INJECTION, SUSPENSION INTRAVENOUS at 09:00

## 2022-12-01 RX ADMIN — MOMETASONE FUROATE AND FORMOTEROL FUMARATE DIHYDRATE 2 PUFF: 100; 5 AEROSOL RESPIRATORY (INHALATION) at 07:09

## 2022-12-01 ASSESSMENT — PAIN - FUNCTIONAL ASSESSMENT: PAIN_FUNCTIONAL_ASSESSMENT: NONE - DENIES PAIN

## 2022-12-01 NOTE — PROGRESS NOTES
TRANSFER - IN REPORT:    Verbal report received from ER on Hugo Sensing  being received from [facility] for routine progression of patient care      Report consisted of patient's Situation, Background, Assessment and   Recommendations(SBAR). Information from the following report(s) Nurse Handoff Report was reviewed with the receiving nurse. Opportunity for questions and clarification was provided. Assessment completed upon patient's arrival to unit and care assumed.

## 2022-12-01 NOTE — PROGRESS NOTES
ACUTE OCCUPATIONAL THERAPY GOALS:   (Developed with and agreed upon by patient and/or caregiver.)  1. Patient will complete total body bathing and dressing with independence. 2. Patient will complete toileting with independence. 3. Patient will tolerate 30 minutes of OT treatment with 1-2 rest breaks to increase activity tolerance for ADLs. 4. Patient will complete functional transfers with independence and good safety. 5. Patient will complete functional mobility with independence for household distances. 6. Patient will complete 15 minutes of therapeutic exercise/coordination activities to improve functional use of B UE for ADL. Timeframe: 7 visits       OCCUPATIONAL THERAPY Initial Assessment, Daily Note, and AM       OT Visit Days: 1  Acknowledge Orders  Time  OT Charge Capture  Rehab Caseload Tracker      Cyn Kimball is a 41 y.o. female   PRIMARY DIAGNOSIS: Stroke-like symptom  Stroke-like symptom [R29.90]  Right sided numbness [R20.0]       Reason for Referral: Generalized Muscle Weakness (M62.81)  Other lack of cordination (R27.8)  Observation: Payor: [facility] MEDICARE / Plan: [employer] DUAL COMPLETE / Product Type: *No Product type* /     ASSESSMENT:     REHAB RECOMMENDATIONS:   Recommendation to date pending progress:  Setting:  Outpatient Therapy     Equipment:    To Be Determined     ASSESSMENT:  Ms. Jojo Branch presents to the hospital with stroke-like symptoms including R sided numbness. Pt denies any numbness currently. Pt is alert and pleasant this am. Pt reports typically she lives alone and is mostly independent to modified independent with ADL. Does need additional time for some iADLs and reports fear of falling with stepping over the tub/shower. Pt reports hx of 1 recent fall. Pt educated on the use of a tub transfer bench to prevent falls. Pt today demonstrates equal sensation and strength in B UE but did move slower with finger to nose test using her R UE.  Pt completed bed mobility, sit to stand, functional mobility, toileting, and standing sink side for grooming tasks with SBA to CGA. Pt did have a few episodes of feeling unsteady and reports overall that her legs feel \"heavy\". Pt agreeable to sitting up in the chair at the end of session. Pt appears to be functioning slightly below her baseline and will benefit from OT services to address stated goals and plan of care. [address] [facility] [address] [address] [facility] 6 Clicks Daily Activity Inpatient Short Form:    AM-PAC Daily Activity Inpatient   How much help for putting on and taking off regular lower body clothing?: A Little  How much help for Bathing?: A Little  How much help for Toileting?: A Little  How much help for putting on and taking off regular upper body clothing?: A Little  How much help for taking care of personal grooming?: A Little  How much help for eating meals?: None  [facility] Inpatient Daily Activity Raw Score: 19  AM-[facility] Inpatient ADL T-Scale Score : 40.22  ADL Inpatient CMS 0-100% Score: 42.8  ADL Inpatient CMS G-Code Modifier : CK           SUBJECTIVE:     Ms. Ray  states, \"I can sit up in the chair! \"     Social/Functional Lives With: Alone  Type of Home: Apartment  Home Layout: One level  Bathroom Shower/Tub: Tub/Shower unit  Bathroom Equipment: Grab bars in shower  Has the patient had two or more falls in the past year or any fall with injury in the past year?: Yes  ADL Assistance: Independent  Homemaking Assistance: Needs assistance    OBJECTIVE:     Yulia Huerta / Latoya Young / Cira Beltrán: IV    RESTRICTIONS/PRECAUTIONS:  Restrictions/Precautions: Fall Risk    PAIN: Dolores Love / O2:   Pre Treatment:   Pain Assessment: None - Denies Pain      Post Treatment: same        Vitals          Oxygen            GROSS EVALUATION: INTACT IMPAIRED   (See Comments)   UE AROM [x] [] WFL   UE PROM [x] []   Strength []  Generalized weakness but WFL     Posture / Balance [] Sitting - Static: Good  Sitting - Dynamic: Good  Standing - Static: Fair, +  Standing - Dynamic: Fair   Sensation [x]  B UE equal to light touch   Coordination []  Slowed with finger to nose on the R side     Tone [x]       Edema [x]    Activity Tolerance []  Limited cue to fatigue      Hand Dominance R [x] L []      COGNITION/  PERCEPTION: INTACT IMPAIRED   (See Comments)   Orientation [x]     Vision []  Wears glasses   Hearing [x]     Cognition  []  Reports difficulty with cognition at times    Perception [x]       MOBILITY: I Mod I S SBA CGA Min Mod Max Total  NT x2 Comments:   Bed Mobility    Rolling [] [] [] [x] [] [] [] [] [] [] []    Supine to Sit [] [] [] [x] [] [] [] [] [] [] []    Scooting [] [] [] [x] [] [] [] [] [] [] []    Sit to Supine [] [] [] [] [] [] [] [] [] [x] []    Transfers    Sit to Stand [] [] [] [x] [] [] [] [] [] [] []    Bed to Chair [] [] [] [] [x] [] [] [] [] [] []    Stand to Sit [] [] [] [x] [] [] [] [] [] [] []    Tub/Shower [] [] [] [] [] [] [] [] [] [x] []     Toilet [] [] [] [x] [x] [] [] [] [] [] []      [] [] [] [] [] [] [] [] [] [] []    I=Independent, Mod I=Modified Independent, S=Supervision/Setup, SBA=Standby Assistance, CGA=Contact Guard Assistance, Min=Minimal Assistance, Mod=Moderate Assistance, Max=Maximal Assistance, Total=Total Assistance, NT=Not Tested    ACTIVITIES OF DAILY LIVING: I Mod I S SBA CGA Min Mod Max Total NT Comments   BASIC ADLs:              Upper Body Bathing  [] [] [] [] [] [] [] [] [] [x]    Lower Body Bathing [] [] [] [] [] [] [] [] [] [x]    Toileting [] [] [] [x] [] [] [] [] [] []    Upper Body Dressing [] [] [] [] [] [] [] [] [] [x]    Lower Body Dressing [] [] [] [] [] [] [] [] [] [x]    Feeding [] [] [] [] [] [] [] [] [] [x]    Grooming [] [] [] [x] [] [] [] [] [] [] Brushing teeth, combing hair, washing hands    Personal Device Care [] [] [] [] [] [] [] [] [] [x]    Functional Mobility [] [] [] [x] [x] [] [] [] [] []    I=Independent, Mod I=Modified Independent, S=Supervision/Setup, SBA=Standby Assistance, CGA=Contact Guard Assistance, Min=Minimal Assistance, Mod=Moderate Assistance, Max=Maximal Assistance, Total=Total Assistance, NT=Not Tested    PLAN:   [facility]: 3 times/week for duration of hospital stay or until stated goals are met, whichever comes first.    PROBLEM LIST:   (Skilled intervention is medically necessary to address:)  Decreased ADL/Functional Activities  Decreased Activity Tolerance  Decreased Balance  Decreased Cognition  Decreased Coordination  Decreased Gait Ability  Decreased Safety Awareness  Decreased Strength  Decreased Transfer Abilities   INTERVENTIONS PLANNED:  (Benefits and precautions of occupational therapy have been discussed with the patient.)  Self Care Training  Therapeutic Activity  Therapeutic Exercise/HEP  Neuromuscular Re-education  Manual Therapy  Education         TREATMENT:     EVALUATION: LOW COMPLEXITY: (Untimed Charge)    TREATMENT:   Self Care (10 minutes): Patient participated in toileting and grooming ADLs in standing with minimal visual, verbal, and tactile cueing to increase independence, decrease assistance required, and increase activity tolerance. Patient also participated in functional mobility and functional transfer training to increase independence, decrease assistance required, increase activity tolerance, and increase safety awareness.      TREATMENT GRID:  N/A    AFTER TREATMENT PRECAUTIONS: Bed/Chair Locked, Call light within reach, Chair, Needs within reach, and RN notified    INTERDISCIPLINARY COLLABORATION:  RN/ PCT and OT/ JAVIER    EDUCATION:  Education Given To: Patient  Education Provided: Role of Therapy;Plan of Care  Education Method: Verbal  Education Outcome: Verbalized understanding    TOTAL TREATMENT DURATION AND TIME:  Time In: 9596  Time Out: 1101  Minutes: Jordana Jasmine 141, OT

## 2022-12-01 NOTE — ED NOTES
Pt moved to room 25. Report given to accepting RN, Gita Quinn.      Frida Frazier, KAYLYNN  12/01/22 [address] RN  12/01/22 3230

## 2022-12-01 NOTE — DISCHARGE INSTRUCTIONS
Learning About BE FAST: Stroke Warning Signs  What are the BE FAST stroke warning signs? BE FAST is a simple way to remember the main symptoms of stroke. These symptoms happen suddenly. So knowing what to look for helps you know when to call for medical help. BE FAST stands for:  B alance. Loss of balance or trouble walking. E yes. Trouble seeing out of one or both eyes. F ace. Weakness or drooping on one side of the face. A rm. Weakness or numbness in an arm or leg. S peech. Trouble speaking. T herbert to call 911. Other stroke symptoms include sudden confusion, sudden trouble understanding simple statements, fainting, a seizure, and a sudden, severe headache. What are the symptoms of a stroke? Symptoms of a stroke happen quickly. A stroke may cause:  Sudden numbness, tingling, weakness, or loss of movement in your face, arm, or leg, especially on only one side of your body. Sudden vision changes. Sudden trouble speaking. Sudden confusion or trouble understanding simple statements. Sudden problems with walking or balance. A sudden, severe headache that is different from past headaches. Fainting. A seizure. It's important to call for medical help if you have stroke symptoms. Quick treatment may save your life. And it may reduce the damage in your brain so that you have fewer problems after the stroke. What happens when you have a stroke? A stroke occurs when a blood vessel to the brain bursts or is blocked by a blood clot. The blood supply to part of the brain is reduced. Without blood and the oxygen it supplies, the nerve cells in that part of the brain die within minutes. As a result, the part of the body controlled by those cells cannot work properly. The effects of a stroke may range from mild to severe. They may get better, or they may last the rest of your life. A stroke can affect many things, including vision, speech, behavior, thought processes, and your ability to move.   Where can you learn more? Go to [email or web address].[email or web address] [place] [email or web address] [place]. org and sign in to your [employer] account. Enter Z931 in the [facility] box to learn more about \"Learning About BE FAST: Stroke Warning Signs. \"     If you do not have an account, please click on the \"Sign Up Now\" link. Current as of: March 28, 2022               Content Version: 13.4  © 2006-2022 Healthwise[employer] Incorporated. Care instructions adapted under license by [facility] ([facility] [place]). If you have questions about a medical condition or this instruction, always ask your healthcare professional. [address] any warranty or liability for your use of this information.

## 2022-12-01 NOTE — CARE COORDINATION
Spoke to Ms. Baron in room 609 about discharge planning. She is here to r/o stroke. Ms. Deon Lomas lives alone at the [facility] ([place] [address]) on [address]. She owns a car, and drives, and is independent with ADLs, but reports some balance issues when ambulating. She agrees to OP PT, but declines OP OT and SLP. Referral faxed to central scheduling at [phone] (phone [phone]) for PT at [address] [place] [address], [address]. No other needs voiced or identified. 12/01/22 6766   Service Assessment   Patient Orientation Alert and Oriented   Cognition Alert   History Provided By Patient   Primary Caregiver Self   Support Systems Friends/Neighbors   PCP Verified by CM Yes   Prior Functional Level Independent in ADLs/IADLs   Current Functional Level Independent in ADLs/IADLs   Can patient return to prior living arrangement Yes   Ability to make needs known: Good   Family able to assist with home care needs: Yes   Would you like for me to discuss the discharge plan with any other family members/significant others, and if so, who?  No   Condition of Participation: Discharge Planning   The Plan for Transition of Care is related to the following treatment goals: home today

## 2022-12-01 NOTE — PROGRESS NOTES
Neurology Daily Progress Note     Assessment:     41year old female with right sided numbness, resolved. No stroke on MRI. The patient does have a history of migraines and it is possible that these recurrent episodes are related to migraine and patient's migraines are poorly controlled currently. Plan:     Follow up in neurology clinic as an outpatient for migraines    Advised against use of fioricet, which is the only thing she is prescribed for migraine currently. Subjective: Interval history:    Patient reports numbness is resolved. MRI negative for infarct. Patient is unsure if events occur with or precede a headache, but does endorse migraines. She is unable to take triptans due to other serotonergic meds. Currently taking Fioricet. History:    Leona Tee is a 41 y.o. female who is being seen for numbness.     Past Medical History:   Diagnosis Date    ADHD (attention deficit hyperactivity disorder)     Allergic rhinitis 1/20/2014    Anxiety disorder 1/20/2014    Asthma     B12 deficiency 1/20/2014    Depression 1/20/2014    Essential hypertension     Fibromyalgia     GERD (gastroesophageal reflux disease) 1/20/2014    Hypothyroidism     Meniere disease 1/20/2014    Migraine     OCD (obsessive compulsive disorder)     Tinnitus      Past Surgical History:   Procedure Laterality Date    HEENT  06/2017    nasal procedure (balloon inserted, drainage)    HEENT  07/03/2013    Septo / Fess / release CP    HEENT      eye    ORTHOPEDIC SURGERY      jaw    OTHER SURGICAL HISTORY      sinus implants      Family History   Problem Relation Age of Onset    Breast Cancer Maternal Grandmother 80    Hypertension Mother     Hypertension Brother     Heart Disease Mother     Hypertension Other     Heart Disease Other     Elevated Lipids Brother     Elevated Lipids Mother     Cancer Other     Asthma Other     Cancer Father     Coronary Art Dis Mother     Diabetes Other      Social History     Tobacco Use Smoking status: Some Days     Packs/day: 0.50     Types: Cigarettes     Last attempt to quit: 2008     Years since quittin.6    Smokeless tobacco: Never    Tobacco comments:     Quit smoking: STARTED BACK 2021   Substance Use Topics    Alcohol use: No     Alcohol/week: 0.0 standard drinks      Current Facility-Administered Medications   Medication Dose Route Frequency Provider Last Rate Last Admin    sodium chloride flush 0.9 % injection 10 mL  10 mL IntraVENous PRN Sharon Bajwa, DO   10 mL at 22 1237    ondansetron (ZOFRAN-ODT) disintegrating tablet 4 mg  4 mg Oral Q8H PRN Sharon Bajwa, DO        Or    ondansetron (ZOFRAN) injection 4 mg  4 mg IntraVENous Q6H PRN Sharon Bajwa, DO        polyethylene glycol (GLYCOLAX) packet 17 g  17 g Oral Daily PRN Sharon Bajwa, DO        enoxaparin (LOVENOX) injection 40 mg  40 mg SubCUTAneous Daily Sharon Bajwa, DO   40 mg at 22 0855    aspirin EC tablet 81 mg  81 mg Oral Daily Sharon Bajwa, DO   81 mg at 22 3991    Or    aspirin suppository 300 mg  300 mg Rectal Daily Sharon Bajwa, DO        0.9 % sodium chloride infusion   IntraVENous Continuous Sharon Bajwa, DO 75 mL/hr at 22 0002 Rate Verify at 22 0002    acetaminophen (TYLENOL) tablet 650 mg  650 mg Oral Q4H PRN Sharon Bajwa, DO        Or    acetaminophen (TYLENOL) suppository 650 mg  650 mg Rectal Q4H PRN Sharon Bajwa, DO        atorvastatin (LIPITOR) tablet 80 mg  80 mg Oral Nightly Sharon Bajwa, DO   80 mg at 22 2353    labetalol (NORMODYNE;TRANDATE) injection 10 mg  10 mg IntraVENous Q10 Min PRN Sharon Bajwa, DO        folic acid (FOLVITE) tablet 1 mg  1 mg Oral Daily Sharon Bajwa, DO   1 mg at 22 0855    mometasone-formoterol (DULERA) 100-5 MCG/ACT inhaler 2 puff  2 puff Inhalation BID Sharon Bajwa, DO   2 puff at 22 0709    montelukast (SINGULAIR) tablet 10 mg  10 mg Oral Nightly Sharon Bajwa, DO   10 mg at 22 4282    albuterol (PROVENTIL) nebulizer solution 2.5 mg  2.5 mg Nebulization Q6H PRN Rudolph Bright, DO        valsartan (DIOVAN) tablet 20 mg  20 mg Oral Daily Rudolph Bright, DO   20 mg at 12/01/22 0850    risperiDONE (RISPERDAL) tablet 4 mg  4 mg Oral Nightly Rudolph Bright, DO   4 mg at 11/30/22 2356    buPROPion [facility]) extended release tablet 150 mg  150 mg Oral BID Rudolph Bright, DO   150 mg at 12/01/22 1438    lamoTRIgine (LAMICTAL) tablet 300 mg  300 mg Oral Daily Rudolph Bright, DO   300 mg at 12/01/22 8576    And    lamoTRIgine (LAMICTAL) tablet 200 mg  200 mg Oral Nightly Rudolph Bright, DO   200 mg at 11/30/22 2355    levothyroxine (SYNTHROID) tablet 88 mcg  88 mcg Oral QAM AC Rudolph Bright, DO   88 mcg at 12/01/22 0529    venlafaxine (EFFEXOR XR) extended release capsule 225 mg  225 mg Oral Nightly Rudolph Bright, DO   225 mg at 11/30/22 2359    nicotine (NICODERM CQ) 21 MG/24HR 1 patch  1 patch TransDERmal Q24H Rudolph Bright, DO        famotidine (PEPCID) tablet 20 mg  20 mg Oral BID Rudolph Bright, DO   20 mg at 12/01/22 4310    Vitamin D (CHOLECALCIFEROL) tablet 1,000 Units  1,000 Units Oral Daily Rudolph Bright, DO   1,000 Units at 12/01/22 0486    clonazePAM (KLONOPIN) tablet 0.5 mg  0.5 mg Oral 2 times per day Rudolph Bright, DO   0.5 mg at 12/01/22 0855    clonazePAM (KLONOPIN) tablet 0.5 mg  0.5 mg Oral Daily PRN Rudolph Bright, DO        pregabalin (LYRICA) capsule 75 mg  75 mg Oral Daily Rudolph Bright, DO   75 mg at 12/01/22 1244    And    pregabalin (LYRICA) capsule 150 mg  150 mg Oral Nightly Rudolph Bright, DO   150 mg at 11/30/22 2354    venlafaxine (EFFEXOR XR) extended release capsule 75 mg  75 mg Oral Daily with breakfast Rudolph Bright, DO   75 mg at 12/01/22 4393        Allergies   Allergen Reactions    Latex Rash    Aripiprazole Other (See Comments)     Other reaction(s): Agitation-I  Caused severe akathesia, couldn't sit still. \"Worse 3 days of my life. \"    Ziprasidone Hcl Other (See Comments) Other reaction(s): Agitation-I  \"moods swing off the chain\"  caused hospitalization, unsure of reaction    Topiramate Other (See Comments)    Zolpidem Other (See Comments)     'hallucinates'  hallucinations       Review of systems negative with exception of pertinent positives and negatives noted above.        Objective:     Vitals:    12/01/22 0536 12/01/22 0710 12/01/22 0743 12/01/22 1208   BP:   122/69 125/70   Pulse: 73 64 70 70   Resp:  18 16    Temp:   98 °F (36.7 °C)    TempSrc:   Oral    SpO2:  96% 95%    Weight:       Height:              Current Facility-Administered Medications:     sodium chloride flush 0.9 % injection 10 mL, 10 mL, IntraVENous, PRN, Rachel Tsai DO, 10 mL at 12/01/22 1237    ondansetron (ZOFRAN-ODT) disintegrating tablet 4 mg, 4 mg, Oral, Q8H PRN **OR** ondansetron (ZOFRAN) injection 4 mg, 4 mg, IntraVENous, Q6H PRN, Rachel Tsai DO    polyethylene glycol (GLYCOLAX) packet 17 g, 17 g, Oral, Daily PRN, Rachel Tsai DO    enoxaparin (LOVENOX) injection 40 mg, 40 mg, SubCUTAneous, Daily, Rachel Tsai DO, 40 mg at 12/01/22 5005    aspirin EC tablet 81 mg, 81 mg, Oral, Daily, 81 mg at 12/01/22 0852 **OR** aspirin suppository 300 mg, 300 mg, Rectal, Daily, Rachel Tsai DO    0.9 % sodium chloride infusion, , IntraVENous, Continuous, Rachel Tsai DO, Last Rate: 75 mL/hr at 12/01/22 0002, Rate Verify at 12/01/22 0002    acetaminophen (TYLENOL) tablet 650 mg, 650 mg, Oral, Q4H PRN **OR** acetaminophen (TYLENOL) suppository 650 mg, 650 mg, Rectal, Q4H PRN, Rachel Tsai DO    atorvastatin (LIPITOR) tablet 80 mg, 80 mg, Oral, Nightly, Rachel Tsai DO, 80 mg at 11/30/22 6874    labetalol (NORMODYNE;TRANDATE) injection 10 mg, 10 mg, IntraVENous, Q10 Min PRN, Rachel Tsai DO    folic acid (FOLVITE) tablet 1 mg, 1 mg, Oral, Daily, Rachel Tsai DO, 1 mg at 12/01/22 0855    mometasone-formoterol (DULERA) 100-5 MCG/ACT inhaler 2 puff, 2 puff, Inhalation, BID, Rachel Tsai DO, 2 puff at 12/01/22 0709    montelukast (SINGULAIR) tablet 10 mg, 10 mg, Oral, Nightly, Jenae Cristo, DO, 10 mg at 11/30/22 2352    albuterol (PROVENTIL) nebulizer solution 2.5 mg, 2.5 mg, Nebulization, Q6H PRN, Jenae Cristo, DO    valsartan (DIOVAN) tablet 20 mg, 20 mg, Oral, Daily, Jenae Cristo, DO, 20 mg at 12/01/22 0850    risperiDONE (RISPERDAL) tablet 4 mg, 4 mg, Oral, Nightly, Jenae Cristo, DO, 4 mg at 11/30/22 2356    buPROPion [facility]) extended release tablet 150 mg, 150 mg, Oral, BID, Jenae Cristo, DO, 150 mg at 12/01/22 6412    lamoTRIgine (LAMICTAL) tablet 300 mg, 300 mg, Oral, Daily, 300 mg at 12/01/22 2221 **AND** lamoTRIgine (LAMICTAL) tablet 200 mg, 200 mg, Oral, Nightly, Jenae Cristo, DO, 200 mg at 11/30/22 2355    levothyroxine (SYNTHROID) tablet 88 mcg, 88 mcg, Oral, QAM AC, Jenae Cristo, DO, 88 mcg at 12/01/22 0529    venlafaxine (EFFEXOR XR) extended release capsule 225 mg, 225 mg, Oral, Nightly, Jenae Cristo, DO, 225 mg at 11/30/22 2359    nicotine (NICODERM CQ) 21 MG/24HR 1 patch, 1 patch, TransDERmal, Q24H, Jenae Cristo, DO    famotidine (PEPCID) tablet 20 mg, 20 mg, Oral, BID, Jenae Cristo, DO, 20 mg at 12/01/22 1115    Vitamin D (CHOLECALCIFEROL) tablet 1,000 Units, 1,000 Units, Oral, Daily, Jenae Cristo, DO, 1,000 Units at 12/01/22 2702    clonazePAM (KLONOPIN) tablet 0.5 mg, 0.5 mg, Oral, 2 times per day, Jenae Cristo, DO, 0.5 mg at 12/01/22 0855    clonazePAM (KLONOPIN) tablet 0.5 mg, 0.5 mg, Oral, Daily PRN, Jenae Lemons,     pregabalin (LYRICA) capsule 75 mg, 75 mg, Oral, Daily, 75 mg at 12/01/22 0854 **AND** pregabalin (LYRICA) capsule 150 mg, 150 mg, Oral, Nightly, Jenae Lemons DO, 150 mg at 11/30/22 7554    venlafaxine (EFFEXOR XR) extended release capsule 75 mg, 75 mg, Oral, Daily with breakfast, Jenae Lemons DO, 75 mg at 12/01/22 0948    Recent Results (from the past 12 hour(s))   CBC    Collection Time: 12/01/22  6:53 AM   Result Value Ref Range    WBC 5.5 4.3 - 11.1 K/uL    RBC 3.90 (L) 4.05 - 5.2 M/uL    Hemoglobin 11.2 (L) 11.7 - 15.4 g/dL    Hematocrit 35.2 (L) 35.8 - 46.3 %    MCV 90.3 82 - 102 FL    MCH 28.7 26.1 - 32.9 PG    MCHC 31.8 31.4 - 35.0 g/dL    RDW 15.5 (H) 11.9 - 14.6 %    Platelets 330 944 - 369 K/uL    MPV 10.9 9.4 - 12.3 FL    nRBC 0.00 0.0 - 0.2 K/uL   Hemoglobin A1c    Collection Time: 12/01/22  6:53 AM   Result Value Ref Range    Hemoglobin A1C 5.0 4.8 - 5.6 %    eAG 97 mg/dL   Lipid Panel    Collection Time: 12/01/22  6:53 AM   Result Value Ref Range    Cholesterol, Total 160 <200 MG/DL    Triglycerides 84 35 - 150 MG/DL    HDL 55 40 - 60 MG/DL    LDL Calculated 88.2 <100 MG/DL    VLDL Cholesterol Calculated 16.8 6.0 - 23.0 MG/DL    Chol/HDL Ratio 2.9     Vitamin D 25 Hydroxy    Collection Time: 12/01/22  6:53 AM   Result Value Ref Range    Vit D, 25-Hydroxy 36.4 30.0 - 100.0 ng/mL   Ferritin    Collection Time: 12/01/22  6:53 AM   Result Value Ref Range    Ferritin 14 8 - 388 NG/ML   Transferrin Saturation    Collection Time: 12/01/22  6:53 AM   Result Value Ref Range    Iron 82 35 - 150 ug/dL    TIBC 305 250 - 450 ug/dL    TRANSFERRIN SATURATION 27 >20 %   Sedimentation Rate    Collection Time: 12/01/22  6:53 AM   Result Value Ref Range    Sed Rate, Automated 25 (H) 0 - 20 mm/hr   Comprehensive Metabolic Panel    Collection Time: 12/01/22  6:53 AM   Result Value Ref Range    Sodium 142 133 - 143 mmol/L    Potassium 3.9 3.5 - 5.1 mmol/L    Chloride 112 (H) 101 - 110 mmol/L    CO2 25 21 - 32 mmol/L    Anion Gap 5 2 - 11 mmol/L    Glucose 82 65 - 100 mg/dL    BUN 12 6 - 23 MG/DL    Creatinine 0.90 0.6 - 1.0 MG/DL    Est, Glom Filt Rate >60 >60 ml/min/1.73m2    Calcium 8.7 8.3 - 10.4 MG/DL    Total Bilirubin 0.4 0.2 - 1.1 MG/DL    ALT 16 12 - 65 U/L    AST 9 (L) 15 - 37 U/L    Alk Phosphatase 55 50 - 136 U/L    Total Protein 6.3 6.3 - 8.2 g/dL    Albumin 3.0 (L) 3.5 - 5.0 g/dL    Globulin 3.3 2.8 - 4.5 g/dL    Albumin/Globulin Ratio 0.9 0.4 - 1.6 Magnesium    Collection Time: 12/01/22  6:53 AM   Result Value Ref Range    Magnesium 2.2 1.8 - 2.4 mg/dL   Transthoracic echocardiogram (TTE) complete with contrast, bubble, strain, and 3D PRN    Collection Time: 12/01/22 10:38 AM   Result Value Ref Range    LV EDV A2C 123 mL    LV EDV A4C 77 mL    LV ESV A2C 44 mL    LV ESV A4C 38 mL    IVSd 0.6 0.6 - 0.9 cm    LVIDd 5.4 (A) 3.9 - 5.3 cm    LVIDs 3.6 cm    LVOT Diameter 1.8 cm    LVOT Mean Gradient 5 mmHg    LVOT VTI 34.5 cm    LVOT Peak Velocity 1.6 m/s    LVOT Peak Gradient 11 mmHg    LVPWd 0.7 0.6 - 0.9 cm    LV E' Lateral Velocity 18 cm/s    LV E' Septal Velocity 15 cm/s    LV Ejection Fraction A2C 65 %    LV Ejection Fraction A4C 51 %    EF BP 55 55 - 100 %    LVOT Area 2.5 cm2    LVOT SV 87.7 ml    LA Minor Axis 6.1 cm    LA Major Axis 6.8 cm    LA Area 2C 23.3 cm2    LA Area 4C 19.2 cm2    LA Volume 2C 73 (A) 22 - 52 mL    LA Volume 4C 43 22 - 52 mL    LA Volume BP 58 (A) 22 - 52 mL    LA Diameter 3.2 cm    AV Mean Velocity 1.3 m/s    AV Mean Gradient 8 mmHg    AV Mean Gradient 8 mmHg    AV VTI 42.5 cm    AV Peak Velocity 2.0 m/s    AV Peak Gradient 17 mmHg    AV Area by VTI 2.1 cm2    AV Area by Peak Velocity 2.0 cm2    Aortic Root 2.5 cm    Ascending Aorta 2.7 cm    MR Peak Velocity 3.8 m/s    MR Peak Gradient 58 mmHg    MV E Wave Deceleration Time 224.0 ms    MV A Velocity 0.65 m/s    MV E Velocity 1.14 m/s    PV Max Velocity 1.0 m/s    PV Peak Gradient 4 mmHg    RV Basal Dimension 3.3 cm    RV Free Wall Peak S' 13 cm/s    TR Max Velocity 2.65 m/s    TR Peak Gradient 28 mmHg    Body Surface Area 1.79 m2    Fractional Shortening 2D 33 28 - 44 %    LV ESV Index A4C 22 mL/m2    LV EDV Index A4C 44 mL/m2    LV ESV Index A2C 25 mL/m2    LV EDV Index A2C 70 mL/m2    LVIDd Index 3.09 cm/m2    LVIDs Index 2.06 cm/m2    LV RWT Ratio 0.26     LV Mass 2D 119.8 67 - 162 g    LV Mass 2D Index 68.5 43 - 95 g/m2    MV E/A 1.75     E/E' Ratio (Averaged) 6.97     E/E' Lateral 6.33     E/E' Septal 7.60     LA Volume Index BP 33 16 - 34 ml/m2    LVOT Stroke Volume Index 50.1 mL/m2    LA Volume Index 2C 42 (A) 16 - 34 mL/m2    LA Volume Index 4C 25 16 - 34 mL/m2    LA Size Index 1.83 cm/m2    LA/AO Root Ratio 1.28     Ao Root Index 1.43 cm/m2    Ascending Aorta Index 1.54 cm/m2    AV Velocity Ratio 0.80     LVOT:AV VTI Index 0.81     JONNY/BSA VTI 1.2 cm2/m2    JONNY/BSA Peak Velocity 1.1 cm2/m2       CT Results (most recent):  CT reviewed. Results do not populate into note    Most recent MRI   Results for orders placed during the hospital encounter of 11/30/22    MRI brain with and without contrast    Narrative  EXAMINATION: BRAIN MRI 12/1/2022 12:41 PM    ACCESSION NUMBER: [id number]    INDICATION: stroke like symptoms right-sided weakness with disorientation    COMPARISON: Head CT and CTA head and neck 11/30/2022 brain MRI 4/14/2016    TECHNIQUE: Multiplanar multisequence MRI of the brain without and with  intravenous administration of 15 cc ProHance contrast agent. FINDINGS:    Midline structures including the corpus callosum, pituitary gland, optic nerves,  and cerebellum are well developed. The ventricles are within normal limits. There is no midline shift. The basilar  cisterns are patent. There is no cerebellar tonsillar ectopia or herniation. Diffusion imaging shows no evidence of acute infarction or other acute  abnormality. Partially visualized maxillary sinus fixations bilaterally. The expected large intracranial vascular flow voids are preserved. There is no  evidence of intracranial blood products. There is no abnormal intra or extra-axial postcontrast enhancement. There are no suspicious osseous lesions. Impression  Unremarkable MRI of the brain for patient age, including no evidence of acute  ischemic infarction. Most recent MRA   No results found for this or any previous visit.         Most recent CTA  No results found for this or any previous visit. Most recent Echo  Results for orders placed during the hospital encounter of 11/30/22    Transthoracic echocardiogram (TTE) complete with contrast, bubble, strain, and 3D PRN    Interpretation Summary    Left Ventricle: Normal left ventricular systolic function with a visually estimated EF of 55 - 60%. Left ventricle size is normal. Normal wall thickness. Normal wall motion. Normal diastolic function. Technical qualifiers: Color flow Doppler was performed and pulse wave and/or continuous wave Doppler was performed. Physical Exam:  General - Well developed, well nourished, in no apparent distress. Pleasant and conversant. HEENT - Normocephalic, atraumatic. Conjunctiva are clear. Neck - Supple without masses  Extremities - Peripheral pulses intact. No edema and no rashes. Neurological examination - Comprehension, attention, memory and reasoning are intact. Language and speech are normal.   On cranial nerve examination, (II, III, IV, VI) pupils are equal, round, and reactive to light. Visual acuity is adequate. Visual fields are full to finger confrontation. Extraocular motility is at baseline. Pt has chronic strabismus (V, VII) Face is symmetric and sensation is intact to light touch. (VIII) Hearing is intact. (IX, X) Palate and uvula elevate symmetrically. Voice is normal. (XI) Shoulder shrug is strong and equal bilaterally. (XII)Tongue is midline. Motor examination - There is normal muscle tone and bulk. Power is full throughout. Muscle stretch reflexes are normoactive throughout. Plantar response is flexor bilaterally. Sensation is intact to light touch, pinprick, vibration and proprioception in all extremities.  Cerebellar examination is normal.     Signed By: ROSANNA Garcia NP     December 1, 2022    Attending note  Patient seen and examined  Negative MRI for acute ischemia  Differential diagnosis functional is probably most likely however as she does have a history of migraine there is the possibility of hemiplegic migraine. This is far more usually seen in children and does have a underlying genetic basis. I feel disinclined to research the possibility with esoteric extremely expensive genetic testing which is not covered by insurance  I do think however that as she is intolerant to triptans and has been using Fioricet that it would be prudent to have her seen in follow-up neurology clinic by Christina Saldivar and given a clinical trial on Nurtec. This has some appeal and that it would not interfere with her psychiatric medication and would be a preparation that if she felt something unusual happening on 1 side or the other now that we have excluded an organic cerebrovascular origin she could take on a as needed basis.   Obviously mandatory that she quit smoking

## 2022-12-01 NOTE — PROGRESS NOTES
Comprehensive Nutrition Assessment    Type and Reason for Visit: Initial, Positive Nutrition Screen  Malnutrition Screening Tool: Malnutrition Screen  Have you recently lost weight without trying?: 2 to 13 pounds (1 point)  Have you been eating poorly because of a decreased appetite?: Yes (1 point)  Malnutrition Screening Tool Score: 2    This assessment was completed remotely. Nutrition Recommendations/Plan:   ***     Malnutrition Assessment:  Malnutrition Status:    Context:    Findings of clinical characteristics of malnutrition:   {Malnutrition Characteristics:94383}     Nutrition Assessment:  Nutrition History: ***     Do You Have Any Cultural, Gnosticist, or Ethnic Food Preferences?: No   Nutrition Background:       PMHx of fibromyalgia, B12 deficiency, vit D deficiency, GERD, bipolar d/o, PTSD, HTN, OCD. Pt presented c/o right sided numbness present on awakening the morning PTA. Current smoker of about >0.5 pack per day. Nutrition Interval:  ***     Current Nutrition Therapies:  {Current Nutrition Therapy:95763}    Current Intake:            Anthropometric Measures:  Height: 5' 3\" (160 cm)  Current Body Wt:  , Weight source:    BMI:  ,       Ideal Body Weight (Kg) (Calculated): 52 kg (115 lbs),    Usual Body Wt:  , Percent weight change:         Edema:Peripheral Vascular  Peripheral Vascular (WDL): Within Defined Limits   BMI Category    Estimated Daily Nutrient Needs:  Energy (kcal/day):   (  Weight used:      Protein (g/day):   Weight Used: ( )    Fluid (ml/day):   ( )    Nutrition Diagnosis:   {PES Statements:70261}  Nutrition Interventions:                 Goals:       Active Goal:         Nutrition Monitoring and Evaluation:              Discharge Planning:         Leni Mcdonald, [address]

## 2022-12-01 NOTE — ED NOTES
Report given to [facility], RN and care assumed at this time.       Sonia Barrett RN  11/30/22 4620

## 2022-12-01 NOTE — PROGRESS NOTES
Discharge instructions given. Education provided. All questions answered and verbally voiced understanding. Medication changes and follow up appointments discussed. Script provided. AVS reviewed, signed, and placed in chart. Copy provided for pt. Friend here to pick patient up. Patient taken down in wheelchair.

## 2022-12-01 NOTE — PROGRESS NOTES
SBA. Pt reports numbness has resolved, and mobility is much better for her today compared to yesterday. Pt presents as functioning slightly below her baseline, with deficits in mobility including gait, balance, and activity tolerance. Pt will benefit from skilled therapy services to address stated deficits to promote return to highest level of function, independence, and safety. Will continue to follow. [address] [facility] [address] [address] AM-PAC 6 Clicks Basic Mobility Inpatient Short Form  AM-PAC Mobility Inpatient   How much difficulty turning over in bed?: None  How much difficulty sitting down on / standing up from a chair with arms?: None  How much difficulty moving from lying on back to sitting on side of bed?: None  How much help from another person moving to and from a bed to a chair?: None  How much help from another person needed to walk in hospital room?: A Little  How much help from another person for climbing 3-5 steps with a railing?: A Little  [facility] 22  AM-PAC Inpatient T-Scale Score : 53.28  Mobility Inpatient CMS 0-100% Score: 20.91  Mobility Inpatient CMS G-Code Modifier : CJ    SUBJECTIVE:   Ms. Izzy Catalan states, \"My balance is bad usually\"     Social/Functional Lives With: Alone  Type of Home: Apartment  Home Layout: One level  Has the patient had two or more falls in the past year or any fall with injury in the past year?: Yes    OBJECTIVE:     PAIN: VITALS / O2: PRECAUTION / Jolena Jewels / Laymon Benson:   Pre Treatment: 0/10         Post Treatment: 0/10 Vitals        Oxygen      IV    RESTRICTIONS/PRECAUTIONS:  Restrictions/Precautions: Fall Risk                 GROSS EVALUATION: Intact Impaired (Comments):   AROM [x]     PROM [x]    Strength []  Generalized weakness secondary to fatigue, but overall close to baseline.  No overt deficits   Balance [] Sitting - Static: Good  Sitting - Dynamic: Good  Standing - Static: Fair  Standing - Dynamic: Fair   Posture [] Forward Head  Rounded Shoulders   Sensation [x]     Coordination [x]      Tone []     Edema []    Activity Tolerance [x]      []      COGNITION/  PERCEPTION: Intact Impaired (Comments):   Orientation [x]     Vision [x]     Hearing [x]     Cognition  [x]       MOBILITY: I Mod I S SBA CGA Min Mod Max Total  NT x2 Comments:   Bed Mobility    Rolling [] [] [] [x] [] [] [] [] [] [] []    Supine to Sit [] [] [] [x] [] [] [] [] [] [] []    Scooting [] [] [] [x] [] [] [] [] [] [] []    Sit to Supine [] [] [] [x] [] [] [] [] [] [] []    Transfers    Sit to Stand [] [] [] [x] [] [] [] [] [] [] []    Bed to Chair [] [] [] [x] [] [] [] [] [] [] []    Stand to Sit [] [] [] [x] [] [] [] [] [] [] []     [] [] [] [] [] [] [] [] [] [] []    I=Independent, Mod I=Modified Independent, S=Supervision, SBA=Standby Assistance, CGA=Contact Guard Assistance,   Min=Minimal Assistance, Mod=Moderate Assistance, Max=Maximal Assistance, Total=Total Assistance, NT=Not Tested    GAIT: I Mod I S SBA CGA Min Mod Max Total  NT x2 Comments:   Level of Assistance [] [] [] [x] [] [] [] [] [] [] []    Distance 40 feet    DME None    Gait Quality Path deviations  and Trunk sway increased    Weightbearing Status Restrictions/Precautions  Restrictions/Precautions: Fall Risk    Stairs      I=Independent, Mod I=Modified Independent, S=Supervision, SBA=Standby Assistance, CGA=Contact Guard Assistance,   Min=Minimal Assistance, Mod=Moderate Assistance, Max=Maximal Assistance, Total=Total Assistance, NT=Not Tested    PLAN:   FREQUENCY AND DURATION: 3 times/week for duration of hospital stay or until stated goals are met, whichever comes first.    THERAPY PROGNOSIS: Good    PROBLEM LIST:   (Skilled intervention is medically necessary to address:)  Decreased Activity Tolerance  Decreased Balance  Decreased Coordination  Decreased Gait Ability  Decreased Strength  Decreased Transfer Abilities INTERVENTIONS PLANNED:   (Benefits and precautions of physical therapy have been discussed with the patient.)  Therapeutic Activity  Therapeutic Exercise/HEP  Neuromuscular Re-education  Gait Training  Education       TREATMENT:   EVALUATION: LOW COMPLEXITY: (Untimed Charge)    TREATMENT:   Eval only today; no tx billed    TREATMENT GRID:  N/A    AFTER TREATMENT PRECAUTIONS: Bed, Bed/Chair Locked, Call light within reach, Needs within reach, and RN notified    INTERDISCIPLINARY COLLABORATION:  RN/ PCT and PT/ PTA    EDUCATION: Education Given To: Patient  Education Provided: Plan of Care;Role of Therapy  Education Outcome: Verbalized understanding    TIME IN/OUT:  Time In: 1004  Time Out: [address]  Minutes: [address] Tashia rAroyo, PT

## 2022-12-01 NOTE — PROGRESS NOTES
OBSERVATION STATUS  SPEECH LANGUAGE PATHOLOGY: DYSPHAGIA  Initial Assessment    NAME: Rosalva Rust  : 1975  MRN: [id number]    ADMISSION DATE: 2022  PRIMARY DIAGNOSIS: Stroke-like symptom  Stroke-like symptom [R29.90]  Right sided numbness [R20.0]    ICD-10: Treatment Diagnosis: R13.11 Dysphagia, Oral Phase    RECOMMENDATIONS   Diet:  Diet Solids Recommendation: Regular  Liquid Consistency Recommendation: Thin    Medications: PO     Compensatory Swallowing Strategies:  n/a   Patient continues to require skilled intervention: TBD  D/C Recommendations: To be determined; possible cognitive lingusitic evaluation pending imaging/course of hospitalization. ASSESSMENT    Dysphagia Diagnosis: Swallow function appears WFL  No dysphagia treatment indicated. Patient reports symptoms resolved. MRI pending. GENERAL    History of Present Injury/Illness: Ms. Amber Hicsk  has a past medical history of ADHD (attention deficit hyperactivity disorder), Allergic rhinitis, Anxiety disorder, Asthma, B12 deficiency, Depression, Essential hypertension, Fibromyalgia, GERD (gastroesophageal reflux disease), Hypothyroidism, Meniere disease, Migraine, OCD (obsessive compulsive disorder), and Tinnitus. . She also  has a past surgical history that includes heent (2017); other surgical history; orthopedic surgery; heent (2013); and heent. Subjective: upright in bed. RN present to give AM meds. patient reports numbness resolved. Behavior/Cognition: Alert; Cooperative;Pleasant mood  Communication Observation: Functional  Follows Directions: Complex    Prior Dysphagia History: n/a     Current Diet : Regular  Current Liquid Diet : Thin          O2 Device: None (Room air)        Pain:   Patient does not c/o pain                                        OBJECTIVE    Patient Positioning: Upright in bed   Oral Motor   Labial: No impairment  Oral Hygiene: Moist;Clean  Lingual: No impairment  Dentition: Adequate Volitional Cough: Strong  Baseline Vocal Quality: Normal    Oropharyngeal Phase:      Patient self fed thin liquid via cup and straw, mixed fruit, regular solid consistencies as well as morning pills whole with thin liquids with RN present. Appropriate oral prep and clearance with all textures. Appeared to demonstrate timely swallow and single swallows upon palpation likely indicating adequate pharyngeal clearance. No overt signs or symptoms of airway compromise observed with liquid or solid textures. Voice remained clear. PLAN    Duration/Frequency: SLP will follow up for possible cognitive linguistic evaluation if indicated by imaging or course of hospitalization. Frequency/Duration TBD. Dysphagia Outcome and Severity Scale (DULCE MARIA)  Dysphagia Outcome Severity Scale: Level 7: Normal in all situations  Interpretation of Tool: The Dysphagia Outcome and Severity Scale (DULCE MARIA) is a simple, easy-to-use, 7-point scale developed to systematically rate the functional severity of dysphagia based on objective assessment and make recommendations for diet level, independence level, and type of nutrition.    Normal(7), Functional(6), Mild(5), Mild-Moderate(4), Moderate(3), Moderate-Severe(2), Severe(1)    Speech Therapy Prognosis  Prognosis: Excellent    Education: Patient, RN  Patient Education: diet consistencies, aspiration precautions  Patient Education Response: Verbalizes understanding, Demonstrated understanding    PRECAUTIONS/ALLERGIES: Latex, Aripiprazole, Ziprasidone hcl, Topiramate, and Zolpidem   Safety Devices in place: Yes  Type of devices: Left in bed, Nurse notified, Call light within reach    Therapy Time  SLP Individual Minutes  Time In: 0848  Time Out: 0353  Minutes: [id number] [address]  12/1/2022 9:15 AM

## 2022-12-01 NOTE — PROGRESS NOTES
Physical Therapy Note:    Physical therapy evaluation orders received and chart reviewed. Attempted to see patient this AM to initiate assessment. Patient getting meds from RN and echo waiting at bedside to perform test. Will follow and re-attempt at a later time/date as schedule permits/patient available.     Thank you,  Noel Webb, PT, DPT

## 2022-12-01 NOTE — DISCHARGE SUMMARY
given to call a physician or return if any concerns. Current Discharge Medication List        START taking these medications    Details   folic acid (FOLVITE) 1 MG tablet Take 1 tablet by mouth daily  Qty: 30 tablet, Refills: 0      Cholecalciferol (VITAMIN D) 25 MCG TABS Take 1 tablet by mouth daily  Qty: 30 tablet, Refills: 0    Comments: Labeling may look different. 25 mcg=1000 Units. Please double check dosages. vitamin B-12 (CYANOCOBALAMIN) 1000 MCG tablet Take 1 tablet by mouth daily  Qty: 30 tablet, Refills: 0           CONTINUE these medications which have CHANGED    Details   meloxicam (MOBIC) 15 MG tablet Take 1 tablet by mouth daily as needed for Pain  Qty: 3 tablet, Refills: 0    Associated Diagnoses: Fibromyalgia; Osteoarthritis of multiple joints, unspecified osteoarthritis type           CONTINUE these medications which have NOT CHANGED    Details   clonazePAM (KLONOPIN) 1 MG tablet Pt states takes 0.5 mg morning and night, in afternoon she takes 0.5 - 1mg      albuterol sulfate HFA (PROVENTIL;VENTOLIN;PROAIR) 108 (90 Base) MCG/ACT inhaler Inhale 1-2 puffs into the lungs every 4 hours as needed for Wheezing or Shortness of Breath  Qty: 18 g, Refills: 2    Associated Diagnoses:  Moderate persistent asthma, unspecified whether complicated      candesartan (ATACAND) 4 MG tablet Take 1 tablet by mouth daily  Qty: 30 tablet, Refills: 1    Associated Diagnoses: Hypertension, unspecified type      mirabegron (MYRBETRIQ) 50 MG TB24 Take 50 mg by mouth daily  Qty: 90 tablet, Refills: 1    Associated Diagnoses: OAB (overactive bladder)      dexlansoprazole (DEXILANT) 60 MG CPDR delayed release capsule Take 1 capsule by mouth daily  Qty: 90 capsule, Refills: 1      levothyroxine (SYNTHROID) 88 MCG tablet Take 1 tablet by mouth every morning (before breakfast)  Qty: 90 tablet, Refills: 1    Associated Diagnoses: Hypothyroidism (acquired)      tiZANidine (ZANAFLEX) 4 MG tablet Take 1 tablet by mouth 3 times daily as needed (pain or muscle spasms)  Qty: 270 tablet, Refills: 1    Associated Diagnoses: Fibromyalgia      buPROPion (WELLBUTRIN XL) 300 MG extended release tablet 300 mg every morning      ketoconazole (NIZORAL) 2 % shampoo       risperiDONE (RISPERDAL) 4 MG tablet 4 mg at bedtime      !! venlafaxine (EFFEXOR XR) 75 MG extended release capsule 75 mg Pt states she takes 75 mg in a.m. and 225mg nightly      Fluticasone furoate-vilanterol (BREO ELLIPTA) 200-25 MCG/INH AEPB inhaler Inhale 1 puff into the lungs daily      lamoTRIgine (LAMICTAL) 100 MG tablet 2 times daily T 3 TS PO QAM AND 2 TS QHS    Pt takes 200mg in a.m. and 300mg nightly      melatonin 5 MG TABS tablet Take 5 mg by mouth nightly as needed      montelukast (SINGULAIR) 10 MG tablet Take 10 mg by mouth daily      pregabalin (LYRICA) 75 MG capsule Take 75 mg by mouth daily. Pt states takes 75mg in a.m. and 150 mg in p.m.      traMADol (ULTRAM) 50 MG tablet Take 50 mg by mouth every 8 hours as needed. !! venlafaxine (EFFEXOR XR) 150 MG extended release capsule Take by mouth daily       ! ! - Potential duplicate medications found. Please discuss with provider. STOP taking these medications       ibuprofen (ADVIL;MOTRIN) 600 MG tablet Comments:   Reason for Stopping:         ALPRAZolam (XANAX) 1 MG tablet Comments:   Reason for Stopping:               Procedures done this admission:  * No surgery found *    Consults this admission:  IP CONSULT TO CASE MANAGEMENT    Echocardiogram results:  11/30/22    TRANSTHORACIC ECHOCARDIOGRAM (TTE) COMPLETE (CONTRAST/BUBBLE/3D PRN) 12/01/2022 11:22 AM (Final)    Interpretation Summary    Left Ventricle: Normal left ventricular systolic function with a visually estimated EF of 55 - 60%. Left ventricle size is normal. Normal wall thickness. Normal wall motion. Normal diastolic function.     Technical qualifiers: Color flow Doppler was performed and pulse wave and/or continuous wave Doppler was performed. Signed by: Chalmer Hatchet, MD on 12/1/2022 11:22 AM      Diagnostic Imaging/Tests:   CT HEAD WO CONTRAST    Result Date: 11/30/2022  No acute intracranial abnormality evident by CT. CTA HEAD NECK W CONTRAST    Result Date: 11/30/2022  No definite intracranial arterial occlusion or aneurysm. MRI brain with and without contrast    Result Date: 12/1/2022  Unremarkable MRI of the brain for patient age, including no evidence of acute ischemic infarction.         Labs: Results:       BMP, Mg, Phos Recent Labs     11/29/22  1143 11/30/22  1424 12/01/22  0653    139 142   K 4.4 3.9 3.9    107 112*   CO2 28 26 25   ANIONGAP 6 6 5   BUN 12 12 12   CREATININE 1.00 1.20* 0.90   LABGLOM >60 57* >60   CALCIUM 9.2 9.5 8.7   GLUCOSE 87 141* 82   MG  --   --  2.2      CBC Recent Labs     11/30/22  1424 12/01/22  0653   WBC 6.2 5.5   RBC 4.19 3.90*   HGB 12.0 11.2*   HCT 37.4 35.2*   MCV 89.3 90.3   MCH 28.6 28.7   MCHC 32.1 31.8   RDW 15.5* 15.5*    169   MPV 11.2 10.9   NRBC 0.00 0.00   SEGS 66  --    LYMPHOPCT 23  --    EOSRELPCT 4  --    MONOPCT 6  --    BASOPCT 1  --    IMMGRAN 0  --    SEGSABS 4.0  --    LYMPHSABS 1.5  --    EOSABS 0.2  --    MONOSABS 0.4  --    BASOSABS 0.1  --    ABSIMMGRAN 0.0  --       LFT Recent Labs     11/29/22  1143 11/30/22  1424 12/01/22  0653   BILITOT 0.3 0.3 0.4   ALKPHOS 58 58 55   AST 9* 14* 9*   ALT 21 21 16   PROT 6.7 7.3 6.3   LABALBU 3.5 3.4* 3.0*   GLOB 3.2 3.9 3.3      Cardiac  Lab Results   Component Value Date/Time    TROPHS 3.6 11/30/2022 02:24 PM      Coags Lab Results   Component Value Date/Time    PROTIME 13.0 11/30/2022 02:24 PM    INR 0.9 11/30/2022 02:24 PM      A1c Lab Results   Component Value Date/Time    LABA1C 5.0 12/01/2022 06:53 AM    LABA1C 4.8 05/31/2022 12:43 PM    LABA1C 5.1 06/05/2020 10:53 AM    EAG 97 12/01/2022 06:53 AM    EAG Cannot be calculated 05/31/2022 12:43 PM     06/05/2020 10:53 AM      Lipids Lab Results Component Value Date/Time    CHOL 160 12/01/2022 06:53 AM    LDLCALC 88.2 12/01/2022 06:53 AM    LABVLDL 16.8 12/01/2022 06:53 AM    HDL 55 12/01/2022 06:53 AM    CHOLHDLRATIO 2.9 12/01/2022 06:53 AM    TRIG 84 12/01/2022 06:53 AM      Thyroid  Lab Results   Component Value Date/Time    HQH4NWY 10.600 11/29/2022 11:43 AM        Most Recent UA Lab Results   Component Value Date/Time    COLORU YELLOW/STRAW 11/30/2022 05:44 PM    APPEARANCE CLOUDY 11/30/2022 05:44 PM    SPECGRAV >1.035 11/30/2022 05:44 PM    LABPH 8.0 11/30/2022 05:44 PM    PROTEINU Negative 11/30/2022 05:44 PM    GLUCOSEU Negative 11/30/2022 05:44 PM    KETUA Negative 11/30/2022 05:44 PM    BILIRUBINUR Negative 11/30/2022 05:44 PM    BLOODU Negative 11/30/2022 05:44 PM    UROBILINOGEN 0.2 11/30/2022 05:44 PM    NITRU Negative 11/30/2022 05:44 PM    LEUKOCYTESUR Negative 11/30/2022 05:44 PM    WBCUA 0-4 11/30/2022 05:44 PM    RBCUA 0-5 11/30/2022 05:44 PM    EPITHUA 20-50 11/30/2022 05:44 PM    BACTERIA 4+ 11/30/2022 05:44 PM    LABCAST 0-2 11/30/2022 05:44 PM    MUCUS 0 11/30/2022 05:44 PM        No results for input(s): CULTURE in the last 720 hours.     All Labs from Last 24 Hrs:  Recent Results (from the past 24 hour(s))   EKG 12 Lead    Collection Time: 11/30/22  2:41 PM   Result Value Ref Range    Ventricular Rate 82 BPM    Atrial Rate 82 BPM    P-R Interval 174 ms    QRS Duration 105 ms    Q-T Interval 386 ms    QTc Calculation (Bazett) 451 ms    P Axis 74 degrees    R Axis 89 degrees    T Axis -14 degrees    Diagnosis Sinus rhythm    Urinalysis with Reflex to Culture    Collection Time: 11/30/22  5:44 PM    Specimen: Urine   Result Value Ref Range    Color, UA YELLOW/STRAW      Appearance CLOUDY      Specific Gravity, UA >1.035 (H) 1.001 - 1.023    pH, Urine 8.0 5.0 - 9.0      Protein, UA Negative NEG mg/dL    Glucose, UA Negative mg/dL    Ketones, Urine Negative NEG mg/dL    Bilirubin Urine Negative NEG      Blood, Urine Negative NEG Urobilinogen, Urine 0.2 0.2 - 1.0 EU/dL    Nitrite, Urine Negative NEG      Leukocyte Esterase, Urine Negative NEG      Urine Culture if Indicated CULTURE NOT INDICATED BY UA RESULT      WBC, UA 0-4 U4 /hpf    RBC, UA 0-5 U5 /hpf    BACTERIA, URINE 4+ (A) NEG /hpf    Epithelial Cells UA 20-50 (A) U5 /hpf    Casts 0-2 U2 /lpf    Mucus, UA 0 0 /lpf   CBC    Collection Time: 12/01/22  6:53 AM   Result Value Ref Range    WBC 5.5 4.3 - 11.1 K/uL    RBC 3.90 (L) 4.05 - 5.2 M/uL    Hemoglobin 11.2 (L) 11.7 - 15.4 g/dL    Hematocrit 35.2 (L) 35.8 - 46.3 %    MCV 90.3 82 - 102 FL    MCH 28.7 26.1 - 32.9 PG    MCHC 31.8 31.4 - 35.0 g/dL    RDW 15.5 (H) 11.9 - 14.6 %    Platelets 880 101 - 731 K/uL    MPV 10.9 9.4 - 12.3 FL    nRBC 0.00 0.0 - 0.2 K/uL   Hemoglobin A1c    Collection Time: 12/01/22  6:53 AM   Result Value Ref Range    Hemoglobin A1C 5.0 4.8 - 5.6 %    eAG 97 mg/dL   Lipid Panel    Collection Time: 12/01/22  6:53 AM   Result Value Ref Range    Cholesterol, Total 160 <200 MG/DL    Triglycerides 84 35 - 150 MG/DL    HDL 55 40 - 60 MG/DL    LDL Calculated 88.2 <100 MG/DL    VLDL Cholesterol Calculated 16.8 6.0 - 23.0 MG/DL    Chol/HDL Ratio 2.9     Vitamin D 25 Hydroxy    Collection Time: 12/01/22  6:53 AM   Result Value Ref Range    Vit D, 25-Hydroxy 36.4 30.0 - 100.0 ng/mL   Ferritin    Collection Time: 12/01/22  6:53 AM   Result Value Ref Range    Ferritin 14 8 - 388 NG/ML   Transferrin Saturation    Collection Time: 12/01/22  6:53 AM   Result Value Ref Range    Iron 82 35 - 150 ug/dL    TIBC 305 250 - 450 ug/dL    TRANSFERRIN SATURATION 27 >20 %   Sedimentation Rate    Collection Time: 12/01/22  6:53 AM   Result Value Ref Range    Sed Rate, Automated 25 (H) 0 - 20 mm/hr   Comprehensive Metabolic Panel    Collection Time: 12/01/22  6:53 AM   Result Value Ref Range    Sodium 142 133 - 143 mmol/L    Potassium 3.9 3.5 - 5.1 mmol/L    Chloride 112 (H) 101 - 110 mmol/L    CO2 25 21 - 32 mmol/L    Anion Gap 5 2 - 11 mmol/L    Glucose 82 65 - 100 mg/dL    BUN 12 6 - 23 MG/DL    Creatinine 0.90 0.6 - 1.0 MG/DL    Est, Glom Filt Rate >60 >60 ml/min/1.73m2    Calcium 8.7 8.3 - 10.4 MG/DL    Total Bilirubin 0.4 0.2 - 1.1 MG/DL    ALT 16 12 - 65 U/L    AST 9 (L) 15 - 37 U/L    Alk Phosphatase 55 50 - 136 U/L    Total Protein 6.3 6.3 - 8.2 g/dL    Albumin 3.0 (L) 3.5 - 5.0 g/dL    Globulin 3.3 2.8 - 4.5 g/dL    Albumin/Globulin Ratio 0.9 0.4 - 1.6     Magnesium    Collection Time: 12/01/22  6:53 AM   Result Value Ref Range    Magnesium 2.2 1.8 - 2.4 mg/dL   Transthoracic echocardiogram (TTE) complete with contrast, bubble, strain, and 3D PRN    Collection Time: 12/01/22 10:38 AM   Result Value Ref Range    LV EDV A2C 123 mL    LV EDV A4C 77 mL    LV ESV A2C 44 mL    LV ESV A4C 38 mL    IVSd 0.6 0.6 - 0.9 cm    LVIDd 5.4 (A) 3.9 - 5.3 cm    LVIDs 3.6 cm    LVOT Diameter 1.8 cm    LVOT Mean Gradient 5 mmHg    LVOT VTI 34.5 cm    LVOT Peak Velocity 1.6 m/s    LVOT Peak Gradient 11 mmHg    LVPWd 0.7 0.6 - 0.9 cm    LV E' Lateral Velocity 18 cm/s    LV E' Septal Velocity 15 cm/s    LV Ejection Fraction A2C 65 %    LV Ejection Fraction A4C 51 %    EF BP 55 55 - 100 %    LVOT Area 2.5 cm2    LVOT SV 87.7 ml    LA Minor Axis 6.1 cm    LA Major Axis 6.8 cm    LA Area 2C 23.3 cm2    LA Area 4C 19.2 cm2    LA Volume 2C 73 (A) 22 - 52 mL    LA Volume 4C 43 22 - 52 mL    LA Volume BP 58 (A) 22 - 52 mL    LA Diameter 3.2 cm    AV Mean Velocity 1.3 m/s    AV Mean Gradient 8 mmHg    AV Mean Gradient 8 mmHg    AV VTI 42.5 cm    AV Peak Velocity 2.0 m/s    AV Peak Gradient 17 mmHg    AV Area by VTI 2.1 cm2    AV Area by Peak Velocity 2.0 cm2    Aortic Root 2.5 cm    Ascending Aorta 2.7 cm    MR Peak Velocity 3.8 m/s    MR Peak Gradient 58 mmHg    MV E Wave Deceleration Time 224.0 ms    MV A Velocity 0.65 m/s    MV E Velocity 1.14 m/s    PV Max Velocity 1.0 m/s    PV Peak Gradient 4 mmHg    RV Basal Dimension 3.3 cm    RV Free Wall Peak S' 13 cm/s    TR Max Velocity 2.65 m/s    TR Peak Gradient 28 mmHg    Body Surface Area 1.79 m2    Fractional Shortening 2D 33 28 - 44 %    LV ESV Index A4C 22 mL/m2    LV EDV Index A4C 44 mL/m2    LV ESV Index A2C 25 mL/m2    LV EDV Index A2C 70 mL/m2    LVIDd Index 3.09 cm/m2    LVIDs Index 2.06 cm/m2    LV RWT Ratio 0.26     LV Mass 2D 119.8 67 - 162 g    LV Mass 2D Index 68.5 43 - 95 g/m2    MV E/A 1.75     E/E' Ratio (Averaged) 6.97     E/E' Lateral 6.33     E/E' Septal 7.60     LA Volume Index BP 33 16 - 34 ml/m2    LVOT Stroke Volume Index 50.1 mL/m2    LA Volume Index 2C 42 (A) 16 - 34 mL/m2    LA Volume Index 4C 25 16 - 34 mL/m2    LA Size Index 1.83 cm/m2    LA/AO Root Ratio 1.28     Ao Root Index 1.43 cm/m2    Ascending Aorta Index 1.54 cm/m2    AV Velocity Ratio 0.80     LVOT:AV VTI Index 0.81     JONNY/BSA VTI 1.2 cm2/m2    JONNY/BSA Peak Velocity 1.1 cm2/m2       Allergies   Allergen Reactions    Latex Rash    Aripiprazole Other (See Comments)     Other reaction(s): Agitation-I  Caused severe akathesia, couldn't sit still. \"Worse 3 days of my life. \"    Ziprasidone Hcl Other (See Comments)     Other reaction(s): Agitation-I  \"moods swing off the chain\"  caused hospitalization, unsure of reaction    Topiramate Other (See Comments)    Zolpidem Other (See Comments)     'hallucinates'  hallucinations     Immunization History   Administered Date(s) Administered    COVID-19, PFIZER PURPLE top, DILUTE for use, (age 15 y+), 30mcg/0.3mL 04/01/2021, 04/27/2021    Influenza Virus Vaccine 10/31/2008, 10/30/2014, 10/05/2015, 09/05/2018, 10/24/2019    Influenza, FLUARIX, FLULAVAL, FLUZONE (age 10 mo+) AND AFLURIA, (age 1 y+), PF, 0.5mL 10/30/2014, 09/05/2018, 10/24/2019    Pneumococcal Polysaccharide ([id number]) 03/15/2016    Td (Adult), 2 Lf Tetanus Toxoid, Pf (Td, Absorbed) 04/27/2022    Td vaccine (adult) 01/31/2005       Recent Vital Data:  Patient Vitals for the past 24 hrs:   Temp Pulse Resp BP SpO2   12/01/22 0743 98 °F (36.7 °C) 70 16 122/69 95 %   12/01/22 0710 -- 64 18 -- 96 %   12/01/22 0536 -- 73 -- -- --   12/01/22 0326 97.7 °F (36.5 °C) 73 18 131/78 94 %   12/01/22 0031 97.7 °F (36.5 °C) 74 19 136/84 95 %   12/01/22 0025 -- 72 15 139/83 96 %   11/30/22 2045 -- 71 18 -- 96 %   11/30/22 2015 -- 76 17 115/74 96 %   11/30/22 2000 -- 79 15 -- 97 %   11/30/22 1947 -- 85 21 -- 98 %   11/30/22 1945 -- 73 15 -- 97 %   11/30/22 1930 -- 77 13 -- 97 %   11/30/22 1915 -- 72 16 -- 96 %   11/30/22 1900 -- 73 17 -- 96 %   11/30/22 1745 -- 74 17 129/75 97 %   11/30/22 1645 -- 77 16 118/83 97 %   11/30/22 1630 -- 73 19 113/87 96 %   11/30/22 1616 -- 81 18 109/71 97 %   11/30/22 1600 -- 75 17 121/72 96 %   11/30/22 1545 -- 79 17 108/71 96 %   11/30/22 1530 -- 81 15 104/61 96 %   11/30/22 1500 -- 86 16 112/65 96 %   11/30/22 1445 -- 87 18 117/72 --       Oxygen Therapy  SpO2: 95 %  Pulse Oximeter Device Mode: Intermittent  O2 Device: None (Room air)    Estimated body mass index is 28.04 kg/m² as calculated from the following:    Height as of this encounter: 5' 3\" (1.6 m). Weight as of this encounter: 158 lb 4.6 oz (71.8 kg). No intake or output data in the 24 hours ending 12/01/22 1439      Physical Exam:    General:    Well nourished. No overt distress, pleasant, alert   Head:  Normocephalic, atraumatic  Eyes:  Sclerae appear normal.  Pupils equally round. HENT:  Nares appear normal, no drainage. Moist mucous membranes  Neck:  No restricted ROM. Trachea midline  CV:   RRR. No m/r/g. No JVD  Lungs:   CTAB. No wheezing, rhonchi, or rales. Respirations even, unlabored  Abdomen:   Soft, nontender, nondistended. Extremities: Warm and dry. No cyanosis or clubbing. No edema. Skin:     No rashes. Normal coloration  Neuro:  grossly intact. Psych:  Normal mood and affect. Signed:  Steffen Gonzales MD    Part of this note may have been written by using a voice dictation software.   The note has been proof read but may still contain some grammatical/other typographical errors.

## 2022-12-02 ENCOUNTER — CARE COORDINATION (OUTPATIENT)
Dept: OTHER | Facility: CLINIC | Age: 47
End: 2022-12-02

## 2022-12-02 LAB — HCYS SERPL-SCNC: 9.7 UMOL/L (ref 0–14.5)

## 2022-12-05 ENCOUNTER — CARE COORDINATION (OUTPATIENT)
Dept: OTHER | Facility: CLINIC | Age: 47
End: 2022-12-05

## 2022-12-05 NOTE — CARE COORDINATION
Care Transitions Outreach Attempt    Call within 2 business days of discharge: Yes   Attempted to reach patient for transitions of care follow up. Unable to reach patient. Patient: Chet Thompson Patient : 1975 MRN: [id number]    Last Discharge  Street       Date Complaint Diagnosis Description Type Department Provider    22 Right Side Numbness Right sided numbness . .. ED to Hosp-Admission (Discharged) (ADMITTED) [id number] Eagle Cotton MD; Deirdre Rogers. .. Was this an external facility discharge?  No Discharge Facility: N/A    Noted following upcoming appointments from discharge chart review:   [facility] follow up appointment(s):   Future Appointments   Date Time Provider Tone Lacy   2022 11:00 AM Nehemias Espinoza MD PRE [facility] AMB   10/30/2023 10:50 AM MYRIAM Orona MD PO[facility] AMB     Non-[facility] follow up appointment(s): none noted

## 2022-12-07 ENCOUNTER — TELEMEDICINE (OUTPATIENT)
Dept: FAMILY MEDICINE CLINIC | Facility: CLINIC | Age: 47
End: 2022-12-07
Payer: MEDICARE

## 2022-12-07 DIAGNOSIS — E03.9 HYPOTHYROIDISM (ACQUIRED): ICD-10-CM

## 2022-12-07 DIAGNOSIS — Z12.11 COLON CANCER SCREENING: ICD-10-CM

## 2022-12-07 DIAGNOSIS — E55.9 VITAMIN D DEFICIENCY: ICD-10-CM

## 2022-12-07 DIAGNOSIS — M79.7 FIBROMYALGIA: ICD-10-CM

## 2022-12-07 DIAGNOSIS — I10 HYPERTENSION, UNSPECIFIED TYPE: Primary | ICD-10-CM

## 2022-12-07 DIAGNOSIS — E53.8 VITAMIN B12 DEFICIENCY: ICD-10-CM

## 2022-12-07 DIAGNOSIS — K21.9 GASTROESOPHAGEAL REFLUX DISEASE, UNSPECIFIED WHETHER ESOPHAGITIS PRESENT: ICD-10-CM

## 2022-12-07 DIAGNOSIS — N32.81 OAB (OVERACTIVE BLADDER): ICD-10-CM

## 2022-12-07 DIAGNOSIS — Z00.00 ANNUAL PHYSICAL EXAM: ICD-10-CM

## 2022-12-07 DIAGNOSIS — M15.9 OSTEOARTHRITIS OF MULTIPLE JOINTS, UNSPECIFIED OSTEOARTHRITIS TYPE: ICD-10-CM

## 2022-12-07 DIAGNOSIS — M51.37 DDD (DEGENERATIVE DISC DISEASE), LUMBOSACRAL: ICD-10-CM

## 2022-12-07 PROBLEM — R20.0 RIGHT SIDED NUMBNESS: Status: RESOLVED | Noted: 2022-11-30 | Resolved: 2022-12-07

## 2022-12-07 PROBLEM — R07.2 PRECORDIAL PAIN: Status: RESOLVED | Noted: 2022-05-16 | Resolved: 2022-12-07

## 2022-12-07 PROBLEM — R29.90 STROKE-LIKE SYMPTOM: Status: RESOLVED | Noted: 2022-11-30 | Resolved: 2022-12-07

## 2022-12-07 PROBLEM — G62.9 NEUROPATHY: Status: ACTIVE | Noted: 2019-04-16

## 2022-12-07 PROCEDURE — 99214 OFFICE O/P EST MOD 30 MIN: CPT | Performed by: FAMILY MEDICINE

## 2022-12-07 PROCEDURE — G8427 DOCREV CUR MEDS BY ELIG CLIN: HCPCS | Performed by: FAMILY MEDICINE

## 2022-12-07 RX ORDER — MELOXICAM 15 MG/1
15 TABLET ORAL DAILY
Qty: 90 TABLET | Refills: 1 | Status: SHIPPED | OUTPATIENT
Start: 2022-12-07

## 2022-12-07 RX ORDER — LEVOTHYROXINE SODIUM 88 UG/1
88 TABLET ORAL
Qty: 90 TABLET | Refills: 1 | Status: SHIPPED | OUTPATIENT
Start: 2022-12-07

## 2022-12-07 RX ORDER — CANDESARTAN 4 MG/1
4 TABLET ORAL DAILY
Qty: 90 TABLET | Refills: 1 | Status: SHIPPED | OUTPATIENT
Start: 2022-12-07

## 2022-12-07 RX ORDER — TIZANIDINE 4 MG/1
4 TABLET ORAL 3 TIMES DAILY PRN
Qty: 270 TABLET | Refills: 1 | Status: SHIPPED | OUTPATIENT
Start: 2022-12-07

## 2022-12-07 RX ORDER — DEXLANSOPRAZOLE 60 MG/1
60 CAPSULE, DELAYED RELEASE ORAL DAILY
Qty: 90 CAPSULE | Refills: 1 | Status: SHIPPED | OUTPATIENT
Start: 2022-12-07

## 2022-12-07 NOTE — PROGRESS NOTES
Kimberly  [address] [place] [address], [address] [place] [address]  Phone: [phone]  Fax: [phone]  [email or web address]      Encounter [address]; Established patient 41 y. o.female; seen 12/7/2022 for: 6 Month Follow-Up, Hypothyroidism, and Hypertension      Assessment & Plan    1. Hypertension, unspecified type  -     candesartan (ATACAND) 4 MG tablet; Take 1 tablet by mouth daily, Disp-90 tablet, R-1Normal  -     Comprehensive Metabolic Panel; Future  2. OAB (overactive bladder)  -     mirabegron (MYRBETRIQ) 50 MG TB24; Take 50 mg by mouth daily, Disp-90 tablet, R-1Normal  3. Hypothyroidism (acquired)  -     levothyroxine (SYNTHROID) 88 MCG tablet; Take 1 tablet by mouth every morning (before breakfast), Disp-90 tablet, R-1Normal  -     TSH; Future  -     T4, Free; Future  4. Gastroesophageal reflux disease, unspecified whether esophagitis present  -     dexlansoprazole (DEXILANT) 60 MG CPDR delayed release capsule; Take 1 capsule by mouth daily, Disp-90 capsule, R-1Normal  5. Fibromyalgia  -     meloxicam (MOBIC) 15 MG tablet; Take 1 tablet by mouth daily, Disp-90 tablet, R-1Normal  -     tiZANidine (ZANAFLEX) 4 MG tablet; Take 1 tablet by mouth 3 times daily as needed (pain or muscle spasms), Disp-270 tablet, R-1Normal  6. Osteoarthritis of multiple joints, unspecified osteoarthritis type  -     meloxicam (MOBIC) 15 MG tablet; Take 1 tablet by mouth daily, Disp-90 tablet, R-1Normal  7. DDD (degenerative disc disease), lumbosacral  -     meloxicam (MOBIC) 15 MG tablet; Take 1 tablet by mouth daily, Disp-90 tablet, R-1Normal  8. Vitamin B12 deficiency  -     Vitamin B12; Future  9. Vitamin D deficiency  -     Vitamin D 25 Hydroxy; Future  10. Colon cancer screening  -     Ambulatory referral to Gastroenterology  11. Annual physical exam  -     Lipid Panel; Future    Problem and/or Symptoms are currently stable and/or improving on current treatment plan.   Will continue and have patient follow up as directed. Pertinent labs reviewed & pertinent meds refilled X 6 M      Check Out Instructions  Return for Virtual Visit, Previsit Vitals and Labs, Also Needs Virtual AWV ASAP. Subjective & Objective    HPI  Patient states that chronic health problems are stable on current regimens and has no acute concerns today except as mentioned. Review of Systems     Physical Exam  Patient-Reported Vitals 12/7/2022   Patient-Reported Height 5\"3   Patient-Reported Systolic 727   Patient-Reported Diastolic 78   Patient-Reported Pulse 88   Patient-Reported SpO2 97       BP Readings from Last 3 Encounters:   12/01/22 114/76   06/07/22 139/68   05/16/22 118/84     Wt Readings from Last 3 Encounters:   12/01/22 158 lb 4.6 oz (71.8 kg)   11/30/22 191 lb (86.6 kg)   06/07/22 191 lb (86.6 kg)     There is no height or weight on file to calculate BMI. PHQ-9  10/20/2022   Little interest or pleasure in doing things 0   Little interest or pleasure in doing things -   Feeling down, depressed, or hopeless 1   Trouble falling or staying asleep, or sleeping too much 0   Feeling tired or having little energy 1   Poor appetite or overeating 2   Feeling bad about yourself - or that you are a failure or have let yourself or your family down 2   Trouble concentrating on things, such as reading the newspaper or watching television 1   Moving or speaking so slowly that other people could have noticed. Or the opposite - being so fidgety or restless that you have been moving around a lot more than usual 0   Thoughts that you would be better off dead, or of hurting yourself in some way 0   PHQ-2 Score 1   Total Score PHQ 2 -   PHQ-9 Total Score 7   If you checked off any problems, how difficult have these problems made it for you to do your work, take care of things at home, or get along with other people? 0        We discussed the typical prognosis and potential complications of the concern(s), including treatment options. Medication risks, benefits, costs, interactions, and alternatives were discussed as appropriate. I advised her to contact the office if her condition worsens or fails to improve as anticipated. She expressed understanding with the discussion and plan of care. Eddy Macias, was evaluated through a synchronous (real-time) audio and/or video encounter. The patient (or guardian if applicable) is aware that this is a billable service, which includes applicable co-pays. This Virtual Visit was conducted with patient's (and/or legal guardian's) consent. The visit was conducted pursuant to the emergency declaration under the [address] [place] [address], [address] [place] [address] authority and the [employer] Act. Patient identification was verified, and a caregiver was present when appropriate. The patient was located at Home: [address] [address]. Provider was located at [facility] (Appt Dept): [address] [address]. An electronic signature was used to authenticate this note.   -- Patti Mendosa MD

## 2022-12-12 ENCOUNTER — CARE COORDINATION (OUTPATIENT)
Dept: OTHER | Facility: CLINIC | Age: 47
End: 2022-12-12

## 2022-12-13 NOTE — CARE COORDINATION
[facility] Initial Follow Up Call    Call within 2 business days of discharge: Yes    Care Transition Nurse contacted the patient by telephone to perform post hospital discharge assessment. Verified name and  with patient as identifiers. Provided introduction to self, and explanation of the Care Transition Nurse role. Patient: Gunjan Bah Patient : 1975   MRN: [id number]  Reason for Admission: ED to Admission for stroke like symptoms. Discharge Date: 22 RARS: No data recorded    Last Discharge  Street       Date Complaint Diagnosis Description Type Department Provider    22 Right Side Numbness Right sided numbness . .. ED to Hosp-Admission (Discharged) (ADMITTED) [id number] Bertha Thomas MD; Emiliano Bloch. .. Was this an external facility discharge? No Discharge Facility: N/A    Challenges to be reviewed by the provider   Additional needs identified to be addressed with provider: No  none               Method of communication with provider: none. Patient has attended a hospital follow up visit with her PCP. Discussed follow up appointment. Actively uses [employer] to review her medical information. Care Transition Nurse reviewed discharge instructions, medical action plan, and red flags with patient who verbalized understanding. The patient was given an opportunity to ask questions and does not have any further questions or concerns at this time. Were discharge instructions available to patient? Yes. Reviewed appropriate site of care based on symptoms and resources available to patient including: PCP  Specialist  Urgent care clinics. The patient agrees to contact the PCP office for questions related to their healthcare. Advance Care Planning:   Does patient have an Advance Directive:  no ACP docs . Medication reconciliation was performed with patient, who verbalizes understanding of administration of home medications.  Medications reviewed: yes    Was patient discharged with a pulse oximeter? no    Non-face-to-face services provided:  Obtained and reviewed discharge summary and/or continuity of care documents  Education of patient/family/caregiver/guardian to support self-management-voices understanding  Assessment and support for treatment adherence and medication management-voices understanding. Offered patient enrollment in the Remote Patient Monitoring (RPM) program for in-home monitoring: NA.    Care Transitions 24 Hour Call    Do you have a copy of your discharge instructions?: Yes  Do you have all of your prescriptions and are they filled?: Yes  Have you been contacted by a [address]?: No  Have you scheduled your follow up appointment?: Yes  How are you going to get to your appointment?: Car - family or friend to transport  Do you feel like you have everything you need to keep you well at home?: Yes  Care Transitions Interventions         Follow Up  Future Appointments   Date Time Provider Tone Lacy   10/30/2023 10:50 AM Long Berry MD [facility]       Care Transition Nurse provided contact information. Plan for follow-up call in 5-7 days based on severity of symptoms and risk factors. Plan for next call:  discuss and review information as needed.     Daniella Arrieta RN

## 2022-12-21 ENCOUNTER — CARE COORDINATION (OUTPATIENT)
Dept: OTHER | Facility: CLINIC | Age: 47
End: 2022-12-21

## 2022-12-21 NOTE — CARE COORDINATION
Spoke with patient. States that she \"does not have time to talk today, not a good time\"    CTN will follow up.

## 2022-12-28 ENCOUNTER — CARE COORDINATION (OUTPATIENT)
Dept: OTHER | Facility: CLINIC | Age: 47
End: 2022-12-28

## 2022-12-28 NOTE — CARE COORDINATION
[facility] Care Transitions Follow Up Call    Care Transition Nurse contacted the patient by telephone to follow up after admission on 2022. Verified name and  with patient as identifiers. Patient: Rad Chin  Patient : 1975   MRN: [id number]  Reason for Admission: Stoke like symptoms  Discharge Date: 22 RARS: No data recorded    Needs to be reviewed by the provider   Additional needs identified to be addressed with provider: No  none             Method of communication with provider: none. Patient reports that she does not have any symptoms such as: right sided numbness. Discussed follow up with providers and taking medications as prescribed. Addressed changes since last contact:  none  Discussed follow-up appointments. If no appointment was previously scheduled, appointment scheduling offered: Yes. Is follow up appointment scheduled within 7 days of discharge? Yes. Attended hospital follow up on 2022. Follow Up  Future Appointments   Date Time Provider Tone Lacy   10/30/2023 10:50 AM MYRIAM Ken MD [facility]     Non-[facility] follow up appointment(s): none noted    Care Transition Nurse reviewed discharge instructions, medical action plan, and red flags with patient and discussed any barriers to care and/or understanding of plan of care after discharge. Discussed appropriate site of care based on symptoms and resources available to patient including: PCP  Specialist  Urgent care clinics. The patient agrees to contact the PCP office for questions related to their healthcare. Advance Care Planning:   No ACP docs .      Patients top risk factors for readmission: medical condition-recent hospitalization  Interventions to address risk factors: Obtained and reviewed discharge summary and/or continuity of care documents, Education of patient/family/caregiver/guardian to support self-management-voices understanding, and Assessment and support for treatment adherence and medication management-voices understanding. Offered patient enrollment in the Remote Patient Monitoring (RPM) program for in-home monitoring: NA.     Care Transitions Subsequent and Final Call    Subsequent and Final Calls  Do you have any ongoing symptoms?: No  Have your medications changed?: No  Do you have any questions related to your medications?: No  Do you currently have any active services?: No  Do you have any needs or concerns that I can assist you with?: No  Care Transitions Interventions  Other Interventions:             Care Transition Nurse provided contact information for future needs. Plan for follow-up call in 10-14 days based on severity of symptoms and risk factors.   Plan for next call:  close, goals    Juany Neumann RN

## 2023-01-11 ENCOUNTER — CARE COORDINATION (OUTPATIENT)
Dept: OTHER | Facility: CLINIC | Age: 48
End: 2023-01-11

## 2023-01-11 NOTE — CARE COORDINATION
Patient has graduated from the Care Transitions program on 1/11/2023. Patient/family has the ability to self-manage at this time. Patient has no further care management needs, no referral to the [facility] team for further management. Patient has Care Transition Nurse's contact information for any further questions, concerns, or needs.   Patients upcoming visits:    Future Appointments   Date Time Provider Tone Lacy   10/30/2023 10:50 AM Saloni Baker MD [facility]               Patient requests a new PCP, CTN will assist.

## 2023-01-13 ENCOUNTER — CARE COORDINATION (OUTPATIENT)
Dept: OTHER | Facility: CLINIC | Age: 48
End: 2023-01-13

## 2023-01-13 NOTE — CARE COORDINATION
CTN called to follow up on patient's referral to Neurologist. Staff answered the phone and transferred to another department, no answer, no voicemail. CTN called Neuro office back, no answer, no voice mail. Staff message sent to referral coordinator. For follow up.

## 2023-01-24 ENCOUNTER — CARE COORDINATION (OUTPATIENT)
Dept: OTHER | Facility: CLINIC | Age: 48
End: 2023-01-24

## 2023-02-02 ENCOUNTER — CARE COORDINATION (OUTPATIENT)
Dept: OTHER | Facility: CLINIC | Age: 48
End: 2023-02-02

## 2023-02-02 NOTE — CARE COORDINATION
Patient has graduated from the Care Transitions program on 2/3/2023. Patient/family has the ability to self-manage at this time. Patient has no further care management needs, no referral to the [facility] team for further management. Patient has Care Transition Nurse's contact information for any further questions, concerns, or needs. Patients upcoming visits:    Future Appointments   Date Time Provider Tone Lacy   2/3/2023 11:30 AM ROSANNA Hodge - CNP [facility]   2/13/2023  2:00 PM ROSANNA Howard [facility]   4/4/2023 11:00 AM Surya Storm MD [facility]   10/30/2023 10:50 AM Tessa Chin., MD [facility]           Neuro appointment has been scheduled. Declined PT services. [facility] appointment.

## 2023-02-03 ENCOUNTER — OFFICE VISIT (OUTPATIENT)
Dept: ORTHOPEDIC SURGERY | Age: 48
End: 2023-02-03

## 2023-02-03 DIAGNOSIS — M25.562 LEFT KNEE PAIN, UNSPECIFIED CHRONICITY: Primary | ICD-10-CM

## 2023-02-03 RX ORDER — TRIAMCINOLONE ACETONIDE 40 MG/ML
40 INJECTION, SUSPENSION INTRA-ARTICULAR; INTRAMUSCULAR ONCE
Status: COMPLETED | OUTPATIENT
Start: 2023-02-03 | End: 2023-02-03

## 2023-02-03 RX ADMIN — TRIAMCINOLONE ACETONIDE 40 MG: 40 INJECTION, SUSPENSION INTRA-ARTICULAR; INTRAMUSCULAR at 11:53

## 2023-02-03 NOTE — PROGRESS NOTES
Name: Cindy Elmore  YOB: 1975  Gender: female  MRN: [id number]      CC: Follow-up (Left Knee)       HPI: Cindy Elmore is a 41 y.o. female who returns for follow up on left knee pain. She been well since last July, but reports that her pain returned a few months ago with no new mechanism of injury. Physical Examination:  General: no acute distress  Lungs: breathing easily  CV: regular rhythm by pulse  Left Knee: Negative effusion present. Tenderness to palpation in both the medial lateral joint line and over the patellar tendon. Full active and passive range of motion pain in the extreme of extension. Negative ligamentous exam x4. Positive Elida's with reproduction of patient's symptoms medial joint line. Positive bounce home test.        Assessment:   1. Left knee pain, unspecified chronicity         Plan:   Patient returns after excellent response to corticosteroid injection in the left knee. I discussed with the patient that she could potentially have a meniscal pathology and a component of prepatellar tendinitis or bursitis. I discussed with the patient formal physical therapy which she would be unable to attend therefore I provided her with a home exercise program for prepatellar tendinitis and bursitis and encouraged her to perform these exercises 2-3 times per day. I also discussed consideration of MRI which she does not wish to proceed with at this time. Given the substantial relief that she got from the corticosteroid injection I think it is reasonable to repeat this today. If she does not get good benefit from the combination of the exercises with the steroid injection then we will consider MRI at that time. The patient elected to proceed with an intraarticular knee injection today. After verbal informed consent was obtained after sterile prep the Left knee  was injected from a anterior lateral approach with 4 cc of 0.25% Marcaine and 1 cc of 40 mg Kenalog. The patient tolerated the procedure well was given postinjection flare precautions.     ROSANNA Zuluaga - CNP    Orthopaedics and Sports Medicine

## 2023-02-17 ENCOUNTER — OFFICE VISIT (OUTPATIENT)
Dept: NEUROLOGY | Age: 48
End: 2023-02-17

## 2023-02-17 VITALS
HEART RATE: 83 BPM | DIASTOLIC BLOOD PRESSURE: 68 MMHG | HEIGHT: 63 IN | WEIGHT: 196.6 LBS | SYSTOLIC BLOOD PRESSURE: 100 MMHG | BODY MASS INDEX: 34.84 KG/M2 | OXYGEN SATURATION: 96 %

## 2023-02-17 DIAGNOSIS — G43.009 MIGRAINE WITHOUT AURA AND WITHOUT STATUS MIGRAINOSUS, NOT INTRACTABLE: ICD-10-CM

## 2023-02-17 DIAGNOSIS — Z09 HOSPITAL DISCHARGE FOLLOW-UP: Primary | ICD-10-CM

## 2023-02-17 DIAGNOSIS — H50.9 STRABISMUS: ICD-10-CM

## 2023-02-17 DIAGNOSIS — F17.210 TOBACCO DEPENDENCE DUE TO CIGARETTES: ICD-10-CM

## 2023-02-17 RX ORDER — RISPERIDONE 0.5 MG/1
0.5 TABLET ORAL 2 TIMES DAILY
COMMUNITY

## 2023-02-17 RX ORDER — ERENUMAB-AOOE 70 MG/ML
70 INJECTION SUBCUTANEOUS
Qty: 1 ML | Refills: 11 | Status: SHIPPED | OUTPATIENT
Start: 2023-02-17

## 2023-02-17 RX ORDER — DESVENLAFAXINE 50 MG/1
50 TABLET, EXTENDED RELEASE ORAL DAILY
COMMUNITY
Start: 2023-02-14

## 2023-02-17 ASSESSMENT — PATIENT HEALTH QUESTIONNAIRE - PHQ9
SUM OF ALL RESPONSES TO PHQ QUESTIONS 1-9: 2
2. FEELING DOWN, DEPRESSED OR HOPELESS: 1
SUM OF ALL RESPONSES TO PHQ QUESTIONS 1-9: 2
SUM OF ALL RESPONSES TO PHQ QUESTIONS 1-9: 2
SUM OF ALL RESPONSES TO PHQ9 QUESTIONS 1 & 2: 2
SUM OF ALL RESPONSES TO PHQ QUESTIONS 1-9: 2
1. LITTLE INTEREST OR PLEASURE IN DOING THINGS: 1

## 2023-02-17 ASSESSMENT — ENCOUNTER SYMPTOMS
GASTROINTESTINAL NEGATIVE: 1
RESPIRATORY NEGATIVE: 1
ALLERGIC/IMMUNOLOGIC NEGATIVE: 1
PHOTOPHOBIA: 1

## 2023-02-17 NOTE — PROGRESS NOTES
[address] [facility]  11 [facility]  727 [facility], [address] [facility]      Chief Complaint   Patient presents with    Follow-Up from [address] [facility] [facility] [address] is a 41 y.o. female who presents for hospital follow up for migraine. PMH of fibromyalgia, obesity, Ménière's, B12 deficiency, hypothyroidism, vitamin D deficiency, GERD, asthma, bipolar, PTSD, tobacco use and noncompliance who presented to ED with right sided numbness. CODE S called in ED. CT head, CTA head and neck, MRI brain no acute issues. TTE  preserved EF with extracardiac shunting. She was evaluated by Neurology, felt symptoms could be consistent with poorly controlled management of her migraines v. Functional neurological disorder. She was recommended to follow up OP for migraine management. Interval history:  She is here today by herself. Chronic strabismus on right. Denies headache today. Hx of migraines, started as teenager. Headaches are located in right frontal region and diffusely radiates. Occasional bifrontal. Frequency 4-5 per month. Associated with photophobia, phonophobia, nausea. Denies aura, rhinorrhea, or excessive tearing. Gradual onset. Describes as throbbing. Aggravating factors: stress. Alleviating: lying down,sleeping, dark room. Stated her maternal aunt, paternal uncle experienced headaches. She has previously tried and failed: topiramate, motrin/aleve, tylenol, fiorcet, sumatriptan, SSRI    She endorses smoking 1/2 ppd cigarettes daily. Denies ETOH or recreational drug use.      Past Medical History:   Diagnosis Date    ADHD (attention deficit hyperactivity disorder)     Allergic rhinitis 1/20/2014    Anxiety disorder 1/20/2014    Asthma     B12 deficiency 1/20/2014    Depression 1/20/2014    Essential hypertension     Fibromyalgia     GERD (gastroesophageal reflux disease) 1/20/2014    Hypothyroidism     Meniere disease 1/20/2014    Migraine     OCD (obsessive compulsive disorder) Tinnitus        Past Surgical History:   Procedure Laterality Date    HEENT  2017    nasal procedure (balloon inserted, drainage)    HEENT  2013    Septo / Fess / release CP    HEENT      eye    ORTHOPEDIC SURGERY      jaw    OTHER SURGICAL HISTORY      sinus implants       Family History   Problem Relation Age of Onset    Breast Cancer Maternal Grandmother 80    Hypertension Mother     Hypertension Brother     Heart Disease Mother     Hypertension Other     Heart Disease Other     Elevated Lipids Brother     Elevated Lipids Mother     Cancer Other     Asthma Other     Cancer Father     Coronary Art Dis Mother     Diabetes Other        Social History     Socioeconomic History    Marital status:      Spouse name: None    Number of children: None    Years of education: None    Highest education level: None   Tobacco Use    Smoking status: Some Days     Packs/day: 0.50     Types: Cigarettes     Last attempt to quit: 2008     Years since quittin.8    Smokeless tobacco: Never    Tobacco comments:     Quit smoking: STARTED BACK 2021   Vaping Use    Vaping Use: Never used   Substance and Sexual Activity    Alcohol use: No     Alcohol/week: 0.0 standard drinks    Drug use: No     Social Determinants of Health     Financial Resource Strain: Low Risk     Difficulty of Paying Living Expenses: Not hard at all   Food Insecurity: No Food Insecurity    Worried About Running Out of Food in the Last Year: Never true    Ran Out of Food in the Last Year: Never true         Current Outpatient Medications:     risperiDONE (RISPERDAL) 0.5 MG tablet, Take 0.5 mg by mouth 2 times daily, Disp: , Rfl:     desvenlafaxine succinate (PRISTIQ) 50 MG TB24 extended release tablet, Take 50 mg by mouth daily, Disp: , Rfl:     mirabegron (MYRBETRIQ) 50 MG TB24, Take 50 mg by mouth daily, Disp: 90 tablet, Rfl: 1    candesartan (ATACAND) 4 MG tablet, Take 1 tablet by mouth daily, Disp: 90 tablet, Rfl: 1 dexlansoprazole (DEXILANT) 60 MG CPDR delayed release capsule, Take 1 capsule by mouth daily, Disp: 90 capsule, Rfl: 1    levothyroxine (SYNTHROID) 88 MCG tablet, Take 1 tablet by mouth every morning (before breakfast), Disp: 90 tablet, Rfl: 1    meloxicam (MOBIC) 15 MG tablet, Take 1 tablet by mouth daily, Disp: 90 tablet, Rfl: 1    tiZANidine (ZANAFLEX) 4 MG tablet, Take 1 tablet by mouth 3 times daily as needed (pain or muscle spasms), Disp: 270 tablet, Rfl: 1    Cholecalciferol (VITAMIN D) 25 MCG TABS, Take 1 tablet by mouth daily, Disp: 30 tablet, Rfl: 0    vitamin B-12 (CYANOCOBALAMIN) 1000 MCG tablet, Take 1 tablet by mouth daily, Disp: 30 tablet, Rfl: 0    clonazePAM (KLONOPIN) 1 MG tablet, Pt states takes 0.5 mg morning and night, in afternoon she takes 0.5 - 1mg, Disp: , Rfl:     albuterol sulfate HFA (PROVENTIL;VENTOLIN;PROAIR) 108 (90 Base) MCG/ACT inhaler, Inhale 1-2 puffs into the lungs every 4 hours as needed for Wheezing or Shortness of Breath, Disp: 18 g, Rfl: 2    buPROPion (WELLBUTRIN XL) 300 MG extended release tablet, 300 mg every morning, Disp: , Rfl:     ketoconazole (NIZORAL) 2 % shampoo, , Disp: , Rfl:     risperiDONE (RISPERDAL) 4 MG tablet, 4 mg at bedtime, Disp: , Rfl:     venlafaxine (EFFEXOR XR) 75 MG extended release capsule, Take 75 mg by mouth daily, Disp: , Rfl:     Fluticasone furoate-vilanterol (BREO ELLIPTA) 200-25 MCG/INH AEPB inhaler, Inhale 1 puff into the lungs daily, Disp: , Rfl:     lamoTRIgine (LAMICTAL) 100 MG tablet, Pt takes 200mg in a.m. and 300mg nightly, Disp: , Rfl:     pregabalin (LYRICA) 75 MG capsule, Pt states takes 75mg in a.m. and 150 mg in p.m., Disp: , Rfl:     traMADol (ULTRAM) 50 MG tablet, Take 50 mg by mouth every 8 hours as needed. , Disp: , Rfl:     venlafaxine (EFFEXOR XR) 150 MG extended release capsule, Take by mouth daily, Disp: , Rfl:     folic acid (FOLVITE) 1 MG tablet, Take 1 tablet by mouth daily (Patient not taking: Reported on 2/17/2023), Disp: 30 tablet, Rfl: 0    Allergies   Allergen Reactions    Latex Rash    Aripiprazole Other (See Comments)     Other reaction(s): Agitation-I  Caused severe akathesia, couldn't sit still. \"Worse 3 days of my life. \"    Ziprasidone Hcl Other (See Comments)     Other reaction(s): Agitation-I  \"moods swing off the chain\"  caused hospitalization, unsure of reaction    Topiramate Other (See Comments)    Zolpidem Other (See Comments)     'hallucinates'  hallucinations       Review of Systems   Constitutional: Negative. HENT: Negative. Eyes:  Positive for photophobia and visual disturbance (chronic strabismus). Respiratory: Negative. Cardiovascular: Negative. Gastrointestinal: Negative. Endocrine: Negative. Genitourinary: Negative. Musculoskeletal: Negative. Allergic/Immunologic: Negative. Neurological:  Positive for headaches. Hematological: Negative. Psychiatric/Behavioral:  Positive for dysphoric mood. Negative for suicidal ideas. The patient is nervous/anxious. Physical Examination  /68   Pulse 83   Ht 5' 3\" (1.6 m)   Wt 196 lb 9.6 oz (89.2 kg)   LMP 02/11/2023   SpO2 96%   BMI 34.83 kg/m²     General - Well developed, well nourished, in no apparent distress. Pleasant and conversent. HEENT - Normocephalic, atraumatic. Conjunctiva, tympanic membranes, and oropharynx are clear. Neck - Supple without masses, no bruits   Cardiovascular - Regular rate and rhythm. Normal S1, S2 without murmurs, rubs, or gallops. Lungs - Clear to auscultation. Abdomen - Soft, nontender with normal bowel sounds. Extremities - Peripheral pulses intact. No edema and no rashes. Neurological examination - Comprehension, attention, memory and reasoning are intact. Language and speech are normal.   On cranial nerve examination, (II, III, IV, VI) pupils are equal, round, and reactive to light. Visual acuity is adequate. Visual fields are full to finger confrontation.  Extraocular motility is at baseline. Pt has chronic strabismus (V, VII) Face is symmetric and sensation is intact to light touch. (VIII) Hearing is intact. (IX, X) Palate and uvula elevate symmetrically. Voice is normal. (XI) Shoulder shrug is strong and equal bilaterally. (XII)Tongue is midline. Motor examination - There is normal muscle tone and bulk. Power is full throughout. Muscle stretch reflexes are normoactive throughout. Plantar response is flexor bilaterally. Sensation is intact to light touch, pinprick, vibration and proprioception in all extremities. Cerebellar examination is normal. Gait and stance are normal.     Most recent CTA  - I personally reviewed this image   CT Result (most recent):  CTA HEAD NECK W CONTRAST 11/30/2022    Narrative  Title:  CT arteriogram of the neck and head. Indication: Code stroke. Numbness from the right face to the right foot,  disorientation. Technique: Axial images of the neck and head were obtained after the uneventful  administration of intravenous iodinated contrast media. Contrast was used to  best identify the arterial structures. Images were reviewed on a separate, free  standing, three-dimensional workstation as per the referring physicians request.      All stenosis percentages derived by comparing the narrowest segment with the  distal Internal Carotid Artery luminal diameter, as described in the [place]  American Symptomatic Carotid Endarterectomy Trial (NASCET) criteria. All CT scans at this facility are performed using dose reduction/dose modulation  techniques, as appropriate the performed exam, including the following:  Automated Exposure Control; Adjustment of the mA and/or kV according to patient  size (this includes techniques or standardized protocols for targeted exams  where dose is matched to indication/reason for exam); and Use of Iterative  Reconstruction Technique. Comparison: Head CT same date. Findings:  Lungs:  Clear.   Bones:  Metallic plate and screws on the anterior right maxillary sinus wall. Paranasal sinuses:  Extensive mucosal thickening in the right maxillary sinus. Brain:  No mass effect. Soft tissues:  Unremarkable. Superior sagittal sinus:  Patent. Right transverse sinus:  Patent. Left transverse sinus:  Patent. Aortic arch:  Patent. Right brachiocephalic artery:  Patent. Right subclavian artery:  Patent. Left subclavian artery:  Patent. Right common carotid artery:  Patent. Right external carotid artery:  Patent. Cervical Right internal carotid artery:  Mildly tortuous, patent. Left common carotid artery: Patent. Left external carotid artery:  Patent. Cervical Left internal carotid artery:  Patent. Right vertebral artery:  Patent. Left vertebral artery:  Patent with fairly rapid tapering intracranially. Basilar artery: Moderately tortuous, patent. Venous contamination degrades the intracranial imaging. Intracranial right internal carotid artery:  Patent. Right middle cerebral artery:  Patent. Right anterior cerebral artery:  Small diameter A1 segment, more normal diameter  A2 segment, patent. Anterior communicating artery: Patent. Left anterior cerebral artery:  Patent. Left middle cerebral artery:  Patent. Intracranial left internal carotid artery:  Patent. Right posterior communicating artery: Small diameter, patent. Left posterior communicating artery:  Patent. Right posterior cerebral artery:  Extremely small diameter, patency cannot be  confirmed. Left posterior cerebral artery:  Patent. Impression  No definite intracranial arterial occlusion or aneurysm.       Most recent MRI - I personally reviewed this image   MRI Result (most recent):  MRI BRAIN W WO CONTRAST 12/01/2022    Narrative  EXAMINATION: BRAIN MRI 12/1/2022 12:41 PM    ACCESSION NUMBER: [id number]    INDICATION: stroke like symptoms right-sided weakness with disorientation    COMPARISON: Head CT and CTA head and neck 11/30/2022 brain MRI 4/14/2016    TECHNIQUE: Multiplanar multisequence MRI of the brain without and with  intravenous administration of 15 cc ProHance contrast agent. FINDINGS:    Midline structures including the corpus callosum, pituitary gland, optic nerves,  and cerebellum are well developed. The ventricles are within normal limits. There is no midline shift. The basilar  cisterns are patent. There is no cerebellar tonsillar ectopia or herniation. Diffusion imaging shows no evidence of acute infarction or other acute  abnormality. Partially visualized maxillary sinus fixations bilaterally. The expected large intracranial vascular flow voids are preserved. There is no  evidence of intracranial blood products. There is no abnormal intra or extra-axial postcontrast enhancement. There are no suspicious osseous lesions. Impression  Unremarkable MRI of the brain for patient age, including no evidence of acute  ischemic infarction. 11/30/22    TRANSTHORACIC ECHOCARDIOGRAM (TTE) COMPLETE (CONTRAST/BUBBLE/3D PRN) 12/01/2022 11:22 AM, 12/01/2022 12:00 AM (Final)    Interpretation Summary    Left Ventricle: Normal left ventricular systolic function with a visually estimated EF of 55 - 60%. Left ventricle size is normal. Normal wall thickness. Normal wall motion. Normal diastolic function. Technical qualifiers: Color flow Doppler was performed and pulse wave and/or continuous wave Doppler was performed. Signed by: Luis Enrique Wayne MD on 12/1/2022 11:22 AM, Signed by: Unknown Provider Result on 12/1/2022 12:00 AM    Quyen Roa was seen today for follow-up from hospital and migraine. Diagnoses and all orders for this visit:    Hospital discharge follow-up  -     IA DISCHARGE MEDS RECONCILED W/ CURRENT OUTPATIENT MED LIST    Migraine without aura and without status migrainosus, not intractable  Will start on Aimovig 70 mg monthly injection for migraine prevention.  Medication and side effects discussed. Discussed importance of medication compliance. She is not candidate for triptan therapy due to ineffectiveness and medication interaction with psych medications. She is not candidate for BB due to hx of asthma. She was previously tried on topiramate, unable to tolerate due to side effects. Samples of Ubrelvy 50 mg and Nurtec ODT 75 mg provided to patient. Medications and side effects discussed. She is not to take medication on same day and she is to call office with update on efficacy. Instructions. Nurtec 75 mg ODT daily as needed for migraine abortive therapy. Not to exceed 75 mg/24 hours. Ubrelvy 50 mg daily as needed for migraine abortive therapy. May repeat after 2 hours if needed. Not to exceed 200mg/24 hours. -     Erenumab-aooe (AIMOVIG) 70 MG/ML SOAJ; Inject 70 mg into the skin every 30 days    Strabismus  Chronic. Followed by opthalmology. Tobacco dependence due to cigarettes  Discussed importance of tobacco cessation as this can contribute to recurrent headaches, and increase risk for stroke and cardiovascular events. Current 1/2 ppd cigarette smoker. Headache Education:   Instructed the patient on over-the-counter headache management medications including: CoQ10, magnesium oxide, and butterbur. To avoid a pain medication overuse headache trying not to take pain medicines more than 3 doses a week. To help relieve headache symptoms without taking pain medicine lie down under darkroom and drink glass of water. Consider lifestyle modification including good sleep hygiene, routine medial schedules, regular exercise and managing triggers. Keep a headache diary  to reveal triggers and possible patterns. Triggers may be: Food, stress, perfumes, alcohol, or even chocolate. Drink plenty of water and try to get 8 hours of sleep each night to reduce risk factors that may cause headaches.       Follow up in 3 months or sooner if needed    I spent greater than 50% of the 60 total minutes of today's visit in coordination of care and patient/family education and counseling regarding the above patient concerns, reviewing the patient's medical record, my assessment and recommendations.           Tresa Rob, [address] [facility] [address], [address] [place] [address]  VZLKM:[phone]

## 2023-02-17 NOTE — PATIENT INSTRUCTIONS
Headache Education:   Instructed the patient on over-the-counter headache management medications including: CoQ10, magnesium oxide, and butterbur. To avoid a pain medication overuse headache trying not to take pain medicines more than 3 doses a week. To help relieve headache symptoms without taking pain medicine lie down under darkroom and drink glass of water. Consider lifestyle modification including good sleep hygiene, routine medial schedules, regular exercise and managing triggers. Keep a headache diary  to reveal triggers and possible patterns. Triggers may be: Food, stress, perfumes, alcohol, or even chocolate. Drink plenty of water and try to get 8 hours of sleep each night to reduce risk factors that may cause headaches. Samples of Ubrelvy 50 mg and Nurtec ODT 75 mg provided to patient. Medications and side effects discussed. She is not to take medication on same day and she is to call office with update on efficacy. Instructions. Nurtec 75 mg ODT daily as needed for migraine abortive therapy. Not to exceed 75 mg/24 hours. Ubrelvy 50 mg daily as needed for migraine abortive therapy. May repeat after 2 hours if needed. Not to exceed 200mg/24 hours.

## 2023-02-17 NOTE — PROGRESS NOTES
Patient presents for aimovig injection. VITAL SIGNS:  /68   Pulse 83   Ht 5' 3\" (1.6 m)   Wt 196 lb 9.6 oz (89.2 kg)   LMP 2023   SpO2 96%   BMI 34.83 kg/m²     Common adverse reactions including injection site reaction and constipation were reviewed with patient. Your aimovig dose is 70 mg. This is given as a subcutaneous injection once a month. The syringe is very small, you will never see the syringe. Reviewed proper use of auto injector includin. Storage of medication in refrigerator until use and remove 30 min prior to use. Once removed from the refrigerator, the medication is good for 7 days. Do no place back in refrigerator. 2. Gather materials needed prior to injection  3. Review injection sites  4. Clean injection site  5. Remove cap from auto injector  6. Firmly press injector at 90 degree angle against site and press the purple start button. You will hear a loud click. Let go of the injector button. 7. Continue to press injector against you skin for about 15 seconds, the window will turn      yellow when injection is complete. 8. Dispose the auto injector in a sharps container. Patient demonstrated proper self injection technique with sample syringe and administered first self dose properly in office. Dose given:  Aimovig 70 mg subcutaneous injection  Site: right abdomen  Lot No : [id number]  Exp date: 2023  NDC: 38316-493-03     Pt tolerated procedure without immediate adverse reaction.

## 2023-02-21 ENCOUNTER — TELEPHONE (OUTPATIENT)
Dept: NEUROLOGY | Age: 48
End: 2023-02-21

## 2023-02-21 NOTE — TELEPHONE ENCOUNTER
[place] APPROVAL AIMOVIG 140      Prior authorization approved Case ID: Lorenza Ayala      Payer:  [employer] Payer    [phone]    Request Reference Number: [id number]. AIMOVIG INJ 70MG/ML is approved through 12/31/2023. Your patient may now fill this prescription and it will be covered.    Approval Details    Authorized from February 21, 2023 to December 31, 2023

## 2023-03-15 ENCOUNTER — CLINICAL DOCUMENTATION (OUTPATIENT)
Dept: SURGERY | Age: 48
End: 2023-03-15

## 2023-03-15 NOTE — PROGRESS NOTES
Pt has no family history of colon cancer  Pt is not taking anticoagulation  Pt has no previous colonoscopies discoverable through chart review    I have reviewed the patient's chart and consider the patient an acceptable risk for screening colonoscopy without a formal office visit. We will contact the patient to give the details of the bowel prep and to schedule screening colonoscopy in the near future. Once the colonoscopy has been completed, the Health Maintenance will be updated accordingly.      ROSANNA Mclaughlin - NP

## 2023-04-26 ENCOUNTER — PATIENT MESSAGE (OUTPATIENT)
Dept: NEUROLOGY | Age: 48
End: 2023-04-26

## 2023-04-26 DIAGNOSIS — G43.009 MIGRAINE WITHOUT AURA AND WITHOUT STATUS MIGRAINOSUS, NOT INTRACTABLE: Primary | ICD-10-CM

## 2023-04-27 RX ORDER — UBROGEPANT 100 MG/1
TABLET ORAL
COMMUNITY
Start: 2023-02-17 | End: 2023-04-27 | Stop reason: SDUPTHER

## 2023-04-27 RX ORDER — LISDEXAMFETAMINE DIMESYLATE 20 MG/1
20 CAPSULE ORAL EVERY MORNING
COMMUNITY
Start: 2023-01-18

## 2023-04-27 RX ORDER — UBROGEPANT 100 MG/1
100 TABLET ORAL DAILY PRN
Qty: 16 TABLET | Refills: 11 | Status: SHIPPED | OUTPATIENT
Start: 2023-04-27

## 2023-04-27 NOTE — TELEPHONE ENCOUNTER
From: Leticia Alicea  To: Kayla Castillo  Sent: 4/26/2023 2:53 PM EDT  Subject: Aniceto Santillan. I saw you for my migraine headache and the Ubrelvy 50 mg has worked well for me. Can you please send in a prescription for it at [facility] on [address]. It is going to need a prior authorization. Can you please make sure that is done. I am almost out of samples. I appreciate your help.     Thank you,  Leticia Alicea

## 2023-04-28 ENCOUNTER — PATIENT MESSAGE (OUTPATIENT)
Dept: NEUROLOGY | Age: 48
End: 2023-04-28

## 2023-05-22 PROBLEM — F17.210 TOBACCO DEPENDENCE DUE TO CIGARETTES: Status: RESOLVED | Noted: 2022-06-07 | Resolved: 2023-05-22

## 2023-05-26 ENCOUNTER — TELEPHONE (OUTPATIENT)
Dept: NEUROLOGY | Age: 48
End: 2023-05-26

## 2023-05-26 ENCOUNTER — OFFICE VISIT (OUTPATIENT)
Dept: NEUROLOGY | Age: 48
End: 2023-05-26
Payer: MEDICARE

## 2023-05-26 VITALS
SYSTOLIC BLOOD PRESSURE: 98 MMHG | DIASTOLIC BLOOD PRESSURE: 69 MMHG | BODY MASS INDEX: 33.98 KG/M2 | HEIGHT: 63 IN | HEART RATE: 76 BPM | WEIGHT: 191.8 LBS | OXYGEN SATURATION: 94 %

## 2023-05-26 DIAGNOSIS — G43.009 MIGRAINE WITHOUT AURA AND WITHOUT STATUS MIGRAINOSUS, NOT INTRACTABLE: ICD-10-CM

## 2023-05-26 PROCEDURE — 99213 OFFICE O/P EST LOW 20 MIN: CPT | Performed by: NURSE PRACTITIONER

## 2023-05-26 PROCEDURE — 3074F SYST BP LT 130 MM HG: CPT | Performed by: NURSE PRACTITIONER

## 2023-05-26 PROCEDURE — 3078F DIAST BP <80 MM HG: CPT | Performed by: NURSE PRACTITIONER

## 2023-05-26 PROCEDURE — G8417 CALC BMI ABV UP PARAM F/U: HCPCS | Performed by: NURSE PRACTITIONER

## 2023-05-26 PROCEDURE — G8427 DOCREV CUR MEDS BY ELIG CLIN: HCPCS | Performed by: NURSE PRACTITIONER

## 2023-05-26 PROCEDURE — 1036F TOBACCO NON-USER: CPT | Performed by: NURSE PRACTITIONER

## 2023-05-26 ASSESSMENT — ENCOUNTER SYMPTOMS
RESPIRATORY NEGATIVE: 1
GASTROINTESTINAL NEGATIVE: 1

## 2023-05-26 ASSESSMENT — PATIENT HEALTH QUESTIONNAIRE - PHQ9
SUM OF ALL RESPONSES TO PHQ9 QUESTIONS 1 & 2: 0
SUM OF ALL RESPONSES TO PHQ QUESTIONS 1-9: 0
1. LITTLE INTEREST OR PLEASURE IN DOING THINGS: 0
2. FEELING DOWN, DEPRESSED OR HOPELESS: 0
SUM OF ALL RESPONSES TO PHQ QUESTIONS 1-9: 0

## 2023-05-26 NOTE — PROGRESS NOTES
aura and without status migrainosus, not intractable    Stable. Continue Aimovig 70 mg monthly injection. Continue Ubrelvy 100 mg daily as needed for migraine abortive therapy. Samples provided in office, will check on PA status. Headache Education:   Instructed the patient on over-the-counter headache management medications including: CoQ10, magnesium oxide, and butterbur. To avoid a pain medication overuse headache trying not to take pain medicines more than 3 doses a week. Avoid use of Fioricet or opioids to treat headaches as this can increase risk for rebound headaches. To help relieve headache symptoms without taking pain medicine lie down under darkroom and drink glass of water. Consider lifestyle modification including good sleep hygiene, routine medial schedules, regular exercise and managing triggers. Keep a headache diary  to reveal triggers and possible patterns. Triggers may be: Food, stress, perfumes, alcohol, or even chocolate. Drink plenty of water and try to get 8 hours of sleep each night to reduce risk factors that may cause headaches. Follow up in 6 months or sooner if needed    I spent  50% of the 20 total minutes of today's visit in coordination of care and patient/family education and counseling regarding the above patient concerns, reviewing the patient's medical record, my assessment and recommendations.                 Shahana Hilton, [facility] [address] [facility] 2000 [facility]  11 [facility], [address]  AZMTX:[phone]

## 2023-07-27 DIAGNOSIS — E55.9 VITAMIN D DEFICIENCY: ICD-10-CM

## 2023-07-27 DIAGNOSIS — I10 HYPERTENSION, UNSPECIFIED TYPE: ICD-10-CM

## 2023-07-27 DIAGNOSIS — E03.9 HYPOTHYROIDISM (ACQUIRED): ICD-10-CM

## 2023-07-27 DIAGNOSIS — Z00.00 ANNUAL PHYSICAL EXAM: ICD-10-CM

## 2023-07-27 DIAGNOSIS — E53.8 VITAMIN B12 DEFICIENCY: ICD-10-CM

## 2023-07-27 LAB
25(OH)D3 SERPL-MCNC: 41.5 NG/ML (ref 30–100)
ALBUMIN SERPL-MCNC: 3.6 G/DL (ref 3.5–5)
ALBUMIN/GLOB SERPL: 1 (ref 0.4–1.6)
ALP SERPL-CCNC: 63 U/L (ref 50–136)
ALT SERPL-CCNC: 20 U/L (ref 12–65)
ANION GAP SERPL CALC-SCNC: 5 MMOL/L (ref 2–11)
AST SERPL-CCNC: 14 U/L (ref 15–37)
BILIRUB SERPL-MCNC: 0.2 MG/DL (ref 0.2–1.1)
BUN SERPL-MCNC: 14 MG/DL (ref 6–23)
CALCIUM SERPL-MCNC: 8.9 MG/DL (ref 8.3–10.4)
CHLORIDE SERPL-SCNC: 108 MMOL/L (ref 101–110)
CHOLEST SERPL-MCNC: 151 MG/DL
CO2 SERPL-SCNC: 29 MMOL/L (ref 21–32)
CREAT SERPL-MCNC: 1 MG/DL (ref 0.6–1)
GLOBULIN SER CALC-MCNC: 3.6 G/DL (ref 2.8–4.5)
GLUCOSE SERPL-MCNC: 90 MG/DL (ref 65–100)
HDLC SERPL-MCNC: 50 MG/DL (ref 40–60)
HDLC SERPL: 3
LDLC SERPL CALC-MCNC: 90.2 MG/DL
POTASSIUM SERPL-SCNC: 4.1 MMOL/L (ref 3.5–5.1)
PROT SERPL-MCNC: 7.2 G/DL (ref 6.3–8.2)
SODIUM SERPL-SCNC: 142 MMOL/L (ref 133–143)
T4 FREE SERPL-MCNC: 1.3 NG/DL (ref 0.78–1.46)
TRIGL SERPL-MCNC: 54 MG/DL (ref 35–150)
TSH, 3RD GENERATION: 6.98 UIU/ML (ref 0.36–3.74)
VIT B12 SERPL-MCNC: 422 PG/ML (ref 193–986)
VLDLC SERPL CALC-MCNC: 10.8 MG/DL (ref 6–23)

## 2023-08-04 ENCOUNTER — OFFICE VISIT (OUTPATIENT)
Dept: FAMILY MEDICINE CLINIC | Facility: CLINIC | Age: 48
End: 2023-08-04

## 2023-08-04 VITALS
BODY MASS INDEX: 33.98 KG/M2 | HEIGHT: 63 IN | SYSTOLIC BLOOD PRESSURE: 108 MMHG | WEIGHT: 191.8 LBS | TEMPERATURE: 97 F | DIASTOLIC BLOOD PRESSURE: 79 MMHG | HEART RATE: 85 BPM

## 2023-08-04 DIAGNOSIS — M79.7 FIBROMYALGIA: ICD-10-CM

## 2023-08-04 DIAGNOSIS — L21.9 SBD (SEBORRHEIC DERMATITIS): ICD-10-CM

## 2023-08-04 DIAGNOSIS — M15.9 OSTEOARTHRITIS OF MULTIPLE JOINTS, UNSPECIFIED OSTEOARTHRITIS TYPE: ICD-10-CM

## 2023-08-04 DIAGNOSIS — E03.9 HYPOTHYROIDISM (ACQUIRED): Primary | ICD-10-CM

## 2023-08-04 DIAGNOSIS — D64.9 ANEMIA, UNSPECIFIED TYPE: ICD-10-CM

## 2023-08-04 DIAGNOSIS — Z83.3 FAMILY HISTORY OF DIABETES MELLITUS (DM): ICD-10-CM

## 2023-08-04 DIAGNOSIS — K21.9 GASTROESOPHAGEAL REFLUX DISEASE, UNSPECIFIED WHETHER ESOPHAGITIS PRESENT: ICD-10-CM

## 2023-08-04 DIAGNOSIS — J45.40 MODERATE PERSISTENT ASTHMA, UNSPECIFIED WHETHER COMPLICATED: ICD-10-CM

## 2023-08-04 DIAGNOSIS — F31.9 BIPOLAR 1 DISORDER (HCC): ICD-10-CM

## 2023-08-04 DIAGNOSIS — F19.11 HISTORY OF SUBSTANCE ABUSE (HCC): ICD-10-CM

## 2023-08-04 DIAGNOSIS — E53.8 VITAMIN B12 DEFICIENCY: ICD-10-CM

## 2023-08-04 DIAGNOSIS — E22.1 HYPERPROLACTINEMIA (HCC): ICD-10-CM

## 2023-08-04 DIAGNOSIS — I10 HYPERTENSION, UNSPECIFIED TYPE: ICD-10-CM

## 2023-08-04 DIAGNOSIS — N32.81 OAB (OVERACTIVE BLADDER): ICD-10-CM

## 2023-08-04 DIAGNOSIS — M51.37 DDD (DEGENERATIVE DISC DISEASE), LUMBOSACRAL: ICD-10-CM

## 2023-08-04 DIAGNOSIS — E55.9 VITAMIN D DEFICIENCY: ICD-10-CM

## 2023-08-04 RX ORDER — LEVOTHYROXINE SODIUM 0.1 MG/1
100 TABLET ORAL
Qty: 100 TABLET | Refills: 1 | Status: SHIPPED | OUTPATIENT
Start: 2023-08-04

## 2023-08-04 RX ORDER — CHLORHEXIDINE GLUCONATE 0.12 MG/ML
RINSE ORAL
COMMUNITY
Start: 2023-07-25

## 2023-08-04 RX ORDER — DEXLANSOPRAZOLE 60 MG/1
60 CAPSULE, DELAYED RELEASE ORAL DAILY
Qty: 100 CAPSULE | Refills: 1 | Status: SHIPPED | OUTPATIENT
Start: 2023-08-04

## 2023-08-04 RX ORDER — TIZANIDINE 4 MG/1
4 TABLET ORAL 3 TIMES DAILY PRN
Qty: 300 TABLET | Refills: 1 | Status: SHIPPED | OUTPATIENT
Start: 2023-08-04

## 2023-08-04 RX ORDER — MELOXICAM 15 MG/1
15 TABLET ORAL DAILY
Qty: 100 TABLET | Refills: 1 | Status: SHIPPED | OUTPATIENT
Start: 2023-08-04

## 2023-08-04 RX ORDER — KETOCONAZOLE 20 MG/ML
SHAMPOO TOPICAL
Qty: 120 ML | Refills: 5 | Status: SHIPPED | OUTPATIENT
Start: 2023-08-04

## 2023-08-04 RX ORDER — ALBUTEROL SULFATE 90 UG/1
1-2 AEROSOL, METERED RESPIRATORY (INHALATION) EVERY 4 HOURS PRN
Qty: 18 G | Refills: 5 | Status: SHIPPED | OUTPATIENT
Start: 2023-08-04

## 2023-08-04 RX ORDER — PREGABALIN 100 MG/1
100 CAPSULE ORAL 3 TIMES DAILY
COMMUNITY
Start: 2023-07-05

## 2023-08-04 NOTE — PROGRESS NOTES
Current Outpatient Medications   Medication Instructions    Aimovig 70 mg, SubCUTAneous, EVERY 30 DAYS    albuterol sulfate HFA (PROVENTIL;VENTOLIN;PROAIR) 108 (90 Base) MCG/ACT inhaler 1-2 puffs, Inhalation, EVERY 4 HOURS PRN    buPROPion (WELLBUTRIN XL) 300 mg, EVERY MORNING    chlorhexidine (PERIDEX) 0.12 % solution SWISH 15 MLS BY MOUTH FOR 30 SECONDS AND EXPECTORATE TWICE DAILY AS DIRECTED. clonazePAM (KLONOPIN) 1 MG tablet Pt states takes 0.5 mg morning and night, in afternoon she takes 0.5 - 1mg    desvenlafaxine succinate (PRISTIQ) 50 mg, Oral, DAILY    dexlansoprazole (DEXILANT) 60 mg, Oral, DAILY    ketoconazole (NIZORAL) 2 % shampoo Apply 5-10 ml to wet hair, lather then leave on for 5 minutes, then rinse out. Do this twice a week as needed. lamoTRIgine (LAMICTAL) 100 MG tablet Pt takes 200mg in a.m. and 300mg nightly    levothyroxine (SYNTHROID) 100 mcg, Oral, DAILY BEFORE BREAKFAST    meloxicam (MOBIC) 15 mg, Oral, DAILY    mirabegron (MYRBETRIQ) 50 mg, Oral, DAILY    pregabalin (LYRICA) 100 mg, Oral, 3 TIMES DAILY    risperiDONE (RISPERDAL) 4 mg, Nightly    tiZANidine (ZANAFLEX) 4 mg, Oral, 3 TIMES DAILY PRN    traMADol (ULTRAM) 50 mg, Oral, EVERY 8 HOURS PRN    Ubrelvy 100 mg, Oral, DAILY PRN    venlafaxine (EFFEXOR XR) 150 MG extended release capsule Oral, DAILY    vitamin B-12 (CYANOCOBALAMIN) 1,000 mcg, Oral, DAILY    Vitamin D3 25 mcg, Oral, DAILY    Vyvanse 20 mg, Oral, EVERY MORNING       We discussed the typical prognosis and potential complications of the concern(s), including treatment options. Medication risks, benefits, costs, interactions, and alternatives were discussed as appropriate. I advised her to contact the office if her condition worsens or fails to improve as anticipated. She expressed understanding with the discussion and plan of care. An electronic signature was used to authenticate this note.   -- Peter Bailey MD

## 2023-08-04 NOTE — ASSESSMENT & PLAN NOTE
Problem and/or Symptoms are currently not stable and/or well controlled on current treatment plan.  Will have patient follow up as directed and make the following changes for further evaluation and/or treatment:     DC Candesartan & monitor BP for now

## 2023-08-04 NOTE — ASSESSMENT & PLAN NOTE
Problem and/or Symptoms are currently not stable and/or well controlled on current treatment plan.  Will have patient follow up as directed and make the following changes for further evaluation and/or treatment:     Increasing Synthroid from 88 to 100 mcg QD

## 2023-09-14 ENCOUNTER — TELEPHONE (OUTPATIENT)
Dept: FAMILY MEDICINE CLINIC | Facility: CLINIC | Age: 48
End: 2023-09-14

## 2023-09-15 ENCOUNTER — OFFICE VISIT (OUTPATIENT)
Dept: FAMILY MEDICINE CLINIC | Facility: CLINIC | Age: 48
End: 2023-09-15

## 2023-09-15 VITALS
HEIGHT: 63 IN | WEIGHT: 195.2 LBS | SYSTOLIC BLOOD PRESSURE: 114 MMHG | DIASTOLIC BLOOD PRESSURE: 72 MMHG | HEART RATE: 88 BPM | BODY MASS INDEX: 34.59 KG/M2 | TEMPERATURE: 98.1 F | OXYGEN SATURATION: 94 %

## 2023-09-15 DIAGNOSIS — R11.0 NAUSEA: ICD-10-CM

## 2023-09-15 DIAGNOSIS — H81.09 MENIERE'S DISEASE, UNSPECIFIED LATERALITY: ICD-10-CM

## 2023-09-15 DIAGNOSIS — H81.10 BENIGN PAROXYSMAL POSITIONAL VERTIGO, UNSPECIFIED LATERALITY: Primary | ICD-10-CM

## 2023-09-15 RX ORDER — ONDANSETRON 4 MG/1
4 TABLET, ORALLY DISINTEGRATING ORAL EVERY 8 HOURS PRN
Qty: 90 TABLET | Refills: 5 | Status: SHIPPED | OUTPATIENT
Start: 2023-09-15

## 2023-09-15 RX ORDER — METHYLPHENIDATE HYDROCHLORIDE 10 MG/1
TABLET ORAL
COMMUNITY
Start: 2023-09-14

## 2023-09-15 RX ORDER — MECLIZINE HCL 12.5 MG/1
12.5-25 TABLET ORAL 3 TIMES DAILY PRN
Qty: 90 TABLET | Refills: 5 | Status: SHIPPED | OUTPATIENT
Start: 2023-09-15

## 2023-09-15 NOTE — PATIENT INSTRUCTIONS

## 2023-09-15 NOTE — PROGRESS NOTES
[address], [facility] [address]  Phone: [phone]  Fax:[email or web address]: [phone] Lindsay Manzanares; Established patient 57 y. o.female; seen 9/15/2023 for: Other (Vertigo off and on for a couple of months)      Assessment & Plan    1. Benign paroxysmal positional vertigo, unspecified laterality  -     Ambulatory referral to Physical Therapy  -     meclizine (ANTIVERT) 12.5 MG tablet; Take 1-2 tablets by mouth 3 times daily as needed for Dizziness, Disp-90 tablet, R-5Normal  2. Meniere's disease, unspecified laterality  -     Ambulatory referral to Physical Therapy  3. Nausea  -     ondansetron (ZOFRAN-ODT) 4 MG disintegrating tablet; Take 1 tablet by mouth every 8 hours as needed for Nausea or Vomiting, Disp-90 tablet, R-5Normal    Problem and/or Symptoms are currently not stable and/or well controlled on current treatment plan. Will have patient follow up as directed and make the following changes for further evaluation and/or treatment:     Providing pt with Meclizine & Zofran TID PRN; also referring pt to vestibular therapy. If Sx not improving w/n the next few months will plan to refer to ENT specialist.       Check Out Instructions  Return for Next Scheduled. Subjective & Objective    HPI  Pt had issues with vertigo 10+ years ago but then seemed to resolve, however it has now come back over the past few months & is happening 1-3 X a week. Sx last for several minutes up to an hour at the most. Turning head makes Sx worse & laying down/still helps alleviate Sx to some degree. No change in hearing (though pt does have chronic tinnitus & mild hearing loss), no HA, etc. She does have some nausea with the vertigo and pt states she can't tolerate Phenergan at all d/t ASE of sedation.      Review of Systems    Physical Exam  Vitals:    09/15/23 0921   BP: 114/72   Site: Left Upper Arm   Position: Sitting   Cuff Size: Large Adult   Pulse:

## 2023-09-19 ENCOUNTER — TELEPHONE (OUTPATIENT)
Dept: ORTHOPEDIC SURGERY | Age: 48
End: 2023-09-19

## 2023-09-19 NOTE — TELEPHONE ENCOUNTER
Called an worked patient up for an office visit with [facility] 9/25 8:30am 58 [address] [facility]

## 2023-09-19 NOTE — TELEPHONE ENCOUNTER
She is having sciatic pain on the left side. Should she get an inj or what does he suggest? He told her to call if she had any problems. Please give her a call.

## 2023-09-24 ENCOUNTER — HOSPITAL ENCOUNTER (EMERGENCY)
Age: 48
Discharge: HOME | End: 2023-09-24
Attending: GENERAL PRACTICE
Payer: MEDICARE

## 2023-09-24 ENCOUNTER — APPOINTMENT (OUTPATIENT)
Dept: GENERAL RADIOLOGY | Age: 48
End: 2023-09-24
Payer: MEDICARE

## 2023-09-24 VITALS
BODY MASS INDEX: 34.38 KG/M2 | RESPIRATION RATE: 18 BRPM | WEIGHT: 194 LBS | SYSTOLIC BLOOD PRESSURE: 124 MMHG | DIASTOLIC BLOOD PRESSURE: 72 MMHG | TEMPERATURE: 97.8 F | HEART RATE: 75 BPM | OXYGEN SATURATION: 98 % | HEIGHT: 63 IN

## 2023-09-24 DIAGNOSIS — R07.9 CHEST PAIN, UNSPECIFIED TYPE: Primary | ICD-10-CM

## 2023-09-24 LAB
ANION GAP SERPL CALC-SCNC: 4 MMOL/L (ref 2–11)
BASOPHILS # BLD: 0.1 K/UL (ref 0–0.2)
BASOPHILS NFR BLD: 1 % (ref 0–2)
BUN SERPL-MCNC: 18 MG/DL (ref 6–23)
CALCIUM SERPL-MCNC: 9.5 MG/DL (ref 8.3–10.4)
CHLORIDE SERPL-SCNC: 109 MMOL/L (ref 101–110)
CO2 SERPL-SCNC: 29 MMOL/L (ref 21–32)
CREAT SERPL-MCNC: 1.1 MG/DL (ref 0.6–1)
DIFFERENTIAL METHOD BLD: NORMAL
EKG ATRIAL RATE: 95 BPM
EKG DIAGNOSIS: NORMAL
EKG P AXIS: 61 DEGREES
EKG P-R INTERVAL: 150 MS
EKG Q-T INTERVAL: 336 MS
EKG QRS DURATION: 94 MS
EKG QTC CALCULATION (BAZETT): 422 MS
EKG R AXIS: 92 DEGREES
EKG T AXIS: 0 DEGREES
EKG VENTRICULAR RATE: 95 BPM
EOSINOPHIL # BLD: 0.2 K/UL (ref 0–0.8)
EOSINOPHIL NFR BLD: 3 % (ref 0.5–7.8)
ERYTHROCYTE [DISTWIDTH] IN BLOOD BY AUTOMATED COUNT: 13.2 % (ref 11.9–14.6)
GLUCOSE SERPL-MCNC: 92 MG/DL (ref 65–100)
HCT VFR BLD AUTO: 39.5 % (ref 35.8–46.3)
HGB BLD-MCNC: 13.3 G/DL (ref 11.7–15.4)
IMM GRANULOCYTES # BLD AUTO: 0 K/UL (ref 0–0.5)
IMM GRANULOCYTES NFR BLD AUTO: 0 % (ref 0–5)
LYMPHOCYTES # BLD: 1.8 K/UL (ref 0.5–4.6)
LYMPHOCYTES NFR BLD: 26 % (ref 13–44)
MCH RBC QN AUTO: 29.7 PG (ref 26.1–32.9)
MCHC RBC AUTO-ENTMCNC: 33.7 G/DL (ref 31.4–35)
MCV RBC AUTO: 88.2 FL (ref 82–102)
MONOCYTES # BLD: 0.5 K/UL (ref 0.1–1.3)
MONOCYTES NFR BLD: 8 % (ref 4–12)
NEUTS SEG # BLD: 4.1 K/UL (ref 1.7–8.2)
NEUTS SEG NFR BLD: 62 % (ref 43–78)
NRBC # BLD: 0 K/UL (ref 0–0.2)
PLATELET # BLD AUTO: 214 K/UL (ref 150–450)
PMV BLD AUTO: 11 FL (ref 9.4–12.3)
POTASSIUM SERPL-SCNC: 4.1 MMOL/L (ref 3.5–5.1)
RBC # BLD AUTO: 4.48 M/UL (ref 4.05–5.2)
SODIUM SERPL-SCNC: 142 MMOL/L (ref 133–143)
TROPONIN I SERPL HS-MCNC: 3.5 PG/ML (ref 0–14)
TROPONIN I SERPL HS-MCNC: 4 PG/ML (ref 0–14)
WBC # BLD AUTO: 6.7 K/UL (ref 4.3–11.1)

## 2023-09-24 PROCEDURE — 93005 ELECTROCARDIOGRAM TRACING: CPT | Performed by: EMERGENCY MEDICINE

## 2023-09-24 PROCEDURE — 84484 ASSAY OF TROPONIN QUANT: CPT

## 2023-09-24 PROCEDURE — 85025 COMPLETE CBC W/AUTO DIFF WBC: CPT

## 2023-09-24 PROCEDURE — 80048 BASIC METABOLIC PNL TOTAL CA: CPT

## 2023-09-24 PROCEDURE — 99285 EMERGENCY DEPT VISIT HI MDM: CPT

## 2023-09-24 PROCEDURE — 71046 X-RAY EXAM CHEST 2 VIEWS: CPT

## 2023-09-24 ASSESSMENT — PAIN DESCRIPTION - LOCATION: LOCATION: CHEST

## 2023-09-24 ASSESSMENT — ENCOUNTER SYMPTOMS
NAUSEA: 0
SHORTNESS OF BREATH: 0
ABDOMINAL PAIN: 0
COUGH: 0
VOMITING: 0

## 2023-09-24 ASSESSMENT — PAIN - FUNCTIONAL ASSESSMENT
PAIN_FUNCTIONAL_ASSESSMENT: NONE - DENIES PAIN
PAIN_FUNCTIONAL_ASSESSMENT: 0-10

## 2023-09-24 ASSESSMENT — LIFESTYLE VARIABLES
HOW OFTEN DO YOU HAVE A DRINK CONTAINING ALCOHOL: NEVER
HOW MANY STANDARD DRINKS CONTAINING ALCOHOL DO YOU HAVE ON A TYPICAL DAY: PATIENT DOES NOT DRINK

## 2023-09-24 ASSESSMENT — PAIN DESCRIPTION - DESCRIPTORS: DESCRIPTORS: PRESSURE

## 2023-09-24 ASSESSMENT — PAIN DESCRIPTION - ORIENTATION: ORIENTATION: RIGHT

## 2023-09-24 NOTE — ED NOTES
Patient walked into the ER with complaints of chest pain and back pain. She states that in the last months she has had dizziness, chest  pain and nausea. Patient today denies any nausea, vomiting today. Patient denies any hx of cardiac disorders.       Srikanth Angelo LPN  70/11/74 [id number]

## 2023-09-24 NOTE — ED PROVIDER NOTES
Emergency Department Provider Note       PCP: Mookie Cronin MD   Age: 57 y.o. Sex: female     DISPOSITION Decision To Discharge 09/24/2023 08:21:19 PM       ICD-10-CM    1. Chest pain, unspecified type  R07.9           Medical Decision Making     Complexity of Problems Addressed:  1 or more acute illnesses that pose a threat to life or bodily function. Data Reviewed and Analyzed:   I independently ordered and reviewed each unique test.    I independently ordered and interpreted the ED EKG in the absence of a Cardiologist.    Rate: 95  EKG Interpretation: EKG Interpretation: no acute changes  ST Segments: Normal ST segments - NO STEMI  I interpreted the X-rays no acute abnormality on chest x-ray, in agreement with radiologist interpretation. Discussion of management or test interpretation. This patient is a 70-year-old female with a history of hypertension, asthma, and fibromyalgia who presents today due to chest pain that has been intermittent for the past several weeks to months. This episode of chest pain started just prior to arrival in the emergency department. Physical exam the patient is unremarkable. CBC and CMP are within normal limits. Initial and secondary troponins are both within normal limits. EKG shows normal sinus rhythm with a rate of 95 bpm.  On reevaluation, patient says that her chest pain and resolved and she reiterated that she did not need any pain medication. I encouraged her to make an appointment with her primary care doctor for close follow-up. The patient verbalized understanding agreement with the plan moving forward. Risk of Complications and/or Morbidity of Patient Management:  Shared medical decision making was utilized in creating the patients health plan today. ED Course as of 09/24/23 2127   Cheyenne Shetty Sep 24, 2023   2126 Impression:  No evidence of an acute pleural or pulmonary parenchymal abnormality.  [KS]      ED Course User Index  [KS] NIKKI Marin

## 2023-09-24 NOTE — ED TRIAGE NOTES
Patient walked into the ER with complaints of chest pain and back pain. She states that in the last months she has had dizziness, chest  pain and nausea. Patient today denies any nausea, vomiting today. Patient denies any hx of cardiac disorders.

## 2023-09-25 NOTE — DISCHARGE INSTRUCTIONS
Today your evaluation was unremarkable. Please make an appointment with your primary care doctor for close follow-up. If your symptoms change or worsen in any way, return immediately to the emergency department.

## 2023-09-25 NOTE — ED NOTES
I have reviewed discharge instructions with the patient. The patient verbalized understanding. Patient left ED via Discharge Method: ambulatory to Home with . Opportunity for questions and clarification provided. Patient given 0 scripts.                 Juan Awad RN  09/24/23 2035

## 2023-10-19 ENCOUNTER — OFFICE VISIT (OUTPATIENT)
Dept: OBGYN CLINIC | Age: 48
End: 2023-10-19
Payer: MEDICARE

## 2023-10-19 VITALS
BODY MASS INDEX: 34.62 KG/M2 | WEIGHT: 195.4 LBS | SYSTOLIC BLOOD PRESSURE: 130 MMHG | DIASTOLIC BLOOD PRESSURE: 72 MMHG | HEIGHT: 63 IN

## 2023-10-19 DIAGNOSIS — N89.8 VAGINAL DISCHARGE: ICD-10-CM

## 2023-10-19 DIAGNOSIS — N94.3 PMS (PREMENSTRUAL SYNDROME): ICD-10-CM

## 2023-10-19 DIAGNOSIS — N89.8 VAGINAL ODOR: ICD-10-CM

## 2023-10-19 DIAGNOSIS — N92.0 MENORRHAGIA WITH REGULAR CYCLE: Primary | ICD-10-CM

## 2023-10-19 PROCEDURE — G8427 DOCREV CUR MEDS BY ELIG CLIN: HCPCS | Performed by: NURSE PRACTITIONER

## 2023-10-19 PROCEDURE — 3075F SYST BP GE 130 - 139MM HG: CPT | Performed by: NURSE PRACTITIONER

## 2023-10-19 PROCEDURE — 99214 OFFICE O/P EST MOD 30 MIN: CPT | Performed by: NURSE PRACTITIONER

## 2023-10-19 PROCEDURE — 3078F DIAST BP <80 MM HG: CPT | Performed by: NURSE PRACTITIONER

## 2023-10-19 PROCEDURE — G8484 FLU IMMUNIZE NO ADMIN: HCPCS | Performed by: NURSE PRACTITIONER

## 2023-10-19 PROCEDURE — 1036F TOBACCO NON-USER: CPT | Performed by: NURSE PRACTITIONER

## 2023-10-19 PROCEDURE — G8417 CALC BMI ABV UP PARAM F/U: HCPCS | Performed by: NURSE PRACTITIONER

## 2023-10-19 RX ORDER — NORETHINDRONE ACETATE AND ETHINYL ESTRADIOL 1; .02 MG/1; MG/1
1 TABLET ORAL DAILY
Qty: 4 PACKET | Refills: 2 | Status: SHIPPED | OUTPATIENT
Start: 2023-10-19

## 2023-10-19 NOTE — PROGRESS NOTES
The patient is a 57 y.o. No obstetric history on file. who is seen for vagial odor and white discharge. Started months ago. Symptoms have stayed the same. Mild itching. No new partners. Would like to discuss birth control options. Not currently on [facility] but would like to start. C/o PMS sx, c/o mood swings and disturbance before her periods start. Has been on pills in the past but has not been on anything in the while. Did fine on OCP. She complained about this previously but did not start [address] [facility] as she used to be a smoker. She notes that she has quit for good April 2023. Congratulated her! She notes she does not even have cravings. Denies migraine with aura. She reports monthly cycles, long interval q30-35d. States periods last 6d, Reports heavy flow first couple days, change pad q1.5-2hrs at its heaviest.       HISTORY:    No obstetric history on file. Patient's last menstrual period was 09/28/2023. Sexual History:  has sex with males  Contraception:  none  Current Outpatient Medications on File Prior to Visit   Medication Sig Dispense Refill    methylphenidate (RITALIN) 10 MG tablet       ondansetron (ZOFRAN-ODT) 4 MG disintegrating tablet Take 1 tablet by mouth every 8 hours as needed for Nausea or Vomiting 90 tablet 5    meclizine (ANTIVERT) 12.5 MG tablet Take 1-2 tablets by mouth 3 times daily as needed for Dizziness 90 tablet 5    chlorhexidine (PERIDEX) 0.12 % solution SWISH 15 MLS BY MOUTH FOR 30 SECONDS AND EXPECTORATE TWICE DAILY AS DIRECTED.      pregabalin (LYRICA) 100 MG capsule Take 1 capsule by mouth 3 times daily.       albuterol sulfate HFA (PROVENTIL;VENTOLIN;PROAIR) 108 (90 Base) MCG/ACT inhaler Inhale 1-2 puffs into the lungs every 4 hours as needed for Wheezing or Shortness of Breath 18 g 5    dexlansoprazole (DEXILANT) 60 MG CPDR delayed release capsule Take 1 capsule by mouth daily 100 capsule 1    levothyroxine (SYNTHROID) 100 MCG tablet Take 1 tablet by mouth every morning

## 2023-10-22 LAB
A VAGINAE DNA VAG QL NAA+PROBE: NORMAL SCORE
BVAB2 DNA VAG QL NAA+PROBE: NORMAL SCORE
C ALBICANS DNA VAG QL NAA+PROBE: NEGATIVE
C GLABRATA DNA VAG QL NAA+PROBE: NEGATIVE
MEGA1 DNA VAG QL NAA+PROBE: NORMAL SCORE
SPECIMEN SOURCE: NORMAL
T VAGINALIS RRNA SPEC QL NAA+PROBE: NEGATIVE

## 2023-12-14 ENCOUNTER — OFFICE VISIT (OUTPATIENT)
Dept: NEUROLOGY | Age: 48
End: 2023-12-14
Payer: MEDICARE

## 2023-12-14 VITALS
DIASTOLIC BLOOD PRESSURE: 82 MMHG | BODY MASS INDEX: 34.61 KG/M2 | HEIGHT: 63 IN | SYSTOLIC BLOOD PRESSURE: 120 MMHG | HEART RATE: 83 BPM | OXYGEN SATURATION: 97 %

## 2023-12-14 DIAGNOSIS — G43.009 MIGRAINE WITHOUT AURA AND WITHOUT STATUS MIGRAINOSUS, NOT INTRACTABLE: Primary | ICD-10-CM

## 2023-12-14 PROCEDURE — G8484 FLU IMMUNIZE NO ADMIN: HCPCS | Performed by: NURSE PRACTITIONER

## 2023-12-14 PROCEDURE — 1036F TOBACCO NON-USER: CPT | Performed by: NURSE PRACTITIONER

## 2023-12-14 PROCEDURE — G8417 CALC BMI ABV UP PARAM F/U: HCPCS | Performed by: NURSE PRACTITIONER

## 2023-12-14 PROCEDURE — 3074F SYST BP LT 130 MM HG: CPT | Performed by: NURSE PRACTITIONER

## 2023-12-14 PROCEDURE — 99214 OFFICE O/P EST MOD 30 MIN: CPT | Performed by: NURSE PRACTITIONER

## 2023-12-14 PROCEDURE — 3079F DIAST BP 80-89 MM HG: CPT | Performed by: NURSE PRACTITIONER

## 2023-12-14 PROCEDURE — G8427 DOCREV CUR MEDS BY ELIG CLIN: HCPCS | Performed by: NURSE PRACTITIONER

## 2023-12-14 RX ORDER — ERENUMAB-AOOE 140 MG/ML
140 INJECTION, SOLUTION SUBCUTANEOUS
Qty: 1 ML | Refills: 11 | Status: SHIPPED | OUTPATIENT
Start: 2023-12-14

## 2023-12-14 ASSESSMENT — PATIENT HEALTH QUESTIONNAIRE - PHQ9
SUM OF ALL RESPONSES TO PHQ QUESTIONS 1-9: 0
1. LITTLE INTEREST OR PLEASURE IN DOING THINGS: 0
SUM OF ALL RESPONSES TO PHQ QUESTIONS 1-9: 0
SUM OF ALL RESPONSES TO PHQ9 QUESTIONS 1 & 2: 0
2. FEELING DOWN, DEPRESSED OR HOPELESS: 0

## 2023-12-14 NOTE — PROGRESS NOTES
(ZANAFLEX) 4 MG tablet, Take 1 tablet by mouth 3 times daily as needed (pain or muscle spasms), Disp: 300 tablet, Rfl: 1    ketoconazole (NIZORAL) 2 % shampoo, Apply 5-10 ml to wet hair, lather then leave on for 5 minutes, then rinse out. Do this twice a week as needed. , Disp: 120 mL, Rfl: 5    Ubrogepant (UBRELVY) 100 MG TABS, Take 100 mg by mouth daily as needed (migraine. May repeat for 1 dose in 2 hours. Not to exceed 200 mg/24 hours.), Disp: 16 tablet, Rfl: 11    desvenlafaxine succinate (PRISTIQ) 50 MG TB24 extended release tablet, Take 1 tablet by mouth daily, Disp: , Rfl:     Erenumab-aooe (AIMOVIG) 70 MG/ML SOAJ, Inject 70 mg into the skin every 30 days, Disp: 1 mL, Rfl: 11    Cholecalciferol (VITAMIN D) 25 MCG TABS, Take 1 tablet by mouth daily, Disp: 30 tablet, Rfl: 0    vitamin B-12 (CYANOCOBALAMIN) 1000 MCG tablet, Take 1 tablet by mouth daily, Disp: 30 tablet, Rfl: 0    clonazePAM (KLONOPIN) 1 MG tablet, Pt states takes 0.5 mg morning and night, in afternoon she takes 0.5 - 1mg, Disp: , Rfl:     buPROPion (WELLBUTRIN XL) 300 MG extended release tablet, 1 tablet every morning, Disp: , Rfl:     risperiDONE (RISPERDAL) 4 MG tablet, 1 tablet at bedtime, Disp: , Rfl:     lamoTRIgine (LAMICTAL) 100 MG tablet, Pt takes 200mg in a.m. and 300mg nightly, Disp: , Rfl:     traMADol (ULTRAM) 50 MG tablet, Take 1 tablet by mouth every 8 hours as needed. , Disp: , Rfl:     venlafaxine (EFFEXOR XR) 150 MG extended release capsule, Take by mouth daily, Disp: , Rfl:     Allergies   Allergen Reactions    Latex Rash    Aripiprazole Other (See Comments)     Other reaction(s): Agitation-I  Caused severe akathesia, couldn't sit still. \"Worse 3 days of my life. \"    Ziprasidone Hcl Other (See Comments)     Other reaction(s): Agitation-I  \"moods swing off the chain\"  caused hospitalization, unsure of reaction    Topiramate Other (See Comments)    Zolpidem Other (See Comments)     'hallucinates'  hallucinations       Review of

## 2024-01-12 DIAGNOSIS — Z30.41 ENCOUNTER FOR SURVEILLANCE OF CONTRACEPTIVE PILLS: Primary | ICD-10-CM

## 2024-01-12 RX ORDER — NORETHINDRONE ACETATE AND ETHINYL ESTRADIOL .03; 1.5 MG/1; MG/1
1 TABLET ORAL DAILY
Qty: 4 PACKET | Refills: 3 | Status: SHIPPED | OUTPATIENT
Start: 2024-01-12

## 2024-02-08 DIAGNOSIS — Z83.3 FAMILY HISTORY OF DIABETES MELLITUS (DM): ICD-10-CM

## 2024-02-08 DIAGNOSIS — E03.9 HYPOTHYROIDISM (ACQUIRED): ICD-10-CM

## 2024-02-08 DIAGNOSIS — E55.9 VITAMIN D DEFICIENCY: ICD-10-CM

## 2024-02-08 DIAGNOSIS — E53.8 VITAMIN B12 DEFICIENCY: ICD-10-CM

## 2024-02-08 DIAGNOSIS — D64.9 ANEMIA, UNSPECIFIED TYPE: ICD-10-CM

## 2024-02-08 DIAGNOSIS — I10 HYPERTENSION, UNSPECIFIED TYPE: ICD-10-CM

## 2024-02-08 LAB
25(OH)D3 SERPL-MCNC: 35.1 NG/ML (ref 30–100)
ALBUMIN SERPL-MCNC: 3.4 G/DL (ref 3.5–5)
ALBUMIN/GLOB SERPL: 0.9 (ref 0.4–1.6)
ALP SERPL-CCNC: 49 U/L (ref 50–136)
ALT SERPL-CCNC: 24 U/L (ref 12–65)
ANION GAP SERPL CALC-SCNC: 3 MMOL/L (ref 2–11)
AST SERPL-CCNC: 11 U/L (ref 15–37)
BASOPHILS # BLD: 0.1 K/UL (ref 0–0.2)
BASOPHILS NFR BLD: 1 % (ref 0–2)
BILIRUB SERPL-MCNC: 0.2 MG/DL (ref 0.2–1.1)
BUN SERPL-MCNC: 13 MG/DL (ref 6–23)
CALCIUM SERPL-MCNC: 9.5 MG/DL (ref 8.3–10.4)
CHLORIDE SERPL-SCNC: 108 MMOL/L (ref 103–113)
CO2 SERPL-SCNC: 28 MMOL/L (ref 21–32)
CREAT SERPL-MCNC: 1 MG/DL (ref 0.6–1)
DIFFERENTIAL METHOD BLD: NORMAL
EOSINOPHIL # BLD: 0.2 K/UL (ref 0–0.8)
EOSINOPHIL NFR BLD: 4 % (ref 0.5–7.8)
ERYTHROCYTE [DISTWIDTH] IN BLOOD BY AUTOMATED COUNT: 13.2 % (ref 11.9–14.6)
EST. AVERAGE GLUCOSE BLD GHB EST-MCNC: 88 MG/DL
GLOBULIN SER CALC-MCNC: 4 G/DL (ref 2.8–4.5)
GLUCOSE SERPL-MCNC: 89 MG/DL (ref 65–100)
HBA1C MFR BLD: 4.7 % (ref 4.8–5.6)
HCT VFR BLD AUTO: 37.1 % (ref 35.8–46.3)
HGB BLD-MCNC: 12.1 G/DL (ref 11.7–15.4)
IMM GRANULOCYTES # BLD AUTO: 0 K/UL (ref 0–0.5)
IMM GRANULOCYTES NFR BLD AUTO: 0 % (ref 0–5)
IRON SERPL-MCNC: 68 UG/DL (ref 35–150)
LYMPHOCYTES # BLD: 1.2 K/UL (ref 0.5–4.6)
LYMPHOCYTES NFR BLD: 26 % (ref 13–44)
MCH RBC QN AUTO: 29.9 PG (ref 26.1–32.9)
MCHC RBC AUTO-ENTMCNC: 32.6 G/DL (ref 31.4–35)
MCV RBC AUTO: 91.6 FL (ref 82–102)
MONOCYTES # BLD: 0.4 K/UL (ref 0.1–1.3)
MONOCYTES NFR BLD: 9 % (ref 4–12)
NEUTS SEG # BLD: 2.7 K/UL (ref 1.7–8.2)
NEUTS SEG NFR BLD: 60 % (ref 43–78)
NRBC # BLD: 0 K/UL (ref 0–0.2)
PLATELET # BLD AUTO: 203 K/UL (ref 150–450)
PMV BLD AUTO: 11.6 FL (ref 9.4–12.3)
POTASSIUM SERPL-SCNC: 3.7 MMOL/L (ref 3.5–5.1)
PROT SERPL-MCNC: 7.4 G/DL (ref 6.3–8.2)
RBC # BLD AUTO: 4.05 M/UL (ref 4.05–5.2)
SODIUM SERPL-SCNC: 139 MMOL/L (ref 136–146)
T4 FREE SERPL-MCNC: 1.3 NG/DL (ref 0.78–1.46)
TIBC SERPL-MCNC: 326 UG/DL (ref 250–450)
TSH, 3RD GENERATION: 11 UIU/ML (ref 0.36–3.74)
VIT B12 SERPL-MCNC: 217 PG/ML (ref 193–986)
WBC # BLD AUTO: 4.6 K/UL (ref 4.3–11.1)

## 2024-02-19 ENCOUNTER — TELEMEDICINE (OUTPATIENT)
Dept: FAMILY MEDICINE CLINIC | Facility: CLINIC | Age: 49
End: 2024-02-19
Payer: MEDICARE

## 2024-02-19 DIAGNOSIS — R11.0 CHRONIC NAUSEA: ICD-10-CM

## 2024-02-19 DIAGNOSIS — I10 HYPERTENSION, UNSPECIFIED TYPE: ICD-10-CM

## 2024-02-19 DIAGNOSIS — F19.11 HISTORY OF SUBSTANCE ABUSE (HCC): ICD-10-CM

## 2024-02-19 DIAGNOSIS — H81.10 BENIGN PAROXYSMAL POSITIONAL VERTIGO, UNSPECIFIED LATERALITY: ICD-10-CM

## 2024-02-19 DIAGNOSIS — N32.81 OAB (OVERACTIVE BLADDER): ICD-10-CM

## 2024-02-19 DIAGNOSIS — Z12.11 COLON CANCER SCREENING: ICD-10-CM

## 2024-02-19 DIAGNOSIS — K21.9 GASTROESOPHAGEAL REFLUX DISEASE, UNSPECIFIED WHETHER ESOPHAGITIS PRESENT: ICD-10-CM

## 2024-02-19 DIAGNOSIS — F31.9 BIPOLAR 1 DISORDER (HCC): ICD-10-CM

## 2024-02-19 DIAGNOSIS — E22.1 HYPERPROLACTINEMIA (HCC): ICD-10-CM

## 2024-02-19 DIAGNOSIS — J45.40 MODERATE PERSISTENT ASTHMA, UNSPECIFIED WHETHER COMPLICATED: ICD-10-CM

## 2024-02-19 DIAGNOSIS — M79.7 FIBROMYALGIA: ICD-10-CM

## 2024-02-19 DIAGNOSIS — Z00.00 ANNUAL PHYSICAL EXAM: Primary | ICD-10-CM

## 2024-02-19 DIAGNOSIS — E03.9 HYPOTHYROIDISM (ACQUIRED): ICD-10-CM

## 2024-02-19 DIAGNOSIS — L21.9 SBD (SEBORRHEIC DERMATITIS): ICD-10-CM

## 2024-02-19 DIAGNOSIS — G93.32 CFS (CHRONIC FATIGUE SYNDROME): ICD-10-CM

## 2024-02-19 PROCEDURE — 99214 OFFICE O/P EST MOD 30 MIN: CPT | Performed by: FAMILY MEDICINE

## 2024-02-19 PROCEDURE — G0439 PPPS, SUBSEQ VISIT: HCPCS | Performed by: FAMILY MEDICINE

## 2024-02-19 RX ORDER — DESVENLAFAXINE 100 MG/1
100 TABLET, EXTENDED RELEASE ORAL DAILY
COMMUNITY
Start: 2023-12-14

## 2024-02-19 RX ORDER — MECLIZINE HCL 12.5 MG/1
12.5-25 TABLET ORAL 3 TIMES DAILY PRN
Qty: 90 TABLET | Refills: 5 | Status: SHIPPED | OUTPATIENT
Start: 2024-02-19

## 2024-02-19 RX ORDER — ALBUTEROL SULFATE 90 UG/1
1-2 AEROSOL, METERED RESPIRATORY (INHALATION) EVERY 4 HOURS PRN
Qty: 18 G | Refills: 5 | Status: SHIPPED | OUTPATIENT
Start: 2024-02-19

## 2024-02-19 RX ORDER — DEXLANSOPRAZOLE 60 MG/1
60 CAPSULE, DELAYED RELEASE ORAL DAILY
Qty: 100 CAPSULE | Refills: 1 | Status: SHIPPED | OUTPATIENT
Start: 2024-02-19

## 2024-02-19 RX ORDER — TIZANIDINE 4 MG/1
4 TABLET ORAL 3 TIMES DAILY PRN
Qty: 300 TABLET | Refills: 1 | Status: SHIPPED | OUTPATIENT
Start: 2024-02-19

## 2024-02-19 RX ORDER — LEVOTHYROXINE SODIUM 0.12 MG/1
125 TABLET ORAL
Qty: 100 TABLET | Refills: 1 | Status: SHIPPED | OUTPATIENT
Start: 2024-02-19

## 2024-02-19 RX ORDER — KETOCONAZOLE 20 MG/ML
SHAMPOO TOPICAL
Qty: 120 ML | Refills: 5 | Status: SHIPPED | OUTPATIENT
Start: 2024-02-19

## 2024-02-19 RX ORDER — ONDANSETRON 4 MG/1
4 TABLET, ORALLY DISINTEGRATING ORAL EVERY 8 HOURS PRN
Qty: 90 TABLET | Refills: 5 | Status: SHIPPED | OUTPATIENT
Start: 2024-02-19

## 2024-02-19 NOTE — ASSESSMENT & PLAN NOTE
Problem is managed by appropriate specialist and patient encouraged to attend all scheduled visits and take all medications as directed.

## 2024-02-19 NOTE — PROGRESS NOTES
Korey MIGUEL MD   levothyroxine (SYNTHROID) 125 MCG tablet Take 1 tablet by mouth every morning (before breakfast) Yes Korey Henderson MD   mirabegron (MYRBETRIQ) 50 MG TB24 Take 50 mg by mouth daily Yes Korey Henderson MD   tiZANidine (ZANAFLEX) 4 MG tablet Take 1 tablet by mouth 3 times daily as needed (pain or muscle spasms) Yes Korey Henderson MD   albuterol sulfate HFA (PROVENTIL;VENTOLIN;PROAIR) 108 (90 Base) MCG/ACT inhaler Inhale 1-2 puffs into the lungs every 4 hours as needed for Wheezing or Shortness of Breath Yes Korey Henderson MD   ketoconazole (NIZORAL) 2 % shampoo Apply 5-10 ml to wet hair, lather then leave on for 5 minutes, then rinse out. Do this twice a week as needed. Yes Korey Henderson MD   meclizine (ANTIVERT) 12.5 MG tablet Take 1-2 tablets by mouth 3 times daily as needed for Dizziness Yes Korey Henderson MD   ondansetron (ZOFRAN-ODT) 4 MG disintegrating tablet Take 1 tablet by mouth every 8 hours as needed for Nausea or Vomiting Yes Korey Henderson MD   desvenlafaxine succinate (PRISTIQ) 100 MG TB24 extended release tablet Take 1 tablet by mouth daily Yes Provider, MD Love   Norethindrone Acet-Ethinyl Est (JUNEL 1.5/30) 1.5-30 MG-MCG TABS Take 1 tablet by mouth daily For Continuous cycling  Portia Gutierres, APRN - CNP   Erenumab-aooe (AIMOVIG) 140 MG/ML SOAJ Inject 140 mg into the skin every 30 days  Abby Villareal, APRPOPEYE   methylphenidate (RITALIN) 10 MG tablet Take 1 tablet by mouth as needed.  ProviderLove MD   chlorhexidine (PERIDEX) 0.12 % solution SWISH 15 MLS BY MOUTH FOR 30 SECONDS AND EXPECTORATE TWICE DAILY AS DIRECTED.  Caleb Peace   pregabalin (LYRICA) 100 MG capsule Take 1 capsule by mouth 3 times daily.  Evens Lay MD   meloxicam (MOBIC) 15 MG tablet Take 1 tablet by mouth daily  Korey Henderson MD   Ubrogepant (UBRELVY) 100 MG TABS Take 100 mg by mouth daily as needed (migraine. May repeat for 1 dose in 2 hours. Not to

## 2024-02-19 NOTE — ASSESSMENT & PLAN NOTE
Problem and/or Symptoms are currently not stable and/or well controlled on current treatment plan. Will have patient follow up as directed and make the following changes for further evaluation and/or treatment:     Increasing Synthroid dose from 100 to 125 mcg QD & f/u response next visit

## 2024-02-19 NOTE — ASSESSMENT & PLAN NOTE
Discussed ideal body weight and encouraged regular physical activity and healthy diet. Recommended routine preventative measures such as always wearing seatbelt & home safety measures. Counseled the patient regarding the rationale for the recommended immunizations and screenings and they are current and/or updated. Reviewed personalized prevention plan which is current and/or updated and a list of screening services available to patient. Provided end of life counseling as appropriate and recommended patient discuss with family their wishes for end of life decisions if they have not already done so.

## 2024-02-19 NOTE — ASSESSMENT & PLAN NOTE
Problem and/or Symptoms are currently not stable and/or well controlled on current treatment plan. Will have patient follow up as directed and make the following changes for further evaluation and/or treatment:     Hopefully increasing her Synthroid regimen will help with this; all other labs were wnl

## 2024-02-19 NOTE — PATIENT INSTRUCTIONS

## 2024-03-05 ENCOUNTER — APPOINTMENT (OUTPATIENT)
Dept: GENERAL RADIOLOGY | Age: 49
End: 2024-03-05
Payer: MEDICARE

## 2024-03-05 ENCOUNTER — HOSPITAL ENCOUNTER (EMERGENCY)
Age: 49
Discharge: HOME | End: 2024-03-05
Attending: GENERAL PRACTICE
Payer: MEDICARE

## 2024-03-05 VITALS
HEART RATE: 77 BPM | BODY MASS INDEX: 34.54 KG/M2 | OXYGEN SATURATION: 96 % | RESPIRATION RATE: 16 BRPM | DIASTOLIC BLOOD PRESSURE: 80 MMHG | WEIGHT: 195 LBS | SYSTOLIC BLOOD PRESSURE: 143 MMHG | TEMPERATURE: 98 F

## 2024-03-05 DIAGNOSIS — R07.89 CHEST WALL PAIN: Primary | ICD-10-CM

## 2024-03-05 DIAGNOSIS — R10.13 ABDOMINAL PAIN, EPIGASTRIC: ICD-10-CM

## 2024-03-05 LAB
ANION GAP SERPL CALC-SCNC: 4 MMOL/L (ref 2–11)
BASOPHILS # BLD: 0.1 K/UL (ref 0–0.2)
BASOPHILS NFR BLD: 1 % (ref 0–2)
BUN SERPL-MCNC: 13 MG/DL (ref 6–23)
CALCIUM SERPL-MCNC: 9.6 MG/DL (ref 8.3–10.4)
CHLORIDE SERPL-SCNC: 109 MMOL/L (ref 103–113)
CO2 SERPL-SCNC: 26 MMOL/L (ref 21–32)
CREAT SERPL-MCNC: 0.9 MG/DL (ref 0.6–1)
DIFFERENTIAL METHOD BLD: NORMAL
EOSINOPHIL # BLD: 0.3 K/UL (ref 0–0.8)
EOSINOPHIL NFR BLD: 4 % (ref 0.5–7.8)
ERYTHROCYTE [DISTWIDTH] IN BLOOD BY AUTOMATED COUNT: 12.7 % (ref 11.9–14.6)
GLUCOSE SERPL-MCNC: 99 MG/DL (ref 65–100)
HCT VFR BLD AUTO: 37.4 % (ref 35.8–46.3)
HGB BLD-MCNC: 12.6 G/DL (ref 11.7–15.4)
IMM GRANULOCYTES # BLD AUTO: 0 K/UL (ref 0–0.5)
IMM GRANULOCYTES NFR BLD AUTO: 0 % (ref 0–5)
LIPASE SERPL-CCNC: 113 U/L (ref 73–393)
LYMPHOCYTES # BLD: 1.7 K/UL (ref 0.5–4.6)
LYMPHOCYTES NFR BLD: 26 % (ref 13–44)
MCH RBC QN AUTO: 29.8 PG (ref 26.1–32.9)
MCHC RBC AUTO-ENTMCNC: 33.7 G/DL (ref 31.4–35)
MCV RBC AUTO: 88.4 FL (ref 82–102)
MONOCYTES # BLD: 0.6 K/UL (ref 0.1–1.3)
MONOCYTES NFR BLD: 9 % (ref 4–12)
NEUTS SEG # BLD: 4 K/UL (ref 1.7–8.2)
NEUTS SEG NFR BLD: 60 % (ref 43–78)
NRBC # BLD: 0 K/UL (ref 0–0.2)
PLATELET # BLD AUTO: 213 K/UL (ref 150–450)
PMV BLD AUTO: 10.8 FL (ref 9.4–12.3)
POTASSIUM SERPL-SCNC: 3.8 MMOL/L (ref 3.5–5.1)
RBC # BLD AUTO: 4.23 M/UL (ref 4.05–5.2)
SODIUM SERPL-SCNC: 139 MMOL/L (ref 136–146)
TROPONIN I SERPL HS-MCNC: 4.4 PG/ML (ref 0–14)
TROPONIN I SERPL HS-MCNC: 5.6 PG/ML (ref 0–14)
WBC # BLD AUTO: 6.6 K/UL (ref 4.3–11.1)

## 2024-03-05 PROCEDURE — 84484 ASSAY OF TROPONIN QUANT: CPT

## 2024-03-05 PROCEDURE — 96375 TX/PRO/DX INJ NEW DRUG ADDON: CPT

## 2024-03-05 PROCEDURE — 99285 EMERGENCY DEPT VISIT HI MDM: CPT

## 2024-03-05 PROCEDURE — 83690 ASSAY OF LIPASE: CPT

## 2024-03-05 PROCEDURE — 6360000002 HC RX W HCPCS: Performed by: GENERAL PRACTICE

## 2024-03-05 PROCEDURE — 85025 COMPLETE CBC W/AUTO DIFF WBC: CPT

## 2024-03-05 PROCEDURE — 80048 BASIC METABOLIC PNL TOTAL CA: CPT

## 2024-03-05 PROCEDURE — 96374 THER/PROPH/DIAG INJ IV PUSH: CPT

## 2024-03-05 PROCEDURE — 93005 ELECTROCARDIOGRAM TRACING: CPT | Performed by: GENERAL PRACTICE

## 2024-03-05 PROCEDURE — 71046 X-RAY EXAM CHEST 2 VIEWS: CPT

## 2024-03-05 RX ORDER — MORPHINE SULFATE 4 MG/ML
4 INJECTION, SOLUTION INTRAMUSCULAR; INTRAVENOUS
Status: COMPLETED | OUTPATIENT
Start: 2024-03-05 | End: 2024-03-05

## 2024-03-05 RX ORDER — ONDANSETRON 2 MG/ML
4 INJECTION INTRAMUSCULAR; INTRAVENOUS ONCE
Status: COMPLETED | OUTPATIENT
Start: 2024-03-05 | End: 2024-03-05

## 2024-03-05 RX ADMIN — MORPHINE SULFATE 4 MG: 4 INJECTION INTRAVENOUS at 20:17

## 2024-03-05 RX ADMIN — ONDANSETRON 4 MG: 2 INJECTION INTRAMUSCULAR; INTRAVENOUS at 20:17

## 2024-03-05 ASSESSMENT — LIFESTYLE VARIABLES
HOW MANY STANDARD DRINKS CONTAINING ALCOHOL DO YOU HAVE ON A TYPICAL DAY: PATIENT DOES NOT DRINK
HOW OFTEN DO YOU HAVE A DRINK CONTAINING ALCOHOL: NEVER

## 2024-03-05 ASSESSMENT — PAIN DESCRIPTION - LOCATION: LOCATION: CHEST;ABDOMEN

## 2024-03-05 ASSESSMENT — PAIN - FUNCTIONAL ASSESSMENT: PAIN_FUNCTIONAL_ASSESSMENT: 0-10

## 2024-03-05 ASSESSMENT — PAIN SCALES - GENERAL: PAINLEVEL_OUTOF10: 9

## 2024-03-05 NOTE — ED TRIAGE NOTES
Patient ambulatory to triage with c/o chest /upper abdominal pain that wraps around her breast to her left shoulder blade. This has been ongoing for the past few days.

## 2024-03-06 LAB
EKG ATRIAL RATE: 81 BPM
EKG DIAGNOSIS: NORMAL
EKG P AXIS: 61 DEGREES
EKG P-R INTERVAL: 194 MS
EKG Q-T INTERVAL: 352 MS
EKG QRS DURATION: 82 MS
EKG QTC CALCULATION (BAZETT): 408 MS
EKG R AXIS: 82 DEGREES
EKG T AXIS: -2 DEGREES
EKG VENTRICULAR RATE: 81 BPM

## 2024-03-06 PROCEDURE — 93010 ELECTROCARDIOGRAM REPORT: CPT | Performed by: INTERNAL MEDICINE

## 2024-03-06 NOTE — ED PROVIDER NOTES
Emergency Department Provider Note       PCP: Korey Henderson MD   Age: 48 y.o.   Sex: female     DISPOSITION Decision To Discharge 03/05/2024 10:57:15 PM       ICD-10-CM    1. Chest wall pain  R07.89       2. Abdominal pain, epigastric  R10.13           Medical Decision Making     Patient presents with chest pain.  I have considered cardiopulmonary emergency such as ACS, PE, TAD, pneumonia, among other cardiopulmonary emergencies.  There is nothing to suggest emergent pathology at this time.  She is also having epigastric pain.  I have considered perforated viscus, pancreatitis, cholecystitis, bowel obstruction, AAA, among other intra-abdominal emergencies and surgical emergencies.  There is nothing to suggest emergent or surgical pathology at this time.  Patient feels improved here.  Repeat examination is benign and reassuring.  She will follow-up with her primary care and return precautions are given.     1 or more acute illnesses that pose a threat to life or bodily function.   Parental controlled substances given in the ED.  Patient was discharged risks and benefits of hospitalization were considered.  Shared medical decision making was utilized in creating the patients health plan today.    I independently ordered and reviewed each unique test.  I reviewed external records: provider visit note from PCP.  I reviewed external records: previous lab results from outside ED.  I reviewed external records: previous imaging study including radiologist interpretation.       My Independent EKG Interpretation: sinus rhythm, no evidence of arrhythmia      ST Segments:Normal ST segments - NO STEMI   Rate: 81      Exclusion criteria - the patient is NOT to be included for SEP-1 Core Measure due to: Infection is not suspected       History     Patient presents with epigastric abdominal pain and pain going up in the left upper chest.  It is diffuse in the left chest area.  It is worsened with arm movements and position

## 2024-03-13 ENCOUNTER — OFFICE VISIT (OUTPATIENT)
Dept: FAMILY MEDICINE CLINIC | Facility: CLINIC | Age: 49
End: 2024-03-13
Payer: MEDICARE

## 2024-03-13 VITALS
HEART RATE: 83 BPM | BODY MASS INDEX: 33.77 KG/M2 | HEIGHT: 63 IN | DIASTOLIC BLOOD PRESSURE: 72 MMHG | WEIGHT: 190.6 LBS | TEMPERATURE: 97.1 F | SYSTOLIC BLOOD PRESSURE: 121 MMHG

## 2024-03-13 DIAGNOSIS — K21.9 GASTROESOPHAGEAL REFLUX DISEASE, UNSPECIFIED WHETHER ESOPHAGITIS PRESENT: Primary | ICD-10-CM

## 2024-03-13 PROCEDURE — 3078F DIAST BP <80 MM HG: CPT | Performed by: FAMILY MEDICINE

## 2024-03-13 PROCEDURE — 99214 OFFICE O/P EST MOD 30 MIN: CPT | Performed by: FAMILY MEDICINE

## 2024-03-13 PROCEDURE — 3074F SYST BP LT 130 MM HG: CPT | Performed by: FAMILY MEDICINE

## 2024-03-13 RX ORDER — SUCRALFATE 1 G/1
1 TABLET ORAL 4 TIMES DAILY PRN
Qty: 120 TABLET | Refills: 5 | Status: SHIPPED | OUTPATIENT
Start: 2024-03-13

## 2024-03-13 RX ORDER — VENLAFAXINE HYDROCHLORIDE 150 MG/1
1 CAPSULE, EXTENDED RELEASE ORAL
COMMUNITY

## 2024-03-13 NOTE — PROGRESS NOTES
nightly    levothyroxine (SYNTHROID) 125 mcg, Oral, DAILY BEFORE BREAKFAST    meclizine (ANTIVERT) 12.5-25 mg, Oral, 3 TIMES DAILY PRN    meloxicam (MOBIC) 15 mg, Oral, DAILY    methylphenidate (RITALIN) 10 mg, Oral, AS NEEDED    mirabegron (MYRBETRIQ) 50 mg, Oral, DAILY    Norethindrone Acet-Ethinyl Est (JUNEL 1.5/30) 1.5-30 MG-MCG TABS 1 tablet, Oral, DAILY, For Continuous cycling    ondansetron (ZOFRAN-ODT) 4 mg, Oral, EVERY 8 HOURS PRN    pregabalin (LYRICA) 100 mg, Oral, 3 TIMES DAILY    risperiDONE (RISPERDAL) 4 mg, Nightly    sucralfate (CARAFATE) 1 g, Oral, 4 TIMES DAILY PRN    tiZANidine (ZANAFLEX) 4 mg, Oral, 3 TIMES DAILY PRN    traMADol (ULTRAM) 50 mg, Oral, EVERY 8 HOURS PRN    Ubrelvy 100 mg, Oral, DAILY PRN    venlafaxine (EFFEXOR XR) 150 MG extended release capsule Oral, DAILY    venlafaxine (EFFEXOR XR) 150 MG extended release capsule 1 capsule, Oral    vitamin B-12 (CYANOCOBALAMIN) 1,000 mcg, Oral, DAILY    Vitamin D3 25 mcg, Oral, DAILY       We discussed the typical prognosis and potential complications of the concern(s), including treatment options.  Medication risks, benefits, costs, interactions, and alternatives were discussed as appropriate.  I advised her to contact the office if her condition worsens or fails to improve as anticipated. She expressed understanding with the discussion and plan of care.    An electronic signature was used to authenticate this note.  -- Korey Henderson MD

## 2024-03-13 NOTE — ASSESSMENT & PLAN NOTE
Problem and/or Symptoms are currently not stable and/or well controlled on current treatment plan. Will have patient follow up as directed and make the following changes for further evaluation and/or treatment:     Advised pt to start her Dexilant regimen back again & also providing her with Carfate QID for the next 2 wks to allow for healing of what sounds like a possible small gastric ulcer that developed with her untreated GERD; then use Carafate PRN.

## 2024-03-13 NOTE — PATIENT INSTRUCTIONS

## 2024-03-19 ENCOUNTER — HOSPITAL ENCOUNTER (EMERGENCY)
Age: 49
Discharge: HOME | End: 2024-03-19
Payer: MEDICARE

## 2024-03-19 VITALS
TEMPERATURE: 98.4 F | HEART RATE: 78 BPM | SYSTOLIC BLOOD PRESSURE: 145 MMHG | OXYGEN SATURATION: 99 % | WEIGHT: 190 LBS | BODY MASS INDEX: 33.66 KG/M2 | RESPIRATION RATE: 18 BRPM | HEIGHT: 63 IN | DIASTOLIC BLOOD PRESSURE: 97 MMHG

## 2024-03-19 DIAGNOSIS — R10.13 ABDOMINAL PAIN, EPIGASTRIC: Primary | ICD-10-CM

## 2024-03-19 LAB
ALBUMIN SERPL-MCNC: 3.4 G/DL (ref 3.5–5)
ALBUMIN/GLOB SERPL: 0.8 (ref 0.4–1.6)
ALP SERPL-CCNC: 62 U/L (ref 50–136)
ALT SERPL-CCNC: 28 U/L (ref 12–65)
ANION GAP SERPL CALC-SCNC: 2 MMOL/L (ref 2–11)
AST SERPL-CCNC: 16 U/L (ref 15–37)
BASOPHILS # BLD: 0.1 K/UL (ref 0–0.2)
BASOPHILS NFR BLD: 1 % (ref 0–2)
BILIRUB SERPL-MCNC: 0.3 MG/DL (ref 0.2–1.1)
BILIRUB UR QL: NEGATIVE
BUN SERPL-MCNC: 13 MG/DL (ref 6–23)
CALCIUM SERPL-MCNC: 9.3 MG/DL (ref 8.3–10.4)
CHLORIDE SERPL-SCNC: 105 MMOL/L (ref 103–113)
CO2 SERPL-SCNC: 28 MMOL/L (ref 21–32)
CREAT SERPL-MCNC: 1 MG/DL (ref 0.6–1)
DIFFERENTIAL METHOD BLD: NORMAL
EKG ATRIAL RATE: 81 BPM
EKG DIAGNOSIS: NORMAL
EKG P AXIS: 63 DEGREES
EKG P-R INTERVAL: 156 MS
EKG Q-T INTERVAL: 364 MS
EKG QRS DURATION: 96 MS
EKG QTC CALCULATION (BAZETT): 422 MS
EKG R AXIS: 82 DEGREES
EKG T AXIS: 25 DEGREES
EKG VENTRICULAR RATE: 81 BPM
EOSINOPHIL # BLD: 0.4 K/UL (ref 0–0.8)
EOSINOPHIL NFR BLD: 4 % (ref 0.5–7.8)
ERYTHROCYTE [DISTWIDTH] IN BLOOD BY AUTOMATED COUNT: 12.4 % (ref 11.9–14.6)
GLOBULIN SER CALC-MCNC: 4.4 G/DL (ref 2.8–4.5)
GLUCOSE SERPL-MCNC: 87 MG/DL (ref 65–100)
GLUCOSE UR QL STRIP.AUTO: NEGATIVE MG/DL
HCT VFR BLD AUTO: 37.9 % (ref 35.8–46.3)
HGB BLD-MCNC: 12.3 G/DL (ref 11.7–15.4)
IMM GRANULOCYTES # BLD AUTO: 0 K/UL (ref 0–0.5)
IMM GRANULOCYTES NFR BLD AUTO: 0 % (ref 0–5)
KETONES UR-MCNC: NEGATIVE MG/DL
LEUKOCYTE ESTERASE UR QL STRIP: NEGATIVE
LIPASE SERPL-CCNC: 177 U/L (ref 73–393)
LYMPHOCYTES # BLD: 2.1 K/UL (ref 0.5–4.6)
LYMPHOCYTES NFR BLD: 23 % (ref 13–44)
MCH RBC QN AUTO: 29.5 PG (ref 26.1–32.9)
MCHC RBC AUTO-ENTMCNC: 32.5 G/DL (ref 31.4–35)
MCV RBC AUTO: 90.9 FL (ref 82–102)
MONOCYTES # BLD: 0.7 K/UL (ref 0.1–1.3)
MONOCYTES NFR BLD: 8 % (ref 4–12)
NEUTS SEG # BLD: 5.7 K/UL (ref 1.7–8.2)
NEUTS SEG NFR BLD: 64 % (ref 43–78)
NITRITE UR QL: NEGATIVE
NRBC # BLD: 0 K/UL (ref 0–0.2)
PH UR: 6 (ref 5–9)
PLATELET # BLD AUTO: 256 K/UL (ref 150–450)
PMV BLD AUTO: 10.7 FL (ref 9.4–12.3)
POTASSIUM SERPL-SCNC: 3.8 MMOL/L (ref 3.5–5.1)
PROT SERPL-MCNC: 7.8 G/DL (ref 6.3–8.2)
PROT UR QL: NEGATIVE MG/DL
RBC # BLD AUTO: 4.17 M/UL (ref 4.05–5.2)
RBC # UR STRIP: ABNORMAL
SERVICE CMNT-IMP: ABNORMAL
SODIUM SERPL-SCNC: 135 MMOL/L (ref 136–146)
SP GR UR: 1.02 (ref 1–1.02)
UROBILINOGEN UR QL: 0.2 EU/DL (ref 0.2–1)
WBC # BLD AUTO: 8.9 K/UL (ref 4.3–11.1)

## 2024-03-19 PROCEDURE — 85025 COMPLETE CBC W/AUTO DIFF WBC: CPT

## 2024-03-19 PROCEDURE — 99284 EMERGENCY DEPT VISIT MOD MDM: CPT

## 2024-03-19 PROCEDURE — 80053 COMPREHEN METABOLIC PANEL: CPT

## 2024-03-19 PROCEDURE — 81003 URINALYSIS AUTO W/O SCOPE: CPT

## 2024-03-19 PROCEDURE — 93005 ELECTROCARDIOGRAM TRACING: CPT | Performed by: STUDENT IN AN ORGANIZED HEALTH CARE EDUCATION/TRAINING PROGRAM

## 2024-03-19 PROCEDURE — 93010 ELECTROCARDIOGRAM REPORT: CPT | Performed by: INTERNAL MEDICINE

## 2024-03-19 PROCEDURE — 83690 ASSAY OF LIPASE: CPT

## 2024-03-19 ASSESSMENT — PAIN DESCRIPTION - LOCATION: LOCATION: ABDOMEN;CHEST

## 2024-03-19 ASSESSMENT — PAIN DESCRIPTION - DESCRIPTORS: DESCRIPTORS: SHOOTING;SHARP

## 2024-03-19 ASSESSMENT — PAIN DESCRIPTION - ORIENTATION: ORIENTATION: MID;UPPER

## 2024-03-19 ASSESSMENT — PAIN SCALES - GENERAL: PAINLEVEL_OUTOF10: 7

## 2024-03-19 NOTE — ED TRIAGE NOTES
Pt arrives via pov ambulatory c/o upper abd discomfort with chest sharp shooting pain. Hx of GERD. Pcp was called by patient but not calling her back. Pt tearful in triage. Denies n/v/d.

## 2024-03-20 ENCOUNTER — CARE COORDINATION (OUTPATIENT)
Dept: CARE COORDINATION | Facility: CLINIC | Age: 49
End: 2024-03-20

## 2024-03-20 NOTE — DISCHARGE INSTRUCTIONS
As discussed, I think your pain is likely heartburn in nature.  I have sent in a referral for gastroenterology.  Please follow-up with them.  Continue the Carafate.  Resume your Dexilant and adhere to it long-term.    Return here with worsening or worrisome symptoms.

## 2024-03-20 NOTE — CARE COORDINATION
Ambulatory Care Management Note        Date/Time:  3/20/2024 8:19 AM    This patient was received as a referral from  Daily assignment for case management of recent er admission..  Ccm outreached to pt. No answer at this time, message left. Awaiting response. If no response, ccm will outreach tomorrow.     Mirvaso Counseling: Mirvaso is a topical medication which can decrease superficial blood flow where applied. Side effects are uncommon and include stinging, redness and allergic reactions.

## 2024-03-20 NOTE — ED PROVIDER NOTES
Emergency Department Provider Note       PCP: Korey Henderson MD   Age: 48 y.o.   Sex: female     DISPOSITION Decision To Discharge 03/19/2024 10:40:58 PM       ICD-10-CM    1. Abdominal pain, epigastric  R10.13 [facility] - Kurt Julian MD, Gastroenterology, [facility]        Medical Decision Making     Labs look good here.  Her symptoms are improved with good medications at home.  Suspect that is likely cause of her pain as well.  Has history of NSAID use secondary to fibromyalgia.  She was recently started on Carafate and has only been on it for couple days.  I will think there is any benefit of added medications tonight.  I will send referral for gastroenterology as I feel she can benefit from an EGD and colonoscopy.  Patient amenable to this course of treatment.  Patient discharged home and questions answered.     Complexity of Problem: 1 acute complicated illness or injury.  Over the counter drug management performed.  Patient was discharged risks and benefits of hospitalization were considered.  Shared medical decision making was utilized in creating the patients health plan today.    I independently ordered and reviewed each unique test.  I reviewed external records: ED visit note from an outside group.  I reviewed external records: provider visit note from PCP.  I reviewed external records: provider visit note from outside specialist.  I reviewed external records: previous lab results from outside ED.             Voice dictation software was used during the making of this note.  This software is not perfect and grammatical and other typographical errors may be present.  This note has not been completely proofread for errors.    History     48-year-old female here with complaint of epigastric abdominal pain and chest pain.  She states this radiates to her back and neck.  She states she has history of GERD and she was previously well-controlled on Dexilant.  She states she then took herself off of the Dexilant

## 2024-03-21 ENCOUNTER — CARE COORDINATION (OUTPATIENT)
Dept: CARE COORDINATION | Facility: CLINIC | Age: 49
End: 2024-03-21

## 2024-03-21 NOTE — CARE COORDINATION
Ambulatory Care Management Note        Date/Time:  3/21/2024 4:34 PM    Ccm outreached to patient. No answer at this time, message left. Awaiting response. If no response, ccm will outreach next week.

## 2024-03-27 ENCOUNTER — CARE COORDINATION (OUTPATIENT)
Dept: CARE COORDINATION | Facility: CLINIC | Age: 49
End: 2024-03-27

## 2024-03-27 NOTE — CARE COORDINATION
Ambulatory Care Management Note        Date/Time:  3/27/2024 8:44 AM    Ccm outreached to patient. No answer at this time, unable to leave message. Ccm will close case at this time d/t unable to reach x3. Ccm will remain available as needed.

## 2024-03-30 ENCOUNTER — TELEPHONE (OUTPATIENT)
Dept: FAMILY MEDICINE CLINIC | Facility: CLINIC | Age: 49
End: 2024-03-30

## 2024-04-04 ENCOUNTER — PREP FOR PROCEDURE (OUTPATIENT)
Dept: GASTROENTEROLOGY | Age: 49
End: 2024-04-04

## 2024-04-04 ENCOUNTER — OFFICE VISIT (OUTPATIENT)
Age: 49
End: 2024-04-04
Payer: MEDICARE

## 2024-04-04 VITALS
HEART RATE: 83 BPM | OXYGEN SATURATION: 98 % | SYSTOLIC BLOOD PRESSURE: 121 MMHG | WEIGHT: 190.2 LBS | BODY MASS INDEX: 33.7 KG/M2 | HEIGHT: 63 IN | DIASTOLIC BLOOD PRESSURE: 80 MMHG

## 2024-04-04 DIAGNOSIS — Z12.11 COLON CANCER SCREENING: ICD-10-CM

## 2024-04-04 DIAGNOSIS — R13.19 ESOPHAGEAL DYSPHAGIA: ICD-10-CM

## 2024-04-04 DIAGNOSIS — R10.13 EPIGASTRIC PAIN: ICD-10-CM

## 2024-04-04 DIAGNOSIS — K21.9 GASTROESOPHAGEAL REFLUX DISEASE, UNSPECIFIED WHETHER ESOPHAGITIS PRESENT: Primary | ICD-10-CM

## 2024-04-04 PROCEDURE — 3074F SYST BP LT 130 MM HG: CPT | Performed by: PHYSICIAN ASSISTANT

## 2024-04-04 PROCEDURE — 99204 OFFICE O/P NEW MOD 45 MIN: CPT | Performed by: PHYSICIAN ASSISTANT

## 2024-04-04 PROCEDURE — 3079F DIAST BP 80-89 MM HG: CPT | Performed by: PHYSICIAN ASSISTANT

## 2024-04-04 RX ORDER — FAMOTIDINE 40 MG/1
40 TABLET, FILM COATED ORAL NIGHTLY
Qty: 90 TABLET | Refills: 0 | Status: SHIPPED | OUTPATIENT
Start: 2024-04-04 | End: 2024-07-03

## 2024-04-04 RX ORDER — OMEPRAZOLE 40 MG/1
40 CAPSULE, DELAYED RELEASE ORAL
Qty: 180 CAPSULE | Refills: 0 | Status: SHIPPED | OUTPATIENT
Start: 2024-04-04

## 2024-04-04 NOTE — PROGRESS NOTES
Keeley Baron (:  1975) is a 48 y.o. female new patient referred to our office for evaluation of the following chief complaint(s):  New Patient           ASSESSMENT/PLAN:  1. Gastroesophageal reflux disease, unspecified whether esophagitis present  2. Epigastric pain  3. Esophageal dysphagia  4. Colon cancer screening  -     Cologuard (Fecal DNA Colorectal Cancer Screening)       -Counseled patient and provided written recommendations for diet and lifestyle modifications for GERD. Avoid tobacco, alcohol, NSAIDs. Discuss alternatives to Mobic with PCP. Currently on Dexilant 60 mg daily and Carafate 1 g QID with no relief. Switch to Prilosec 40 mg BID AC and Pepcid 40 mg QHS. Schedule EGD/dilation.  -No prior colonoscopy; declines this for now. Agreeable to Cologuard testing and if this is positive would recommend adding colonoscopy to scheduled EGD.     Subjective   SUBJECTIVE/OBJECTIVE  Keeley Baron is a 48 y.o. year old female with PMH is pertinent for HTN, GERD, IBS, chronic nausea, anemia, asthma, hypothyroidism, hyperprolactinemia, vitamin B12 deficiency, osteoarthritis, neuropathy, migraines, bipolar 1 disorder, binge eating disorder, anxiety, PTSD, OCD, fibromyalgia.  She denies abdominal surgical history.    Patient was referred to our office by the ED following visit 3/19/2024 for epigastric pain attributed to NSAID use for fibromyalgia relief. Previously had well-controlled GERD but symptoms recurred when she took herself off Dexilant. Her PCP had also recently added Carafate.  CBC, CMP, lipase were unremarkable. UA showed trace blood. EKG showed NSR.  No abdominal imaging obtained.  Prior pertinent GI evaluation includes:    - EGD 2017 (Dr. Rodriguez): normal, empiric esophageal dilation performed; Gastric biopsies show changes of repair, negative H. pylori, esophageal biopsies showed minimally hyperplastic squamous epithelium    Today patient reports she first started having upper  no

## 2024-04-05 RX ORDER — SODIUM CHLORIDE 0.9 % (FLUSH) 0.9 %
5-40 SYRINGE (ML) INJECTION EVERY 12 HOURS SCHEDULED
Status: CANCELLED | OUTPATIENT
Start: 2024-04-05

## 2024-04-05 RX ORDER — SODIUM CHLORIDE 9 MG/ML
25 INJECTION, SOLUTION INTRAVENOUS PRN
Status: CANCELLED | OUTPATIENT
Start: 2024-04-05

## 2024-04-05 RX ORDER — SODIUM CHLORIDE 0.9 % (FLUSH) 0.9 %
5-40 SYRINGE (ML) INJECTION PRN
Status: CANCELLED | OUTPATIENT
Start: 2024-04-05

## 2024-04-15 NOTE — PROGRESS NOTES
Patient verified name, , and procedure.    Type: 1a; abbreviated assessment per anesthesia guidelines  Labs per surgeon: NONE  Labs per anesthesia: NONE      Instructed pt that they will be notified by the Gi Lab for time of arrival. If any questions please call the GI lab at [phone].    Follow diet and prep instructions per office. May have clear liquids until 4 hours prior to time of arrival.    Bath or shower the night before and the am of surgery with antibacterial soap. No lotions, oils, powders, cologne on skin. No make up, eye make up or jewelry. Wear loose fitting comfortable, clean clothing.     Must have adult present in building the entire time .     Medications for the day of procedure : ALBUTEROL (use prn and bring to hospital),  WELLBUTRIN, KLONOPIN, LAMICTAL, ANTIVERT PRN, RITALIN PRN, BCP, PRILOSEC, ZOFRAN PRN, LYRICA, CARAFATE, ZANAFLEX PRN, TRAMADOL PRN.  Hold Meloxicam.

## 2024-04-17 ENCOUNTER — ANESTHESIA EVENT (OUTPATIENT)
Dept: ENDOSCOPY | Age: 49
End: 2024-04-17
Payer: MEDICARE

## 2024-04-18 ENCOUNTER — HOSPITAL ENCOUNTER (OUTPATIENT)
Age: 49
Setting detail: OUTPATIENT SURGERY
Discharge: HOME | End: 2024-04-18
Attending: INTERNAL MEDICINE | Admitting: INTERNAL MEDICINE
Payer: MEDICARE

## 2024-04-18 ENCOUNTER — ANESTHESIA (OUTPATIENT)
Dept: ENDOSCOPY | Age: 49
End: 2024-04-18
Payer: MEDICARE

## 2024-04-18 VITALS
BODY MASS INDEX: 33.63 KG/M2 | OXYGEN SATURATION: 98 % | SYSTOLIC BLOOD PRESSURE: 126 MMHG | DIASTOLIC BLOOD PRESSURE: 70 MMHG | TEMPERATURE: 98.6 F | RESPIRATION RATE: 15 BRPM | HEART RATE: 70 BPM | WEIGHT: 189.8 LBS | HEIGHT: 63 IN

## 2024-04-18 LAB — HCG UR QL: NEGATIVE

## 2024-04-18 PROCEDURE — 2580000003 HC RX 258: Performed by: STUDENT IN AN ORGANIZED HEALTH CARE EDUCATION/TRAINING PROGRAM

## 2024-04-18 PROCEDURE — 3700000001 HC ADD 15 MINUTES (ANESTHESIA): Performed by: INTERNAL MEDICINE

## 2024-04-18 PROCEDURE — 81025 URINE PREGNANCY TEST: CPT

## 2024-04-18 PROCEDURE — 2720000010 HC SURG SUPPLY STERILE: Performed by: INTERNAL MEDICINE

## 2024-04-18 PROCEDURE — 3609012400 HC EGD TRANSORAL BIOPSY SINGLE/MULTIPLE: Performed by: INTERNAL MEDICINE

## 2024-04-18 PROCEDURE — 7100000010 HC PHASE II RECOVERY - FIRST 15 MIN: Performed by: INTERNAL MEDICINE

## 2024-04-18 PROCEDURE — 2500000003 HC RX 250 WO HCPCS

## 2024-04-18 PROCEDURE — 7100000011 HC PHASE II RECOVERY - ADDTL 15 MIN: Performed by: INTERNAL MEDICINE

## 2024-04-18 PROCEDURE — 43239 EGD BIOPSY SINGLE/MULTIPLE: CPT | Performed by: INTERNAL MEDICINE

## 2024-04-18 PROCEDURE — 6360000002 HC RX W HCPCS

## 2024-04-18 PROCEDURE — 88305 TISSUE EXAM BY PATHOLOGIST: CPT

## 2024-04-18 PROCEDURE — 2709999900 HC NON-CHARGEABLE SUPPLY: Performed by: INTERNAL MEDICINE

## 2024-04-18 PROCEDURE — 43249 ESOPH EGD DILATION <30 MM: CPT | Performed by: INTERNAL MEDICINE

## 2024-04-18 PROCEDURE — 3700000000 HC ANESTHESIA ATTENDED CARE: Performed by: INTERNAL MEDICINE

## 2024-04-18 PROCEDURE — 88312 SPECIAL STAINS GROUP 1: CPT

## 2024-04-18 RX ORDER — NALOXONE HYDROCHLORIDE 0.4 MG/ML
INJECTION, SOLUTION INTRAMUSCULAR; INTRAVENOUS; SUBCUTANEOUS PRN
Status: DISCONTINUED | OUTPATIENT
Start: 2024-04-18 | End: 2024-04-18 | Stop reason: HOSPADM

## 2024-04-18 RX ORDER — LIDOCAINE HYDROCHLORIDE 10 MG/ML
1 INJECTION, SOLUTION INFILTRATION; PERINEURAL
Status: DISCONTINUED | OUTPATIENT
Start: 2024-04-18 | End: 2024-04-18 | Stop reason: HOSPADM

## 2024-04-18 RX ORDER — SODIUM CHLORIDE 9 MG/ML
INJECTION, SOLUTION INTRAVENOUS PRN
Status: DISCONTINUED | OUTPATIENT
Start: 2024-04-18 | End: 2024-04-18 | Stop reason: HOSPADM

## 2024-04-18 RX ORDER — LIDOCAINE HYDROCHLORIDE 20 MG/ML
INJECTION, SOLUTION EPIDURAL; INFILTRATION; INTRACAUDAL; PERINEURAL PRN
Status: DISCONTINUED | OUTPATIENT
Start: 2024-04-18 | End: 2024-04-18 | Stop reason: SDUPTHER

## 2024-04-18 RX ORDER — PROPOFOL 10 MG/ML
INJECTION, EMULSION INTRAVENOUS PRN
Status: DISCONTINUED | OUTPATIENT
Start: 2024-04-18 | End: 2024-04-18 | Stop reason: SDUPTHER

## 2024-04-18 RX ORDER — SODIUM CHLORIDE 0.9 % (FLUSH) 0.9 %
5-40 SYRINGE (ML) INJECTION EVERY 12 HOURS SCHEDULED
Status: DISCONTINUED | OUTPATIENT
Start: 2024-04-18 | End: 2024-04-18 | Stop reason: HOSPADM

## 2024-04-18 RX ORDER — SODIUM CHLORIDE, SODIUM LACTATE, POTASSIUM CHLORIDE, CALCIUM CHLORIDE 600; 310; 30; 20 MG/100ML; MG/100ML; MG/100ML; MG/100ML
INJECTION, SOLUTION INTRAVENOUS CONTINUOUS
Status: DISCONTINUED | OUTPATIENT
Start: 2024-04-18 | End: 2024-04-18 | Stop reason: HOSPADM

## 2024-04-18 RX ORDER — SODIUM CHLORIDE 0.9 % (FLUSH) 0.9 %
5-40 SYRINGE (ML) INJECTION PRN
Status: DISCONTINUED | OUTPATIENT
Start: 2024-04-18 | End: 2024-04-18 | Stop reason: HOSPADM

## 2024-04-18 RX ORDER — SODIUM CHLORIDE 9 MG/ML
25 INJECTION, SOLUTION INTRAVENOUS PRN
Status: DISCONTINUED | OUTPATIENT
Start: 2024-04-18 | End: 2024-04-18 | Stop reason: HOSPADM

## 2024-04-18 RX ADMIN — PROPOFOL 200 MCG/KG/MIN: 10 INJECTION, EMULSION INTRAVENOUS at 12:12

## 2024-04-18 RX ADMIN — LIDOCAINE HYDROCHLORIDE 100 MG: 20 INJECTION, SOLUTION EPIDURAL; INFILTRATION; INTRACAUDAL; PERINEURAL at 12:11

## 2024-04-18 RX ADMIN — SODIUM CHLORIDE, POTASSIUM CHLORIDE, SODIUM LACTATE AND CALCIUM CHLORIDE: 600; 310; 30; 20 INJECTION, SOLUTION INTRAVENOUS at 11:42

## 2024-04-18 RX ADMIN — PROPOFOL 80 MG: 10 INJECTION, EMULSION INTRAVENOUS at 12:11

## 2024-04-18 NOTE — ANESTHESIA PRE PROCEDURE
Department of Anesthesiology  Preprocedure Note       Name:  Keeley Baron   Age:  48 y.o.  :  1975                                          MRN:  [id number]         Date:  2024      Surgeon: Surgeon(s):  Kurt Julian MD    Procedure: Procedure(s):  ESOPHAGOGASTRODUODENOSCOPY    Medications prior to admission:   Prior to Admission medications    Medication Sig Start Date End Date Taking? Authorizing Provider   omeprazole (PRILOSEC) 40 MG delayed release capsule Take 1 capsule by mouth 2 times daily (before meals) 24   Grinnell, Melissa R, PA-C   famotidine (PEPCID) 40 MG tablet Take 1 tablet by mouth at bedtime 4/4/24 7/3/24  Grinnell, Melissa R, PA-C   sucralfate (CARAFATE) 1 GM tablet Take 1 tablet by mouth 4 times daily as needed (heartburn) 3/13/24   Korey Henderson MD   levothyroxine (SYNTHROID) 125 MCG tablet Take 1 tablet by mouth every morning (before breakfast) 24   Korey Henderson MD   mirabegron (MYRBETRIQ) 50 MG TB24 Take 50 mg by mouth daily  Patient taking differently: Take 50 mg by mouth every evening 24   Korey Henderson MD   tiZANidine (ZANAFLEX) 4 MG tablet Take 1 tablet by mouth 3 times daily as needed (pain or muscle spasms)  Patient not taking: Reported on 2024   Korey Henderson MD   albuterol sulfate HFA (PROVENTIL;VENTOLIN;PROAIR) 108 (90 Base) MCG/ACT inhaler Inhale 1-2 puffs into the lungs every 4 hours as needed for Wheezing or Shortness of Breath 24   Korey Henderson MD   ketoconazole (NIZORAL) 2 % shampoo Apply 5-10 ml to wet hair, lather then leave on for 5 minutes, then rinse out. Do this twice a week as needed. 24   Korey Henderson MD   meclizine (ANTIVERT) 12.5 MG tablet Take 1-2 tablets by mouth 3 times daily as needed for Dizziness 24   Korey Henderson MD   ondansetron (ZOFRAN-ODT) 4 MG disintegrating tablet Take 1 tablet by mouth every 8 hours as needed for Nausea or Vomiting 24   Beatriz

## 2024-04-18 NOTE — ANESTHESIA POSTPROCEDURE EVALUATION
Department of Anesthesiology  Postprocedure Note    Patient: Keeley Baron  MRN: [id number]  YOB: 1975  Date of evaluation: 4/18/2024    Procedure Summary       Date: 04/18/24 Room / Location: [facility] ENDO 01 / [facility] ENDOSCOPY    Anesthesia Start: 1208 Anesthesia Stop: 1227    Procedure: ESOPHAGOGASTRODUODENOSCOPY BIOPSY and dilation (Upper GI Region) Diagnosis:       Gastroesophageal reflux disease, unspecified whether esophagitis present      Epigastric pain      Esophageal dysphagia      Colon cancer screening      (Gastroesophageal reflux disease, unspecified whether esophagitis present [K21.9])      (Epigastric pain [R10.13])      (Esophageal dysphagia [R13.19])      (Colon cancer screening [Z12.11])    Surgeons: Kurt Julian MD Responsible Provider: Servando Petersen MD    Anesthesia Type: TIVA ASA Status: 2            Anesthesia Type: No value filed.    Sakshi Phase I:      Sakshi Phase II: Sakshi Score: 10    Anesthesia Post Evaluation    Patient location during evaluation: PACU  Patient participation: complete - patient participated  Level of consciousness: sleepy but conscious  Airway patency: patent  Nausea & Vomiting: no nausea and no vomiting  Cardiovascular status: blood pressure returned to baseline and hemodynamically stable  Respiratory status: acceptable  Hydration status: euvolemic  There was medical reason for not using a multimodal analgesia pain management approach.Pain management: adequate    No notable events documented.

## 2024-04-18 NOTE — PROCEDURES
Endoscopy Note          Operative Report    Patient: Keeley Baron MRN: [id number]      YOB: 1975  Age: 48 y.o.  Sex: female            Indications:    Abd pain  GERD  Dysphagia       Preoperative Evaluation: The patient was evaluated prior to the procedure in the GI lab admission area, the patient ASA was recorded .  Consent was obtained from the patient with the risk of perforation bleeding and aspiration.    Anesthesia: YSABEL-per anesthesia  Complications: None  EBL: Unremarkable  Procedure: The patient was sedated in the left lateral decubitus position. Scope was advance from the mouth to the third portion of the duodenum. The scope was withdrawn to the stomach and a refroflexed view was performed.     Findings:  Normal duodenum- biopsies taken   Gastritis- biopsies taken   Irregular Z-line, biopsies taken   Normal appearaing esophageal mucosa, biopsies of the mid esophagus were taken and esophageal dilation was performed up to 20mm using CRE balloon.     Postoperative Diagnosis:  Normal duodenum- biopsies taken   Gastritis- biopsies taken   Irregular Z-line, biopsies taken   Normal appearaing esophageal mucosa, biopsies of the mid esophagus were taken and esophageal dilation was performed up to 20mm using CRE balloon.         Recommendations:   F/u path results in 1-2 weeks   Repeat EGD if needed   Chew well, small bites with meals, liquids with all meds and pills, sit up with meals    Continue with Prilosec prior to meals     Signed By:  Kurt Julian MD     April 18, 2024

## 2024-04-18 NOTE — H&P
HISTORY AND PHYSICAL             Date: 4/18/2024        Patient Name: Keeley Baron     YOB: 1975      Age:  48 y.o.      History of Present Illness   Dysphagia Epigastric pain and Reflux     Past Medical History     Past Medical History:   Diagnosis Date    ADHD (attention deficit hyperactivity disorder)     Allergic rhinitis 1/20/2014    Anxiety disorder 1/20/2014    Asthma     MILD per pt.  Rare rescue inhaler use.    B12 deficiency 1/20/2014    Depression 1/20/2014    Essential hypertension     Fibromyalgia     GERD (gastroesophageal reflux disease) 1/20/2014    Hypothyroidism     Meniere disease 1/20/2014    Migraine     OCD (obsessive compulsive disorder)     Tinnitus         Past Surgical History     Past Surgical History:   Procedure Laterality Date    HEENT  06/2017    nasal procedure (balloon inserted, drainage)    HEENT  07/03/2013    Septo / Fess / release CP    HEENT      eye    ORTHOPEDIC SURGERY      jaw    OTHER SURGICAL HISTORY      sinus implants        Medications Prior to Admission     Prior to Admission medications    Medication Sig Start Date End Date Taking? Authorizing Provider   omeprazole (PRILOSEC) 40 MG delayed release capsule Take 1 capsule by mouth 2 times daily (before meals) 4/4/24   Grinnell, Melissa R, PA-C   famotidine (PEPCID) 40 MG tablet Take 1 tablet by mouth at bedtime 4/4/24 7/3/24  Grinnell, Melissa R, PA-C   sucralfate (CARAFATE) 1 GM tablet Take 1 tablet by mouth 4 times daily as needed (heartburn) 3/13/24   Korey Henderson MD   levothyroxine (SYNTHROID) 125 MCG tablet Take 1 tablet by mouth every morning (before breakfast) 2/19/24   Korey Henderson MD   mirabegron (MYRBETRIQ) 50 MG TB24 Take 50 mg by mouth daily  Patient taking differently: Take 50 mg by mouth every evening 2/19/24   Korey Henderson MD   tiZANidine (ZANAFLEX) 4 MG tablet Take 1 tablet by mouth 3 times daily as needed (pain or muscle spasms) 2/19/24   Korey Henderson MD  tenderness.     Physical Exam   Ht 1.6 m (5' 3\")   Wt 86.2 kg (190 lb)   BMI 33.66 kg/m²     - GENERAL: Alert and oriented x 3. No acute distress. Well-nourished.    - EYES: EOMI. Anicteric.    - HENT: Moist mucous membranes. No cervical lymphadenopathy.    - LUNGS: Clear to auscultation bilaterally. No accessory muscle use.    - CARDIOVASCULAR: Regular rate and rhythm. No murmur. No JVD.    - ABDOMEN: Soft, non-tender and non-distended. No palpable masses.    - EXTREMITIES: No edema. Non-tender.?SKIN: No rashes or lesions. Warm.    - NEUROLOGIC: No focal neurological deficits. CN II-XII grossly intact, but not individually tested.    - PSYCHIATRIC: Cooperative. Appropriate mood and affect.    - SKIN: No rashes, sores, or lesions.     - RECTAL: Deferred.     Labs      No results found for this or any previous visit (from the past 24 hour(s)).     Imaging/Diagnostics Last 24 Hours     See my note above, if applicable.       Assessment & Plan:     GERD  Abdominal pain   Dysphagia     Plan for EGD with possible dilation and biopsies

## 2024-04-18 NOTE — DISCHARGE INSTRUCTIONS
Gastrointestinal Esophagogastroduodenoscopy (EGD)/ Endoscopic Ultrasound(EUS)- Upper Exam Discharge Instructions    1. Call Dr. Fernandez for any problems or questions.  2. Contact the doctor's office for follow up appointment as directed.  3. Medication may cause drowsiness for several hours, therefore, do not drive or operate machinery for remainder of the day.  4. No alcohol today.  5. Do not make any important decisions such as signing legal paperwork.  6. Ordinarily, you may resume regular diet and activity after exam unless otherwise specified by your physician.  7. For mild soreness in your throat you may use Cepacol throat lozenges or warm  salt-water gargles as needed.    Findings:  Normal duodenum- biopsies taken   Gastritis- biopsies taken   Irregular Z-line, biopsies taken   Normal appearaing esophageal mucosa, biopsies of the mid esophagus were taken and esophageal dilation was performed up to 20mm using CRE balloon.      Postoperative Diagnosis:  Normal duodenum- biopsies taken   Gastritis- biopsies taken   Irregular Z-line, biopsies taken   Normal appearaing esophageal mucosa, biopsies of the mid esophagus were taken and esophageal dilation was performed up to 20mm using CRE balloon.            Recommendations:   F/u path results in 1-2 weeks   Repeat EGD if needed   Chew well, small bites with meals, liquids with all meds and pills, sit up with meals    Continue with Prilosec prior to meals      After general anesthesia or intravenous sedation, for 24 hours or while taking prescription Narcotics:  Limit your activities  A responsible adult needs to be with you for the next 24 hours  Do not drive and operate hazardous machinery  Do not make important personal or business decisions  Do not drink alcoholic beverages  If you have not urinated within 8 hours after discharge, and you are experiencing discomfort from urinary retention, please go to the nearest ED.  If you have sleep apnea and have a CPAP

## 2024-04-24 ENCOUNTER — TELEPHONE (OUTPATIENT)
Age: 49
End: 2024-04-24

## 2024-04-24 NOTE — TELEPHONE ENCOUNTER
Called pt to review results of EGD/biopsies per Dr. Julian. No answer, voicemail box full, unable to leave message. Attempted call x2.    Will attempt call again at later date to review results, remind pt to complete cologuard test per NIKKI Odonnell, and to schedule f/u apt with NIKKI Odonnell.     Recall updated.       ------    \"Grinnell, Melissa R, PA-C Lukhard, Amanda, RN  I think Dr. Julian has already sent you a recall for 3 years for Domínguez's. Can you also please remind her to submit cologuard test for CRC screening? And offer follow-up to discuss Domínguez's and symptoms please?\"    ------    Per Dr. Julian: \"Barretts presents, recommend f/u EGD inb 3 years and ppi daily thanks.\"    Date Obtained:   4/18/2024   DIAGNOSIS       A:  \"DUODENAL BIOPSIES\":  FRAGMENTS OF HISTOLOGICALLY UNREMARKABLE   SMALL INTESTINE.        B:  \"GASTRIC BIOPSIES\":  MINIMAL CHRONIC INACTIVE GASTRITIS.        C:  \"DISTAL ESOPHAGUS BIOPSIES\":  GLANDULAR EPITHELIUM HAVING FOCAL   GOBLET CELL TYPE INTESTINAL METAPLASIA.  SIGNIFICANT DYSPLASIA IS NOT   IDENTIFIED.  FRAGMENTS OF MINIMALLY HYPERPLASTIC SQUAMOUS EPITHELIUM.        D:  \"MID ESOPHAGUS BIOPSIES\":  DETACHED FRAGMENTS OF MINIMALLY   HYPERPLASTIC SQUAMOUS EPITHELIUM.     ----    Procedure: The patient was sedated in the left lateral decubitus position. Scope was advance from the mouth to the third portion of the duodenum. The scope was withdrawn to the stomach and a refroflexed view was performed.      Findings:  Normal duodenum- biopsies taken   Gastritis- biopsies taken   Irregular Z-line, biopsies taken   Normal appearaing esophageal mucosa, biopsies of the mid esophagus were taken and esophageal dilation was performed up to 20mm using CRE balloon.      Postoperative Diagnosis:  Normal duodenum- biopsies taken   Gastritis- biopsies taken   Irregular Z-line, biopsies taken   Normal appearaing esophageal mucosa, biopsies of the mid esophagus were taken and esophageal dilation was

## 2024-04-25 NOTE — TELEPHONE ENCOUNTER
Cc: induction    HPI: Nancy Melo is an 47 y.o.  female  at 44 3/7 who presents for scheduled IOL for LGA baby at term. Has a h/o C/S x 1 for NRFS ([place]). She is 2cm dilated and would like a TOLAC. She notes active FM. She denies complaints. Prenatal course was notable for the followin) h/o c/s for NRFS  2) LGA baby 99th%ile at 36 wks ([id number])  3) marginal cord insertion  4) AMA with normal Mat 21        No past medical history on file. Allergies: Allergies   Allergen Reactions    Harrod [Fish Oil] Itching, Rash and Other (See Comments)     LIP SWELLING     Claritin [Loratadine] Other (See Comments)     Make hay fever system worst    Eryped [Erythromycin] Rash    Ibuprofen Itching and Rash       Principal Problem:    History of  section  Resolved Problems:    * No resolved hospital problems. *    There were no vitals taken for this visit. Review of Systems  As noted in HPI    Physical Exam  Gen: NAD  Resp: CTAB  CV: RRR  Abd: soft, NT, gravid  Ext: no edema    An NST was performed and was reactive. The baseline FHR was 145. Moderate baseline  variability was noted. Accelerations of sufficient amplitude and duration were noted. There were no decelerations noted.   Category 1 tracing    [place]: rare contraction    Cervix: 2cm/50%/-3      Assessment:   39 3/7 IOL for LGA at term    Plan:  - admit to L&D  - T&S/CBC  - consented x 2  - GBS neg  - reviewed risks of both shoulder dystocia due to LGA baby as well as risks of TOLAC with the patient and she accepts all risks and desires to proceed with IOL  - Cook catheter placed with 60/60 of saline placed into cervical/vaginal balloons  - CEFM  - plan pitocin in the am at 0600  - discussed that if fetal distress at any time or lack of progression in labor would have low threshold to move toward c/s      Akanksha Hobson MD  2023 Received VM that pt tried calling back in response to missed call. Attempted to call pt again at number in chart, no answer, voicemail box full. [employer] message sent to pt with direct RN number for pt to call back and review results.

## 2024-04-25 NOTE — TELEPHONE ENCOUNTER
Pt returned call to clinic. Reviewed results of EGD/biopsies per Dr. Julian according to previous chart note. Pt verbalized understanding, agreeable to proceed with current recommendations. Reminded pt to submit cologuard test per Melissa Grinnell, PA. Pt verbalized that she would do it as soon as possible. Scheduled pt for follow up apt with Belle per PA request. Appointment 5/16/24 at 1030. Gave address for [facility] location. Instructed pt to reach out with any questions, concerns, or worsening sx prior to OV.

## 2024-05-04 PROBLEM — Z12.11 COLON CANCER SCREENING: Status: RESOLVED | Noted: 2024-04-04 | Resolved: 2024-05-04

## 2024-05-21 ENCOUNTER — PATIENT MESSAGE (OUTPATIENT)
Dept: FAMILY MEDICINE CLINIC | Facility: CLINIC | Age: 49
End: 2024-05-21

## 2024-05-21 DIAGNOSIS — M51.37 DDD (DEGENERATIVE DISC DISEASE), LUMBOSACRAL: Primary | ICD-10-CM

## 2024-05-21 DIAGNOSIS — M15.9 OSTEOARTHRITIS OF MULTIPLE JOINTS, UNSPECIFIED OSTEOARTHRITIS TYPE: ICD-10-CM

## 2024-05-21 DIAGNOSIS — G62.9 NEUROPATHY: ICD-10-CM

## 2024-05-23 NOTE — TELEPHONE ENCOUNTER
Pt NEW ins is requesting a new referral sent to the pt's current ortho Dr before she seen. (IKR) .Tks :)    From: Keeley Baron  To: Dr. Korey Henderson  Sent: 5/21/2024  4:39 PM EDT  Subject: Referral     Hi Dr. Henderson. I am having a lot of pain in my right knee. I'm a patient at [facility]. My insurance changed this year so I need a referral to the office for the doctor to check my knee. Will you please send out the referral? I'd appreciate it so very much.     Keeley Baron

## 2024-05-24 PROBLEM — K21.9 GASTROESOPHAGEAL REFLUX DISEASE: Status: RESOLVED | Noted: 2024-04-04 | Resolved: 2024-05-24

## 2024-05-24 NOTE — TELEPHONE ENCOUNTER
So for pt to \"stay established\" at a practice that she's already a patient, she needs a \"new\" referral?? Makes absolutely no sense at all, but that's not surprising that an insurance company is \"requiring\" that as a way to hopefully deny payment for necessary medical services.     Referral sent for pt to remain established.

## 2024-06-12 ENCOUNTER — TELEPHONE (OUTPATIENT)
Age: 49
End: 2024-06-12

## 2024-06-14 ENCOUNTER — TELEPHONE (OUTPATIENT)
Age: 49
End: 2024-06-14

## 2024-07-10 ENCOUNTER — PATIENT MESSAGE (OUTPATIENT)
Age: 49
End: 2024-07-10

## 2024-07-15 RX ORDER — PANTOPRAZOLE SODIUM 40 MG/1
40 TABLET, DELAYED RELEASE ORAL
Qty: 180 TABLET | Refills: 0 | Status: SHIPPED | OUTPATIENT
Start: 2024-07-15 | End: 2024-10-13

## 2024-07-15 NOTE — TELEPHONE ENCOUNTER
From: Keeley Baron  To: Belle GASPAR Grinnell  Sent: 7/10/2024 3:11 PM EDT  Subject: Re: pain     Hi. I haven't been on Prilosec for over a week bc the person who cleaned my apartment put it in the wrong place and I couldn't find it. But it hasn't been working the best. Anyhow I'm experiencing uncomfortable pain in my chest and stomach. It's a burning ache. This has been happening for nearly 24 hours. I tried Pepto Bismal hoping for relief but didn't get any. I thought perhaps it was inflammation so I tried a 200 mg Ibuprofen but no relief. The pain keeps my attention. I found my Prilosec and took it. I know it's not for acute pain. Can you please guide me as to what medication will help this pain? I'm miserable. I don't want to go to ER because I had bad experience the last time I went for similar symptoms. I'd go to urgent care but any mention of chest pain and they hurry you off to ER. If you feel I need to go there please let me know.     Thank you so much! I appreciate your help.

## 2024-07-22 ENCOUNTER — OFFICE VISIT (OUTPATIENT)
Dept: ORTHOPEDIC SURGERY | Age: 49
End: 2024-07-22
Payer: MEDICARE

## 2024-07-22 DIAGNOSIS — M79.10 MYALGIA, UNSPECIFIED SITE: ICD-10-CM

## 2024-07-22 DIAGNOSIS — M54.50 LUMBAR BACK PAIN: ICD-10-CM

## 2024-07-22 DIAGNOSIS — M54.16 LUMBAR RADICULOPATHY: Primary | ICD-10-CM

## 2024-07-22 PROCEDURE — G2211 COMPLEX E/M VISIT ADD ON: HCPCS | Performed by: PHYSICAL MEDICINE & REHABILITATION

## 2024-07-22 PROCEDURE — 99213 OFFICE O/P EST LOW 20 MIN: CPT | Performed by: PHYSICAL MEDICINE & REHABILITATION

## 2024-07-22 RX ORDER — CYCLOBENZAPRINE HCL 10 MG
10 TABLET ORAL 2 TIMES DAILY PRN
Qty: 60 TABLET | Refills: 1 | Status: SHIPPED | OUTPATIENT
Start: 2024-07-22 | End: 2024-09-20

## 2024-07-22 NOTE — PROGRESS NOTES
Date:  07/22/24     HPI:  I am seeing Keeley Baron in evalution/folowup.  I saw her most recently in the office setting in October 2022.  Full details in that note but essentially has pain in the lower lumbar paraspinal areas.  Is mostly nonradiating pain and in the past has responded to physical therapy and oral steroids.  She has no neurologic issues in the lower extremities and tells me that trigger point injections into the lower lumbar paraspinal areas of offered significant benefit.  These were most recently performed in July 2022 which she currently does not feel she is to the point she wants reinjection.    She carries a diagnosis of fibromyalgia.    In the past she had pain that would gravitate into the right foot and prior MR imaging lumbar spine from around 8 years ago revealed degenerative changes.  The current feeling is that there is not a spine surgical issue.  Of note around that time a translaminar lumbar KIM offered some benefit.    ROS: As above.  No new problems to report.    PE: Alert and cooperative.  Good strength lower extremities.  No lateralizing sensory findings.  She has no significant lumbar spine tenderness.  Good range of motion in the hips.  Ambulates without assistance.  No lumbar spine pain accentuated by extension.    Assessment/Plan:       PREDOMINANTLY MUSCULOSKELETAL LUMBOSACRAL  SPINE PAIN.  Facet joint arthropathy for the most part.  Follow along.  Doing pretty well at this time.  Will have her do lumbar spine exercises or continue the ones that she tries to do from time to time.  Can reinject as required.  I would recommend trigger point injections again since those help, assuming recurrence of nonradiating, axial lumbar spine pain.  I do not think need a repeat MR lumbar spine at this time, nor do I think there is a spine surgical issue currently.  Follow-up 2 months for continuity of care.  She is requested a change from tizanidine to cyclobenzaprine.  That is

## 2024-09-13 ENCOUNTER — NURSE ONLY (OUTPATIENT)
Age: 49
End: 2024-09-13

## 2024-09-13 DIAGNOSIS — K29.50 MILD CHRONIC GASTRITIS: ICD-10-CM

## 2024-09-13 DIAGNOSIS — K21.9 GASTROESOPHAGEAL REFLUX DISEASE WITHOUT ESOPHAGITIS: Primary | ICD-10-CM

## 2024-09-13 DIAGNOSIS — K22.70 BARRETT'S ESOPHAGUS WITHOUT DYSPLASIA: ICD-10-CM

## 2024-09-13 DIAGNOSIS — K59.09 CHRONIC CONSTIPATION: ICD-10-CM

## 2024-09-13 RX ORDER — CLOMIPRAMINE HYDROCHLORIDE 50 MG/1
50 CAPSULE ORAL NIGHTLY
COMMUNITY
Start: 2024-09-04

## 2024-12-06 ENCOUNTER — OFFICE VISIT (OUTPATIENT)
Age: 49
End: 2024-12-06
Payer: MEDICARE

## 2024-12-06 VITALS
DIASTOLIC BLOOD PRESSURE: 75 MMHG | SYSTOLIC BLOOD PRESSURE: 121 MMHG | WEIGHT: 189.8 LBS | BODY MASS INDEX: 33.63 KG/M2 | HEIGHT: 63 IN

## 2024-12-06 DIAGNOSIS — K21.9 GASTROESOPHAGEAL REFLUX DISEASE WITHOUT ESOPHAGITIS: Primary | ICD-10-CM

## 2024-12-06 PROCEDURE — 3078F DIAST BP <80 MM HG: CPT | Performed by: PHYSICIAN ASSISTANT

## 2024-12-06 PROCEDURE — 99213 OFFICE O/P EST LOW 20 MIN: CPT | Performed by: PHYSICIAN ASSISTANT

## 2024-12-06 PROCEDURE — 3074F SYST BP LT 130 MM HG: CPT | Performed by: PHYSICIAN ASSISTANT

## 2024-12-06 RX ORDER — PANTOPRAZOLE SODIUM 40 MG/1
TABLET, DELAYED RELEASE ORAL
Qty: 180 TABLET | Refills: 0 | Status: SHIPPED | OUTPATIENT
Start: 2024-12-06 | End: 2024-12-06

## 2024-12-06 RX ORDER — PANTOPRAZOLE SODIUM 40 MG/1
TABLET, DELAYED RELEASE ORAL
Qty: 120 TABLET | Refills: 0 | Status: SHIPPED | OUTPATIENT
Start: 2024-12-06 | End: 2025-04-05

## 2024-12-06 RX ORDER — FAMOTIDINE 40 MG/1
40 TABLET, FILM COATED ORAL NIGHTLY
Qty: 90 TABLET | Refills: 0 | Status: SHIPPED | OUTPATIENT
Start: 2024-12-06 | End: 2025-03-06

## 2024-12-06 NOTE — PROGRESS NOTES
is trying to follow a GERD diet but says she'd probably get a \"C-minus.\" Didn't get relief with Prilosec 40 mg BID AC. She likes to eat spicy foods and Mexican food. She drinks one cup coffee daily. She \"picks\" throughout the day. Yesterday she woke up at noon and ate a piece of fried chicken. Around 8 pm had a pimiento cheese sandwich. She went to bed around midnight but admits she lays in bed to watch TV. Says if she's at home she doesn't feel hungry. Says she mostly has symptoms if she goes out to dinner which is typically twice a week. Often goes to [employer] in [employer] or a meat-and-three restaurant.     Past Medical History:   Diagnosis Date    ADHD (attention deficit hyperactivity disorder)     Allergic rhinitis 1/20/2014    Anxiety disorder 1/20/2014    Asthma     MILD per pt.  Rare rescue inhaler use.    B12 deficiency 1/20/2014    Depression 1/20/2014    Essential hypertension     Fibromyalgia     GERD (gastroesophageal reflux disease) 1/20/2014    Hypothyroidism     Meniere disease 1/20/2014    Migraine     OCD (obsessive compulsive disorder)     Tinnitus        Past Surgical History:   Procedure Laterality Date    HEENT  06/2017    nasal procedure (balloon inserted, drainage)    HEENT  07/03/2013    Septo / Fess / release CP    HEENT      eye    ORTHOPEDIC SURGERY      jaw    OTHER SURGICAL HISTORY      sinus implants    UPPER GASTROINTESTINAL ENDOSCOPY N/A 4/18/2024    ESOPHAGOGASTRODUODENOSCOPY BIOPSY and dilation performed by Kurt Julian MD at [facility]        Allergies   Allergen Reactions    Latex Rash    Aripiprazole Other (See Comments)     Other reaction(s): Agitation-I  Caused severe akathesia, couldn't sit still.  \"Worse 3 days of my life.\"    Ziprasidone Hcl Other (See Comments)     Other reaction(s): Agitation-I  \"moods swing off the chain\"  caused hospitalization, unsure of reaction    Topiramate Other (See Comments)    Zolpidem Other (See Comments)

## 2024-12-26 ENCOUNTER — PATIENT MESSAGE (OUTPATIENT)
Dept: OBGYN CLINIC | Age: 49
End: 2024-12-26

## 2024-12-26 DIAGNOSIS — Z30.41 ENCOUNTER FOR SURVEILLANCE OF CONTRACEPTIVE PILLS: ICD-10-CM

## 2024-12-26 RX ORDER — NORETHINDRONE ACETATE AND ETHINYL ESTRADIOL .03; 1.5 MG/1; MG/1
1 TABLET ORAL DAILY
Qty: 4 PACKET | Refills: 0 | Status: SHIPPED | OUTPATIENT
Start: 2024-12-26

## 2024-12-26 NOTE — TELEPHONE ENCOUNTER
Prescription sent to pharmacy per standing orders.    Orders Placed This Encounter    Norethindrone Acet-Ethinyl Est (JUNEL 1.5/30) 1.5-30 MG-MCG TABS     Sig: Take 1 tablet by mouth daily For Continuous cycling     Dispense:  4 packet     Refill:  0

## 2025-01-23 ENCOUNTER — TELEMEDICINE (OUTPATIENT)
Dept: FAMILY MEDICINE CLINIC | Facility: CLINIC | Age: 50
End: 2025-01-23

## 2025-01-23 DIAGNOSIS — Z12.31 SCREENING MAMMOGRAM, ENCOUNTER FOR: ICD-10-CM

## 2025-01-23 DIAGNOSIS — F19.11 HISTORY OF SUBSTANCE ABUSE (HCC): ICD-10-CM

## 2025-01-23 DIAGNOSIS — G43.909 MIGRAINE WITHOUT STATUS MIGRAINOSUS, NOT INTRACTABLE, UNSPECIFIED MIGRAINE TYPE: ICD-10-CM

## 2025-01-23 DIAGNOSIS — Z00.00 ANNUAL PHYSICAL EXAM: Primary | ICD-10-CM

## 2025-01-23 DIAGNOSIS — F31.9 BIPOLAR 1 DISORDER (HCC): ICD-10-CM

## 2025-01-23 DIAGNOSIS — Z12.11 COLON CANCER SCREENING: ICD-10-CM

## 2025-01-23 RX ORDER — ERENUMAB-AOOE 140 MG/ML
140 INJECTION, SOLUTION SUBCUTANEOUS
Qty: 1 ML | Refills: 5 | Status: SHIPPED | OUTPATIENT
Start: 2025-01-23

## 2025-01-23 RX ORDER — RISPERIDONE 0.5 MG/1
TABLET ORAL
COMMUNITY
Start: 2024-11-26

## 2025-01-23 RX ORDER — UBROGEPANT 100 MG/1
100 TABLET ORAL DAILY PRN
Qty: 16 TABLET | Refills: 5 | Status: SHIPPED | OUTPATIENT
Start: 2025-01-23

## 2025-01-23 NOTE — PATIENT INSTRUCTIONS
Continue taking all the medications on this list as directed; this list is current and please discontinue any medications not on this list. Please call the office or use \"My Chart\" online to request medication refills prior to running out.    - PLEASE NOTE: when needing to schedule a visit you should send our office a [employer] message requesting a visit (or call the office if you can't use [employer]) and we will send you a scheduling ticket that will include open dates & times. You can then use this feature to select your preferred date & time for an office visit, virtual visit, and/or lab visit. Please make sure to respond to this scheduling ticket ASAP when received.     Please do NOT schedule any visit through the [employer] self-scheduling feature; any self-scheduling should only be done using the scheduling ticket provided to you directly from our office. Also, please do NOT schedule any visits through our out of state call center. If you ever need to speak to someone at our office directly please follow these instructions: when you call our office number, [phone], and get the phone tree options, press \"Option 2\" first & then \"Option 1\". This combination should take you directly to someone at our office and bypass the out of state call center.

## 2025-01-23 NOTE — PROGRESS NOTES
Medicare Annual Wellness Visit    Keeley Baron is here for Migraine    Assessment & Plan   Annual physical exam  Assessment & Plan:  Reviewed ideal body weight and encouraged regular physical activity and healthy diet. Reviewed routine preventative measures such as always wearing seatbelt & home safety measures. Reviewed the recommended immunizations and screenings and they are current and/or updated and/or declined. Reviewed personalized prevention plan which is current and/or updated and a list of screening services available to patient. Provided end of life counseling as appropriate and recommended patient discuss with family their wishes for end of life decisions if they have not already done so.     Migraine without status migrainosus, not intractable, unspecified migraine type  Assessment & Plan:  Problem and/or Symptoms are currently not stable and/or well controlled on current treatment plan. Will have patient follow up as directed and make the following changes for further evaluation and/or treatment:     RF prior Tx regimens of Aimovig & Ubrelvy as these had been working well for pt; provided a 6 M supply which should be more than enough to last until her next f/u visit with specialist managing this problem in May  Orders:  -     Erenumab-aooe (AIMOVIG) 140 MG/ML SOAJ; Inject 140 mg into the skin every 30 days, Disp-1 mL, R-5Normal  -     Ubrogepant (UBRELVY) 100 MG TABS; Take 100 mg by mouth daily as needed (migraine. May repeat for 1 dose in 2 hours. Not to exceed 200 mg/24 hours.), Disp-16 tablet, R-5Normal  History of substance abuse (HCC)  Bipolar 1 disorder (HCC)  Colon cancer screening  -     Cologuard (Fecal DNA Colorectal Cancer Screening)  Screening mammogram, encounter for  -     [facility] DIGITAL SCREEN BILATERAL; Future       Return if symptoms worsen or fail to improve.     Subjective       Patient's complete Health Risk Assessment and screening values have been reviewed and are found in 
Nightly    ketoconazole (NIZORAL) 2 % shampoo Apply 5-10 ml to wet hair, lather then leave on for 5 minutes, then rinse out. Do this twice a week as needed.    lamoTRIgine (LAMICTAL) 100 MG tablet Pt takes 200mg in a.m. and 300mg nightly    levothyroxine (SYNTHROID) 125 mcg, Oral, DAILY BEFORE BREAKFAST    meclizine (ANTIVERT) 12.5-25 mg, Oral, 3 TIMES DAILY PRN    meloxicam (MOBIC) 15 mg, Oral, DAILY    methylphenidate (RITALIN) 10 mg, Oral, AS NEEDED    mirabegron (MYRBETRIQ) 50 mg, Oral, DAILY    Norethindrone Acet-Ethinyl Est (JUNEL 1.5/30) 1.5-30 MG-MCG TABS 1 tablet, Oral, DAILY, For Continuous cycling    ondansetron (ZOFRAN-ODT) 4 mg, Oral, EVERY 8 HOURS PRN    pantoprazole (PROTONIX) 40 MG tablet Take 1 tablet by mouth 2 times daily (before meals) for 30 days, THEN 1 tablet every morning (before breakfast).    pregabalin (LYRICA) 100 mg, Oral, 2 TIMES DAILY    risperiDONE (RISPERDAL) 0.5 MG tablet TAKE 1 TABLET BY MOUTH TWICE DAILY AS NEEDED FOR AGITATION    risperiDONE (RISPERDAL) 4 mg, Nightly    sucralfate (CARAFATE) 1 g, Oral, 4 TIMES DAILY PRN    tiZANidine (ZANAFLEX) 4 mg, Oral, 3 TIMES DAILY PRN    traMADol (ULTRAM) 50 mg, Oral, EVERY 8 HOURS PRN    Ubrelvy 100 mg, Oral, DAILY PRN    venlafaxine (EFFEXOR XR) 150 mg, Oral, EVERY EVENING    vitamin D (CHOLECALCIFEROL) 25 mcg, Oral, DAILY       We discussed the typical prognosis and potential complications of the concern(s), including treatment options.  Medication risks, benefits, costs, interactions, and alternatives were discussed as appropriate.  I advised her to contact the office if her condition worsens or fails to improve as anticipated. She expressed understanding with the discussion and plan of care.    Keeley Baron was evaluated through a synchronous (real-time) audio/video encounter. The patient (or guardian if applicable) is aware that this is a billable service, which includes applicable co-pays. This Virtual Visit was conducted with

## 2025-01-23 NOTE — ASSESSMENT & PLAN NOTE
Problem and/or Symptoms are currently not stable and/or well controlled on current treatment plan. Will have patient follow up as directed and make the following changes for further evaluation and/or treatment:     RF prior Tx regimens of Aimovig & Ubrelvy as these had been working well for pt; provided a 6 M supply which should be more than enough to last until her next f/u visit with specialist managing this problem in May

## 2025-01-23 NOTE — ASSESSMENT & PLAN NOTE
Reviewed ideal body weight and encouraged regular physical activity and healthy diet. Reviewed routine preventative measures such as always wearing seatbelt & home safety measures. Reviewed the recommended immunizations and screenings and they are current and/or updated and/or declined. Reviewed personalized prevention plan which is current and/or updated and a list of screening services available to patient. Provided end of life counseling as appropriate and recommended patient discuss with family their wishes for end of life decisions if they have not already done so.

## 2025-01-30 ENCOUNTER — TELEPHONE (OUTPATIENT)
Dept: FAMILY MEDICINE CLINIC | Facility: CLINIC | Age: 50
End: 2025-01-30

## 2025-01-31 ENCOUNTER — TELEPHONE (OUTPATIENT)
Dept: FAMILY MEDICINE CLINIC | Facility: CLINIC | Age: 50
End: 2025-01-31

## 2025-01-31 NOTE — TELEPHONE ENCOUNTER
What? This is already on the script; it's ONE pill per day, that's why it says \"DAILY PRN\". Did they not understand that \"daily\" means \"one per day\"?    Also, I only refilled this script on behalf of her neurologist who is actually managing her migraines. I did not change anything on the script so how it was already previously sent was how I sent it in again.

## 2025-01-31 NOTE — TELEPHONE ENCOUNTER
Rph called stating for insurance to cover the pt's migraine medication they need to know the limit she can take per day in order fill the medication. Please advise tks :)

## 2025-03-24 ENCOUNTER — PATIENT MESSAGE (OUTPATIENT)
Dept: OBGYN CLINIC | Age: 50
End: 2025-03-24

## 2025-04-10 ENCOUNTER — OFFICE VISIT (OUTPATIENT)
Dept: ORTHOPEDIC SURGERY | Age: 50
End: 2025-04-10
Payer: MEDICARE

## 2025-04-10 DIAGNOSIS — M47.816 SPONDYLOSIS OF LUMBAR REGION WITHOUT MYELOPATHY OR RADICULOPATHY: ICD-10-CM

## 2025-04-10 DIAGNOSIS — M54.50 LUMBAR BACK PAIN: ICD-10-CM

## 2025-04-10 DIAGNOSIS — M54.16 LUMBAR RADICULOPATHY: Primary | ICD-10-CM

## 2025-04-10 PROCEDURE — G2211 COMPLEX E/M VISIT ADD ON: HCPCS | Performed by: PHYSICAL MEDICINE & REHABILITATION

## 2025-04-10 PROCEDURE — 99214 OFFICE O/P EST MOD 30 MIN: CPT | Performed by: PHYSICAL MEDICINE & REHABILITATION

## 2025-04-10 RX ORDER — METHYLPREDNISOLONE 4 MG/1
TABLET ORAL
Qty: 1 KIT | Refills: 1 | Status: SHIPPED | OUTPATIENT
Start: 2025-04-10

## 2025-04-10 NOTE — PROGRESS NOTES
gotten better.  Sounds like something she had 8 or 9 years ago.  Hopefully this will just calm down.  I would check plain lumbosacral spine films today.  Do not think MR imaging is indicated.  I will give her prescription for steroid pack if this comes back again and I will give her some tizanidine instead of the cyclobenzaprine she is tried recently.  Would like to see her back in 8 months for continuity of care.  She has another spell, she will get in touch with us, can take oral steroids if she desires.  If she does have another spell will be more inclined to obtain MR imaging to give her a better explanation anatomically.      Review of radiographs.  AP alignment is good.  There are 5 nonrib-bearing lumbar vertebrae.  There is some loss of disc space height at the L4-L5 and L5-S1 levels.  There is no significant listhesis.  There is no clear evidence of vertebral compression.  Impression: Degenerative changes as above.    Clinical Notes:   I/my clinic will serve as the continuing focal point for all needed health care services and/or medical care services that are part of ongoing PAIN care related to patient's single, serious, or complex PAIN condition with the following diagnoses: Lumbar spondylosis with favorable response to injection therapy, other conservative measures.  Lumbar radiculopathy currently following along with notification of recurrence.   Chronic Pain Condition that is not stable = not at the patient's treatment goal     Elements of this note/dictation were created using speech recognition software.  As a result, some errors of speech recognition may be noted throughout the narrative.    MYRIAM NORIEGA JR, MD

## 2025-04-12 SDOH — ECONOMIC STABILITY: TRANSPORTATION INSECURITY
IN THE PAST 12 MONTHS, HAS THE LACK OF TRANSPORTATION KEPT YOU FROM MEDICAL APPOINTMENTS OR FROM GETTING MEDICATIONS?: YES

## 2025-04-12 SDOH — ECONOMIC STABILITY: FOOD INSECURITY: WITHIN THE PAST 12 MONTHS, THE FOOD YOU BOUGHT JUST DIDN'T LAST AND YOU DIDN'T HAVE MONEY TO GET MORE.: NEVER TRUE

## 2025-04-12 SDOH — ECONOMIC STABILITY: TRANSPORTATION INSECURITY
IN THE PAST 12 MONTHS, HAS LACK OF TRANSPORTATION KEPT YOU FROM MEETINGS, WORK, OR FROM GETTING THINGS NEEDED FOR DAILY LIVING?: YES

## 2025-04-12 SDOH — ECONOMIC STABILITY: INCOME INSECURITY: IN THE LAST 12 MONTHS, WAS THERE A TIME WHEN YOU WERE NOT ABLE TO PAY THE MORTGAGE OR RENT ON TIME?: NO

## 2025-04-12 SDOH — ECONOMIC STABILITY: FOOD INSECURITY: WITHIN THE PAST 12 MONTHS, YOU WORRIED THAT YOUR FOOD WOULD RUN OUT BEFORE YOU GOT MONEY TO BUY MORE.: NEVER TRUE

## 2025-04-14 ENCOUNTER — OFFICE VISIT (OUTPATIENT)
Dept: OBGYN CLINIC | Age: 50
End: 2025-04-14
Payer: MEDICARE

## 2025-04-14 VITALS
SYSTOLIC BLOOD PRESSURE: 132 MMHG | DIASTOLIC BLOOD PRESSURE: 80 MMHG | WEIGHT: 188.5 LBS | HEIGHT: 63 IN | BODY MASS INDEX: 33.4 KG/M2

## 2025-04-14 DIAGNOSIS — Z11.51 SCREENING FOR HUMAN PAPILLOMAVIRUS (HPV): ICD-10-CM

## 2025-04-14 DIAGNOSIS — Z12.4 SCREENING FOR CERVICAL CANCER: ICD-10-CM

## 2025-04-14 DIAGNOSIS — Z01.419 ENCOUNTER FOR BREAST AND PELVIC EXAMINATION: Primary | ICD-10-CM

## 2025-04-14 DIAGNOSIS — Z30.41 ENCOUNTER FOR SURVEILLANCE OF CONTRACEPTIVE PILLS: ICD-10-CM

## 2025-04-14 DIAGNOSIS — Z12.31 SCREENING MAMMOGRAM FOR BREAST CANCER: ICD-10-CM

## 2025-04-14 PROCEDURE — 3075F SYST BP GE 130 - 139MM HG: CPT | Performed by: NURSE PRACTITIONER

## 2025-04-14 PROCEDURE — 3079F DIAST BP 80-89 MM HG: CPT | Performed by: NURSE PRACTITIONER

## 2025-04-14 PROCEDURE — 99214 OFFICE O/P EST MOD 30 MIN: CPT | Performed by: NURSE PRACTITIONER

## 2025-04-14 PROCEDURE — G0101 CA SCREEN;PELVIC/BREAST EXAM: HCPCS | Performed by: NURSE PRACTITIONER

## 2025-04-14 RX ORDER — NORETHINDRONE ACETATE AND ETHINYL ESTRADIOL .03; 1.5 MG/1; MG/1
1 TABLET ORAL DAILY
Qty: 4 PACKET | Refills: 3 | Status: SHIPPED | OUTPATIENT
Start: 2025-04-14

## 2025-04-14 NOTE — PROGRESS NOTES
Patient presents today for a pap smear and pelvic exam with breast exam and surveillance of OCP.  Normally does not have periods with OCP's however she ran out of refills on had a period the first week of April.   Requesting refills on OCP's.     She enjoys continuous cycling her BC as it helps to stabilize her moods.     Doing well on Junel and wishes to continue with continuous cycling.   Aware she needs MGM for breast cancer screening, disc importance of early detection.    Last pap smear: 05/10/2022 Negative. HPV Negative.   Mammogram: 2019 No mammographic findings concerning for malignancy. Clinical management of left breast pain is recommended.  Colonoscopy: 2024   A) \"DUODENAL BIOPSIES\":  FRAGMENTS OF HISTOLOGICALLY UNREMARKABLE SMALL INTESTINE.   B) \"GASTRIC BIOPSIES\":  MINIMAL CHRONIC INACTIVE GASTRITIS.   C) \"DISTAL ESOPHAGUS BIOPSIES\":  GLANDULAR EPITHELIUM HAVING FOCAL GOBLET CELL TYPE INTESTINAL METAPLASIA.  SIGNIFICANT DYSPLASIA IS NOT IDENTIFIED.  FRAGMENTS OF MINIMALLY HYPERPLASTIC SQUAMOUS EPITHELIUM.   D) \"MID ESOPHAGUS BIOPSIES\":  DETACHED FRAGMENTS OF MINIMALLY HYPERPLASTIC SQUAMOUS EPITHELIUM.     OB History          0    Para   0    Term   0       0    AB   0    Living   0         SAB   0    IAB   0    Ectopic   0    Molar   0    Multiple   0    Live Births   0                  GYN History           Patient's last menstrual period was 2025 (exact date).     Past Medical History:  Past Medical History:   Diagnosis Date    ADHD (attention deficit hyperactivity disorder)     Allergic rhinitis 2014    Anxiety disorder 2014    Asthma     MILD per pt.  Rare rescue inhaler use.    B12 deficiency 2014    Bursitis     Depression 2014    Essential hypertension     Fibromyalgia     GERD (gastroesophageal reflux disease) 2014    Hypothyroidism     Meniere disease 2014    Migraine     OCD (obsessive compulsive disorder)     Tinnitus

## 2025-04-23 ENCOUNTER — RESULTS FOLLOW-UP (OUTPATIENT)
Dept: OBGYN CLINIC | Age: 50
End: 2025-04-23

## 2025-04-23 LAB
COLLECTION METHOD: NORMAL
CYTOLOGIST CVX/VAG CYTO: NORMAL
CYTOLOGY CVX/VAG DOC THIN PREP: NORMAL
HPV APTIMA: NEGATIVE
Lab: NORMAL
Lab: NORMAL
PAP SOURCE: NORMAL
PATH REPORT.FINAL DX SPEC: NORMAL
STAT OF ADQ CVX/VAG CYTO-IMP: NORMAL

## 2025-05-22 ENCOUNTER — OFFICE VISIT (OUTPATIENT)
Dept: NEUROLOGY | Age: 50
End: 2025-05-22
Payer: MEDICARE

## 2025-05-22 VITALS
BODY MASS INDEX: 33.66 KG/M2 | DIASTOLIC BLOOD PRESSURE: 80 MMHG | SYSTOLIC BLOOD PRESSURE: 123 MMHG | WEIGHT: 190 LBS | HEART RATE: 77 BPM | HEIGHT: 63 IN | OXYGEN SATURATION: 97 %

## 2025-05-22 DIAGNOSIS — G43.909 MIGRAINE WITHOUT STATUS MIGRAINOSUS, NOT INTRACTABLE, UNSPECIFIED MIGRAINE TYPE: Primary | ICD-10-CM

## 2025-05-22 PROCEDURE — 99214 OFFICE O/P EST MOD 30 MIN: CPT | Performed by: NURSE PRACTITIONER

## 2025-05-22 PROCEDURE — 3074F SYST BP LT 130 MM HG: CPT | Performed by: NURSE PRACTITIONER

## 2025-05-22 PROCEDURE — 3079F DIAST BP 80-89 MM HG: CPT | Performed by: NURSE PRACTITIONER

## 2025-05-22 RX ORDER — UBROGEPANT 100 MG/1
TABLET ORAL
Qty: 16 TABLET | Refills: 8 | Status: SHIPPED | OUTPATIENT
Start: 2025-05-22

## 2025-05-22 NOTE — ASSESSMENT & PLAN NOTE
Stable.  Not at goal.  Resume treatment with Aimovig 140 mg.  Monitor for constipation.  This was not previously a problem with patient's continuous use of this medication.  Continue Ubrelvy for rescue. Take 1 tablet at onset of migraine, may be repeated in 2 hours for a max of 2 doses in 24 hours.       Track migraine frequency.

## 2025-05-22 NOTE — PROGRESS NOTES
pertinent images and reports.          Acknowledgements obtained where needed.   [ *NOTE: parts of this note were produced using artificial speech recognition software, which may have inadvertent errors in the produced wordings. ]    The patient (or guardian, if applicable) and other individuals in attendance with the patient were advised that Artificial Intelligence will be utilized during this visit to record, process the conversation to generate a clinical note, and support improvement of the AI technology. The patient (or guardian, if applicable) and other individuals in attendance at the appointment consented to the use of AI, including the recording.                 [facility]

## 2025-06-11 ENCOUNTER — TELEPHONE (OUTPATIENT)
Dept: NEUROLOGY | Age: 50
End: 2025-06-11

## 2025-08-05 ENCOUNTER — OFFICE VISIT (OUTPATIENT)
Dept: FAMILY MEDICINE CLINIC | Facility: CLINIC | Age: 50
End: 2025-08-05

## 2025-08-05 ENCOUNTER — LAB (OUTPATIENT)
Dept: FAMILY MEDICINE CLINIC | Facility: CLINIC | Age: 50
End: 2025-08-05

## 2025-08-05 VITALS
BODY MASS INDEX: 33.31 KG/M2 | SYSTOLIC BLOOD PRESSURE: 128 MMHG | TEMPERATURE: 98.1 F | WEIGHT: 188 LBS | HEART RATE: 74 BPM | DIASTOLIC BLOOD PRESSURE: 84 MMHG

## 2025-08-05 DIAGNOSIS — E03.9 HYPOTHYROIDISM (ACQUIRED): Primary | ICD-10-CM

## 2025-08-05 DIAGNOSIS — Z12.11 COLON CANCER SCREENING: ICD-10-CM

## 2025-08-05 DIAGNOSIS — R73.02 IMPAIRED GLUCOSE TOLERANCE (ORAL): ICD-10-CM

## 2025-08-05 DIAGNOSIS — Z83.3 FAMILY HISTORY OF DIABETES MELLITUS (DM): ICD-10-CM

## 2025-08-05 DIAGNOSIS — I10 HYPERTENSION, UNSPECIFIED TYPE: ICD-10-CM

## 2025-08-05 DIAGNOSIS — E55.9 VITAMIN D DEFICIENCY: ICD-10-CM

## 2025-08-05 DIAGNOSIS — E53.8 VITAMIN B12 DEFICIENCY: ICD-10-CM

## 2025-08-05 DIAGNOSIS — E78.5 HYPERLIPIDEMIA, UNSPECIFIED HYPERLIPIDEMIA TYPE: ICD-10-CM

## 2025-08-05 DIAGNOSIS — E03.9 HYPOTHYROIDISM (ACQUIRED): ICD-10-CM

## 2025-08-05 DIAGNOSIS — M79.7 FIBROMYALGIA: ICD-10-CM

## 2025-08-05 DIAGNOSIS — Z12.31 SCREENING MAMMOGRAM, ENCOUNTER FOR: ICD-10-CM

## 2025-08-05 DIAGNOSIS — D64.9 ANEMIA, UNSPECIFIED TYPE: ICD-10-CM

## 2025-08-05 LAB
25(OH)D3 SERPL-MCNC: 37.6 NG/ML (ref 30–100)
ALBUMIN SERPL-MCNC: 3.6 G/DL (ref 3.5–5)
ALBUMIN/GLOB SERPL: 1 (ref 1–1.9)
ALP SERPL-CCNC: 61 U/L (ref 35–104)
ALT SERPL-CCNC: 15 U/L (ref 8–45)
ANION GAP SERPL CALC-SCNC: 12 MMOL/L (ref 7–16)
AST SERPL-CCNC: 15 U/L (ref 15–37)
BASOPHILS # BLD: 0.06 K/UL (ref 0–0.2)
BASOPHILS NFR BLD: 1.4 % (ref 0–2)
BILIRUB SERPL-MCNC: 0.4 MG/DL (ref 0–1.2)
BUN SERPL-MCNC: 11 MG/DL (ref 6–23)
CALCIUM SERPL-MCNC: 9.2 MG/DL (ref 8.8–10.2)
CHLORIDE SERPL-SCNC: 103 MMOL/L (ref 98–107)
CHOLEST SERPL-MCNC: 165 MG/DL (ref 0–200)
CO2 SERPL-SCNC: 25 MMOL/L (ref 20–29)
CREAT SERPL-MCNC: 0.99 MG/DL (ref 0.6–1.1)
DIFFERENTIAL METHOD BLD: ABNORMAL
EOSINOPHIL # BLD: 0.09 K/UL (ref 0–0.8)
EOSINOPHIL NFR BLD: 2.1 % (ref 0.5–7.8)
ERYTHROCYTE [DISTWIDTH] IN BLOOD BY AUTOMATED COUNT: 13.4 % (ref 11.9–14.6)
EST. AVERAGE GLUCOSE BLD GHB EST-MCNC: 98 MG/DL
GLOBULIN SER CALC-MCNC: 3.7 G/DL (ref 2.3–3.5)
GLUCOSE SERPL-MCNC: 87 MG/DL (ref 70–99)
HBA1C MFR BLD: 5 % (ref 0–5.6)
HCT VFR BLD AUTO: 37.6 % (ref 35.8–46.3)
HDLC SERPL-MCNC: 53 MG/DL (ref 40–60)
HDLC SERPL: 3.1 (ref 0–5)
HGB BLD-MCNC: 11.9 G/DL (ref 11.7–15.4)
IMM GRANULOCYTES # BLD AUTO: 0.02 K/UL (ref 0–0.5)
IMM GRANULOCYTES NFR BLD AUTO: 0.5 % (ref 0–5)
LDLC SERPL CALC-MCNC: 96 MG/DL (ref 0–100)
LYMPHOCYTES # BLD: 1.02 K/UL (ref 0.5–4.6)
LYMPHOCYTES NFR BLD: 23.6 % (ref 13–44)
MCH RBC QN AUTO: 29.5 PG (ref 26.1–32.9)
MCHC RBC AUTO-ENTMCNC: 31.6 G/DL (ref 31.4–35)
MCV RBC AUTO: 93.3 FL (ref 82–102)
MONOCYTES # BLD: 0.42 K/UL (ref 0.1–1.3)
MONOCYTES NFR BLD: 9.7 % (ref 4–12)
NEUTS SEG # BLD: 2.72 K/UL (ref 1.7–8.2)
NEUTS SEG NFR BLD: 62.7 % (ref 43–78)
NRBC # BLD: 0 K/UL (ref 0–0.2)
PLATELET # BLD AUTO: 212 K/UL (ref 150–450)
PMV BLD AUTO: 11.3 FL (ref 9.4–12.3)
POTASSIUM SERPL-SCNC: 3.7 MMOL/L (ref 3.5–5.1)
PROT SERPL-MCNC: 7.3 G/DL (ref 6.3–8.2)
RBC # BLD AUTO: 4.03 M/UL (ref 4.05–5.2)
SODIUM SERPL-SCNC: 139 MMOL/L (ref 136–145)
T4 FREE SERPL-MCNC: 0.9 NG/DL (ref 0.9–1.7)
TRIGL SERPL-MCNC: 79 MG/DL (ref 0–150)
TSH W FREE THYROID IF ABNORMAL: 26.5 UIU/ML (ref 0.27–4.2)
VIT B12 SERPL-MCNC: 239 PG/ML (ref 193–986)
VLDLC SERPL CALC-MCNC: 16 MG/DL (ref 6–23)
WBC # BLD AUTO: 4.3 K/UL (ref 4.3–11.1)

## 2025-08-05 RX ORDER — LEVOTHYROXINE SODIUM 100 UG/1
100 TABLET ORAL
Qty: 30 TABLET | Refills: 2 | Status: SHIPPED | OUTPATIENT
Start: 2025-08-05

## (undated) DEVICE — ESOPHAGEAL BALLOON DILATATION CATHETER: Brand: CRE FIXED WIRE

## (undated) DEVICE — CONNECTOR TBNG OD5-7MM O2 END DISP

## (undated) DEVICE — GAUZE,SPONGE,4"X4",12PLY,WOVEN,NS,LF: Brand: MEDLINE

## (undated) DEVICE — MOUTHPIECE ENDOSCP L CTRL OPN AND SIDE PORTS DISP

## (undated) DEVICE — SYRINGE INFL 60ML DISP ALLIANCE II

## (undated) DEVICE — CANNULA NSL ORAL AD FOR CAPNOFLEX CO2 O2 AIRLFE

## (undated) DEVICE — FORCEPS BX L240CM JAW DIA2.8MM L CAP W/ NDL MIC MESH TOOTH

## (undated) DEVICE — AIRLIFE™ OXYGEN TUBING 7 FEET (2.1 M) CRUSH RESISTANT OXYGEN TUBING, VINYL TIPPED: Brand: AIRLIFE™

## (undated) DEVICE — SINGLE PORT MANIFOLD: Brand: NEPTUNE 2

## (undated) DEVICE — ENDOSCOPIC KIT 1.1+ OP4 CA DE 2 GWN AAMI LEVEL 3

## (undated) DEVICE — CONTAINER FORMALIN PREFILLED 10% NBF 60ML

## (undated) DEVICE — KENDALL RADIOLUCENT FOAM MONITORING ELECTRODE RECTANGULAR SHAPE: Brand: KENDALL

## (undated) DEVICE — BLOCK BITE AD 60FR W/ VELC STRP ADDRESSES MOST PT AND